# Patient Record
Sex: FEMALE | Race: WHITE | NOT HISPANIC OR LATINO | Employment: OTHER
[De-identification: names, ages, dates, MRNs, and addresses within clinical notes are randomized per-mention and may not be internally consistent; named-entity substitution may affect disease eponyms.]

---

## 2017-01-06 ENCOUNTER — RECORDS - HEALTHEAST (OUTPATIENT)
Dept: ADMINISTRATIVE | Facility: OTHER | Age: 72
End: 2017-01-06

## 2017-01-06 ENCOUNTER — OFFICE VISIT (OUTPATIENT)
Dept: ORTHOPEDICS | Facility: CLINIC | Age: 72
End: 2017-01-06

## 2017-01-06 DIAGNOSIS — M79.671 RIGHT FOOT PAIN: Primary | ICD-10-CM

## 2017-01-06 DIAGNOSIS — M84.374A METATARSAL STRESS FRACTURE OF RIGHT FOOT, INITIAL ENCOUNTER: ICD-10-CM

## 2017-01-06 NOTE — MR AVS SNAPSHOT
After Visit Summary   1/6/2017    Sadia Rider    MRN: [id number]           Patient Information     Date Of Birth          1945        Visit Information        Provider Department      1/6/2017 12:00 PM Shankar Edmondson DPM [facility]        Today's Diagnoses     Right foot pain    -  1        Follow-ups after your visit        Your next 10 appointments already scheduled     Jan 06, 2017  4:40 PM   CT LOWER EXTREMITY RIGHT W/O CONTRAST with UCCT2   [facility] [place] [facility] ([facility])    [address] [place] [address]  [address]st [facility] [id number] [phone]           Please bring any scans or X-rays taken at other hospitals, if similar tests were done. Also bring a list of your medicines, including vitamins, minerals and over-the-counter drugs. It is safest to leave personal items at home.  Be sure to tell your doctor:   If you have any allergies.   If there s any chance you are pregnant.   If you are breastfeeding.   If you have any special needs.  You do not need to do anything special to prepare.  Please wear loose clothing, such as a sweat suit or jogging clothes. Avoid snaps, zippers and other metal. We may ask you to undress and put on a hospital gown.            Jan 13, 2017 12:20 PM   (Arrive by 12:05 PM)   RETURN FOOT/ANKLE with Shankar Edmondson DPM   [facility] ([facility])    [address] [place] [address]  4th [facility] [id number] [phone]            Jan 23, 2017  8:00 AM   (Arrive by 7:45 AM)   MA SCREENING DIGITAL BILATERAL with UCBCMA1   [facility] ([facility])    [address] [place] [id number] [phone]           Do not use any powder, lotion or deodorant under your arms or on your breast. If you do, we will ask you to remove it before your exam.  Wear comfortable, two-piece clothing.  If you  have any allergies, tell your care team.  Bring any previous mammograms from other facilities or have them mailed to the breast center. This mammogram location, [facility], now offers 3D mammography. It doesn't replace a screening mammogram and can be done with a regular screening mammogram. It is optional and not all insurances will pay for it. 3D mammography is a special kind of mammogram that produces a three-dimensional image of the breast by using low dose-xrays. 3D allows the radiologist to see the breast tissue differently from 2D, which reduces the chance of repeat testing due to overlapping breast tissue. If you are interested in have a 3D mammogram, please check with your insurance before you arrive for your exam. 3D mammography is offered to all patients. On the day of your exam you will be asked to sign a form stating yes, you wish to have 3D imaging or, no, you decline.            Jan 23, 2017  8:45 AM   VISUAL FIELD with [facility] EYE VISUAL FIELD   Eye Clinic ([facility])    [address]  [address] [place] [id number] [phone]            Jan 23, 2017  9:15 AM   RETURN GLAUCOMA with Evelyn Mathis MD   Eye Clinic ([facility])    [address]  [address] [facility]  [address] [address]  [facility] [address]   [phone]              Future tests that were ordered for you today     Open Future Orders        Priority Expected Expires Ordered    CT Lower extremity RT w/o contrast Routine  1/6/2018 1/6/2017            Who to contact     Please call your clinic at [phone] to:    Ask questions about your health    Make or cancel appointments    Discuss your medicines    Learn about your test results    Speak to your doctor   If you have compliments or concerns about an experience at your clinic, or if you wish to file a complaint, please contact [facility] Patient Relations at  [phone] or email us at [email or web address]         Additional Information About Your Visit        Watch-Siteshart Information     [employer] gives you secure access to your electronic health record. If you see a primary care provider, you can also send messages to your care team and make appointments. If you have questions, please call your primary care clinic.  If you do not have a primary care provider, please call [phone] and they will assist you.      [employer] is an electronic gateway that provides easy, online access to your medical records. With [employer], you can request a clinic appointment, read your test results, renew a prescription or communicate with your care team.     To access your existing account, please contact your [facility] or call [phone] for assistance.        Care EveryWhere ID     This is your Care EveryWhere ID. This could be used by other organizations to access your [place] medical records  [id number]         Blood Pressure from Last 3 Encounters:   07/13/15 188/91   07/08/14 182/92   08/19/11 141/74    Weight from Last 3 Encounters:   07/13/15 79.289 kg (174 lb 12.8 oz)   07/08/14 78.744 kg (173 lb 9.6 oz)   08/19/11 80.151 kg (176 lb 11.2 oz)                 Today's Medication Changes          These changes are accurate as of: 1/6/17  1:08 PM.  If you have any questions, ask your nurse or doctor.               Stop taking these medicines if you haven't already. Please contact your care team if you have questions.     alendronate 70 MG tablet   Commonly known as:  FOSAMAX   Stopped by:  Shankar Edmondson DPM                    Primary Care Provider Office Phone # Fax #    Delfina Boyle [phone]       [facility] [place] [address] [place] [address]        Thank you!     Thank you for choosing [facility]  for your care. Our goal is always to provide you with excellent care.  Hearing back from our patients is one way we can continue to improve our services. Please take a few minutes to complete the written survey that you may receive in the mail after your visit with us. Thank you!             Your Updated Medication List - Protect others around you: Learn how to safely use, store and throw away your medicines at www.disposemymeds.org.          This list is accurate as of: 1/6/17  1:08 PM.  Always use your most recent med list.                   Brand Name Dispense Instructions for use    aspirin 81 MG tablet      Take 1 tablet by mouth daily.       calcium carb-cholecalciferol 600-500 MG-UNIT Caps      Take 1 capsule by mouth 3 times daily       CENTRUM ULTRA WOMENS PO      Take 1 tablet by mouth daily. ( calcium: 500 mg and vitamin d: 800 units)       Fiber Tabs      Take 2 tablets by mouth daily.       simvastatin 10 MG tablet    ZOCOR     Take 1 tablet by mouth At Bedtime.       VITAMIN D3 PO      Take 1,000 mg by mouth daily

## 2017-01-06 NOTE — PROGRESS NOTES
Date of Service: 1/6/2017    Chief Complaint:   Chief Complaint   Patient presents with     Musculoskeletal Problem     Bilateral foot pain, R>L for about 6 weeks.         HPI: Sadia is a 71 year old female who presents today for further evaluation of pain in primarily the right foot.  She walks 1 hour 4 days a week, but can now only walk for about 30 minutes now since 6 weeks ago. No inciting trauma noted. Nothing hurting as of right now, it hurts most when she is exercising and walking. No previous treatment. Anterior shin pain with some pain in the knee and hip. Pain is sharp in nature. No burning or electric. Right hurts more than left. She feels as though she is walking on the outside of her foot.     Review of Systems: A 14-point review was obtained and added to the medical record.   Answers for HPI/ROS submitted by the patient on 1/6/2017   General Symptoms: No  Skin Symptoms: No  HENT Symptoms: No  EYE SYMPTOMS: No  HEART SYMPTOMS: No  LUNG SYMPTOMS: No  INTESTINAL SYMPTOMS: No  URINARY SYMPTOMS: No  GYNECOLOGIC SYMPTOMS: No  BREAST SYMPTOMS: No  SKELETAL SYMPTOMS: No  BLOOD SYMPTOMS: No  NERVOUS SYSTEM SYMPTOMS: No  MENTAL HEALTH SYMPTOMS: No        PMH:   Past Medical History   Diagnosis Date     Glaucoma       POAG ADV     Tear film insufficiency, unspecified      Basal cell carcinoma        PSxH:   Past Surgical History   Procedure Laterality Date     Cataract iol, rt/lt  1998/2002     BE     Glaucoma surgery  1990s     Trab x 2 RE     Trabeculectomy, mitomycin filter, combined  3/9/2014     LE     Argon laser trabeculoplasty (alt) od (right eye)  5/1989     RE     Yag capsulotomy od (right eye)  8/4/2008     RE     Mohs micrographic procedure       Cholecystectomy         Allergies: Alphagan    SH:   Social History     Social History     Marital Status:      Spouse Name: N/A     Number of Children: N/A     Years of Education: N/A     Occupational History     Not on file.     Social History Main  Topics     Smoking status: Never Smoker      Smokeless tobacco: Never Used     Alcohol Use: No     Drug Use: No     Sexual Activity: Not on file     Other Topics Concern     Not on file     Social History Narrative       FH:   Family History   Problem Relation Age of Onset     Glaucoma Mother      Glaucoma Brother      Retinal detachment Brother      Glaucoma Other      uncle       Objective:      PT and DP pulses are 1/4 bilaterally. CRT is > 3 seconds. Diminished pedal hair.   Gross sensation is Intact bilaterally.   Equinus is noted bilaterally with right greater than left. No pain with active or passive ROM of the ankle, MTJ, 1st ray, or halluces bilaterally. The right foot appears more rectus with weightbearing and the left appears to be in planus. No discreet areas of pain are noted today with palpation. No pain along the courses of the Achilles, PT, or peroneal tendons.  She points to the entire forefoot as the area of pain.   Nails normal bilaterally. No open lesions are noted.     right foot xrays indicated in 3 weightbearing views.    Cortical irregularities noted on the 2nd and 3rd met bases. Lucency noted at the base of the 4th met. 2nd and 3rd met changes may represent previous stress fractures or LisFranc injury. 4th met lesion likely stress fracture.       Assessment: right metatarsal stress fractures in various stages of healing    Plan:  - Pt seen and evaluated  - Xrays were taken and discussed with her.   - I ordered a CT for better visualization of the metatarsals. She is getting that today.   - She is not having active pain today. I dispensed a brace today to help keep her alignment better. I will likely change to a CAM next week if she is agreeable.   - See after CT to discuss further tx options.         Answers for HPI/ROS submitted by the patient on 1/6/2017   General Symptoms: No  Skin Symptoms: No  HENT Symptoms: No  EYE SYMPTOMS: No  HEART SYMPTOMS: No  LUNG SYMPTOMS: No  INTESTINAL  SYMPTOMS: No  URINARY SYMPTOMS: No  GYNECOLOGIC SYMPTOMS: No  BREAST SYMPTOMS: No  SKELETAL SYMPTOMS: No  BLOOD SYMPTOMS: No  NERVOUS SYSTEM SYMPTOMS: No  MENTAL HEALTH SYMPTOMS: No

## 2017-01-06 NOTE — Clinical Note
1/6/2017       RE: Sadia Rider  [address] [facility] [address]     Dear Colleague,    Thank you for referring your patient, Sadia Rider, to the [facility] at [facility]. Please see a copy of my visit note below.    Date of Service: 1/6/2017    Chief Complaint:   Chief Complaint   Patient presents with     Musculoskeletal Problem     Bilateral foot pain, R>L for about 6 weeks.         HPI: Sadia is a 71 year old female who presents today for further evaluation of pain in primarily the right foot.  She walks 1 hour 4 days a week, but can now only walk for about 30 minutes now since 6 weeks ago. No inciting trauma noted. Nothing hurting as of right now, it hurts most when she is exercising and walking. No previous treatment. Anterior shin pain with some pain in the knee and hip. Pain is sharp in nature. No burning or electric. Right hurts more than left. She feels as though she is walking on the outside of her foot.     Review of Systems: A 14-point review was obtained and added to the medical record.   Answers for HPI/ROS submitted by the patient on 1/6/2017   General Symptoms: No  Skin Symptoms: No  HENT Symptoms: No  EYE SYMPTOMS: No  HEART SYMPTOMS: No  LUNG SYMPTOMS: No  INTESTINAL SYMPTOMS: No  URINARY SYMPTOMS: No  GYNECOLOGIC SYMPTOMS: No  BREAST SYMPTOMS: No  SKELETAL SYMPTOMS: No  BLOOD SYMPTOMS: No  NERVOUS SYSTEM SYMPTOMS: No  MENTAL HEALTH SYMPTOMS: No        PMH:   Past Medical History   Diagnosis Date     Glaucoma       POAG ADV     Tear film insufficiency, unspecified      Basal cell carcinoma        PSxH:   Past Surgical History   Procedure Laterality Date     Cataract iol, rt/lt  1998/2002     BE     Glaucoma surgery  1990s     Trab x 2 RE     Trabeculectomy, mitomycin filter, combined  3/9/2014     LE     Argon laser trabeculoplasty (alt) od (right eye)  5/1989     RE     Yag capsulotomy od (right eye)  8/4/2008     RE      Mohs micrographic procedure       Cholecystectomy         Allergies: Alphagan    SH:   Social History     Social History     Marital Status:      Spouse Name: N/A     Number of Children: N/A     Years of Education: N/A     Occupational History     Not on file.     Social History Main Topics     Smoking status: Never Smoker      Smokeless tobacco: Never Used     Alcohol Use: No     Drug Use: No     Sexual Activity: Not on file     Other Topics Concern     Not on file     Social History Narrative       FH:   Family History   Problem Relation Age of Onset     Glaucoma Mother      Glaucoma Brother      Retinal detachment Brother      Glaucoma Other      uncle       Objective:      PT and DP pulses are 1/4 bilaterally. CRT is > 3 seconds. Diminished pedal hair.   Gross sensation is Intact bilaterally.   Equinus is noted bilaterally with right greater than left. No pain with active or passive ROM of the ankle, MTJ, 1st ray, or halluces bilaterally. The right foot appears more rectus with weightbearing and the left appears to be in planus. No discreet areas of pain are noted today with palpation. No pain along the courses of the Achilles, PT, or peroneal tendons.  She points to the entire forefoot as the area of pain.   Nails normal bilaterally. No open lesions are noted.     right foot xrays indicated in 3 weightbearing views.    Cortical irregularities noted on the 2nd and 3rd met bases. Lucency noted at the base of the 4th met. 2nd and 3rd met changes may represent previous stress fractures or LisFranc injury. 4th met lesion likely stress fracture.       Assessment: right metatarsal stress fractures in various stages of healing    Plan:  - Pt seen and evaluated  - Xrays were taken and discussed with her.   - I ordered a CT for better visualization of the metatarsals. She is getting that today.   - She is not having active pain today. I dispensed a brace today to help keep her alignment better. I will likely  change to a CAM next week if she is agreeable.   - See after CT to discuss further tx options.             Again, thank you for allowing me to participate in the care of your patient.      Sincerely,    Shankar Edmondson DPM

## 2017-01-13 ENCOUNTER — RECORDS - HEALTHEAST (OUTPATIENT)
Dept: ADMINISTRATIVE | Facility: OTHER | Age: 72
End: 2017-01-13

## 2017-01-13 ENCOUNTER — OFFICE VISIT (OUTPATIENT)
Dept: ORTHOPEDICS | Facility: CLINIC | Age: 72
End: 2017-01-13

## 2017-01-13 DIAGNOSIS — M84.374A STRESS FRACTURE OF METATARSAL BONE OF RIGHT FOOT: Primary | ICD-10-CM

## 2017-01-13 NOTE — Clinical Note
1/13/2017       RE: Sadia Rider  [address] [facility] [facility] [address]     Dear Colleague,    Thank you for referring your patient, Sadia Rider, to the [facility] at [facility]. Please see a copy of my visit note below.    Chief Complaint:   Chief Complaint   Patient presents with     Results     Follow up. MRI, Right Foot. Pt stated that her right foot hurts due to wearing ankle brace. Pt stated that her foot became swollen and painful.           Allergies   Allergen Reactions     Alphagan [Brimonidine Tartrate] Blisters         Subjective: Sadia is a 71 year old female who presents to the clinic today for a follow up of foot swelling and pain. She says the swelling increased in the foot after starting to wear the brace, however her foot has decreased a lot since the last visit and stopping use of the brace. She has been wearing tennis shoes and stopped walking.     Objective  Physical exam unchanged since the last visit. No edema is noted to the right foot today.     Assessment:   - Stress fractures of the 2nd - 4th metatarsals. 4th metatarsal stress fracture is active.    Plan:   - Pt seen and evaluated. Discussed diagnosis and treatment options with patient in detail. Discussed x-rays and CT with patient today.  - Dispensed 1 large AirCast (short) for patient to wear at all times while ambulating. Educated patient on use.   - She is to avoid distance walking, activities that put stress on the foot (biking, leg strengthening). Okay to perform upper body exercises and swimming.   - Encouraged patient to make an appointment with her PCP to re-evaluate bone density throughout. She is no longer taking a bisphosphonate and is incidentally exhibiting 3 stress fractures in her foot. She will make an appointment with PCP.   - Pt to return to clinic in 4 weeks          Again, thank you for allowing me to participate in the care of your patient.       Sincerely,    Shankar Edmondson DPM

## 2017-01-13 NOTE — NURSING NOTE
Reason For Visit:   Chief Complaint   Patient presents with     Results     Follow up. MRI, Right Foot. Pt stated that her right foot hurts due to wearing ankle brace. Pt stated that her foot became swollen and painful.        Pain Assessment  Patient Currently in Pain: Denies (Pt stated she has pain in her right foot when she walks. )            Current Outpatient Prescriptions   Medication Sig Dispense Refill     aspirin 81 MG tablet Take 1 tablet by mouth daily.       calcium carb-cholecalciferol 600-500 MG-UNIT CAPS Take 1 capsule by mouth 3 times daily       Cholecalciferol (VITAMIN D3 PO) Take 1,000 mg by mouth daily       simvastatin (ZOCOR) 10 MG tablet Take 1 tablet by mouth At Bedtime.       Fiber TABS Take 2 tablets by mouth daily.       Multiple Vitamins-Minerals (CENTRUM ULTRA WOMENS PO) Take 1 tablet by mouth daily ( calcium: 300 mg and vitamin d: 1000 units)            Allergies   Allergen Reactions     Alphagan [Brimonidine Tartrate] Blisters

## 2017-01-13 NOTE — PROGRESS NOTES
Chief Complaint:   Chief Complaint   Patient presents with     Results     Follow up. MRI, Right Foot. Pt stated that her right foot hurts due to wearing ankle brace. Pt stated that her foot became swollen and painful.           Allergies   Allergen Reactions     Alphagan [Brimonidine Tartrate] Blisters         Subjective: Sadia is a 71 year old female who presents to the clinic today for a follow up of foot swelling and pain. She says the swelling increased in the foot after starting to wear the brace, however her foot has decreased a lot since the last visit and stopping use of the brace. She has been wearing tennis shoes and stopped walking.     Objective  Physical exam unchanged since the last visit. No edema is noted to the right foot today.     Assessment:   - Stress fractures of the 2nd - 4th metatarsals. 4th metatarsal stress fracture is active.    Plan:   - Pt seen and evaluated. Discussed diagnosis and treatment options with patient in detail. Discussed x-rays and CT with patient today.  - Dispensed 1 large AirCast (short) for patient to wear at all times while ambulating. Educated patient on use.   - She is to avoid distance walking, activities that put stress on the foot (biking, leg strengthening). Okay to perform upper body exercises and swimming.   - Encouraged patient to make an appointment with her PCP to re-evaluate bone density throughout. She is no longer taking a bisphosphonate and is incidentally exhibiting 3 stress fractures in her foot. She will make an appointment with PCP.   - Pt to return to clinic in 4 weeks

## 2017-01-23 ENCOUNTER — RADIANT APPOINTMENT (OUTPATIENT)
Dept: MAMMOGRAPHY | Facility: CLINIC | Age: 72
End: 2017-01-23

## 2017-01-23 ENCOUNTER — OFFICE VISIT - HEALTHEAST (OUTPATIENT)
Dept: FAMILY MEDICINE | Facility: CLINIC | Age: 72
End: 2017-01-23

## 2017-01-23 ENCOUNTER — RECORDS - HEALTHEAST (OUTPATIENT)
Dept: ADMINISTRATIVE | Facility: OTHER | Age: 72
End: 2017-01-23

## 2017-01-23 ENCOUNTER — APPOINTMENT (OUTPATIENT)
Dept: OPHTHALMOLOGY | Facility: CLINIC | Age: 72
End: 2017-01-23
Attending: OPHTHALMOLOGY
Payer: COMMERCIAL

## 2017-01-23 DIAGNOSIS — H52.13 MYOPIA OF BOTH EYES: Primary | ICD-10-CM

## 2017-01-23 DIAGNOSIS — M81.0 OSTEOPOROSIS: ICD-10-CM

## 2017-01-23 DIAGNOSIS — Q15.0 JUVENILE GLAUCOMA: ICD-10-CM

## 2017-01-23 DIAGNOSIS — E06.3 HYPOTHYROIDISM DUE TO HASHIMOTO'S THYROIDITIS: ICD-10-CM

## 2017-01-23 DIAGNOSIS — M84.376A: ICD-10-CM

## 2017-01-23 DIAGNOSIS — Z12.31 VISIT FOR SCREENING MAMMOGRAM: ICD-10-CM

## 2017-01-23 PROCEDURE — 92015 DETERMINE REFRACTIVE STATE: CPT | Mod: ZF

## 2017-01-23 PROCEDURE — 92081 LIMITED VISUAL FIELD XM: CPT | Mod: ZF | Performed by: OPHTHALMOLOGY

## 2017-01-23 PROCEDURE — 99214 OFFICE O/P EST MOD 30 MIN: CPT | Mod: ZF

## 2017-01-23 ASSESSMENT — REFRACTION_MANIFEST
OD_AXIS: 120
OD_ADD: +3.00
OS_SPHERE: -0.50
OS_ADD: +3.00
OS_CYLINDER: SPHERE
OD_SPHERE: -4.00
OD_CYLINDER: +1.50

## 2017-01-23 ASSESSMENT — CONF VISUAL FIELD
OD_INFERIOR_NASAL_RESTRICTION: 3
OD_SUPERIOR_NASAL_RESTRICTION: 3
OD_SUPERIOR_TEMPORAL_RESTRICTION: 3
OD_INFERIOR_TEMPORAL_RESTRICTION: 3

## 2017-01-23 ASSESSMENT — VISUAL ACUITY
OD_CC: 20/50-
OD_PH_CC: 20/30-
METHOD: SNELLEN - LINEAR
OS_CC: 20/150 ECC
CORRECTION_TYPE: GLASSES

## 2017-01-23 ASSESSMENT — MIFFLIN-ST. JEOR: SCORE: 1252.92

## 2017-01-23 ASSESSMENT — TONOMETRY
IOP_METHOD: APPLANATION
OS_IOP_MMHG: 03
OD_IOP_MMHG: 07

## 2017-01-23 ASSESSMENT — REFRACTION_WEARINGRX
OD_CYLINDER: +2.25
OS_SPHERE: -0.75
OS_AXIS: 124
OD_SPHERE: -5.00
OD_AXIS: 119
SPECS_TYPE: SVL
OS_CYLINDER: +0.50

## 2017-01-23 ASSESSMENT — CUP TO DISC RATIO: OS_RATIO: 0.6

## 2017-01-23 ASSESSMENT — SLIT LAMP EXAM - LIDS
COMMENTS: NORMAL
COMMENTS: NORMAL

## 2017-01-23 NOTE — PROGRESS NOTES
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    Assessment & Plan:  1. Primary open angle glaucoma (POAG)    Trabeculectomy right eye in 1990    Trabeculectomy left eye 2004    S/p Argon laser trabeculoplasty both eyes 2009  Currently not using eyedrops  Allergy to Alphagan     2. posterior chamber intraocular lens (PCIOL) both eyes   Cataract extraction right eye 1998 and Cataract extraction left eye  2002    Good intraocular pressure on no medications    return to clinic: 6 months with  LVC  --do yearly given low intraocular pressure, inconsistent with 24-2    Attending Physician Attestation:  Complete documentation of historical and exam elements from today's encounter can be found in the full encounter summary report (not reduplicated in this progress note). I personally obtained the chief complaint(s) and history of present illness. I confirmed and edited asnecessary the review of systems, past medical/surgical history, family history, social history, and examination findings as documented by others; and I examined the patient myself. I personally reviewed the relevant tests, images, and reports as documented above. I formulated and edited as necessary the assessment and plan and discussed the findings and management plan with the patient and family.  - Evelyn Mathis MD 9:13 AM 1/23/2017

## 2017-01-23 NOTE — NURSING NOTE
Chief Complaints and History of Present Illnesses   Patient presents with     Glaucoma Follow Up     HPI    Affected eye(s):  Both   Symptoms:     Blurred vision   Decreased vision   Difficulty watching television   Tearing   Dryness         Do you have eye pain now?:  No      Comments:  Mirna QUINONEZ January 23, 2017 8:14 AM

## 2017-01-23 NOTE — NURSING NOTE
Chief Complaints and History of Present Illnesses   Patient presents with     Glaucoma Follow Up     HPI    Affected eye(s):  Both   Symptoms:     Blurred vision   Decreased vision   Difficulty watching television   No Dryness         Do you have eye pain now?:  No      Comments:  Mirna QUINONEZ January 23, 2017 8:14 AM

## 2017-01-23 NOTE — MR AVS SNAPSHOT
After Visit Summary   1/23/2017    Sadia Rider    MRN: [id number]           Patient Information     Date Of Birth          1945        Visit Information        Provider Department      1/23/2017 9:15 AM Evelyn Mathis MD [facility]        Today's Diagnoses     Myopia of both eyes    -  1     Juvenile glaucoma - Both Eyes            Follow-ups after your visit        Follow-up notes from your care team     Return in about 6 months (around 7/23/2017) for visual field LVC .      Your next 10 appointments already scheduled     Feb 10, 2017 12:40 PM   (Arrive by 12:25 PM)   RETURN FOOT/ANKLE with Shankar Edmondson DPM   [facility] ([facility])    [address]  4th Floor  [place] [facility] [id number] [phone]            Jul 24, 2017  8:15 AM   VISUAL FIELD with [facility] EYE VISUAL FIELD   Eye Clinic ([facility])    [address]  [address]  [address]  [place] [facility] [address] [phone]            Jul 24, 2017  8:45 AM   RETURN GLAUCOMA with Evelyn Mathis MD   Eye Clinic ([facility])    [address]  [address]  [address]  [place] [facility] [address] [phone]              Who to contact     Please call your clinic at [phone] to:    Ask questions about your health    Make or cancel appointments    Discuss your medicines    Learn about your test results    Speak to your doctor   If you have compliments or concerns about an experience at your clinic, or if you wish to file a complaint, please contact [facility] Patient Relations at [phone] or email us at [email or web address]         Additional Information About Your Visit        MyChart Information     [email or web address] gives you secure access to your electronic health record. If you see a primary care provider, you can also send messages to your care team and make appointments. If you have  questions, please call your primary care clinic.  If you do not have a primary care provider, please call [phone] and they will assist you.      [employer] is an electronic gateway that provides easy, online access to your medical records. With [employer] you can request a clinic appointment, read your test results, renew a prescription or communicate with your care team.     To access your existing account, please contact your [facility] or call [phone] for assistance.        Care EveryWhere ID     This is your Care EveryWhere ID. This could be used by other organizations to access your [facility] medical records  [id number]         Blood Pressure from Last 3 Encounters:   07/13/15 188/91   07/08/14 182/92   08/19/11 141/74    Weight from Last 3 Encounters:   07/13/15 79.289 kg (174 lb 12.8 oz)   07/08/14 78.744 kg (173 lb 9.6 oz)   08/19/11 80.151 kg (176 lb 11.2 oz)              We Performed the Following     Kinetic DMV OU        Primary Care Provider Office Phone # Fax #    Delfina ESCOBEDO Boyle [phone]       [facility] [place] [address] [place] [address] [address] [place] [address]        Thank you!     Thank you for choosing EYE CLINIC  for your care. Our goal is always to provide you with excellent care. Hearing back from our patients is one way we can continue to improve our services. Please take a few minutes to complete the written survey that you may receive in the mail after your visit with us. Thank you!             Your Updated Medication List - Protect others around you: Learn how to safely use, store and throw away your medicines at www.disposemymeds.org.          This list is accurate as of: 1/23/17  9:39 AM.  Always use your most recent med list.                   Brand Name Dispense Instructions for use    aspirin 81 MG tablet      Take 1 tablet by mouth daily.       calcium carb-cholecalciferol 600-500 MG-UNIT Caps      Take 1 capsule by mouth 3 times  daily       CENTRUM ULTRA WOMENS PO      Take 1 tablet by mouth daily ( calcium: 300 mg and vitamin d: 1000 units)       Fiber Tabs      Take 2 tablets by mouth daily.       simvastatin 10 MG tablet    ZOCOR     Take 1 tablet by mouth At Bedtime.       VITAMIN D3 PO      Take 1,000 mg by mouth daily

## 2017-02-10 ENCOUNTER — RECORDS - HEALTHEAST (OUTPATIENT)
Dept: ADMINISTRATIVE | Facility: OTHER | Age: 72
End: 2017-02-10

## 2017-02-10 ENCOUNTER — OFFICE VISIT (OUTPATIENT)
Dept: ORTHOPEDICS | Facility: CLINIC | Age: 72
End: 2017-02-10

## 2017-02-10 DIAGNOSIS — L60.0 ONYCHOCRYPTOSIS: ICD-10-CM

## 2017-02-10 DIAGNOSIS — B35.1 DERMATOPHYTOSIS OF NAIL: ICD-10-CM

## 2017-02-10 DIAGNOSIS — M84.374D STRESS FRACTURE OF METATARSAL BONE, RIGHT, WITH ROUTINE HEALING, SUBSEQUENT ENCOUNTER: Primary | ICD-10-CM

## 2017-02-10 NOTE — NURSING NOTE
Reason For Visit:   Chief Complaint   Patient presents with     Foot Problems     f/u stress fractures in right foot and new pain under toenail of right great toe       There were no vitals taken for this visit.    Pain Assessment  Patient Currently in Pain: No

## 2017-02-10 NOTE — PROGRESS NOTES
Chief Complaint: No chief complaint on file.         Allergies   Allergen Reactions     Alphagan [Brimonidine Tartrate] Blisters         Subjective: Sadia is a 71 year old female who presents to the clinic today for a follow up of right foot 4th metatarsal fracture. She has been religiously wearing the CAM walker. She relates decreased pain in the foot today. She has modulated her activity since the last visit. She has a new complaint today: her right big toenail is curved in and is painful for her when she is walking.     Objective    DP/PT pulses 2/4 on the right.   No pain with palpation of the right 2nd, 3rd, or 4th metatarsals. No pain with ROM of the corresponding MTPJs. No ecchymosis or edema noted.   No open lesions. Right hallux nail is incurvated and painful at the lateral nail. No s/s of infection noted to the area.     right foot xrays indicated in 3 weightbearing views.    fracture is noted at the lateral 4th metatarsal shaft. Possible bony bridging at the most lateral aspect of the fracture line. There looks to be some remodeling of the bases of the 2nd and 3rd mets. No new acute processes noted.      Assessment: Stress fractures of the right 2-4th metatarsals. 4th is still active but possible bone bridge.  onychocryptosis    Plan:   - Pt seen and evaluated  - She should continue with the boot for another 2 weeks. After the 2 weeks, she can try to transition into a tennis shoe around her house. At the gym, she can add elliptical and bike, with the boot. Even after these next 2 weeks, she should continue to wear the boot at the gym.  - Right hallux nail was debrided of the offending nail margins. No anesthesia needed.    - Pt to return to clinic in 1 month with re-xray of the right foot 3 views.

## 2017-02-10 NOTE — LETTER
2/10/2017       RE: Sadia Rider  [address] [address]     Dear Colleague,    Thank you for referring your patient, Sadia Rider, to the [facility] at [facility]. Please see a copy of my visit note below.    Chief Complaint: No chief complaint on file.         Allergies   Allergen Reactions     Alphagan [Brimonidine Tartrate] Blisters         Subjective: Sadia is a 71 year old female who presents to the clinic today for a follow up of right foot 4th metatarsal fracture. She has been religiously wearing the CAM walker. She relates decreased pain in the foot today. She has modulated her activity since the last visit. She has a new complaint today: her right big toenail is curved in and is painful for her when she is walking.     Objective    DP/PT pulses 2/4 on the right.   No pain with palpation of the right 2nd, 3rd, or 4th metatarsals. No pain with ROM of the corresponding MTPJs. No ecchymosis or edema noted.   No open lesions. Right hallux nail is incurvated and painful at the lateral nail. No s/s of infection noted to the area.     right foot xrays indicated in 3 weightbearing views.    fracture is noted at the lateral 4th metatarsal shaft. Possible bony bridging at the most lateral aspect of the fracture line. There looks to be some remodeling of the bases of the 2nd and 3rd mets. No new acute processes noted.      Assessment: Stress fractures of the right 2-4th metatarsals. 4th is still active but possible bone bridge.  onychocryptosis    Plan:   - Pt seen and evaluated  - She should continue with the boot for another 2 weeks. After the 2 weeks, she can try to transition into a tennis shoe around her house. At the gym, she can add elliptical and bike, with the boot. Even after these next 2 weeks, she should continue to wear the boot at the gym.  - Right hallux nail was debrided of the offending nail margins. No anesthesia  needed.    - Pt to return to clinic in 1 month with re-xray of the right foot 3 views.       Again, thank you for allowing me to participate in the care of your patient.      Sincerely,    Shankar Edmondson DPM

## 2017-03-14 ENCOUNTER — OFFICE VISIT (OUTPATIENT)
Dept: ORTHOPEDICS | Facility: CLINIC | Age: 72
End: 2017-03-14

## 2017-03-14 ENCOUNTER — RECORDS - HEALTHEAST (OUTPATIENT)
Dept: ADMINISTRATIVE | Facility: OTHER | Age: 72
End: 2017-03-14

## 2017-03-14 DIAGNOSIS — M84.374D STRESS FRACTURE OF METATARSAL BONE, RIGHT, WITH ROUTINE HEALING, SUBSEQUENT ENCOUNTER: Primary | ICD-10-CM

## 2017-03-14 NOTE — PROGRESS NOTES
Chief Complaint:   Chief Complaint   Patient presents with     RECHECK     1 month follow up. Stress fracture, Right foot. Pt stated that she is concerned about her left foot. She occasionally gets a burning sensation under her toes.           Allergies   Allergen Reactions     Alphagan [Brimonidine Tartrate] Blisters         Subjective: Sadia is a 71 year old female who presents to the clinic today for a follow up of right foot stress fx. She relates that over the last 2 weeks, she has been out of the boot more. However, she is very hesitant to be in a shoe still. She relates no pain when she walks around the house in her egular shoe and when on the stationary bike. She did feel a twinge on the elliptical so has not been doing that. She also relates some burning in the balls of the left toes which has been present for about a week.     Objective    No pain is noted with palpation of the right 2nd, 3rd, or 4th metatarsal bases. No pain with active or passive ROM of the lesser digit MTPJs. No ecchymosis or edema on the right foot. No pain with palpation of the left lesser MTPJs or with full ROM of the digits. No s/s of infection to the area noted. Hammered digits BL    right foot xrays indicated in 3 weightbearing views.    Stress fracture is demonstrated on the 4th metatarsal. Appears similar to the last visit. No new fx lines noted today      Assessment: Right 4th met stress fx. Burning in the left toes - possibly secondary to compensation for prolonged boot wear.     Plan:   - Pt seen and evaluated  - She should transition out of the boot now. I want her to start WB to increase the force on the metatarsal to allow for callus formation and remodeling. She should continue with the bike but hold off on the elliptical and long distance walking for now. After 2 weeks, she can start to gradually build up her walking to tolerance. She should always wear shoes during this transition period.    - If there is any fx line  present next month, I will order a bone stim, as the area will then be classified as a non-union.   - Pt to return to clinic in 1 month or sooner if problems arise.

## 2017-03-14 NOTE — MR AVS SNAPSHOT
After Visit Summary   3/14/2017    Sadia Rider    MRN: [id number]           Patient Information     Date Of Birth          1945        Visit Information        Provider Department      3/14/2017 12:40 PM Shankar Edmondson DPM [facility]        Today's Diagnoses     Stress fracture of metatarsal bone, right, with routine healing, subsequent encounter    -  1       Follow-ups after your visit        Your next 10 appointments already scheduled     Apr 11, 2017 12:00 PM CDT   (Arrive by 11:45 AM)   RETURN FOOT/ANKLE with DANE Reza [facility] ([facility])    [address]  4th Floor  [place] [facility] [address]   [phone]            Jul 24, 2017  8:15 AM CDT   VISUAL FIELD with [facility] EYE VISUAL FIELD   Eye Clinic ([facility])    [address]  [address]  [place] [facility] [address]   [phone]            Jul 24, 2017  8:45 AM CDT   RETURN GLAUCOMA with Evelyn Mathis MD   Eye Clinic ([facility])    [address]  [address]  [place] [facility] [address]   [phone]              Who to contact     Please call your clinic at [phone] to:    Ask questions about your health    Make or cancel appointments    Discuss your medicines    Learn about your test results    Speak to your doctor   If you have compliments or concerns about an experience at your clinic, or if you wish to file a complaint, please contact [facility] Patient Relations at [phone] or email us at [email or web address]         Additional Information About Your Visit        MyChart Information     [email or web address] gives you secure access to your electronic health record. If you see a primary care provider, you can also send messages to your care team and make appointments. If you have questions, please call your primary care clinic.   If you do not have a primary care provider, please call [phone] and they will assist you.      [employer] is an electronic gateway that provides easy, online access to your medical records. With [employer], you can request a clinic appointment, read your test results, renew a prescription or communicate with your care team.     To access your existing account, please contact your [facility] or call [phone] for assistance.        Care EveryWhere ID     This is your Care EveryWhere ID. This could be used by other organizations to access your [place] medical records  [id number]         Blood Pressure from Last 3 Encounters:   07/13/15 (!) 188/91   07/08/14 (!) 182/92   08/19/11 141/74    Weight from Last 3 Encounters:   07/13/15 79.3 kg (174 lb 12.8 oz)   07/08/14 78.7 kg (173 lb 9.6 oz)   08/19/11 80.2 kg (176 lb 11.2 oz)               Primary Care Provider Office Phone # Fax #    Delfina FANNY Boyle [phone]       [facility] [place] [address] [place] [address]        Thank you!     Thank you for choosing [facility]THOP[facility] CL[facility]  for your care. Our goal is always to provide you with excellent care. Hearing back from our patients is one way we can continue to improve our services. Please take a few minutes to complete the written survey that you may receive in the mail after your visit with us. Thank you!             Your Updated Medication List - Protect others around you: Learn how to safely use, store and throw away your medicines at www.disposemymeds.org.          This list is accurate as of: 3/14/17  1:18 PM.  Always use your most recent med list.                   Brand Name Dispense Instructions for use    aspirin 81 MG tablet      Take 1 tablet by mouth daily.       calcium carb-cholecalciferol 600-500 MG-UNIT Caps      Take 1 capsule by mouth 3 times daily       CENTRUM ULTRA WOMENS PO      Take 1 tablet by mouth daily ( calcium: 300  mg and vitamin d: 1000 units)       Fiber Tabs      Take 2 tablets by mouth daily.       simvastatin 10 MG tablet    ZOCOR     Take 1 tablet by mouth At Bedtime.       VITAMIN D3 PO      Take 1,000 mg by mouth daily

## 2017-03-14 NOTE — NURSING NOTE
Reason For Visit:   Chief Complaint   Patient presents with     RECHECK     1 month follow up. Stress fracture, Right foot. Pt stated that she is concerned about her left foot. She occasionally gets a burning sensation under her toes.        Pain Assessment  Patient Currently in Pain: Denies (Pt stated once in a while she gets a shooting pain in her right foot. Pt stated she also gets a an occasional burning sensation under the toes.  )               Current Outpatient Prescriptions   Medication Sig Dispense Refill     calcium carb-cholecalciferol 600-500 MG-UNIT CAPS Take 1 capsule by mouth 3 times daily       Cholecalciferol (VITAMIN D3 PO) Take 1,000 mg by mouth daily       simvastatin (ZOCOR) 10 MG tablet Take 1 tablet by mouth At Bedtime.       Fiber TABS Take 2 tablets by mouth daily.       Multiple Vitamins-Minerals (CENTRUM ULTRA WOMENS PO) Take 1 tablet by mouth daily ( calcium: 300 mg and vitamin d: 1000 units)       aspirin 81 MG tablet Take 1 tablet by mouth daily.            Allergies   Allergen Reactions     Alphagan [Brimonidine Tartrate] Blisters

## 2017-03-14 NOTE — LETTER
3/14/2017       RE: Sadia Rider  [address] [address]     Dear Colleague,    Thank you for referring your patient, Sadia Rider, to the [facility] at [facility]. Please see a copy of my visit note below.    Chief Complaint:   Chief Complaint   Patient presents with     RECHECK     1 month follow up. Stress fracture, Right foot. Pt stated that she is concerned about her left foot. She occasionally gets a burning sensation under her toes.           Allergies   Allergen Reactions     Alphagan [Brimonidine Tartrate] Blisters         Subjective: Sadia is a 71 year old female who presents to the clinic today for a follow up of right foot stress fx. She relates that over the last 2 weeks, she has been out of the boot more. However, she is very hesitant to be in a shoe still. She relates no pain when she walks around the house in her egular shoe and when on the stationary bike. She did feel a twinge on the elliptical so has not been doing that. She also relates some burning in the balls of the left toes which has been present for about a week.     Objective    No pain is noted with palpation of the right 2nd, 3rd, or 4th metatarsal bases. No pain with active or passive ROM of the lesser digit MTPJs. No ecchymosis or edema on the right foot. No pain with palpation of the left lesser MTPJs or with full ROM of the digits. No s/s of infection to the area noted. Hammered digits BL    right foot xrays indicated in 3 weightbearing views.    Stress fracture is demonstrated on the 4th metatarsal. Appears similar to the last visit. No new fx lines noted today      Assessment: Right 4th met stress fx. Burning in the left toes - possibly secondary to compensation for prolonged boot wear.     Plan:   - Pt seen and evaluated  - She should transition out of the boot now. I want her to start WB to increase the force on the metatarsal to allow for  callus formation and remodeling. She should continue with the bike but hold off on the elliptical and long distance walking for now. After 2 weeks, she can start to gradually build up her walking to tolerance. She should always wear shoes during this transition period.    - If there is any fx line present next month, I will order a bone stim, as the area will then be classified as a non-union.   - Pt to return to clinic in 1 month or sooner if problems arise.       Again, thank you for allowing me to participate in the care of your patient.      Sincerely,    Shankar Edmondson DPM

## 2017-04-11 ENCOUNTER — RECORDS - HEALTHEAST (OUTPATIENT)
Dept: ADMINISTRATIVE | Facility: OTHER | Age: 72
End: 2017-04-11

## 2017-04-11 ENCOUNTER — OFFICE VISIT (OUTPATIENT)
Dept: ORTHOPEDICS | Facility: CLINIC | Age: 72
End: 2017-04-11

## 2017-04-11 DIAGNOSIS — M84.374D STRESS FRACTURE OF METATARSAL BONE, RIGHT, WITH ROUTINE HEALING, SUBSEQUENT ENCOUNTER: Primary | ICD-10-CM

## 2017-04-11 NOTE — PROGRESS NOTES
Chief Complaint:   Chief Complaint   Patient presents with     RECHECK     1 month follow up.  Right foot stress fracture.           Allergies   Allergen Reactions     Alphagan [Brimonidine Tartrate] Blisters         Subjective: Sadia is a 72 year old female who presents to the clinic today for a follow up of right foot stress fracture. She relates that the pain is much better today. She has been sequestered to the elliptical and the stationary bike and is doing these and is not having pain. She does admit to some pain while she is sleeping, but this is infrequent.  She would like to know if she can use the rower, and if she can start to walk around the neighborhood again.     Objective    Right foot was examined today. There is no pain along the 4th metatarsal at the level of the stress fracture. No pain in the adjacent interspaces either. No pain with any MTPJ ROM on the right foot. No edema or ecchymosis noted.     right foot xrays indicated in 3 weightbearing views.    metatarsal stress fracture is noted on the right 4th metatarsal. The area appears less radiolucent today and has some increased callus formation noted to the area. No other acute processes are noted.     Assessment: Healing right 4th metatarsal stress fx    Plan:   - Pt seen and evaluated  - Xrays were taken, independently interpreted, and discussed with her.   - She can add rowing in a stiff soled shoe and a gradual increase in her walking as tolerated.   - Pt to return to clinic in 3 months unless problems arise. If so, we can order a bone stim for her. As of right now, it is not needed.

## 2017-04-11 NOTE — MR AVS SNAPSHOT
After Visit Summary   4/11/2017    Sadia Rider    MRN: [id number]           Patient Information     Date Of Birth          1945        Visit Information        Provider Department      4/11/2017 12:00 PM Shankar Edmondson DPM M [facility]        Today's Diagnoses     Stress fracture of metatarsal bone, right, with routine healing, subsequent encounter    -  1       Follow-ups after your visit        Your next 10 appointments already scheduled     Jul 24, 2017  8:15 AM CDT   VISUAL FIELD with [facility] EYE VISUAL FIELD   Eye Clinic ([facility])    [address]  [address] [place] [address]   [phone]            Jul 24, 2017  8:45 AM CDT   RETURN GLAUCOMA with Evelyn Mathis MD   Eye Clinic ([facility])    [address]  [address] [address]   [phone]            Jul 24, 2017  1:00 PM CDT   (Arrive by 12:45 PM)   Return Visit with DANE Reza [facility] ([facility])    [address] [facility]  [address] [facility] [id number] [phone]              Who to contact     Please call your clinic at [phone] to:    Ask questions about your health    Make or cancel appointments    Discuss your medicines    Learn about your test results    Speak to your doctor   If you have compliments or concerns about an experience at your clinic, or if you wish to file a complaint, please contact [facility] Patient Relations at [phone] or email us at [email or web address]         Additional Information About Your Visit        MyChart Information     [employer] gives you secure access to your electronic health record. If you see a primary care provider, you can also send messages to your care team and make appointments. If you have questions, please call your primary care clinic.  If you  do not have a primary care provider, please call [phone] and they will assist you.      [employer] is an electronic gateway that provides easy, online access to your medical records. With [employer], you can request a clinic appointment, read your test results, renew a prescription or communicate with your care team.     To access your existing account, please contact your [facility] or call [phone] for assistance.        Care EveryWhere ID     This is your Care EveryWhere ID. This could be used by other organizations to access your [place] medical records  [id number]         Blood Pressure from Last 3 Encounters:   07/13/15 (!) 188/91   07/08/14 (!) 182/92   08/19/11 141/74    Weight from Last 3 Encounters:   07/13/15 79.3 kg (174 lb 12.8 oz)   07/08/14 78.7 kg (173 lb 9.6 oz)   08/19/11 80.2 kg (176 lb 11.2 oz)              Today, you had the following     No orders found for display       Primary Care Provider Office Phone # Fax #    Delfina ESCOBEDO Boyle [phone]       [facility] [place] [address] [place] [address]        Thank you!     Thank you for choosing [facility]  for your care. Our goal is always to provide you with excellent care. Hearing back from our patients is one way we can continue to improve our services. Please take a few minutes to complete the written survey that you may receive in the mail after your visit with us. Thank you!             Your Updated Medication List - Protect others around you: Learn how to safely use, store and throw away your medicines at www.disposemymeds.org.          This list is accurate as of: 4/11/17 12:25 PM.  Always use your most recent med list.                   Brand Name Dispense Instructions for use    aspirin 81 MG tablet      Take 1 tablet by mouth daily.       calcium carb-cholecalciferol 600-500 MG-UNIT Caps      Take 1 capsule by mouth 3 times daily       CENTRUM ULTRA  WOMENS PO      Take 1 tablet by mouth daily ( calcium: 300 mg and vitamin d: 1000 units)       Fiber Tabs      Take 2 tablets by mouth daily.       simvastatin 10 MG tablet    ZOCOR     Take 1 tablet by mouth At Bedtime.       VITAMIN D3 PO      Take 1,000 mg by mouth daily

## 2017-04-11 NOTE — NURSING NOTE
Reason For Visit:   Chief Complaint   Patient presents with     RECHECK     1 month follow up.  Right foot stress fracture.        Pain Assessment  Patient Currently in Pain: Denies (Pt stated that the pain comes and goes. )            Current Outpatient Prescriptions   Medication Sig Dispense Refill     calcium carb-cholecalciferol 600-500 MG-UNIT CAPS Take 1 capsule by mouth 3 times daily       Cholecalciferol (VITAMIN D3 PO) Take 1,000 mg by mouth daily       simvastatin (ZOCOR) 10 MG tablet Take 1 tablet by mouth At Bedtime.       Fiber TABS Take 2 tablets by mouth daily.       Multiple Vitamins-Minerals (CENTRUM ULTRA WOMENS PO) Take 1 tablet by mouth daily ( calcium: 300 mg and vitamin d: 1000 units)       aspirin 81 MG tablet Take 1 tablet by mouth daily.            Allergies   Allergen Reactions     Alphagan [Brimonidine Tartrate] Blisters

## 2017-04-11 NOTE — LETTER
4/11/2017       RE: Sadia Rider  [address] [address]     Dear Colleague,    Thank you for referring your patient, Sadia Rider, to the [facility] at [facility]. Please see a copy of my visit note below.    Chief Complaint:   Chief Complaint   Patient presents with     RECHECK     1 month follow up.  Right foot stress fracture.           Allergies   Allergen Reactions     Alphagan [Brimonidine Tartrate] Blisters         Subjective: Sadia is a 72 year old female who presents to the clinic today for a follow up of right foot stress fracture. She relates that the pain is much better today. She has been sequestered to the elliptical and the stationary bike and is doing these and is not having pain. She does admit to some pain while she is sleeping, but this is infrequent.  She would like to know if she can use the rower, and if she can start to walk around the neighborhood again.     Objective    Right foot was examined today. There is no pain along the 4th metatarsal at the level of the stress fracture. No pain in the adjacent interspaces either. No pain with any MTPJ ROM on the right foot. No edema or ecchymosis noted.     right foot xrays indicated in 3 weightbearing views.    metatarsal stress fracture is noted on the right 4th metatarsal. The area appears less radiolucent today and has some increased callus formation noted to the area. No other acute processes are noted.     Assessment: Healing right 4th metatarsal stress fx    Plan:   - Pt seen and evaluated  - Xrays were taken, independently interpreted, and discussed with her.   - She can add rowing in a stiff soled shoe and a gradual increase in her walking as tolerated.   - Pt to return to clinic in 3 months unless problems arise. If so, we can order a bone stim for her. As of right now, it is not needed.      Again, thank you for allowing me to participate in the care  of your patient.      Sincerely,    Shankar Edmondson DPM

## 2017-07-24 ENCOUNTER — OFFICE VISIT (OUTPATIENT)
Dept: OPHTHALMOLOGY | Facility: CLINIC | Age: 72
End: 2017-07-24
Attending: OPHTHALMOLOGY
Payer: COMMERCIAL

## 2017-07-24 ENCOUNTER — OFFICE VISIT (OUTPATIENT)
Dept: DERMATOLOGY | Facility: CLINIC | Age: 72
End: 2017-07-24

## 2017-07-24 ENCOUNTER — OFFICE VISIT (OUTPATIENT)
Dept: ORTHOPEDICS | Facility: CLINIC | Age: 72
End: 2017-07-24

## 2017-07-24 ENCOUNTER — RECORDS - HEALTHEAST (OUTPATIENT)
Dept: ADMINISTRATIVE | Facility: OTHER | Age: 72
End: 2017-07-24

## 2017-07-24 VITALS — HEIGHT: 65 IN

## 2017-07-24 DIAGNOSIS — D48.5 NEOPLASM OF UNCERTAIN BEHAVIOR OF SKIN: Primary | ICD-10-CM

## 2017-07-24 DIAGNOSIS — M84.374D STRESS FRACTURE OF METATARSAL BONE, RIGHT, WITH ROUTINE HEALING, SUBSEQUENT ENCOUNTER: Primary | ICD-10-CM

## 2017-07-24 DIAGNOSIS — L82.1 SEBORRHEIC KERATOSIS: ICD-10-CM

## 2017-07-24 DIAGNOSIS — D22.9 MULTIPLE BENIGN NEVI: ICD-10-CM

## 2017-07-24 DIAGNOSIS — Q15.0 JUVENILE GLAUCOMA: Primary | ICD-10-CM

## 2017-07-24 DIAGNOSIS — Z12.83 SKIN CANCER SCREENING: ICD-10-CM

## 2017-07-24 PROCEDURE — 92083 EXTENDED VISUAL FIELD XM: CPT | Mod: ZF | Performed by: OPHTHALMOLOGY

## 2017-07-24 PROCEDURE — 99213 OFFICE O/P EST LOW 20 MIN: CPT | Mod: ZF

## 2017-07-24 ASSESSMENT — CONF VISUAL FIELD
OS_SUPERIOR_TEMPORAL_RESTRICTION: 3
OD_SUPERIOR_NASAL_RESTRICTION: 3
OD_INFERIOR_NASAL_RESTRICTION: 1
OS_SUPERIOR_NASAL_RESTRICTION: 3

## 2017-07-24 ASSESSMENT — REFRACTION_WEARINGRX
OS_AXIS: 000
OS_CYLINDER: +0.00
OD_CYLINDER: +1.50
OD_SPHERE: -4.00
OS_ADD: +3.00
OD_AXIS: 120
OD_ADD: +3.00
OS_SPHERE: -0.50

## 2017-07-24 ASSESSMENT — TONOMETRY
OD_IOP_MMHG: 8
OS_IOP_MMHG: 4
IOP_METHOD: APPLANATION

## 2017-07-24 ASSESSMENT — CUP TO DISC RATIO: OS_RATIO: 0.6

## 2017-07-24 ASSESSMENT — VISUAL ACUITY
OS_CC: 20/200 ECC
OD_PH_CC: 20/40-3
METHOD: SNELLEN - LINEAR
OD_CC+: +1
OD_CC: 20/50
OS_PH_CC: 20/150

## 2017-07-24 ASSESSMENT — PAIN SCALES - GENERAL
PAINLEVEL: NO PAIN (0)
PAINLEVEL: NO PAIN (0)

## 2017-07-24 ASSESSMENT — SLIT LAMP EXAM - LIDS
COMMENTS: NORMAL
COMMENTS: NORMAL

## 2017-07-24 NOTE — PROGRESS NOTES
"[facility] Dermatology Note    Dermatology Problem List:  1. History of BCC - nose s/p excision 2012  2. History of intertrigo - OTC anti-fungal  3. NUB - right lower cheek s/p biopsy 7/24/17  4. Last full body skin check 7/24/17    CC:   Chief Complaint   Patient presents with     Skin Check     Skin check, Sadia states \" I have one spot under my left breast.\" Personal hx of BCC.     Date of Service: Jul 24, 2017    History of Present Illness:  Ms. Sadia Rider is a 72 year old female who presents for a skin check. Today the patient reports that she has a spot of concern under her left breast. This spot has been there for a year or so. She is overall doing well. She wears sunscreen with tint in them. She has a history of a mole that was removed by cryotherapy and excisions. The patient reports no other lesions of concern at this time.    Otherwise, the patient reports no painful, bleeding, nonhealing, or pruritic lesions, and denies new or changing moles.    Past Medical History:   Patient Active Problem List   Diagnosis     Juvenile glaucoma     Myopia of both eyes     Stress fracture of metatarsal bone, right, with routine healing, subsequent encounter     Past Medical History:   Diagnosis Date     Basal cell carcinoma      Glaucoma      POAG ADV     Tear film insufficiency, unspecified      Past Surgical History:   Procedure Laterality Date     ARGON LASER TRABECULOPLASTY (ALT) OD (RIGHT EYE)  5/1989    RE     C STOMACH SURGERY PROCEDURE UNLISTED       CATARACT IOL, RT/LT  1998/2002    BE     CHOLECYSTECTOMY       GLAUCOMA SURGERY  1990s    Trab x 2 RE     MOHS MICROGRAPHIC PROCEDURE       TRABECULECTOMY, MITOMYCIN FILTER, COMBINED  3/9/2014    LE     YAG CAPSULOTOMY OD (RIGHT EYE)  8/4/2008    RE     Social History:  She had a lot of sun exposure when she was younger, did not use sunscreen when she was younger.     Family History:  Not addressed at this visit.     Medications:  Current " Outpatient Prescriptions   Medication Sig Dispense Refill     calcium carb-cholecalciferol 600-500 MG-UNIT CAPS Take 1 capsule by mouth 3 times daily       Cholecalciferol (VITAMIN D3 PO) Take 1,000 mg by mouth daily       simvastatin (ZOCOR) 10 MG tablet Take 1 tablet by mouth At Bedtime.       Fiber TABS Take 2 tablets by mouth daily.       Multiple Vitamins-Minerals (CENTRUM ULTRA WOMENS PO) Take 1 tablet by mouth daily ( calcium: 300 mg and vitamin d: 1000 units)       aspirin 81 MG tablet Take 1 tablet by mouth daily.       Allergies:  Allergies   Allergen Reactions     Alphagan [Brimonidine Tartrate] Blisters     Review of Systems:  - Skin: As above in HPI. No additional skin concerns.  - No chronic cough, night sweats, fevers, or unexpected weight loss    Physical exam:  Vitals: There were no vitals taken for this visit.  GEN: This is a well developed, well-nourished female in no acute distress, in a pleasant mood.      SKIN: Total skin excluding the undergarment areas was performed. The exam included the head/face, neck, both arms, chest, back, abdomen, both legs, digits and/or nails.   - Stuck on tan to brown papules on the trunk, extremities  - Multiple regular brown pigmented macules and papules are identified on the back and under the left breast.   - Right lower cheek: 8 mm pink and brown pearly papule  - No other lesions of concern on areas examined.     Impression/Plan:  1. History of BCC, nose s/p excision  - No evidence of recurrence.   - Continue skin checks and good sun protection    2. Seborrheic keratoses - trunk, extremities  - No further intervention required. Patient to report changes.     3. Multiple clinically benign nevi - back, under the left breast  - No further intervention required. Patient to report changes.     4. NUB - right lower cheek. Neoplasm of uncertain behavior. Differential diagnosis includes r/o pigmented BCC  - Shave biopsy:  After discussion of benefits and risks  including but not limited to bleeding/bruising, pain/swelling, infection, scar, incomplete removal, nerve damage/numbness, recurrence, and non-diagnostic biopsy, written consent, verbal consent and photographs were obtained. Time-out was performed. The area was cleaned with isopropyl alcohol.  was injected to obtain adequate anesthesia of the lesion on the right lower cheek. 1.5 ml of 1% lidocaine was injected to obtain adequate anesthesia. A  shave biopsy was performed. Hemostasis was achieved with aluminium chloride. Vaseline and a sterile dressing were applied. The patient tolerated the procedure and no complications were noted. The patient was provided with verbal and written post care instructions.      Follow-up in 1 year, earlier for new or changing lesions.    Staff Involved:  Staff Only    Scribe Disclosure:   I, Georgia Beyer, am serving as a scribe to document services personally performed by Jessica Yoon, based on data collection and the provider's statements to me.    Provider Disclosure:   The documentation recorded by the scribe accurately reflects the services I personally performed and the decisions made by me.    All risks, benefits and alternatives were discussed with patient.  Patient is in agreement and understands the assessment and plan.  All questions were answered.  Sun Screen Education was given.   Return to Clinic annually or sooner as needed.   Jessica Yoon PA-C   [facility]

## 2017-07-24 NOTE — NURSING NOTE
"Dermatology Rooming Note    Sadia Rider's goals for this visit include:   Chief Complaint   Patient presents with     Skin Check     Skin check, Sadia states \" I have one spot under my left breast.\" Personal hx of BCC.     Rika Lauren LPN  "

## 2017-07-24 NOTE — MR AVS SNAPSHOT
After Visit Summary   7/24/2017    Sadia Rider    MRN: [id number]           Patient Information     Date Of Birth          1945        Visit Information        Provider Department      7/24/2017 11:00 AM Jessica Yoon PA-C [facility] D[facility]        Today's Diagnoses     Neoplasm of uncertain behavior of skin    -  1    Skin cancer screening        Seborrheic keratosis        Multiple benign nevi          Care Instructions    Wound Care After a Biopsy    What is a skin biopsy?  A skin biopsy allows the doctor to examine a very small piece of tissue under the microscope to determine the diagnosis and the best treatment for the skin condition. A local anesthetic (numbing medicine)  is injected with a very small needle into the skin area to be tested. A small piece of skin is taken from the area. Sometimes a suture (stitch) is used.     What are the risks of a skin biopsy?  I will experience scar, bleeding, swelling, pain, crusting and redness. I may experience incomplete removal or recurrence. Risks of this procedure are excessive bleeding, bruising, infection, nerve damage, numbness, thick (hypertrophic or keloidal) scar and non-diagnostic biopsy.    How should I care for my wound for the first 24 hours?    Keep the wound dry and covered for 24 hours    If it bleeds, hold direct pressure on the area for 15 minutes. If bleeding does not stop then go to the emergency room    Avoid strenuous exercise the first 1-2 days or as your doctor instructs you    How should I care for the wound after 24 hours?    After 24 hours, remove the bandage    You may bathe or shower as normal    If you had a scalp biopsy, you can shampoo as usual and can use shower water to clean the biopsy site daily    Clean the wound twice a day with gentle soap and water    Do not scrub, be gentle    Apply white petroleum/Vaseline after cleaning the wound with a cotton swab or a clean finger, and keep the site covered  with a Bandaid /bandage. Bandages are not necessary with a scalp biopsy    If you are unable to cover the site with a Bandaid /bandage, re-apply ointment 2-3 times a day to keep the site moist. Moisture will help with healing    Avoid strenuous activity for first 1-2 days    Avoid lakes, rivers, pools, and oceans until the stitches are removed or the site is healed    How do I clean my wound?    Wash hands thoroughly with soap or use hand  before all wound care    Clean the wound with gentle soap and water    Apply white petroleum/Vaseline  to wound after it is clean    Replace the Bandaid /bandage to keep the wound covered for the first few days or as instructed by your doctor    If you had a scalp biopsy, warm shower water to the area on a daily basis should suffice    What should I use to clean my wound?     Cotton-tipped applicators (Qtips )    White petroleum jelly (Vaseline ). Use a clean new container and use Q-tips to apply.    Bandaids   as needed    Gentle soap     How should I care for my wound long term?    Do not get your wound dirty    Keep up with wound care for one week or until the area is healed.    A small scab will form and fall off by itself when the area is completely healed. The area will be red and will become pink in color as it heals. Sun protection is very important for how your scar will turn out. Sunscreen with an SPF 30 or greater is recommended once the area is healed.    You should have some soreness but it should be mild and slowly go away over several days. Talk to your doctor about using tylenol for pain,    When should I call my doctor?  If you have increased:     Pain or swelling    Pus or drainage (clear or slightly yellow drainage is ok)    Temperature over 100F    Spreading redness or warmth around wound    When will I hear about my results?  The biopsy results can take 2-3 weeks to come back. The clinic will call you with the results, send you a "[employer]" message, or  have you schedule a follow-up clinic or phone time to discuss the results. Contact our clinics if you do not hear from us in 3 weeks.     Who should I call with questions?    [facility]: [phone]     [facility]: [phone]    For urgent needs outside of business hours call the [facility] at [phone] and ask for the dermatology resident on call          Follow-ups after your visit        Follow-up notes from your care team     Return in about 1 year (around 7/24/2018).      Your next 10 appointments already scheduled     Jul 24, 2017  1:00 PM CDT   (Arrive by 12:45 PM)   Return Visit with Shankar Edmondson DPM   [facility] Medicine ([facility])    [address] [facility]  [address] [facility] [address] [phone]            Jan 29, 2018  9:15 AM CST   VISUAL FIELD with [facility] EYE VISUAL FIELD   Eye Clinic ([facility])    [address]  [address] [place] [id number] [phone]            Jan 29, 2018  9:45 AM CST   RETURN GLAUCOMA with Evelyn Mathis MD   Eye Clinic ([facility])    [address]  [address]  [facility] [address] [phone]              Who to contact     Please call your clinic at [phone] to:    Ask questions about your health    Make or cancel appointments    Discuss your medicines    Learn about your test results    Speak to your doctor   If you have compliments or concerns about an experience at your clinic, or if you wish to file a complaint, please contact [facility] Patient Relations at [phone] or email us at [email or web address]         Additional Information About Your Visit        MyChart Information     [employer] gives you secure access to your electronic health record. If you see a primary care provider, you can also send  messages to your care team and make appointments. If you have questions, please call your primary care clinic.  If you do not have a primary care provider, please call [phone] and they will assist you.      [employer] is an electronic gateway that provides easy, online access to your medical records. With [employer], you can request a clinic appointment, read your test results, renew a prescription or communicate with your care team.     To access your existing account, please contact your [facility] or call [phone] for assistance.        Care EveryWhere ID     This is your Care EveryWhere ID. This could be used by other organizations to access your [place] medical records  [id number]         Blood Pressure from Last 3 Encounters:   07/13/15 (!) 188/91   07/08/14 (!) 182/92   08/19/11 141/74    Weight from Last 3 Encounters:   07/13/15 79.3 kg (174 lb 12.8 oz)   07/08/14 78.7 kg (173 lb 9.6 oz)   08/19/11 80.2 kg (176 lb 11.2 oz)              We Performed the Following     BIOPSY SKIN/SUBQ/MUC MEM, SINGLE LESION     Surgical pathology exam        Primary Care Provider Office Phone # Fax #    Delfina FANNY MontemayorBoyle [phone]       [address] [place] [facility] [address]        Equal Access to Services     HIPOLITO ESTRELLA : Hadii aad ku hadasho Soomaali, waaxda luqadaha, qaybta kaalmada adeegyada, ela figueroa. So [facility] [phone].    ATENCIÓN: Si habla español, tiene a monroy disposición servicios gratuitos de asistencia lingüística. Llame al [phone].    We comply with applicable federal civil rights laws and [place] laws. We do not discriminate on the basis of race, color, national origin, age, disability sex, sexual orientation or gender identity.            Thank you!     Thank you for choosing [facility] DERMATOLOGY  for your care. Our goal is always to provide you with excellent care. Hearing back from our  patients is one way we can continue to improve our services. Please take a few minutes to complete the written survey that you may receive in the mail after your visit with us. Thank you!             Your Updated Medication List - Protect others around you: Learn how to safely use, store and throw away your medicines at www.disposemymeds.org.          This list is accurate as of: 7/24/17 11:46 AM.  Always use your most recent med list.                   Brand Name Dispense Instructions for use Diagnosis    aspirin 81 MG tablet      Take 1 tablet by mouth daily.        calcium carb-cholecalciferol 600-500 MG-UNIT Caps      Take 1 capsule by mouth 3 times daily        CENTRUM ULTRA WOMENS PO      Take 1 tablet by mouth daily ( calcium: 300 mg and vitamin d: 1000 units)    Osteoporosis, Vertebral compression fracture (H)       Fiber Tabs      Take 2 tablets by mouth daily.    Osteoporosis, Vertebral compression fracture (H)       simvastatin 10 MG tablet    ZOCOR     Take 1 tablet by mouth At Bedtime.    Osteoporosis, Vertebral compression fracture (H)       VITAMIN D3 PO      Take 1,000 mg by mouth daily

## 2017-07-24 NOTE — LETTER
7/24/2017      RE: Sadia Rider  [address] [address]       Chief Complaint:   Chief Complaint   Patient presents with     RECHECK          Allergies   Allergen Reactions     Alphagan [Brimonidine Tartrate] Blisters         Subjective: Sadia is a 72 year old female who presents to the clinic today for a follow up of right foot stress fracture. She relates that the pain has resolved today. She has returned to her regular activity. She relates that she still feels that the needs to walk barefoot cautiously, however in shoes, she is having no pain at all.   Objective    Right foot was examined today. There is no pain along the 4th metatarsal at the level of the stress fracture. No pain in the adjacent interspaces either. No pain with any MTPJ ROM on the right foot. No edema or ecchymosis noted.     No xrays indicated today.     Assessment: Healing right 4th metatarsal stress fx    Plan:   - Pt seen and evaluated  - No need for activity modification now. She can perform all of her normal activities without restrictions.   - If the pain returns, she should reappoint. Otherwise, see PRN.

## 2017-07-24 NOTE — PROGRESS NOTES
Chief Complaint:   Chief Complaint   Patient presents with     RECHECK          Allergies   Allergen Reactions     Alphagan [Brimonidine Tartrate] Blisters         Subjective: Sadia is a 72 year old female who presents to the clinic today for a follow up of right foot stress fracture. She relates that the pain has resolved today. She has returned to her regular activity. She relates that she still feels that the needs to walk barefoot cautiously, however in shoes, she is having no pain at all.   Objective    Right foot was examined today. There is no pain along the 4th metatarsal at the level of the stress fracture. No pain in the adjacent interspaces either. No pain with any MTPJ ROM on the right foot. No edema or ecchymosis noted.     No xrays indicated today.     Assessment: Healing right 4th metatarsal stress fx    Plan:   - Pt seen and evaluated  - No need for activity modification now. She can perform all of her normal activities without restrictions.   - If the pain returns, she should reappoint. Otherwise, see PRN.

## 2017-07-24 NOTE — MR AVS SNAPSHOT
After Visit Summary   7/24/2017    Sadia Rider    MRN: [id number]           Patient Information     Date Of Birth          1945        Visit Information        Provider Department      7/24/2017 1:00 PM Shankar Edmondson DPM [facility]        Today's Diagnoses     Stress fracture of metatarsal bone, right, with routine healing, subsequent encounter    -  1       Follow-ups after your visit        Your next 10 appointments already scheduled     Jul 24, 2017  1:00 PM CDT   (Arrive by 12:45 PM)   Return Visit with DANE Reza [facility] ([facility])    [address]  5th Floor  [place] [address]   [phone]            Jan 29, 2018  9:15 AM CST   VISUAL FIELD with [facility] EYE VISUAL FIELD   Eye Clinic ([facility])    [address]  [place] [address] [phone]            Jan 29, 2018  9:45 AM CST   RETURN GLAUCOMA with Evelyn Mathis MD   Eye Clinic ([facility])    [address]  [address]  [address]  [place] [address]   [phone]              Who to contact     Please call your clinic at [phone] to:    Ask questions about your health    Make or cancel appointments    Discuss your medicines    Learn about your test results    Speak to your doctor   If you have compliments or concerns about an experience at your clinic, or if you wish to file a complaint, please contact [facility] Patient Relations at [phone] or email us at [email or web address]         Additional Information About Your Visit        MyChart Information     [employer] gives you secure access to your electronic health record. If you see a primary care provider, you can also send messages to your care team and make appointments. If you have questions, please call your primary care clinic.  If you do  "not have a primary care provider, please call [phone] and they will assist you.      [employer] is an electronic gateway that provides easy, online access to your medical records. With [employer], you can request a clinic appointment, read your test results, renew a prescription or communicate with your care team.     To access your existing account, please contact your [facility] or call [phone] for assistance.        Care EveryWhere ID     This is your Care EveryWhere ID. This could be used by other organizations to access your [place] medical records  [id number]        Your Vitals Were     Height                   1.638 m (5' 4.5\")            Blood Pressure from Last 3 Encounters:   07/13/15 (!) 188/91   07/08/14 (!) 182/92   08/19/11 141/74    Weight from Last 3 Encounters:   07/13/15 79.3 kg (174 lb 12.8 oz)   07/08/14 78.7 kg (173 lb 9.6 oz)   08/19/11 80.2 kg (176 lb 11.2 oz)              Today, you had the following     No orders found for display       Primary Care Provider Office Phone # Fax #    Delfina Boyle [phone]       [facility] [place] [address] [place] [address] [address] [facility] [address]        Equal Access to Services     HIPOLITO ESTRELLA : Hadii aad ku hadasho Soomaali, waaxda luqadaha, qaybta kaalmada adeegyada, waxay idiin haycamposn sri figueroa. So [facility] [phone].    ATENCIÓN: Si habla español, tiene a monroy disposición servicios gratuitos de asistencia lingüística. Llame al [phone].    We comply with applicable federal civil rights laws and [place] laws. We do not discriminate on the basis of race, color, national origin, age, disability sex, sexual orientation or gender identity.            Thank you!     Thank you for choosing [facility]  for your care. Our goal is always to provide you with excellent care. Hearing back from our patients is one way we can continue to improve our services. Please take a few " minutes to complete the written survey that you may receive in the mail after your visit with us. Thank you!             Your Updated Medication List - Protect others around you: Learn how to safely use, store and throw away your medicines at www.disposemymeds.org.          This list is accurate as of: 7/24/17 12:57 PM.  Always use your most recent med list.                   Brand Name Dispense Instructions for use Diagnosis    aspirin 81 MG tablet      Take 1 tablet by mouth daily.        calcium carb-cholecalciferol 600-500 MG-UNIT Caps      Take 1 capsule by mouth 3 times daily        CENTRUM ULTRA WOMENS PO      Take 1 tablet by mouth daily ( calcium: 300 mg and vitamin d: 1000 units)    Osteoporosis, Vertebral compression fracture (H)       Fiber Tabs      Take 2 tablets by mouth daily.    Osteoporosis, Vertebral compression fracture (H)       simvastatin 10 MG tablet    ZOCOR     Take 1 tablet by mouth At Bedtime.    Osteoporosis, Vertebral compression fracture (H)       VITAMIN D3 PO      Take 1,000 mg by mouth daily

## 2017-07-24 NOTE — LETTER
"7/24/2017       RE: Sadia Rider  [address]  [place] [address]     Dear Colleague,    Thank you for referring your patient, Sadia Rider, to the [facility] DERMATOLOGY at [facility]. Please see a copy of my visit note below.    [facility] Dermatology Note    Dermatology Problem List:  1. History of BCC - nose s/p excision 2012  2. History of intertrigo - OTC anti-fungal  3. NUB - right lower cheek s/p biopsy 7/24/17  4. Last full body skin check 7/24/17    CC:   Chief Complaint   Patient presents with     Skin Check     Skin check, Sadia states \" I have one spot under my left breast.\" Personal hx of BCC.     Date of Service: Jul 24, 2017    History of Present Illness:  Ms. Sadia Rider is a 72 year old female who presents for a skin check. Today the patient reports that she has a spot of concern under her left breast. This spot has been there for a year or so. She is overall doing well. She wears sunscreen with tint in them. She has a history of a mole that was removed by cryotherapy and excisions. The patient reports no other lesions of concern at this time.    Otherwise, the patient reports no painful, bleeding, nonhealing, or pruritic lesions, and denies new or changing moles.    Past Medical History:   Patient Active Problem List   Diagnosis     Juvenile glaucoma     Myopia of both eyes     Stress fracture of metatarsal bone, right, with routine healing, subsequent encounter     Past Medical History:   Diagnosis Date     Basal cell carcinoma      Glaucoma      POAG ADV     Tear film insufficiency, unspecified      Past Surgical History:   Procedure Laterality Date     ARGON LASER TRABECULOPLASTY (ALT) OD (RIGHT EYE)  5/1989    RE     C STOMACH SURGERY PROCEDURE UNLISTED       CATARACT IOL, RT/LT  1998/2002    BE     CHOLECYSTECTOMY       GLAUCOMA SURGERY  1990s    Trab x 2 RE     MOHS MICROGRAPHIC PROCEDURE       TRABECULECTOMY, " MITOMYCIN FILTER, COMBINED  3/9/2014    LE     YAG CAPSULOTOMY OD (RIGHT EYE)  8/4/2008    RE     Social History:  She had a lot of sun exposure when she was younger, did not use sunscreen when she was younger.     Family History:  Not addressed at this visit.     Medications:  Current Outpatient Prescriptions   Medication Sig Dispense Refill     calcium carb-cholecalciferol 600-500 MG-UNIT CAPS Take 1 capsule by mouth 3 times daily       Cholecalciferol (VITAMIN D3 PO) Take 1,000 mg by mouth daily       simvastatin (ZOCOR) 10 MG tablet Take 1 tablet by mouth At Bedtime.       Fiber TABS Take 2 tablets by mouth daily.       Multiple Vitamins-Minerals (CENTRUM ULTRA WOMENS PO) Take 1 tablet by mouth daily ( calcium: 300 mg and vitamin d: 1000 units)       aspirin 81 MG tablet Take 1 tablet by mouth daily.       Allergies:  Allergies   Allergen Reactions     Alphagan [Brimonidine Tartrate] Blisters     Review of Systems:  - Skin: As above in HPI. No additional skin concerns.  - No chronic cough, night sweats, fevers, or unexpected weight loss    Physical exam:  Vitals: There were no vitals taken for this visit.  GEN: This is a well developed, well-nourished female in no acute distress, in a pleasant mood.      SKIN: Total skin excluding the undergarment areas was performed. The exam included the head/face, neck, both arms, chest, back, abdomen, both legs, digits and/or nails.   - Stuck on tan to brown papules on the trunk, extremities  - Multiple regular brown pigmented macules and papules are identified on the back and under the left breast.   - Right lower cheek: 8 mm pink and brown pearly papule  - No other lesions of concern on areas examined.     Impression/Plan:  1. History of BCC, nose s/p excision  - No evidence of recurrence.   - Continue skin checks and good sun protection    2. Seborrheic keratoses - trunk, extremities  - No further intervention required. Patient to report changes.     3. Multiple  clinically benign nevi - back, under the left breast  - No further intervention required. Patient to report changes.     4. NUB - right lower cheek. Neoplasm of uncertain behavior. Differential diagnosis includes r/o pigmented BCC  - Shave biopsy:  After discussion of benefits and risks including but not limited to bleeding/bruising, pain/swelling, infection, scar, incomplete removal, nerve damage/numbness, recurrence, and non-diagnostic biopsy, written consent, verbal consent and photographs were obtained. Time-out was performed. The area was cleaned with isopropyl alcohol.  was injected to obtain adequate anesthesia of the lesion on the right lower cheek. 1.5 ml of 1% lidocaine was injected to obtain adequate anesthesia. A  shave biopsy was performed. Hemostasis was achieved with aluminium chloride. Vaseline and a sterile dressing were applied. The patient tolerated the procedure and no complications were noted. The patient was provided with verbal and written post care instructions.      Follow-up in 1 year, earlier for new or changing lesions.    Staff Involved:  Staff Only    Scribe Disclosure:   I, Goergia Beyer, am serving as a scribe to document services personally performed by Jessica Yoon, based on data collection and the provider's statements to me.        Again, thank you for allowing me to participate in the care of your patient.      Sincerely,    Jessica Yoon PA-C

## 2017-07-24 NOTE — PATIENT INSTRUCTIONS

## 2017-07-24 NOTE — NURSING NOTE
Chief Complaints and History of Present Illnesses   Patient presents with     Glaucoma Follow Up     HPI    Last Eye Exam:  1/23/17   Affected eye(s):  Both   Symptoms:     Floaters (Comment: Nothing new to reprt with floaters and flashing lights, all look the same )   Flashes      Duration:  6 months   Frequency:  Constant       Do you have eye pain now?:  No      Comments:  Pt returns for 6 month follow up. Pt feels that vision may have changed somewhat, feels that it is slowly getting worse. Still seeing some floaters and flashing lights. Notes that she is currently not using drops. KASSIDY FOSTER, COA 8:53 AM 07/24/2017

## 2017-07-24 NOTE — MR AVS SNAPSHOT
After Visit Summary   7/24/2017    Sadia Rider    MRN: [id number]           Patient Information     Date Of Birth          1945        Visit Information        Provider Department      7/24/2017 8:45 AM Evelyn Mathis MD [facility] Clinic        Today's Diagnoses     Juvenile glaucoma    -  1       Follow-ups after your visit        Follow-up notes from your care team     Return in about 6 months (around 1/24/2018) for OCT rnfl, dilation, DMV visual field.      Your next 10 appointments already scheduled     Jul 24, 2017 11:00 AM CDT   (Arrive by 10:45 AM)   Return Visit with Jessica Yoon PA-C   [facility] Dermatology ([facility] [place])    [address]  3rd Floor  [place] [id number] [phone]            Jul 24, 2017  1:00 PM CDT   (Arrive by 12:45 PM)   Return Visit with Shankar Edmondson DPM   [facility] Sports Medicine ([facility])    [address]  5th Floor  [place] [address] [phone]            Jan 29, 2018  9:15 AM CST   VISUAL FIELD with [facility] EYE VISUAL FIELD   Eye Clinic ([facility])    [address]  [address]  [address]  [facility] [address] [phone]            Jan 29, 2018  9:45 AM CST   RETURN GLAUCOMA with Evelyn Mathis MD   Eye Clinic ([facility])    [address]  [address]  [address] [facility] [address]  [facility] [place] [address] [phone]              Who to contact     Please call your clinic at [phone] to:    Ask questions about your health    Make or cancel appointments    Discuss your medicines    Learn about your test results    Speak to your doctor   If you have compliments or concerns about an experience at your clinic, or if you wish to file a complaint, please contact [facility] Patient Relations at [phone] or email us at [email or web address]         Additional Information  About Your Visit        [employer] Information     [employer] gives you secure access to your electronic health record. If you see a primary care provider, you can also send messages to your care team and make appointments. If you have questions, please call your primary care clinic.  If you do not have a primary care provider, please call [phone] and they will assist you.      [employer] is an electronic gateway that provides easy, online access to your medical records. With [employer], you can request a clinic appointment, read your test results, renew a prescription or communicate with your care team.     To access your existing account, please contact your [facility] or call [phone] for assistance.        Care EveryWhere ID     This is your Care EveryWhere ID. This could be used by other organizations to access your [place] medical records  [id number]         Blood Pressure from Last 3 Encounters:   07/13/15 (!) 188/91   07/08/14 (!) 182/92   08/19/11 141/74    Weight from Last 3 Encounters:   07/13/15 79.3 kg (174 lb 12.8 oz)   07/08/14 78.7 kg (173 lb 9.6 oz)   08/19/11 80.2 kg (176 lb 11.2 oz)              We Performed the Following     Low Vision Central OU        Primary Care Provider Office Phone # Fax #    Delfina Boyle [phone]       [facility] [place] [address] [place] [address] [address] [facility] [address]        Equal Access to Services     HIPOLITO ESTRELLA AH: Hadii aad ku hadasho Soomaali, waaxda luqadaha, qaybta kaalmada adeegyada, ela wells haygrey franklin . So [facility] [phone].    ATENCIÓN: Si habla español, tiene a monroy disposición servicios gratuitos de asistencia lingüística. Llame al [phone].    We comply with applicable federal civil rights laws and [place] laws. We do not discriminate on the basis of race, color, national origin, age, disability sex, sexual orientation or gender identity.            Thank you!     Thank you for  choosing EYE CLINIC  for your care. Our goal is always to provide you with excellent care. Hearing back from our patients is one way we can continue to improve our services. Please take a few minutes to complete the written survey that you may receive in the mail after your visit with us. Thank you!             Your Updated Medication List - Protect others around you: Learn how to safely use, store and throw away your medicines at www.disposemymeds.org.          This list is accurate as of: 7/24/17 10:15 AM.  Always use your most recent med list.                   Brand Name Dispense Instructions for use Diagnosis    aspirin 81 MG tablet      Take 1 tablet by mouth daily.        calcium carb-cholecalciferol 600-500 MG-UNIT Caps      Take 1 capsule by mouth 3 times daily        CENTRUM ULTRA WOMENS PO      Take 1 tablet by mouth daily ( calcium: 300 mg and vitamin d: 1000 units)    Osteoporosis, Vertebral compression fracture (H)       Fiber Tabs      Take 2 tablets by mouth daily.    Osteoporosis, Vertebral compression fracture (H)       simvastatin 10 MG tablet    ZOCOR     Take 1 tablet by mouth At Bedtime.    Osteoporosis, Vertebral compression fracture (H)       VITAMIN D3 PO      Take 1,000 mg by mouth daily

## 2017-07-27 LAB — COPATH REPORT: NORMAL

## 2017-07-28 ENCOUNTER — TELEPHONE (OUTPATIENT)
Dept: DERMATOLOGY | Facility: CLINIC | Age: 72
End: 2017-07-28

## 2017-07-28 DIAGNOSIS — C44.310 BCC (BASAL CELL CARCINOMA), FACE: Primary | ICD-10-CM

## 2017-07-28 NOTE — TELEPHONE ENCOUNTER
The patient called back and she was given the BX results. I let the patient know that the care coordinator would be contacting her in a week or two to set up MOHS.   Polina Nava, [facility]

## 2017-08-02 ENCOUNTER — TELEPHONE (OUTPATIENT)
Dept: DERMATOLOGY | Facility: CLINIC | Age: 72
End: 2017-08-02

## 2017-08-24 ENCOUNTER — TELEPHONE (OUTPATIENT)
Dept: DERMATOLOGY | Facility: CLINIC | Age: 72
End: 2017-08-24

## 2017-08-28 NOTE — TELEPHONE ENCOUNTER
History of Skin cancer : Yes    History of Mohs Surgery: yes    History of a solid organ transplant: No Organ(s):  Year(s):  Creatinine:  Bone Marrow Transplant :  (year, )    Immunosuppressive Medications: No (if yes, which ones: )    HIV or Hepatitis B or C : no    Chronic lymphocytic leukemia: No    Diabetes: no (Type 1 or 2: no)    Any Bleeding disorders: no    Do you have a Pacemaker or Defibrillator: no (year placed: )    Artificial heart valve: no (mechanical or porcine: )    Joint Replacement in the last 2 years: no Joint(s):  Year(s):     Do you typically take Prophylactic Antibiotics before seeing a dentist or having a procedure?: no    If yes, verify that patient has the antibiotic on hand and instruct to take one hour before their surgery appointment.    If they do not have the antibiotic, verify the patients pharmacy and notify Dr. Duncan by printing the pre-mohs call sheet.    Do you wear a C Pap Mask: no    If yes, and procedure is on the face, ask patient to bring in the mask with them (Mask only)    Do you have any mobility issues: no    If yes, is a van service is required to transport you to/from your appointment? no    Smoking History (tobacco of any sort): no    Do you have any other health issues that we should know about? no    Do you take any of the following Blood Thinners:    Aspirin: 81mg    Plavix/Aggrastat/Brilinta: no    Warfarin: no (Last INR:  Date: )    Pradaxa/Eliquis/Xarelto: no    Ibuprofen (Advil/Motrin): no    Naproxen (Aleve): no    Vitamin E: no    Fish Oil: no    Ginkgo biloba: no    Other:no    Done Paient was instructed of the following: Ibuprofen, naproxen, Vitamin E, Fish Oil, and Ginkgo biloba should be stopped 1 week prior to and 1 week after the procedure.    Medication Allergies: see chart     Are medications in Epic: see chart    Done Patient was reminded to take all medications as usual, other than those listed above, on the day of surgery     done Patient was  instructed to bring all medications to clinic on day of surgery     done  Patient will bring a     (A  is helpful for the following reasons: some patients need medications to relax, but once given, patients should not drive; sometimes bandages obstruct your vision making it difficult to see and unsafe to drive; the day can get long so it s nice to have a minh; sometimes the procedure wears people out/makes them quite tired)    Done Photograph of the surgical site(s) is/are available     done Patient was reminded that this can be an all-day procedure and that no other appointments should be scheduled on the day of Mohs

## 2017-08-30 ENCOUNTER — RECORDS - HEALTHEAST (OUTPATIENT)
Dept: ADMINISTRATIVE | Facility: OTHER | Age: 72
End: 2017-08-30

## 2017-08-30 ENCOUNTER — OFFICE VISIT (OUTPATIENT)
Dept: DERMATOLOGY | Facility: CLINIC | Age: 72
End: 2017-08-30

## 2017-08-30 VITALS — HEART RATE: 61 BPM | SYSTOLIC BLOOD PRESSURE: 159 MMHG | DIASTOLIC BLOOD PRESSURE: 84 MMHG

## 2017-08-30 DIAGNOSIS — C44.91 BASAL CELL CARCINOMA: Primary | ICD-10-CM

## 2017-08-30 ASSESSMENT — PAIN SCALES - GENERAL
PAINLEVEL: NO PAIN (0)
PAINLEVEL: NO PAIN (0)

## 2017-08-30 NOTE — NURSING NOTE
Chief Complaint   Patient presents with     Skin Cancer     Mohs on the right lower cheek BCC       Larisa Lloyd CMA

## 2017-08-30 NOTE — PATIENT INSTRUCTIONS
Sutured Wound Care  Caring for your skin after surgery:    After your surgery, a pressure bandage will be placed over the area that has stitches. This will prevent bleeding. Please follow these instructions over the next 1 to 2 weeks. Following this regimen will help to prevent complications as your wound heals.      No strenuous activity for 48 hours. Resume moderate activity in 48 hours. No heavy exercising until you are seen for follow up.    Take Tylenol one hour after surgery. Take Tylenol every 4 hours as needed and do not take any aspirin products for pain (continue taking aspirin if prescribed by your doctor to prevent heart attacks and strokes).    Do not drink alcoholic beverages for 48 hours.    No dietary restrictions.    Keep the pressure bandage in place for 24 hours. If the bandage becomes blood tinged or loose, reinforce it with gauze and tape.     Keep the bandage dry. Wash around it carefully    If the tape becomes soiled or starts to come off, reinforce it with additional paper tape.    Do not smoke for 3 weeks; smoking is detrimental to wound healing.    It is normal to have swelling and bruising around the surgical site. The bruising will fade in approximately 10-14 days. Elevate the area to reduce swelling.    Numbness, itchiness and sensitivity to temperature changes can occur after surgery and may take up to 18 months to normalize.  Remove the outer pressure bandage in 24 hours. Leave the flat bandage in place for 10 days or until your follow-up appointment if it is within those 10 days.     Bleeding:  You may see a small amount of drainage or blood on your pressure dressing. This is normal and the following instructions should be followed if the wound is bleeding saturates the dressing.    1. Leave the bandage in place.  2. Use tightly rolled up gauze or a cloth to apply direct pressure over the bandage for 20 minutes.  3. Reapply pressure for an additional 20 minutes if necessary.  4. Call  the office or go to the nearest emergency room if pressure fails to stop the bleeding.  5. Use additional gauze and tape to maintain pressure once the bleeding has stopped.    Pain:  1. Post operative pain should slowly get better, never worse.  2. A severe increase in pain may indicate a problem. Call the office if this occurs.  3. You may use ice directly over the dressing, but make sure the dressing does not get wet.    Phone numbers:  During business hours (M-F 8:00-4:30 p.m.)  [facility]-  [phone] Option 1 appt. desk  [phone]  Option 3 nurse triage line  ---------------------------------------------------------  Evenings/Weekends/Holidays  Hospital - [phone]   TTY for hearing popmjhpj-[phone]  *Ask  to page dermatologist on-call  Emergency Ylta-[phone]  TTY for hearing impaired- [phone]

## 2017-08-30 NOTE — LETTER
8/30/2017       RE: Sadia Rdier  [address] [facility] [address]     Dear Colleague,    Thank you for referring your patient, Sadia Rider, to the [facility] DERMATOLOGIC SURGERY at [facility]. Please see a copy of my visit note below.    [facility] Mohs Micrographic Surgery Summary:  Aug 30, 2017    Dear Jessica Yoon PA-C,    Thank you for referring your patient, Sadia Rider, a 72 year old female. Today a basal cell carcinoma located on the right lower cheek was excised using Mohs micrographic surgery in 2 stage(s); the defect was then repaired by complex linear closure.     Please refer to the attached operative report(s). The patient was asked to return in 1 week(s) for bandage change/wound check.     Thank you for the opportunity to see and treat your patient. Please do not hesitate to contact me with any questions or concerns regarding the findings and/or operation when the patient returns to you for regular skin examinations.      Sincerely,    Luda Duncan MD  , Department of Dermatology  Director, Dermatologic Surgery & Laser Center  Phone: [phone]; Fax: [phone]  [email or web address] Surgery [email or web address] Center   [address] [place] [address]   Mail Code [id number]   [place] [address]      MOHS MICROGRAPHIC SURGERY REPORT   Aug 30, 2017    Surgeon: Luda Duncan MD  Fellow:    Resident:     INDICATION:    Preoperative Diagnosis: primary basal cell carcinoma  Location: right lower cheek  Postoperative Diagnosis: Same  Preoperative Lesion size: 0.8 x 1.2 cm    After appropriate discussion and informed consent for Mohs surgery and possible repair of the Mohs surgery defect, the patient underwent Mohs surgery as follows:    STAGE I:  The patient was placed on the operating room table.  The area was cleansed with chlorhexidine and infiltrated with 1% Lidocaine and  epinephrine. Tumor was debulked. Using a #15-blade, complete excision was made around the tumor in 1 section.  Hemostasis was obtained by electrodesiccation.  A dressing was placed.  Tissue was divided into 1 tissue block(s) that were subsequently mapped, color coded and processed in the Mohs Laboratory.  Microscopic tumor (infiltrating BCC) was found in the tissue block.    STAGE II: The patient returned to the operating room.  By reference to the Mohs map, the area of positivity was delineated, infiltrated with the anesthetic mixture described above and excised in 2 sections. Tissue was divided into 2 tissue block(s) that were again mapped, color coded and processed in the Mohs Laboratory.  Hemostasis was obtained in the usual manner and a dressing placed.  Microscopic tumor was not found in either of the tissue blocks.     With the lesion clear of micrographic tumor, surgery was considered complete.  The defect extended to the subcutis and measured 1.5 x 1.9 cm.    RECONSTRUCTIVE DERMATOLOGIC SURGERY REPORT  We discussed the options for wound management in full with the patient including risks/benefits/possible outcomes.     Complex Layered Linear Closure:  Because of the size and full thickness nature of the defect and tightness of the surrounding skin, a complex closure was planned.  Patient was prepped with Betasept,  local anesthesia administered, and draped in a sterile fashion. The Mohs defect/wound was circumferentially debeveled. Burow s triangles were excised in the relaxed skin tension lines from opposite ends of the defect, oriented tangentially, and the wound edges were widely undermined by dissection in the subcutaneous plane until adequate tissue mobility had been obtained and tissue could be cosmetically re-draped.  Hemostasis was obtained.  The wound edges were then advanced and closed in a layered fashion.  Postoperative length was 4.9 cm.      Estimated blood loss, minimal; complications, none;  bandaging and wound care, routine. Patient was discharged in good condition and will return for bandage change in 1 week.      I, Paul Tijerina, am serving as a scribe to document services personally performed by Dr. Luda Duncan M.D., based on data collection and the provider's statements to me.     Provider Disclosure:   I have reviewed the content of the documentation above and have edited it as needed. I have personally performed the services documented herein and the documentation accurately represents those services provided and the decisions that I have made.       Luda Duncan MD  , Department of Dermatology  Director, Dermatologic Surgery & Laser Center  Phone: [phone]; Fax: [phone]  [email or web address] Dermatologic Surgery & Laser Center   [address] [place] [address]   Mail Code [id number]   [place] [address]

## 2017-08-30 NOTE — NURSING NOTE
1st layer performed on the right lower cheek. Injected 3.0ml of Xylocaine  (Lidocaine 1% and Epinephrine  (1:100,000))      Present for procedure:   Dr. Duncan, Dr. Polk Fellow and Larisa Lloyd CMA

## 2017-08-30 NOTE — NURSING NOTE
Steri strips, paper tape applied. Dental rolls, gauze and micropore tape applied for pressure. Instructions gone over verbally with patients. All questions answered, patient denied any pain.    Larisa Lloyd CMA

## 2017-08-30 NOTE — NURSING NOTE
Closure layer performed on the Right Lower Cheek. Injected 13.0ml of Xylocaine  (Lidocaine 1.5% and Epinephrine  (1:200,000))      Present for procedure: Larisa Perez CMA

## 2017-08-30 NOTE — PROGRESS NOTES
[facility] Mohs Micrographic Surgery Summary:  Aug 30, 2017    Dear Jessica Yoon PA-C,    Thank you for referring your patient, Sadia Rider, a 72 year old female. Today a basal cell carcinoma located on the right lower cheek was excised using Mohs micrographic surgery in 2 stage(s); the defect was then repaired by complex linear closure.     Please refer to the attached operative report(s). The patient was asked to return in 1 week(s) for bandage change/wound check.     Thank you for the opportunity to see and treat your patient. Please do not hesitate to contact me with any questions or concerns regarding the findings and/or operation when the patient returns to you for regular skin examinations.                    Sincerely,    Luda Duncan MD  , Department of Dermatology  Director, Dermatologic Surgery & Laser Center  Phone: [phone]; Fax: [phone]  [email or web address] [email or web address] Surgery [email or web address] Laser Center   [address] [place] [address]   Mail Code [id number]   [place] [address]      MOHS MICROGRAPHIC SURGERY REPORT   Aug 30, 2017    Surgeon: Luda Duncan MD  Fellow:    Resident:     INDICATION:    Preoperative Diagnosis: primary basal cell carcinoma  Location: right lower cheek  Postoperative Diagnosis: Same  Preoperative Lesion size: 0.8 x 1.2 cm    After appropriate discussion and informed consent for Mohs surgery and possible repair of the Mohs surgery defect, the patient underwent Mohs surgery as follows:    STAGE I:  The patient was placed on the operating room table.  The area was cleansed with chlorhexidine and infiltrated with 1% Lidocaine and epinephrine. Tumor was debulked. Using a #15-blade, complete excision was made around the tumor in 1 section.  Hemostasis was obtained by electrodesiccation.  A dressing was placed.  Tissue was divided into 1 tissue block(s) that were subsequently mapped, color coded and processed in the Mohs  Laboratory.  Microscopic tumor (infiltrating BCC) was found in the tissue block.    STAGE II: The patient returned to the operating room.  By reference to the Mohs map, the area of positivity was delineated, infiltrated with the anesthetic mixture described above and excised in 2 sections. Tissue was divided into 2 tissue block(s) that were again mapped, color coded and processed in the Mohs Laboratory.  Hemostasis was obtained in the usual manner and a dressing placed.  Microscopic tumor was not found in either of the tissue blocks.     With the lesion clear of micrographic tumor, surgery was considered complete.  The defect extended to the subcutis and measured 1.5 x 1.9 cm.    RECONSTRUCTIVE DERMATOLOGIC SURGERY REPORT  We discussed the options for wound management in full with the patient including risks/benefits/possible outcomes.     Complex Layered Linear Closure:  Because of the size and full thickness nature of the defect and tightness of the surrounding skin, a complex closure was planned.  Patient was prepped with Betasept,  local anesthesia administered, and draped in a sterile fashion. The Mohs defect/wound was circumferentially debeveled. Burow s triangles were excised in the relaxed skin tension lines from opposite ends of the defect, oriented tangentially, and the wound edges were widely undermined by dissection in the subcutaneous plane until adequate tissue mobility had been obtained and tissue could be cosmetically re-draped.  Hemostasis was obtained.  The wound edges were then advanced and closed in a layered fashion.  Postoperative length was 4.9 cm.      Estimated blood loss, minimal; complications, none; bandaging and wound care, routine. Patient was discharged in good condition and will return for bandage change in 1 week.      Paul LARA, am serving as a scribe to document services personally performed by Dr. Luda Duncan M.D., based on data collection and the provider's  statements to me.     Provider Disclosure:   I have reviewed the content of the documentation above and have edited it as needed. I have personally performed the services documented herein and the documentation accurately represents those services provided and the decisions that I have made.       Luda Duncan MD  , Department of Dermatology  Director, Dermatologic Surgery & Laser Center  Phone: [phone]; Fax: [phone]  [email or web address] [email or web address] Surgery [email or web address] Laser Center   [address] [place] [address]   Mail Code [place] [address]

## 2017-08-30 NOTE — MR AVS SNAPSHOT
After Visit Summary   8/30/2017    Sadia Rider    MRN: [id number]           Patient Information     Date Of Birth          1945        Visit Information        Provider Department      8/30/2017 9:00 AM Luda Duncan MD [facility] Dermatologic Surgery        Care Instructions    Sutured Wound Care  Caring for your skin after surgery:    After your surgery, a pressure bandage will be placed over the area that has stitches. This will prevent bleeding. Please follow these instructions over the next 1 to 2 weeks. Following this regimen will help to prevent complications as your wound heals.      No strenuous activity for 48 hours. Resume moderate activity in 48 hours. No heavy exercising until you are seen for follow up.    Take Tylenol one hour after surgery. Take Tylenol every 4 hours as needed and do not take any aspirin products for pain (continue taking aspirin if prescribed by your doctor to prevent heart attacks and strokes).    Do not drink alcoholic beverages for 48 hours.    No dietary restrictions.    Keep the pressure bandage in place for 24 hours. If the bandage becomes blood tinged or loose, reinforce it with gauze and tape.     Keep the bandage dry. Wash around it carefully    If the tape becomes soiled or starts to come off, reinforce it with additional paper tape.    Do not smoke for 3 weeks; smoking is detrimental to wound healing.    It is normal to have swelling and bruising around the surgical site. The bruising will fade in approximately 10-14 days. Elevate the area to reduce swelling.    Numbness, itchiness and sensitivity to temperature changes can occur after surgery and may take up to 18 months to normalize.  Remove the outer pressure bandage in 24 hours. Leave the flat bandage in place for 10 days or until your follow-up appointment if it is within those 10 days.     Bleeding:  You may see a small amount of drainage or blood on your pressure dressing. This is normal  and the following instructions should be followed if the wound is bleeding saturates the dressing.    1. Leave the bandage in place.  2. Use tightly rolled up gauze or a cloth to apply direct pressure over the bandage for 20 minutes.  3. Reapply pressure for an additional 20 minutes if necessary.  4. Call the office or go to the nearest emergency room if pressure fails to stop the bleeding.  5. Use additional gauze and tape to maintain pressure once the bleeding has stopped.    Pain:  1. Post operative pain should slowly get better, never worse.  2. A severe increase in pain may indicate a problem. Call the office if this occurs.  3. You may use ice directly over the dressing, but make sure the dressing does not get wet.    Phone numbers:  During business hours (M-F 8:00-4:30 p.m.)  Dermatologic Surgery and Laser Center-  [phone] Option 1 appt. desk  [phone]  Option 3 nurse triage line  ---------------------------------------------------------  Evenings/Weekends/Holidays  Hospital - [phone]   TTY for hearing jptvgujn-[phone]  *Ask  to page dermatologist on-call  Emergency Wuwb-[phone]  TTY for hearing impaired- [phone]            Follow-ups after your visit        Your next 10 appointments already scheduled     Sep 06, 2017  2:30 PM CDT   (Arrive by 2:15 PM)   Post-Op with Luda Duncan MD   [facility] Dermatologic Surgery ([facility])    [address]  3rd Floor  [facility] [id number] [phone]            Jan 29, 2018  9:15 AM CST   VISUAL FIELD with [facility] EYE VISUAL FIELD   Eye Clinic ([facility])    N[place]or Dudley  [address]  [facility] [address]   [phone]            Jan 29, 2018  9:45 AM CST   RETURN GLAUCOMA with Evelyn Mathis MD   Eye Clinic ([facility])    [place] Dudley  [address]  [address]  [facility] [id number] [phone]               Who to contact     Please call your clinic at [phone] to:    Ask questions about your health    Make or cancel appointments    Discuss your medicines    Learn about your test results    Speak to your doctor   If you have compliments or concerns about an experience at your clinic, or if you wish to file a complaint, please contact [facility] Patient Relations at [phone] or email us at [email or web address]         Additional Information About Your Visit        [employer] Information     [employer] gives you secure access to your electronic health record. If you see a primary care provider, you can also send messages to your care team and make appointments. If you have questions, please call your primary care clinic.  If you do not have a primary care provider, please call [phone] and they will assist you.      [facility] is an electronic gateway that provides easy, online access to your medical records. With [facility], you can request a clinic appointment, read your test results, renew a prescription or communicate with your care team.     To access your existing account, please contact your [facility] Clinic or call [phone] for assistance.        Care EveryWhere ID     This is your Care EveryWhere ID. This could be used by other organizations to access your [place] medical records  [id number]        Your Vitals Were     Pulse                   61            Blood Pressure from Last 3 Encounters:   08/30/17 159/84   07/13/15 (!) 188/91   07/08/14 (!) 182/92    Weight from Last 3 Encounters:   07/13/15 79.3 kg (174 lb 12.8 oz)   07/08/14 78.7 kg (173 lb 9.6 oz)   08/19/11 80.2 kg (176 lb 11.2 oz)              Today, you had the following     No orders found for display       Primary Care Provider Office Phone # Fax #    Delfina Boyle [phone]       [facility] [place] [address] [place] [address]         Equal Access to Services     [facility] HospitalFANNY : Hadii aad ku haddeshawnfide Celinaali, warogerda luqlucas, qamattta zunildacurryela bryant. So [facility] [phone].    ATENCIÓN: Si habla español, tiene a monroy disposición servicios gratuitos de asistencia lingüística. Llame al [phone].    We comply with applicable federal civil rights laws and [place] laws. We do not discriminate on the basis of race, color, national origin, age, disability sex, sexual orientation or gender identity.            Thank you!     Thank you for choosing [facility] DERMATOLOGIC SURGERY  for your care. Our goal is always to provide you with excellent care. Hearing back from our patients is one way we can continue to improve our services. Please take a few minutes to complete the written survey that you may receive in the mail after your visit with us. Thank you!             Your Updated Medication List - Protect others around you: Learn how to safely use, store and throw away your medicines at www.disposemymeds.org.          This list is accurate as of: 8/30/17 12:36 PM.  Always use your most recent med list.                   Brand Name Dispense Instructions for use Diagnosis    aspirin 81 MG tablet      Take 1 tablet by mouth daily.        calcium carb-cholecalciferol 600-500 MG-UNIT Caps      Take 1 capsule by mouth 3 times daily        CENTRUM ULTRA WOMENS PO      Take 1 tablet by mouth daily ( calcium: 300 mg and vitamin d: 1000 units)    Osteoporosis, Vertebral compression fracture (H)       Fiber Tabs      Take 2 tablets by mouth daily.    Osteoporosis, Vertebral compression fracture (H)       simvastatin 10 MG tablet    ZOCOR     Take 1 tablet by mouth At Bedtime.    Osteoporosis, Vertebral compression fracture (H)       VITAMIN D3 PO      Take 1,000 mg by mouth daily

## 2017-08-30 NOTE — NURSING NOTE
2nd layer performed on the Right Lower Cheek. Injected 3.0ml of Xylocaine  (Lidocaine 1.5% and Epinephrine  (1:200,000)      Present for procedure:  Dr. Dakota Polk Fellow  Polina Nava, [facility]

## 2017-09-06 ENCOUNTER — ALLIED HEALTH/NURSE VISIT (OUTPATIENT)
Dept: DERMATOLOGY | Facility: CLINIC | Age: 72
End: 2017-09-06

## 2017-09-06 DIAGNOSIS — Z51.89 VISIT FOR WOUND CHECK: Primary | ICD-10-CM

## 2017-09-06 NOTE — PATIENT INSTRUCTIONS
Wound care instructions for  ONE WEEK AFTER SURGERY    1. Leave the bandage on for 1 week after today's bandage change.  2. In 1 week you may resume your regular skin care when you remove the dressing. This includes washing with mild soap and water, applying moisturizer, make-up and sunscreen.  3. If the wound is open or bleeding in any part of the incision/graft site, you should cover the area with a bandage daily as directed below:    1. Clean and dry area with plain water using a Q-tip or sterile gauze pad.  2. Apply vaseline, aquaphor, polysporin, or bacitracin ointment to the wound  3. Cover the wound with a band-aid or a sterile non-stick gauze pad and micropore paper tape.      Repeat the instructions above until wound is completely healed    If you notice that the scar is red and firm (after you remove the dressing) this is normal and will fade over time. It may take up to 6-12 months for this to occur.    Massaging the area helps the scar fade and soften quicker. Begin to massage the area one month after the dressings have been removed. Apply pressure directly and firmly over the scar with the fingertips and move in circular motions. You may massage up to 10 times daily, each time for 30 seconds.    It is normal for there to be a tender, pimple-like bump along the scar about 6-8 weeks after surgery. This sometimes happens as the scar matures and the stitches beneath begin to dissolve. Do not pick or squeeze. It will resolve in time. If an area of the wound does open and there appears to be drainage, you may apply one of the ointments listed in the above directions a few times every day until the wound is completely healed.    Numbness around the surgical site is normal. It can take up to 12-18 months for the feeling to return to normal. Itchiness, tingling, and occasional sharp pains might be present during this portion of the healing. All of these feelings will subside once the nerves have completely  healed.      Phone numbers:  During business hours (M-F 8:00-4:30 p.m.)  Dermatologic Surgery and Laser Center-  [phone] Option 1 appt. desk  [phone]  Option 3 nurse triage line  ---------------------------------------------------------  Evenings/Weekends/Holidays  Hospital - [phone]   TTY for hearing ogmbcbwg-[phone]  *Ask  to page dermatologist on-call  Emergency Ifyf-[phone]  TTY for hearing impaired- [phone]

## 2017-09-06 NOTE — NURSING NOTE
Sadia is here for a 1 week post op for mohs. Site looks well healed, mild crusting. Cleansed with hibaclens and saline. Steristrips and paper tape were applied. Instructions were gone over with patient and all questions were answered.    Larisa Lloyd CMA

## 2017-11-01 ENCOUNTER — OFFICE VISIT (OUTPATIENT)
Dept: OPHTHALMOLOGY | Facility: CLINIC | Age: 72
End: 2017-11-01
Attending: OPHTHALMOLOGY
Payer: COMMERCIAL

## 2017-11-01 DIAGNOSIS — Q15.0 JUVENILE GLAUCOMA: Primary | ICD-10-CM

## 2017-11-01 DIAGNOSIS — H53.8 BLURRED VISION, RIGHT EYE: ICD-10-CM

## 2017-11-01 PROCEDURE — 92015 DETERMINE REFRACTIVE STATE: CPT | Mod: ZF

## 2017-11-01 PROCEDURE — 92081 LIMITED VISUAL FIELD XM: CPT | Mod: ZF | Performed by: OPHTHALMOLOGY

## 2017-11-01 PROCEDURE — 92134 CPTRZ OPH DX IMG PST SGM RTA: CPT | Mod: ZF | Performed by: OPHTHALMOLOGY

## 2017-11-01 PROCEDURE — 99213 OFFICE O/P EST LOW 20 MIN: CPT | Mod: ZF

## 2017-11-01 PROCEDURE — 92133 CPTRZD OPH DX IMG PST SGM ON: CPT | Mod: ZF | Performed by: OPHTHALMOLOGY

## 2017-11-01 PROCEDURE — 92025 CPTRIZED CORNEAL TOPOGRAPHY: CPT | Mod: ZF | Performed by: OPHTHALMOLOGY

## 2017-11-01 ASSESSMENT — REFRACTION_MANIFEST
OS_AXIS: 125
OS_SPHERE: -0.50
OD_AXIS: 120
OD_SPHERE: -4.75
OD_CYLINDER: +0.75
OD_ADD: +3.00
OS_ADD: +3.00
OS_CYLINDER: +0.25

## 2017-11-01 ASSESSMENT — CONF VISUAL FIELD
OD_INFERIOR_TEMPORAL_RESTRICTION: 3
OS_INFERIOR_TEMPORAL_RESTRICTION: 1
OD_SUPERIOR_TEMPORAL_RESTRICTION: 3
OD_INFERIOR_NASAL_RESTRICTION: 3
OD_SUPERIOR_NASAL_RESTRICTION: 3

## 2017-11-01 ASSESSMENT — VISUAL ACUITY
OD_CC: 20/100
OD_PH_CC: 20/60
OS_CC: 20/150 ECC
METHOD: SNELLEN - LINEAR
CORRECTION_TYPE: GLASSES

## 2017-11-01 ASSESSMENT — TONOMETRY
OD_IOP_MMHG: 08
IOP_METHOD: TONOPEN
OS_IOP_MMHG: 05

## 2017-11-01 ASSESSMENT — SLIT LAMP EXAM - LIDS
COMMENTS: NORMAL
COMMENTS: NORMAL

## 2017-11-01 ASSESSMENT — REFRACTION_WEARINGRX
OS_AXIS: 000
OD_ADD: +3.00
OD_CYLINDER: +1.50
OD_SPHERE: -4.00
OD_AXIS: 120
OS_CYLINDER: +0.00
OS_SPHERE: -0.50
OS_ADD: +3.00

## 2017-11-01 ASSESSMENT — CUP TO DISC RATIO: OS_RATIO: 0.6

## 2017-11-01 NOTE — MR AVS SNAPSHOT
"              After Visit Summary   11/1/2017    Sadia Rider    MRN: [id number]           Patient Information     Date Of Birth          1945        Visit Information        Provider Department      11/1/2017 9:30 AM Evelyn Mathis MD [facility] Clinic        Today's Diagnoses     Juvenile glaucoma    -  1    Blurred vision, right eye           Follow-ups after your visit        Your next 10 appointments already scheduled     Nov 17, 2017 10:15 AM CST   RETURN GLAUCOMA with Evelyn Mathis MD   Eye Clinic ([facility])    [address]  [address]  [address]a  [place] [address]   [phone]            Jan 29, 2018  8:15 AM CST   (Arrive by 8:00 AM)   MA SCREENING BILATERAL W/ JHONNY with UCBCMA1   [facility] ([facility])    [address]  2nd Floor  [place] [address] [phone]           Three-dimensional (3D) mammograms are available at [place] locations in [facility], [employer], [place], [facility], [place], [place], and [place]. [facility] locations include [place] and [facility] in [place]. Benefits of 3D mammograms include: - Improved rate of cancer detection - Decreases your chance of having to go back for more tests, which means fewer: - \"False-positive\" results (This means that there is an abnormal area but it isn't cancer.) - Invasive testing procedures, such as a biopsy or surgery - Can provide clearer images of the breast if you have dense breast tissue. 3D mammography is an optional exam that anyone can have with a 2D mammogram. It doesn't replace or take the place of a 2D mammogram. 2D mammograms remain an effective screening test for all women.  Not all insurance companies cover the cost of a 3D mammogram. Check with your insurance.            Jan 29, 2018  9:15 AM CST   VISUAL FIELD with [facility] EYE VISUAL FIELD   Eye Clinic ([facility])    [address]  [address]  [address]a  [place] [facility] [address]   [phone]            Jan 29, 2018  9:45 AM CST   RETURN GLAUCOMA with Evelyn Mathis MD   Eye Clinic ([facility])    [address]  [address]  [address]a  [place] [address]   [phone]              Who to contact     Please call your clinic at [phone] to:    Ask questions about your health    Make or cancel appointments    Discuss your medicines    Learn about your test results    Speak to your doctor   If you have compliments or concerns about an experience at your clinic, or if you wish to file a complaint, please contact [facility] Patient Relations at [phone] or email us at [email or web address]         Additional Information About Your Visit        [employer] Information     [employer] gives you secure access to your electronic health record. If you see a primary care provider, you can also send messages to your care team and make appointments. If you have questions, please call your primary care clinic.  If you do not have a primary care provider, please call [phone] and they will assist you.      [employer] is an electronic gateway that provides easy, online access to your medical records. With [employer], you can request a clinic appointment, read your test results, renew a prescription or communicate with your care team.     To access your existing account, please contact your [facility] or call [phone] for assistance.        Care EveryWhere ID     This is your Care EveryWhere ID. This could be used by other organizations to access your [place] medical records  [id number]         Blood Pressure from Last 3 Encounters:   08/30/17 159/84   07/13/15 (!) 188/91   07/08/14 (!) 182/92    Weight from Last 3 Encounters:   07/13/15 79.3 kg (174 lb 12.8 oz)   07/08/14 78.7 kg (173 lb 9.6 oz)   08/19/11 80.2 kg (176 lb 11.2 oz)               We Performed the Following     Corneal Topography OD (right eye)     OCT Optic Nerve RNFL Spectralis OU (both eyes)     OCT Retina Spectralis OD (right eye)     Octopus DMV        Primary Care Provider Office Phone # Fax #    Delfina Boyle [phone]       [facility] [place] [address] [place] [address]        Equal Access to Services     [facility] : Hadii aad ku hadasho Soomaali, waaxda luqadaha, qaybta kaalmada adeegyada, waxay jasonin hayaan adedanny silvakimberleeklever laruthn . So [facility] [phone].    ATENCIÓN: Si habla español, tiene a monroy disposición servicios gratuitos de asistencia lingüística. Llame al [phone].    We comply with applicable federal civil rights laws and [place] laws. We do not discriminate on the basis of race, color, national origin, age, disability, sex, sexual orientation, or gender identity.            Thank you!     Thank you for choosing EYE CLINIC  for your care. Our goal is always to provide you with excellent care. Hearing back from our patients is one way we can continue to improve our services. Please take a few minutes to complete the written survey that you may receive in the mail after your visit with us. Thank you!             Your Updated Medication List - Protect others around you: Learn how to safely use, store and throw away your medicines at www.disposemymeds.org.          This list is accurate as of: 11/1/17  1:42 PM.  Always use your most recent med list.                   Brand Name Dispense Instructions for use Diagnosis    aspirin 81 MG tablet      Take 1 tablet by mouth daily.        calcium carb-cholecalciferol 600-500 MG-UNIT Caps      Take 1 capsule by mouth 3 times daily        CENTRUM ULTRA WOMENS PO      Take 1 tablet by mouth daily ( calcium: 300 mg and vitamin d: 1000 units)    Osteoporosis, Vertebral compression fracture (H)       simvastatin 10 MG tablet    ZOCOR     Take 1 tablet by mouth At Bedtime.     Osteoporosis, Vertebral compression fracture (H)       VITAMIN D3 PO      Take 1,000 mg by mouth daily

## 2017-11-01 NOTE — NURSING NOTE
Chief Complaints and History of Present Illnesses   Patient presents with     Follow Up For     POAG     HPI    Affected eye(s):  Both   Symptoms:        Frequency:  Constant       Do you have eye pain now?:  No      Comments:  Noticing changes at dist  No red watery or dry  Olya Laureano COT 9:54 AM November 1, 2017

## 2017-11-01 NOTE — PROGRESS NOTES
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  Visual acuity right eye worse in the past 2 weeks, not a sudden change    OCT macula right eye with Epiretinal membrane     OCT retinal nerve fiber layer stable    Octopus visual field DMV visual field stable    Assessment :  Primary open angle glaucoma (POAG)    Trabeculectomy right eye in 1990    Trabeculectomy left eye 2004    S/p Argon laser trabeculoplasty both eyes 2009   Currently not using eyedrops   Allergy to Alphagan     Posterior chamber intraocular lens (PCIOL) both eyes    Cataract extraction right eye 1998 and Cataract extraction left eye  2002   Post Yag right eye, now with posterior capsular opacity (PCO) left eye but baseline poor visual acuity so will recheck next time    Plan  Good intraocular pressure on no medications  Visual acuity worse right eye than before, partially improved with manifest refraction    No cystoid macular edema    Does have some Epiretinal membrane right eye   No leak   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 I personally obtained the chief complaint(s) and history of present illness. I confirmed and edited asnecessary the review of systems, past medical/surgical history, family history, social history, and examination findings as documented by others; and I examined the patient myself. I personally reviewed the relevant tests, images, and reports as documented above. I formulated and edited as necessary the assessment and plan and  discussed the findings and management plan with the patient and family.  - Evelyn Mathis MD 11:19 AM 11/1/2017

## 2017-11-16 ENCOUNTER — OFFICE VISIT - HEALTHEAST (OUTPATIENT)
Dept: FAMILY MEDICINE | Facility: CLINIC | Age: 72
End: 2017-11-16

## 2017-11-16 DIAGNOSIS — E78.2 MIXED HYPERLIPIDEMIA: ICD-10-CM

## 2017-11-16 DIAGNOSIS — E55.9 VITAMIN D DEFICIENCY: ICD-10-CM

## 2017-11-16 DIAGNOSIS — E06.3 HYPOTHYROIDISM DUE TO HASHIMOTO'S THYROIDITIS: ICD-10-CM

## 2017-11-16 DIAGNOSIS — M81.0 OSTEOPOROSIS: ICD-10-CM

## 2017-11-16 LAB
CHOLEST SERPL-MCNC: 155 MG/DL
FASTING STATUS PATIENT QL REPORTED: YES
HDLC SERPL-MCNC: 42 MG/DL
LDLC SERPL CALC-MCNC: 78 MG/DL
TRIGL SERPL-MCNC: 175 MG/DL

## 2017-11-16 ASSESSMENT — MIFFLIN-ST. JEOR: SCORE: 1280.99

## 2017-11-17 ENCOUNTER — OFFICE VISIT (OUTPATIENT)
Dept: OPHTHALMOLOGY | Facility: CLINIC | Age: 72
End: 2017-11-17
Attending: OPHTHALMOLOGY
Payer: COMMERCIAL

## 2017-11-17 DIAGNOSIS — H53.8 BLURRED VISION, RIGHT EYE: Primary | ICD-10-CM

## 2017-11-17 PROCEDURE — 92015 DETERMINE REFRACTIVE STATE: CPT | Mod: 52,ZF

## 2017-11-17 PROCEDURE — 99213 OFFICE O/P EST LOW 20 MIN: CPT | Mod: ZF

## 2017-11-17 ASSESSMENT — REFRACTION_MANIFEST
OD_SPHERE: -4.75
OD_CYLINDER: +1.75
OD_ADD: +2.75
OD_AXIS: 090

## 2017-11-17 ASSESSMENT — SLIT LAMP EXAM - LIDS
COMMENTS: NORMAL
COMMENTS: NORMAL

## 2017-11-17 ASSESSMENT — CONF VISUAL FIELD
METHOD: COUNTING FINGERS
OS_NORMAL: 1
OD_NORMAL: 1

## 2017-11-17 ASSESSMENT — TONOMETRY
IOP_METHOD: APPLANATION
OS_IOP_MMHG: 05
OD_IOP_MMHG: 08

## 2017-11-17 ASSESSMENT — VISUAL ACUITY
OD_CC: 20/80
OD_CC+: -1
OS_CC: 20/150 ECC
OD_PH_CC: 20/60-2
METHOD: SNELLEN - LINEAR
CORRECTION_TYPE: GLASSES

## 2017-11-17 ASSESSMENT — CUP TO DISC RATIO: OS_RATIO: 0.6

## 2017-11-17 ASSESSMENT — REFRACTION_WEARINGRX
OD_CYLINDER: +1.50
OD_AXIS: 120
OD_SPHERE: -4.00
OS_CYLINDER: +0.00
OS_ADD: +3.00
OS_SPHERE: -0.50
OS_AXIS: 000
OD_ADD: +3.00

## 2017-11-17 NOTE — PROGRESS NOTES
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    Interval history: patient reports good compliance with shield. She has not had any discomfort. Vision in right eye may be slightly better.     OCT macula right eye with Epiretinal membrane   OCT retinal nerve fiber layer stable    Assessment :  Primary open angle glaucoma (POAG)    Trabeculectomy right eye in 1990    Trabeculectomy left eye 2004    S/p Argon laser trabeculoplasty both eyes 2009   Currently not using eyedrops   Allergy to Alphagan     Posterior chamber intraocular lens (PCIOL) both eyes    Cataract extraction right eye 1998 and Cataract extraction left eye  2002   Post Yag right eye, now with posterior capsular opacity (PCO) left eye but baseline poor visual acuity so will recheck next time    Plan  -Good intraocular pressure on no medications  -Visual acuity worse right eye than before, unimproved despite today's refraction right eye (patient was refracted to 20/30 right eye jan 2017)  -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 However last recorded at 20/30 vision with intraocular pressure of 7 right eye.   -Patient would like to continue with denise shield while sleeping   -Does have some Epiretinal membrane right eye may be contributing to visual disturbance- condiser referral to retina     Corneal topography right eye - some irregularities inferiorly    (next summer do LVC  --do yearly given low intraocular pressure, inconsistent with 24-2)    Consider eferral to Dr. Tucker for low vision refraction before follow up.   Return to clinic glaucoma 2-3 months for intraocular pressure check. ( of note patient leaving for vacation until Dec 9th).     Brianna Trejo MD  Ophthalmology PGY3    Attending Physician Attestation:  Complete documentation of historical and exam elements from today's encounter can be found in the full encounter summary report (not reduplicated in this progress note). I personally obtained the chief  complaint(s) and history of present illness. I confirmed and edited asnecessary the review of systems, past medical/surgical history, family history, social history, and examination findings as documented by others; and I examined the patient myself. I personally reviewed the relevant tests, images, and reports as documented above. I formulated and edited as necessary the assessment and plan and discussed the findings and management plan with the patient and family.  - Evelyn Mathis MD 1:21 PM 11/17/2017

## 2017-11-17 NOTE — MR AVS SNAPSHOT
"              After Visit Summary   11/17/2017    Sadia Rider    MRN: [id number]           Patient Information     Date Of Birth          1945        Visit Information        Provider Department      11/17/2017 10:15 AM Evelyn Mathis MD Eye Clinic        Today's Diagnoses     Blurred vision, right eye    -  1       Follow-ups after your visit        Follow-up notes from your care team     Return in about 8 weeks (around 1/12/2018).      Your next 10 appointments already scheduled     Jan 12, 2018 10:00 AM CST   RETURN GLAUCOMA with Evelyn Mathis MD   Eye Clinic ([facility])    [address]  [address]  [address] Clin [address]a  [place] [facility] [address]   [phone]            Jan 29, 2018  8:15 AM CST   (Arrive by 8:00 AM)   MA SCREENING BILATERAL W/ JHONNY with UCBCMA1   [facility] ([facility])    [address]  2nd Floor  [facility] [id number] [phone]           Three-dimensional (3D) mammograms are available at [place] locations in [facility], [place], [place], [facility], [place], [place], and [place]. [facility] locations include [place] and [facility] Center in [place]. Benefits of 3D mammograms include: - Improved rate of cancer detection - Decreases your chance of having to go back for more tests, which means fewer: - \"False-positive\" results (This means that there is an abnormal area but it isn't cancer.) - Invasive testing procedures, such as a biopsy or surgery - Can provide clearer images of the breast if you have dense breast tissue. 3D mammography is an optional exam that anyone can have with a 2D mammogram. It doesn't replace or take the place of a 2D mammogram. 2D mammograms remain an effective screening test for all women.  Not all insurance companies cover the cost of a 3D mammogram. Check with your insurance.            Jan 29, 2018  9:15 AM CST   VISUAL FIELD with [facility] EYE VISUAL " FIELD   Eye Clinic ([facility])    [address]   [address] Clin [address]  [facility] [address]   [phone]            Jan 29, 2018  9:45 AM CST   RETURN GLAUCOMA with Evelyn Mathis MD   Eye Clinic ([facility])    [address]  [address]  [address] Clin [address]a  [facility] [address]   [phone]              Who to contact     Please call your clinic at [phone] to:    Ask questions about your health    Make or cancel appointments    Discuss your medicines    Learn about your test results    Speak to your doctor   If you have compliments or concerns about an experience at your clinic, or if you wish to file a complaint, please contact [facility] Patient Relations at [phone] or email us at [email or web address]         Additional Information About Your Visit        [employer] Information     [employer] gives you secure access to your electronic health record. If you see a primary care provider, you can also send messages to your care team and make appointments. If you have questions, please call your primary care clinic.  If you do not have a primary care provider, please call [phone] and they will assist you.      [employer] is an electronic gateway that provides easy, online access to your medical records. With [employer], you can request a clinic appointment, read your test results, renew a prescription or communicate with your care team.     To access your existing account, please contact your [facility] or call [phone] for assistance.        Care EveryWhere ID     This is your Care EveryWhere ID. This could be used by other organizations to access your [place] medical records  [id number]         Blood Pressure from Last 3 Encounters:   08/30/17 159/84   07/13/15 (!) 188/91   07/08/14 (!) 182/92    Weight from Last 3 Encounters:   07/13/15 79.3 kg (174 lb 12.8 oz)   07/08/14  78.7 kg (173 lb 9.6 oz)   08/19/11 80.2 kg (176 lb 11.2 oz)              Today, you had the following     No orders found for display       Primary Care Provider Office Phone # Fax #    Delfina Boyle [phone]       [facility] [place] [address] [place] [address]        Equal Access to Services     [facility]: Hadii aad ku hadasho Soomaali, waaxda luqadaha, qaybta kaalmada adeegyada, waxay idiin hayaan adeeg kharash la'aan ah. So [facility] [phone].    ATENCIÓN: Si anyala krissy, tiene a monroy disposición servicios gratuitos de asistencia lingüística. Jaspal al [phone].    We comply with applicable federal civil rights laws and [place] laws. We do not discriminate on the basis of race, color, national origin, age, disability, sex, sexual orientation, or gender identity.            Thank you!     Thank you for choosing EYE CLINIC  for your care. Our goal is always to provide you with excellent care. Hearing back from our patients is one way we can continue to improve our services. Please take a few minutes to complete the written survey that you may receive in the mail after your visit with us. Thank you!             Your Updated Medication List - Protect others around you: Learn how to safely use, store and throw away your medicines at www.disposemymeds.org.          This list is accurate as of: 11/17/17  1:29 PM.  Always use your most recent med list.                   Brand Name Dispense Instructions for use Diagnosis    aspirin 81 MG tablet      Take 1 tablet by mouth daily.        calcium carb-cholecalciferol 600-500 MG-UNIT Caps      Take 1 capsule by mouth 3 times daily        CENTRUM ULTRA WOMENS PO      Take 1 tablet by mouth daily ( calcium: 300 mg and vitamin d: 1000 units)    Osteoporosis, Vertebral compression fracture (H)       simvastatin 10 MG tablet    ZOCOR     Take 1 tablet by mouth At Bedtime.    Osteoporosis, Vertebral compression fracture (H)        VITAMIN D3 PO      Take 1,000 mg by mouth daily

## 2017-11-17 NOTE — NURSING NOTE
Chief Complaints and History of Present Illnesses   Patient presents with     Follow Up For     Juvenile glaucoma. Needs MRx OD today.     HPI    Affected eye(s):  Right   Symptoms:     No blurred vision   No decreased vision         Do you have eye pain now?:  No      Comments:  Pt states she has been using the denise shield and has noticed much change.  Regine QUINONEZ 10:57 AM November 17, 2017

## 2018-01-12 ENCOUNTER — OFFICE VISIT (OUTPATIENT)
Dept: OPHTHALMOLOGY | Facility: CLINIC | Age: 73
End: 2018-01-12
Attending: OPHTHALMOLOGY
Payer: COMMERCIAL

## 2018-01-12 DIAGNOSIS — Q15.0 JUVENILE GLAUCOMA: Primary | ICD-10-CM

## 2018-01-12 PROCEDURE — G0463 HOSPITAL OUTPT CLINIC VISIT: HCPCS | Mod: ZF

## 2018-01-12 ASSESSMENT — TONOMETRY
OS_IOP_MMHG: 03
IOP_METHOD: APPLANATION
OD_IOP_MMHG: 05

## 2018-01-12 ASSESSMENT — VISUAL ACUITY
OD_CC+: -2
CORRECTION_TYPE: GLASSES
OD_PH_CC: 20/40
METHOD: SNELLEN - LINEAR
OD_CC: 20/60
OS_CC: 20/150 ECC

## 2018-01-12 ASSESSMENT — REFRACTION_WEARINGRX
OS_CYLINDER: +0.00
OD_AXIS: 120
OS_ADD: +3.00
OS_SPHERE: -0.50
OD_CYLINDER: +1.50
OD_ADD: +3.00
OS_AXIS: 000
OD_SPHERE: -4.00

## 2018-01-12 ASSESSMENT — CUP TO DISC RATIO
OD_RATIO: 0.6
OS_RATIO: 0.6

## 2018-01-12 ASSESSMENT — SLIT LAMP EXAM - LIDS
COMMENTS: NORMAL
COMMENTS: NORMAL

## 2018-01-12 ASSESSMENT — CONF VISUAL FIELD: OD_NORMAL: 1

## 2018-01-12 NOTE — MR AVS SNAPSHOT
"              After Visit Summary   1/12/2018    Sadia Rider    MRN: [id number]           Patient Information     Date Of Birth          1945        Visit Information        Provider Department      1/12/2018 10:00 AM Evelyn Mathis MD [facility]        Today's Diagnoses     Juvenile glaucoma    -  1       Follow-ups after your visit        Follow-up notes from your care team     Return in about 4 months (around 5/12/2018) for iop check.      Your next 10 appointments already scheduled     Jan 29, 2018  8:15 AM CST   (Arrive by 8:00 AM)   MA SCREENING BILATERAL W/ JHONNY with [id number]   [facility] ([facility])    [address]  2nd Floor  [facility] [id number] [phone]           Three-dimensional (3D) mammograms are available at [place] locations in [facility], [facility], [facility], [facility], and [place]. [facility] locations include [place] and Clinic & [facility] Center in [place]. Benefits of 3D mammograms include: - Improved rate of cancer detection - Decreases your chance of having to go back for more tests, which means fewer: - \"False-positive\" results (This means that there is an abnormal area but it isn't cancer.) - Invasive testing procedures, such as a biopsy or surgery - Can provide clearer images of the breast if you have dense breast tissue. 3D mammography is an optional exam that anyone can have with a 2D mammogram. It doesn't replace or take the place of a 2D mammogram. 2D mammograms remain an effective screening test for all women.  Not all insurance companies cover the cost of a 3D mammogram. Check with your insurance.            Jan 30, 2018  1:00 PM CST   New Low Vision with Derrick Tucker OD   Eye Clinic ([facility])    [address]  Clinic [address]a  [address] [facility] [place] [address]   [phone]            Jun 01, 2018  8:45 AM CDT   RETURN GLAUCOMA with " Evelyn Mathis MD   Eye Clinic ([facility])    [address]  [address]  9th Fl Clin 9a  [facility] [id number] [phone]              Who to contact     Please call your clinic at [phone] to:    Ask questions about your health    Make or cancel appointments    Discuss your medicines    Learn about your test results    Speak to your doctor   If you have compliments or concerns about an experience at your clinic, or if you wish to file a complaint, please contact [facility] Patient Relations at [phone] or email us at [email or web address]         Additional Information About Your Visit        [employer] Information     Server[employer]ott gives you secure access to your electronic health record. If you see a primary care provider, you can also send messages to your care team and make appointments. If you have questions, please call your primary care clinic.  If you do not have a primary care provider, please call [phone] and they will assist you.      Zalicus is an electronic gateway that provides easy, online access to your medical records. With Zalicus, you can request a clinic appointment, read your test results, renew a prescription or communicate with your care team.     To access your existing account, please contact your [facility] or call [phone] for assistance.        Care EveryWhere ID     This is your Care EveryWhere ID. This could be used by other organizations to access your [place] medical records  [id number]         Blood Pressure from Last 3 Encounters:   08/30/17 159/84   07/13/15 (!) 188/91   07/08/14 (!) 182/92    Weight from Last 3 Encounters:   07/13/15 79.3 kg (174 lb 12.8 oz)   07/08/14 78.7 kg (173 lb 9.6 oz)   08/19/11 80.2 kg (176 lb 11.2 oz)              Today, you had the following     No orders found for display       Primary Care Provider Office Phone # Fax #    Delfina Boyle  [phone]       [facility] [place] [address] [place] [address]        Equal Access to Services     HIPOLITO ESTRELLA : Hadii aad ku hadubaldo Browne, gunner stacylucas, kristal kadaniel gill, ela carcamochevy henry. So [facility] [phone].    ATENCIÓN: Si habla español, tiene a monroy disposición servicios gratuitos de asistencia lingüística. Llame al [phone].    We comply with applicable federal civil rights laws and [place] laws. We do not discriminate on the basis of race, color, national origin, age, disability, sex, sexual orientation, or gender identity.            Thank you!     Thank you for choosing EYE CLINIC  for your care. Our goal is always to provide you with excellent care. Hearing back from our patients is one way we can continue to improve our services. Please take a few minutes to complete the written survey that you may receive in the mail after your visit with us. Thank you!             Your Updated Medication List - Protect others around you: Learn how to safely use, store and throw away your medicines at www.disposemymeds.org.          This list is accurate as of: 1/12/18 11:43 AM.  Always use your most recent med list.                   Brand Name Dispense Instructions for use Diagnosis    aspirin 81 MG tablet      Take 1 tablet by mouth daily.        calcium carb-cholecalciferol 600-500 MG-UNIT Caps      Take 1 capsule by mouth 3 times daily        CENTRUM ULTRA WOMENS PO      Take 1 tablet by mouth daily ( calcium: 300 mg and vitamin d: 1000 units)    Osteoporosis, Vertebral compression fracture (H)       simvastatin 10 MG tablet    ZOCOR     Take 1 tablet by mouth At Bedtime.    Osteoporosis, Vertebral compression fracture (H)       VITAMIN D3 PO      Take 1,000 mg by mouth daily

## 2018-01-12 NOTE — PROGRESS NOTES
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    Interval history: patient reports vision in right eye may be slightly better. Sleeping with shield to reduce hypotony    OCT macula right eye with Epiretinal membrane   OCT retinal nerve fiber layer stable    Assessment :  Primary open angle glaucoma (POAG)    Trabeculectomy right eye in 1990    Trabeculectomy left eye 2004    S/p Argon laser trabeculoplasty both eyes 2009   Currently not using eyedrops   Allergy to Alphagan     Posterior chamber intraocular lens (PCIOL) both eyes    Cataract extraction right eye 1998 and Cataract extraction left eye  2002   Post Yag right eye, now with posterior capsular opacity (PCO) left eye but baseline poor visual acuity so will recheck next time    Plan  -Good intraocular pressure on no medications  -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 However last recorded at 20/30 vision with intraocular pressure of 7 right eye.   -Patient would like to continue with denise shield while sleeping   -Does have some Epiretinal membrane right eye may be contributing to visual disturbance- condiser referral to retina     Corneal topography right eye - some irregularities inferiorly    (this summer do LVC  --do yearly given low intraocular pressure, inconsistent with 24-2)    Refer to Dr. Tucker for low vision refraction   Return to clinic glaucoma 4 months for IOP check      Darren Pruitt MD  PGY3, Dept of Ophthalmology    Attending Physician Attestation:  Complete documentation of historical and exam elements from today's encounter can be found in the full encounter summary report (not reduplicated in this progress note). I personally obtained the chief complaint(s) and history of present illness. I confirmed and edited asnecessary the review of systems, past medical/surgical history, family history, social history, and examination findings as documented by others; and I examined the patient myself. I  personally reviewed the relevant tests, images, and reports as documented above. I formulated and edited as necessary the assessment and plan and discussed the findings and management plan with the patient and family.  - Evelyn Mathis MD 11:31 AM 1/12/2018

## 2018-01-12 NOTE — NURSING NOTE
Chief Complaints and History of Present Illnesses   Patient presents with     Follow Up For     POAG     HPI    Affected eye(s):  Both   Symptoms:        Frequency:  Constant       Do you have eye pain now?:  No      Comments:  Feels that the va is better today  No red watery or dry  Olya Laureano COT 10:14 AM January 12, 2018

## 2018-01-30 ENCOUNTER — RADIANT APPOINTMENT (OUTPATIENT)
Dept: MAMMOGRAPHY | Facility: CLINIC | Age: 73
End: 2018-01-30
Payer: COMMERCIAL

## 2018-01-30 ENCOUNTER — OFFICE VISIT (OUTPATIENT)
Dept: OPTOMETRY | Facility: CLINIC | Age: 73
End: 2018-01-30
Payer: COMMERCIAL

## 2018-01-30 ENCOUNTER — RECORDS - HEALTHEAST (OUTPATIENT)
Dept: ADMINISTRATIVE | Facility: OTHER | Age: 73
End: 2018-01-30

## 2018-01-30 DIAGNOSIS — Q15.0 JUVENILE GLAUCOMA: ICD-10-CM

## 2018-01-30 DIAGNOSIS — H54.52A2: Primary | ICD-10-CM

## 2018-01-30 DIAGNOSIS — H52.13 MYOPIA OF BOTH EYES: ICD-10-CM

## 2018-01-30 DIAGNOSIS — Z12.31 VISIT FOR SCREENING MAMMOGRAM: ICD-10-CM

## 2018-01-30 ASSESSMENT — REFRACTION_MANIFEST
OD_AXIS: 125
OD_SPHERE: -5.00
OS_SPHERE: -0.50
OD_ADD: +2.75
OS_ADD: +2.75
OS_CYLINDER: SPHERE
OD_CYLINDER: +1.75

## 2018-01-30 ASSESSMENT — VISUAL ACUITY
OS_CC: 20/150
OS_SC: J10
OD_CC: 20/60
OD_SC: J3
METHOD: SNELLEN - LINEAR
OD_CC+: +1

## 2018-01-30 ASSESSMENT — CONF VISUAL FIELD: OD_NORMAL: 1

## 2018-01-30 NOTE — MR AVS SNAPSHOT
After Visit Summary   1/30/2018    Sadia Rider    MRN: [id number]           Patient Information     Date Of Birth          1945        Visit Information        Provider Department      1/30/2018 1:00 PM Derrick Tucker, OD Eye Clinic        Today's Diagnoses     Low vision left eye category 2, normal vision right eye    -  1    Juvenile glaucoma        Myopia of both eyes           Follow-ups after your visit        Follow-up notes from your care team     Return in about 1 year (around 1/30/2019) for Refraction.      Your next 10 appointments already scheduled     Jun 01, 2018  8:45 AM CDT   RETURN GLAUCOMA with Evelyn Mathis MD   Eye Clinic ([facility])    [address]  [address]  9th [address] Clin 9a  [facility] [address] [phone]              Who to contact     Please call your clinic at [phone] to:    Ask questions about your health    Make or cancel appointments    Discuss your medicines    Learn about your test results    Speak to your doctor   If you have compliments or concerns about an experience at your clinic, or if you wish to file a complaint, please contact [facility] Patient Relations at [phone] or email us at [email or web address]         Additional Information About Your Visit        MyChart Information     SenseData gives you secure access to your electronic health record. If you see a primary care provider, you can also send messages to your care team and make appointments. If you have questions, please call your primary care clinic.  If you do not have a primary care provider, please call [phone] and they will assist you.      SenseData is an electronic gateway that provides easy, online access to your medical records. With SenseData, you can request a clinic appointment, read your test results, renew a prescription or communicate with your care team.     To access your existing account, please  contact your [facility] or call [phone] for assistance.        Care EveryWhere ID     This is your Care EveryWhere ID. This could be used by other organizations to access your [place] medical records  [id number]         Blood Pressure from Last 3 Encounters:   08/30/17 159/84   07/13/15 (!) 188/91   07/08/14 (!) 182/92    Weight from Last 3 Encounters:   07/13/15 79.3 kg (174 lb 12.8 oz)   07/08/14 78.7 kg (173 lb 9.6 oz)   08/19/11 80.2 kg (176 lb 11.2 oz)              We Performed the Following     REFRACTION [[id number]]        Primary Care Provider Office Phone # Fax #    Delfina Boyle [phone]       [facility] [place] [address] [place] [address]        Equal Access to Services     [facility]: Hadii aad ku hadasho Soomaali, waaxda luqadaha, qaybta kaalmada adeegyada, waxay jasonin haycamposn sri franklin . So [facility] [phone].    ATENCIÓN: Si habla español, tiene a monroy disposición servicios gratuitos de asistencia lingüística. Jaspal al [phone].    We comply with applicable federal civil rights laws and [place] laws. We do not discriminate on the basis of race, color, national origin, age, disability, sex, sexual orientation, or gender identity.            Thank you!     Thank you for choosing EYE CLINIC  for your care. Our goal is always to provide you with excellent care. Hearing back from our patients is one way we can continue to improve our services. Please take a few minutes to complete the written survey that you may receive in the mail after your visit with us. Thank you!             Your Updated Medication List - Protect others around you: Learn how to safely use, store and throw away your medicines at www.disposemymeds.org.          This list is accurate as of 1/30/18  1:36 PM.  Always use your most recent med list.                   Brand Name Dispense Instructions for use Diagnosis    aspirin 81 MG tablet       Take 1 tablet by mouth daily.        calcium carb-cholecalciferol 600-500 MG-UNIT Caps      Take 1 capsule by mouth 3 times daily        CENTRUM ULTRA WOMENS PO      Take 1 tablet by mouth daily ( calcium: 300 mg and vitamin d: 1000 units)    Osteoporosis, Vertebral compression fracture (H)       simvastatin 10 MG tablet    ZOCOR     Take 1 tablet by mouth At Bedtime.    Osteoporosis, Vertebral compression fracture (H)       VITAMIN D3 PO      Take 1,000 mg by mouth daily

## 2018-01-30 NOTE — PROGRESS NOTES
Assessment/Plan  (H54.52A2) Low vision left eye category 2, normal vision right eye  (primary encounter diagnosis)  Comment: BCVA: 20/40- OD, 20/150 OS  Plan: Discussed findings with patient. With today's exam findings, DMV paperwork was completed for patient to continue driving. Advised patient that she should RTC for re-evaluation with any vision changes. Will monitor patient annually at this time.     (Q15.0) Juvenile glaucoma  Comment: Patient under care of Dr. Mathis  Plan: Continue care with Dr. Mathis. RTC ASAP with vision changes, particularly new flashes/floaters/curtain/dimming of vision.     (H52.13) Myopia of both eyes  Plan: SRx updated and released. Monitor each year for changes. Some adaptation may be needed with current lenses.       Complete documentation of historical and exam elements from today's encounter can  be found in the full encounter summary report (not reduplicated in this progress  note). I personally obtained the chief complaint(s) and history of present illness. I  confirmed and edited as necessary the review of systems, past medical/surgical  history, family history, social history, and examination findings as documented by  others; and I examined the patient myself. I personally reviewed the relevant tests,  images, and reports as documented above. I formulated and edited as necessary the  assessment and plan and discussed the findings and management plan with the  patient and family.    Derrick Tucker, OD

## 2018-02-22 ENCOUNTER — OFFICE VISIT (OUTPATIENT)
Dept: OPTOMETRY | Facility: CLINIC | Age: 73
End: 2018-02-22
Payer: COMMERCIAL

## 2018-02-22 DIAGNOSIS — H52.4 PRESBYOPIA: Primary | ICD-10-CM

## 2018-02-22 ASSESSMENT — REFRACTION_MANIFEST
OD_CYLINDER: +1.75
OS_CYLINDER: SPHERE
OD_AXIS: 120
OS_SPHERE: -0.50
OD_SPHERE: -4.50

## 2018-02-22 ASSESSMENT — REFRACTION_WEARINGRX
OD_CYLINDER: +1.50
OS_CYLINDER: SPHERE
OS_SPHERE: -0.50
OD_SPHERE: -4.00
OD_AXIS: 120

## 2018-02-22 ASSESSMENT — VISUAL ACUITY
OD_CC+: +2
CORRECTION_TYPE: GLASSES
OD_CC: 20/60
METHOD: SNELLEN - LINEAR
OS_CC: 20/150

## 2018-02-22 NOTE — PROGRESS NOTES
Assessment/Plan  (H52.4) Presbyopia  (primary encounter diagnosis)  Comment: Rx change today  Plan: Discussed findings with patient. Two Rx's dispensed- one for Rx that patient should be able to adapt to. Prior change of -1.00D was not tolerated, so -0.50 should still help some with her distance visual acuity while providing an easier adaptation experience. Advised patient that with new Rx, she would have a driving restriction of less than 55mph, however patient stated that she does NOT plan on driving anymore. Will plan on monitoring patient regularly with refractions.       Complete documentation of historical and exam elements from today's encounter can  be found in the full encounter summary report (not reduplicated in this progress  note). I personally obtained the chief complaint(s) and history of present illness. I  confirmed and edited as necessary the review of systems, past medical/surgical  history, family history, social history, and examination findings as documented by  others; and I examined the patient myself. I personally reviewed the relevant tests,  images, and reports as documented above. I formulated and edited as necessary the  assessment and plan and discussed the findings and management plan with the  patient and family.    Derrick Tucker OD

## 2018-02-22 NOTE — MR AVS SNAPSHOT
After Visit Summary   2/22/2018    Sadia Rider    MRN: [id number]           Patient Information     Date Of Birth          1945        Visit Information        Provider Department      2/22/2018 2:00 PM Derrick Tucker, [facility]        Today's Diagnoses     Presbyopia    -  1       Follow-ups after your visit        Follow-up notes from your care team     Return for Refraction.      Your next 10 appointments already scheduled     Jun 01, 2018  8:45 AM CDT   RETURN GLAUCOMA with Evelyn Mathis MD   Eye Clinic ([facility])    [address]  [address] [address] [place] [id number]   [phone]              Who to contact     Please call your clinic at [phone] to:    Ask questions about your health    Make or cancel appointments    Discuss your medicines    Learn about your test results    Speak to your doctor            Additional Information About Your Visit        MyChart Information     SchemaLogict gives you secure access to your electronic health record. If you see a primary care provider, you can also send messages to your care team and make appointments. If you have questions, please call your primary care clinic.  If you do not have a primary care provider, please call [phone] and they will assist you.      Met[employer]ix is an electronic gateway that provides easy, online access to your medical records. With [employer], you can request a clinic appointment, read your test results, renew a prescription or communicate with your care team.     To access your existing account, please contact your [facility] or call [phone] for assistance.        Care EveryWhere ID     This is your Care EveryWhere ID. This could be used by other organizations to access your [place] medical records  [id number]         Blood Pressure from Last 3 Encounters:   08/30/17 159/84   07/13/15 (!) 188/91   07/08/14 (!) 182/92     Weight from Last 3 Encounters:   07/13/15 79.3 kg (174 lb 12.8 oz)   07/08/14 78.7 kg (173 lb 9.6 oz)   08/19/11 80.2 kg (176 lb 11.2 oz)              Today, you had the following     No orders found for display       Primary Care Provider Office Phone # Fax #    Delfina Boyle [phone]       [facility] [place] [address] [place] [address]        Equal Access to Services     JAVIER ESTRELLA : Hadii aad ku hadasho Soomaali, waaxda luqadaha, qaybta kaalmada adeegyada, waxay idiin hayaan sri figueroa. So [facility] [phone].    ATENCIÓN: Si habla español, tiene a monroy disposición servicios gratuitos de asistencia lingüística. Ll[facility] [phone].    We comply with applicable federal civil rights laws and [place] laws. We do not discriminate on the basis of race, color, national origin, age, disability, sex, sexual orientation, or gender identity.            Thank you!     Thank you for choosing EYE CLINIC  for your care. Our goal is always to provide you with excellent care. Hearing back from our patients is one way we can continue to improve our services. Please take a few minutes to complete the written survey that you may receive in the mail after your visit with us. Thank you!             Your Updated Medication List - Protect others around you: Learn how to safely use, store and throw away your medicines at www.disposemymeds.org.          This list is accurate as of 2/22/18  2:25 PM.  Always use your most recent med list.                   Brand Name Dispense Instructions for use Diagnosis    aspirin 81 MG tablet      Take 1 tablet by mouth daily.        calcium carb-cholecalciferol 600-500 MG-UNIT Caps      Take 1 capsule by mouth 3 times daily        CENTRUM ULTRA WOMENS PO      Take 1 tablet by mouth daily ( calcium: 300 mg and vitamin d: 1000 units)    Osteoporosis, Vertebral compression fracture (H)       simvastatin 10 MG tablet    ZOCOR     Take 1 tablet by  mouth At Bedtime.    Osteoporosis, Vertebral compression fracture (H)       VITAMIN D3 PO      Take 1,000 mg by mouth daily

## 2018-03-08 ENCOUNTER — RECORDS - HEALTHEAST (OUTPATIENT)
Dept: BONE DENSITY | Facility: CLINIC | Age: 73
End: 2018-03-08

## 2018-03-08 ENCOUNTER — RECORDS - HEALTHEAST (OUTPATIENT)
Dept: ADMINISTRATIVE | Facility: OTHER | Age: 73
End: 2018-03-08

## 2018-03-08 DIAGNOSIS — M81.0 AGE-RELATED OSTEOPOROSIS WITHOUT CURRENT PATHOLOGICAL FRACTURE: ICD-10-CM

## 2018-03-08 DIAGNOSIS — E03.8 OTHER SPECIFIED HYPOTHYROIDISM: ICD-10-CM

## 2018-03-08 DIAGNOSIS — E06.3 AUTOIMMUNE THYROIDITIS: ICD-10-CM

## 2018-03-15 ENCOUNTER — COMMUNICATION - HEALTHEAST (OUTPATIENT)
Dept: FAMILY MEDICINE | Facility: CLINIC | Age: 73
End: 2018-03-15

## 2018-03-17 ENCOUNTER — COMMUNICATION - HEALTHEAST (OUTPATIENT)
Dept: SCHEDULING | Facility: CLINIC | Age: 73
End: 2018-03-17

## 2018-06-01 ENCOUNTER — OFFICE VISIT (OUTPATIENT)
Dept: OPHTHALMOLOGY | Facility: CLINIC | Age: 73
End: 2018-06-01
Attending: OPHTHALMOLOGY
Payer: COMMERCIAL

## 2018-06-01 DIAGNOSIS — Q15.0 JUVENILE GLAUCOMA: Primary | ICD-10-CM

## 2018-06-01 PROCEDURE — G0463 HOSPITAL OUTPT CLINIC VISIT: HCPCS | Mod: ZF

## 2018-06-01 ASSESSMENT — CONF VISUAL FIELD
OD_INFERIOR_TEMPORAL_RESTRICTION: 3
OD_SUPERIOR_NASAL_RESTRICTION: 3
OD_INFERIOR_NASAL_RESTRICTION: 3
OD_SUPERIOR_TEMPORAL_RESTRICTION: 3

## 2018-06-01 ASSESSMENT — TONOMETRY
OD_IOP_MMHG: 7
OS_IOP_MMHG: 2
IOP_METHOD: APPLANATION

## 2018-06-01 ASSESSMENT — VISUAL ACUITY
OD_CC: 20/50
CORRECTION_TYPE: GLASSES
OD_CC+: +2
OD_PH_CC: 20/40
OS_CC: 20/150 ECC
METHOD: SNELLEN - LINEAR

## 2018-06-01 ASSESSMENT — CUP TO DISC RATIO
OD_RATIO: 0.6
OS_RATIO: 0.6

## 2018-06-01 ASSESSMENT — REFRACTION_WEARINGRX
OD_CYLINDER: +1.75
OD_SPHERE: -4.50
OS_SPHERE: -0.50
OS_CYLINDER: SPHERE
OD_AXIS: 120

## 2018-06-01 ASSESSMENT — SLIT LAMP EXAM - LIDS
COMMENTS: NORMAL
COMMENTS: NORMAL

## 2018-06-01 NOTE — MR AVS SNAPSHOT
After Visit Summary   6/1/2018    Sadia Rider    MRN: [id number]           Patient Information     Date Of Birth          1945        Visit Information        Provider Department      6/1/2018 8:45 AM Evelyn Mathis MD [facility]        Today's Diagnoses     Juvenile glaucoma    -  1       Follow-ups after your visit        Follow-up notes from your care team     Return in about 4 months (around 10/1/2018) for visual field LVC.      Your next 10 appointments already scheduled     Aug 01, 2018  2:15 PM CDT   (Arrive by 2:00 PM)   Return Visit with ALMA DELIA Davalos [facility] D[facility]at[facility] ([facility] [place])    [address]  3rd Floor  [place] [address] [phone]            Oct 22, 2018  9:30 AM CDT   RETURN GLAUCOMA with Evelyn Mathis MD   Eye Clinic ([facility])    [address]  [address] [address] [phone]              Who to contact     Please call your clinic at [phone] to:    Ask questions about your health    Make or cancel appointments    Discuss your medicines    Learn about your test results    Speak to your doctor            Additional Information About Your Visit        [employer] Information     [employer] gives you secure access to your electronic health record. If you see a primary care provider, you can also send messages to your care team and make appointments. If you have questions, please call your primary care clinic.  If you do not have a primary care provider, please call [phone] and they will assist you.      [employer] is an electronic gateway that provides easy, online access to your medical records. With [employer], you can request a clinic appointment, read your test results, renew a prescription or communicate with your care team.     To access your existing account, please contact your [facility] or call [phone]  for assistance.        Care EveryWhere ID     This is your Care EveryWhere ID. This could be used by other organizations to access your [place] medical records  [id number]         Blood Pressure from Last 3 Encounters:   08/30/17 159/84   07/13/15 (!) 188/91   07/08/14 (!) 182/92    Weight from Last 3 Encounters:   07/13/15 79.3 kg (174 lb 12.8 oz)   07/08/14 78.7 kg (173 lb 9.6 oz)   08/19/11 80.2 kg (176 lb 11.2 oz)               Primary Care Provider Office Phone # Fax #    Delfina Boyle [phone]       [facility] [place] [address] [place] [address]        Equal Access to Services     HIPOLITO ESTRELLA : Hadii radha serrano hadasho Soomaali, waaxda luqadaha, qaybta kaalmada adeegyada, ela franklin . So [facility] [phone].    ATENCIÓN: Si habla español, tiene a monroy disposición servicios gratuitos de asistencia lingüística. [facility] [phone].    We comply with applicable federal civil rights laws and [place] laws. We do not discriminate on the basis of race, color, national origin, age, disability, sex, sexual orientation, or gender identity.            Thank you!     Thank you for choosing EYE CLINIC  for your care. Our goal is always to provide you with excellent care. Hearing back from our patients is one way we can continue to improve our services. Please take a few minutes to complete the written survey that you may receive in the mail after your visit with us. Thank you!             Your Updated Medication List - Protect others around you: Learn how to safely use, store and throw away your medicines at www.disposemymeds.org.          This list is accurate as of 6/1/18 10:43 AM.  Always use your most recent med list.                   Brand Name Dispense Instructions for use Diagnosis    Alendronate Sodium 70 MG Tbef      Take by mouth every 7 days        aspirin 81 MG tablet      Take 1 tablet by mouth daily.        calcium carb-cholecalciferol  600-500 MG-UNIT Caps      Take 1 capsule by mouth 3 times daily        CENTRUM ULTRA WOMENS PO      Take 1 tablet by mouth daily ( calcium: 300 mg and vitamin d: 1000 units)    Osteoporosis, Vertebral compression fracture (H)       simvastatin 10 MG tablet    ZOCOR     Take 1 tablet by mouth At Bedtime.    Osteoporosis, Vertebral compression fracture (H)       VITAMIN D3 PO      Take 1,000 mg by mouth daily

## 2018-06-01 NOTE — NURSING NOTE
Chief Complaints and History of Present Illnesses   Patient presents with     Follow Up For     Juvenile glaucoma (Primary Dx)     HPI    Affected eye(s):  Both   Symptoms:     No blurred vision   No decreased vision   No floaters   No flashes      Duration:  6 months   Frequency:  Constant       Do you have eye pain now?:  No      Comments:  States va is the same since last visit.  Using no drops.  Arnold Dillard  9:24 AM June 1, 2018

## 2018-08-01 ENCOUNTER — OFFICE VISIT (OUTPATIENT)
Dept: DERMATOLOGY | Facility: CLINIC | Age: 73
End: 2018-08-01
Payer: COMMERCIAL

## 2018-08-01 ENCOUNTER — RECORDS - HEALTHEAST (OUTPATIENT)
Dept: ADMINISTRATIVE | Facility: OTHER | Age: 73
End: 2018-08-01

## 2018-08-01 DIAGNOSIS — Z12.83 SKIN CANCER SCREENING: Primary | ICD-10-CM

## 2018-08-01 DIAGNOSIS — L82.1 SEBORRHEIC KERATOSIS: ICD-10-CM

## 2018-08-01 DIAGNOSIS — Z85.828 HISTORY OF BASAL CELL CARCINOMA: ICD-10-CM

## 2018-08-01 NOTE — MR AVS SNAPSHOT
After Visit Summary   8/1/2018    Sadia Rider    MRN: [id number]           Patient Information     Date Of Birth          1945        Visit Information        Provider Department      8/1/2018 2:15 PM Jessica Yoon PA-C [facility] Dermat[facility]        Today's Diagnoses     Skin cancer screening    -  1    History of basal cell carcinoma        Seborrheic keratosis           Follow-ups after your visit        Follow-up notes from your care team     Return in about 1 year (around 8/1/2019).      Your next 10 appointments already scheduled     Oct 22, 2018  9:30 AM CDT   RETURN GLAUCOMA with Evelyn Mathis MD   Eye Clinic ([facility])    [place] [address]  9th [address] Clin [address] [place] [address] [phone]              Who to contact     Please call your clinic at [phone] to:    Ask questions about your health    Make or cancel appointments    Discuss your medicines    Learn about your test results    Speak to your doctor            Additional Information About Your Visit        [employer] Information     [employer] gives you secure access to your electronic health record. If you see a primary care provider, you can also send messages to your care team and make appointments. If you have questions, please call your primary care clinic.  If you do not have a primary care provider, please call [phone] and they will assist you.      [employer] is an electronic gateway that provides easy, online access to your medical records. With [employer], you can request a clinic appointment, read your test results, renew a prescription or communicate with your care team.     To access your existing account, please contact your [facility] or call [phone] for assistance.        Care EveryWhere ID     This is your Care EveryWhere ID. This could be used by other organizations to access your [place] medical records  [id number]          Blood Pressure from Last 3 Encounters:   08/30/17 159/84   07/13/15 (!) 188/91   07/08/14 (!) 182/92    Weight from Last 3 Encounters:   07/13/15 79.3 kg (174 lb 12.8 oz)   07/08/14 78.7 kg (173 lb 9.6 oz)   08/19/11 80.2 kg (176 lb 11.2 oz)              Today, you had the following     No orders found for display       Primary Care Provider Office Phone # Fax #    Delfina Boyle [phone]       [facility] [place] [address] [place] [address]        Equal Access to Services     [facility]: Hadii radha Browne, waaxda luqadaha, qaybta kaalmada jairo, ela figueroa. So [facility] [place] [phone].    ATENCIÓN: Si habla español, tiene a monroy disposición servicios gratuitos de asistencia lingüística. [facility] [phone].    We comply with applicable federal civil rights laws and [place] laws. We do not discriminate on the basis of race, color, national origin, age, disability, sex, sexual orientation, or gender identity.            Thank you!     Thank you for choosing [facility] DERMATOLOG[facility]  for your care. Our goal is always to provide you with excellent care. Hearing back from our patients is one way we can continue to improve our services. Please take a few minutes to complete the written survey that you may receive in the mail after your visit with us. Thank you!             Your Updated Medication List - Protect others around you: Learn how to safely use, store and throw away your medicines at www.disposemymeds.org.          This list is accurate as of 8/1/18 11:59 PM.  Always use your most recent med list.                   Brand Name Dispense Instructions for use Diagnosis    Alendronate Sodium 70 MG Tbef      Take by mouth every 7 days        aspirin 81 MG tablet      Take 1 tablet by mouth daily.        calcium carb-cholecalciferol 600-500 MG-UNIT Caps      Take 1 capsule by mouth 3 times daily        CENTRUM ULTRA WOMENS PO      Take  1 tablet by mouth daily ( calcium: 300 mg and vitamin d: 1000 units)    Osteoporosis, Vertebral compression fracture (H)       simvastatin 10 MG tablet    ZOCOR     Take 1 tablet by mouth At Bedtime.    Osteoporosis, Vertebral compression fracture (H)       VITAMIN D3 PO      Take 1,000 mg by mouth daily

## 2018-08-01 NOTE — PROGRESS NOTES
"[facility] Dermatology Note    Dermatology Problem List:  1. History of  NMSC  - BCC - right lower cheek, s/p Mohs 8/30/17  - BCC - nose s/p excision 2012  2. History of intertrigo - OTC anti-fungal  3. Last full body skin check 8/1/18    CC:   Chief Complaint   Patient presents with     Skin Check     annual skin check, no areas of concern for patient     Date of Service: Aug 1, 2018    History of Present Illness:  Ms. Sadia Rider is a 73 year old female with a history of BCC who presents for a skin check. She was last seen in dermatology on 8/30/17 with Dr. Duncan, who removed a BCC on the right lower cheek via Mohs. Today, the patient reports that she has healed incredibly well following this procedure. She \"cannot speak more highly\" about the job done to minimize the scar. As for concerns to be addressed today, the patient has not noticed any bleeding, crusting, or changing lesions, but admits there still might be something she has not noticed. She is otherwise feeling well.     Past Medical History:   Patient Active Problem List   Diagnosis     Juvenile glaucoma     Myopia of both eyes     Stress fracture of metatarsal bone, right, with routine healing, subsequent encounter     Past Medical History:   Diagnosis Date     Basal cell carcinoma      Glaucoma      POAG ADV     Tear film insufficiency, unspecified      Past Surgical History:   Procedure Laterality Date     ARGON LASER TRABECULOPLASTY (ALT) OD (RIGHT EYE)  5/1989    RE     C STOMACH SURGERY PROCEDURE UNLISTED       CATARACT IOL, RT/LT  1998/2002    BE     CHOLECYSTECTOMY       GLAUCOMA SURGERY  1990s    Trab x 2 RE     MOHS MICROGRAPHIC PROCEDURE       TRABECULECTOMY, MITOMYCIN FILTER, COMBINED  3/9/2014    LE     YAG CAPSULOTOMY OD (RIGHT EYE)  8/4/2008    RE     Social History:  She had a lot of sun exposure when she was younger, did not use sunscreen when she was younger.     Family History:  Not addressed at this visit. "     Medications:  Current Outpatient Prescriptions   Medication Sig Dispense Refill     Alendronate Sodium 70 MG TBEF Take by mouth every 7 days       aspirin 81 MG tablet Take 1 tablet by mouth daily.       calcium carb-cholecalciferol 600-500 MG-UNIT CAPS Take 1 capsule by mouth 3 times daily       Cholecalciferol (VITAMIN D3 PO) Take 1,000 mg by mouth daily       Multiple Vitamins-Minerals (CENTRUM ULTRA WOMENS PO) Take 1 tablet by mouth daily ( calcium: 300 mg and vitamin d: 1000 units)       simvastatin (ZOCOR) 10 MG tablet Take 1 tablet by mouth At Bedtime.       Allergies:  Allergies   Allergen Reactions     Alphagan [Brimonidine Tartrate] Blisters     Review of Systems:  - Skin: As above in HPI. No additional skin concerns.  - No chronic cough, night sweats, fevers, or unexpected weight loss    Physical exam:  Vitals: There were no vitals taken for this visit.  GEN: This is a well developed, well-nourished female in no acute distress, in a pleasant mood.      SKIN: Total skin excluding the undergarment areas was performed. The exam included the head/face, neck, both arms, chest, back, abdomen, both legs, digits and/or nails.   - Stuck on tan to brown papules on the trunk, extremities  - There is no erythema, telangectasias, nodularity, or pigmentation on the sites of prior skin cancer..  - No other lesions of concern on areas examined.     Impression/Plan:  1. History of NMSC - no evidence of recurrence    Recommend sunscreens SPF #30 or greater, protective clothing and avoidance of tanning beds.    2. Seborrheic keratoses - trunk, extremities    No further intervention required. Patient to report changes.          Follow-up in 1 year, earlier for new or changing lesions.    Staff Involved:  Staff/Scribe    Scribe Disclosure:  Edu LARA, am serving as a scribe to document services personally performed by Jessica Yoon PA-C, based on data collection and the provider's statements to me.     Provider  Disclosure:   The documentation recorded by the scribe accurately reflects the services I personally performed and the decisions made by me.    All risks, benefits and alternatives were discussed with patient.  Patient is in agreement and understands the assessment and plan.  All questions were answered.  Sun Screen Education was given.   Return to Clinic annually or sooner as needed.   Jessica Yoon PA-C   [facility]

## 2018-08-01 NOTE — LETTER
"8/1/2018       RE: Sadia Rider  [address]  [place] [address]     Dear Colleague,    Thank you for referring your patient, Sadia Rider, to the [facility] DERMATOLOGY at [facility]. Please see a copy of my visit note below.    [facility] Dermatology Note    Dermatology Problem List:  1. History of  NMSC  - BCC - right lower cheek, s/p Mohs 8/30/17  - BCC - nose s/p excision 2012  2. History of intertrigo - OTC anti-fungal  3. Last full body skin check 8/1/18    CC:   Chief Complaint   Patient presents with     Skin Check     annual skin check, no areas of concern for patient     Date of Service: Aug 1, 2018    History of Present Illness:  Ms. Sadia Rider is a 73 year old female with a history of BCC who presents for a skin check. She was last seen in dermatology on 8/30/17 with Dr. Duncan, who removed a BCC on the right lower cheek via Mohs. Today, the patient reports that she has healed incredibly well following this procedure. She \"cannot speak more highly\" about the job done to minimize the scar. As for concerns to be addressed today, the patient has not noticed any bleeding, crusting, or changing lesions, but admits there still might be something she has not noticed. She is otherwise feeling well.     Past Medical History:   Patient Active Problem List   Diagnosis     Juvenile glaucoma     Myopia of both eyes     Stress fracture of metatarsal bone, right, with routine healing, subsequent encounter     Past Medical History:   Diagnosis Date     Basal cell carcinoma      Glaucoma      POAG ADV     Tear film insufficiency, unspecified      Past Surgical History:   Procedure Laterality Date     ARGON LASER TRABECULOPLASTY (ALT) OD (RIGHT EYE)  5/1989    RE     C STOMACH SURGERY PROCEDURE UNLISTED       CATARACT IOL, RT/LT  1998/2002    BE     CHOLECYSTECTOMY       GLAUCOMA SURGERY  1990s    Trab x 2 RE     MOHS MICROGRAPHIC " PROCEDURE       TRABECULECTOMY, MITOMYCIN FILTER, COMBINED  3/9/2014    LE     YAG CAPSULOTOMY OD (RIGHT EYE)  8/4/2008    RE     Social History:  She had a lot of sun exposure when she was younger, did not use sunscreen when she was younger.     Family History:  Not addressed at this visit.     Medications:  Current Outpatient Prescriptions   Medication Sig Dispense Refill     Alendronate Sodium 70 MG TBEF Take by mouth every 7 days       aspirin 81 MG tablet Take 1 tablet by mouth daily.       calcium carb-cholecalciferol 600-500 MG-UNIT CAPS Take 1 capsule by mouth 3 times daily       Cholecalciferol (VITAMIN D3 PO) Take 1,000 mg by mouth daily       Multiple Vitamins-Minerals (CENTRUM ULTRA WOMENS PO) Take 1 tablet by mouth daily ( calcium: 300 mg and vitamin d: 1000 units)       simvastatin (ZOCOR) 10 MG tablet Take 1 tablet by mouth At Bedtime.       Allergies:  Allergies   Allergen Reactions     Alphagan [Brimonidine Tartrate] Blisters     Review of Systems:  - Skin: As above in HPI. No additional skin concerns.  - No chronic cough, night sweats, fevers, or unexpected weight loss    Physical exam:  Vitals: There were no vitals taken for this visit.  GEN: This is a well developed, well-nourished female in no acute distress, in a pleasant mood.      SKIN: Total skin excluding the undergarment areas was performed. The exam included the head/face, neck, both arms, chest, back, abdomen, both legs, digits and/or nails.   - Stuck on tan to brown papules on the trunk, extremities  - There is no erythema, telangectasias, nodularity, or pigmentation on the sites of prior skin cancer..  - No other lesions of concern on areas examined.     Impression/Plan:  1. History of NMSC - no evidence of recurrence    Recommend sunscreens SPF #30 or greater, protective clothing and avoidance of tanning beds.    2. Seborrheic keratoses - trunk, extremities    No further intervention required. Patient to report changes.      Follow-up  in 1 year, earlier for new or changing lesions.    Staff Involved:  Staff/Scribe    Scribe Disclosure:  I, Edu Toussaint, am serving as a scribe to document services personally performed by Jessica Yoon PA-C, based on data collection and the provider's statements to me.     Provider Disclosure:   The documentation recorded by the scribe accurately reflects the services I personally performed and the decisions made by me.    All risks, benefits and alternatives were discussed with patient.  Patient is in agreement and understands the assessment and plan.  All questions were answered.  Sun Screen Education was given.   Return to Clinic annually or sooner as needed.     Again, thank you for allowing me to participate in the care of your patient.      Sincerely,    Jessica Yoon PA-C

## 2018-10-19 DIAGNOSIS — Q15.0 JUVENILE GLAUCOMA: Primary | ICD-10-CM

## 2018-10-22 ENCOUNTER — OFFICE VISIT (OUTPATIENT)
Dept: OPHTHALMOLOGY | Facility: CLINIC | Age: 73
End: 2018-10-22
Attending: OPHTHALMOLOGY
Payer: COMMERCIAL

## 2018-10-22 DIAGNOSIS — H26.492 SECONDARY MEMBRANE, OBSCURING VISION, LEFT: Primary | ICD-10-CM

## 2018-10-22 DIAGNOSIS — Q15.0 JUVENILE GLAUCOMA: ICD-10-CM

## 2018-10-22 PROCEDURE — 66821 AFTER CATARACT LASER SURGERY: CPT | Mod: ZF | Performed by: OPHTHALMOLOGY

## 2018-10-22 PROCEDURE — 92083 EXTENDED VISUAL FIELD XM: CPT | Mod: ZF | Performed by: OPHTHALMOLOGY

## 2018-10-22 PROCEDURE — G0463 HOSPITAL OUTPT CLINIC VISIT: HCPCS | Mod: ZF

## 2018-10-22 ASSESSMENT — VISUAL ACUITY
METHOD: SNELLEN - LINEAR
OS_CC: 20/200ECC
OD_CC: 20/40 SLOW

## 2018-10-22 ASSESSMENT — TONOMETRY
IOP_METHOD: APPLANATION
OS_IOP_MMHG: 3
OD_IOP_MMHG: 6

## 2018-10-22 ASSESSMENT — REFRACTION_WEARINGRX
OS_CYLINDER: SPHERE
OD_CYLINDER: +1.75
OD_SPHERE: -4.50
OD_AXIS: 120
OS_SPHERE: -0.50

## 2018-10-22 ASSESSMENT — CONF VISUAL FIELD
OS_INFERIOR_TEMPORAL_RESTRICTION: 3
OS_SUPERIOR_NASAL_RESTRICTION: 3
OS_INFERIOR_NASAL_RESTRICTION: 3
OS_SUPERIOR_TEMPORAL_RESTRICTION: 3

## 2018-10-22 ASSESSMENT — CUP TO DISC RATIO
OS_RATIO: 0.6
OD_RATIO: 0.6

## 2018-10-22 ASSESSMENT — SLIT LAMP EXAM - LIDS
COMMENTS: NORMAL
COMMENTS: NORMAL

## 2018-10-22 NOTE — PROGRESS NOTES
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    Interval history: patient reports vision in right eye may be slightly better. Sleeping with shield to reduce hypotony    OCT macula right eye with epiretinal membrane   OCT retinal nerve fiber layer stable    Assessment :  Primary open angle glaucoma (POAG)    Trabeculectomy right eye in 1990    Trabeculectomy left eye 2004    S/p Argon laser trabeculoplasty both eyes 2009   Currently not using eyedrops   Allergy to Alphagan     Posterior chamber intraocular lens (PCIOL) both eyes    Cataract extraction right eye 1998 and Cataract extraction left eye  2002   Post Yag right eye, now with posterior capsular opacity (PCO) left eye but baseline poor visual acuity so will recheck next time    Plan  -Good intraocular pressure on no medications  -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 However last recorded at 20/30 vision with intraocular pressure of 7 right eye.   -Patient would like to continue with denise shield while sleeping   -Does have some Epiretinal membrane right eye may be contributing to visual disturbance- consider referral to retina     Corneal topography right eye - some irregularities inferiorly    (this fall do LVC--do yearly given low intraocular pressure, inconsistent with 24-2, although does not need frequent visual fields with low intraocular pressure )    Return to clinic glaucoma 4 months for IOP check and DMV visual field   Yag caps left eye done today 10/22/18    Attending Physician Attestation:  Complete documentation of historical and exam elements from today's encounter can be found in the full encounter summary report (not reduplicated in this progress note). I personally obtained the chief complaint(s) and history of present illness. I confirmed and edited asnecessary the review of systems, past medical/surgical history, family history, social history, and examination findings as documented by others; and I  examined the patient myself. I personally reviewed the relevant tests, images, and reports as documented above. I formulated and edited as necessary the assessment and plan and discussed the findings and management plan with the patient and family.  - Evelyn Mathis MD 10:59 AM 10/22/2018

## 2018-10-22 NOTE — MR AVS SNAPSHOT
After Visit Summary   10/22/2018    Sadia Rider    MRN: [id number]           Patient Information     Date Of Birth          1945        Visit Information        Provider Department      10/22/2018 9:30 AM Evelyn Mathis MD [facility]        Today's Diagnoses     Secondary membrane, obscuring vision, left    -  1    Juvenile glaucoma           Follow-ups after your visit        Your next 10 appointments already scheduled     Feb 18, 2019 10:00 AM CST   RETURN GLAUCOMA with Evelyn Mathis MD   Eye Clinic ([facility])    [address] [facility] [address] [place] [id number]   [phone]              Future tests that were ordered for you today     Open Future Orders        Priority Expected Expires Ordered    [facility] DMV OU Routine 2/1/2019 4/22/2020 10/22/2018            Who to contact     Please call your clinic at [phone] to:    Ask questions about your health    Make or cancel appointments    Discuss your medicines    Learn about your test results    Speak to your doctor            Additional Information About Your Visit        [employer] Information     Ant[employer] gives you secure access to your electronic health record. If you see a primary care provider, you can also send messages to your care team and make appointments. If you have questions, please call your primary care clinic.  If you do not have a primary care provider, please call [phone] and they will assist you.      Antavo is an electronic gateway that provides easy, online access to your medical records. With [employer]avo, you can request a clinic appointment, read your test results, renew a prescription or communicate with your care team.     To access your existing account, please contact your [facility] or call [phone] for assistance.        Care EveryWhere ID     This is your Care EveryWhere ID. This could be used by other organizations to  access your Agency medical records  [id number]         Blood Pressure from Last 3 Encounters:   08/30/17 159/84   07/13/15 (!) 188/91   07/08/14 (!) 182/92    Weight from Last 3 Encounters:   07/13/15 79.3 kg (174 lb 12.8 oz)   07/08/14 78.7 kg (173 lb 9.6 oz)   08/19/11 80.2 kg (176 lb 11.2 oz)              We Performed the Following     Low Vision Central OU     YAG Capsulotomy OS (left eye)        Primary Care Provider Office Phone # Fax #    Delfina Boyle [phone]       [facility] [place] [address] [place] [address]        Equal Access to Services     JAVIER ESTRELLA : Hadii radha conteo Sofeliberto, waaxda luqadaha, qaybta kaalmada adeegyada, waxania figueroa. So [facility] [phone].    ATENCIÓN: Si habla español, tiene a monroy disposición servicios gratuitos de asistencia lingüística. [facility] [phone].    We comply with applicable federal civil rights laws and [place] laws. We do not discriminate on the basis of race, color, national origin, age, disability, sex, sexual orientation, or gender identity.            Thank you!     Thank you for choosing EYE CLINIC  for your care. Our goal is always to provide you with excellent care. Hearing back from our patients is one way we can continue to improve our services. Please take a few minutes to complete the written survey that you may receive in the mail after your visit with us. Thank you!             Your Updated Medication List - Protect others around you: Learn how to safely use, store and throw away your medicines at www.disposemymeds.org.          This list is accurate as of 10/22/18 11:58 AM.  Always use your most recent med list.                   Brand Name Dispense Instructions for use Diagnosis    Alendronate Sodium 70 MG Tbef      Take by mouth every 7 days        aspirin 81 MG tablet      Take 1 tablet by mouth daily.        calcium carb-cholecalciferol 600-500 MG-UNIT Caps      Take  1 capsule by mouth 3 times daily        CENTRUM ULTRA WOMENS PO      Take 1 tablet by mouth daily ( calcium: 300 mg and vitamin d: 1000 units)    Osteoporosis, Vertebral compression fracture (H)       simvastatin 10 MG tablet    ZOCOR     Take 1 tablet by mouth At Bedtime.    Osteoporosis, Vertebral compression fracture (H)       VITAMIN D3 PO      Take 1,000 mg by mouth daily

## 2018-10-22 NOTE — NURSING NOTE
Chief Complaints and History of Present Illnesses   Patient presents with     Follow Up For      Juvenile glaucoma      HPI    Last Eye Exam:  6/1/18   Affected eye(s):  Both   Symptoms:        Unknown duration    Frequency:  Constant       Do you have eye pain now?:  No      Comments:  Sadia is here for a follow up of Juvenile glaucoma. She says she needs more light overall to see better. She also says when she closes her LE while reading, she sees better with her RE.     Derick Calles COT 9:15 AM October 22, 2018

## 2018-11-20 ENCOUNTER — OFFICE VISIT - HEALTHEAST (OUTPATIENT)
Dept: FAMILY MEDICINE | Facility: CLINIC | Age: 73
End: 2018-11-20

## 2018-11-20 DIAGNOSIS — Z86.39 HISTORY OF HYPOTHYROIDISM: ICD-10-CM

## 2018-11-20 DIAGNOSIS — M81.0 AGE-RELATED OSTEOPOROSIS WITHOUT CURRENT PATHOLOGICAL FRACTURE: ICD-10-CM

## 2018-11-20 DIAGNOSIS — E78.2 MIXED HYPERLIPIDEMIA: ICD-10-CM

## 2018-11-20 DIAGNOSIS — E55.9 VITAMIN D DEFICIENCY: ICD-10-CM

## 2018-11-20 LAB
ALBUMIN SERPL-MCNC: 4.1 G/DL (ref 3.5–5)
ALP SERPL-CCNC: 53 U/L (ref 45–120)
ALT SERPL W P-5'-P-CCNC: 27 U/L (ref 0–45)
ANION GAP SERPL CALCULATED.3IONS-SCNC: 9 MMOL/L (ref 5–18)
AST SERPL W P-5'-P-CCNC: 20 U/L (ref 0–40)
BILIRUB SERPL-MCNC: 1.2 MG/DL (ref 0–1)
BUN SERPL-MCNC: 23 MG/DL (ref 8–28)
CALCIUM SERPL-MCNC: 10.3 MG/DL (ref 8.5–10.5)
CHLORIDE BLD-SCNC: 108 MMOL/L (ref 98–107)
CHOLEST SERPL-MCNC: 155 MG/DL
CO2 SERPL-SCNC: 25 MMOL/L (ref 22–31)
CREAT SERPL-MCNC: 0.8 MG/DL (ref 0.6–1.1)
FASTING STATUS PATIENT QL REPORTED: YES
GFR SERPL CREATININE-BSD FRML MDRD: >60 ML/MIN/1.73M2
GLUCOSE BLD-MCNC: 89 MG/DL (ref 70–125)
HDLC SERPL-MCNC: 48 MG/DL
LDLC SERPL CALC-MCNC: 71 MG/DL
POTASSIUM BLD-SCNC: 4.9 MMOL/L (ref 3.5–5)
PROT SERPL-MCNC: 7.2 G/DL (ref 6–8)
SODIUM SERPL-SCNC: 142 MMOL/L (ref 136–145)
T4 FREE SERPL-MCNC: 0.8 NG/DL (ref 0.7–1.8)
TRIGL SERPL-MCNC: 179 MG/DL
TSH SERPL DL<=0.005 MIU/L-ACNC: 3.05 UIU/ML (ref 0.3–5)

## 2018-11-20 ASSESSMENT — MIFFLIN-ST. JEOR: SCORE: 1281.84

## 2018-11-21 LAB
25(OH)D3 SERPL-MCNC: 40.1 NG/ML (ref 30–80)
25(OH)D3 SERPL-MCNC: 40.1 NG/ML (ref 30–80)

## 2019-01-16 ENCOUNTER — COMMUNICATION - HEALTHEAST (OUTPATIENT)
Dept: FAMILY MEDICINE | Facility: CLINIC | Age: 74
End: 2019-01-16

## 2019-01-16 DIAGNOSIS — M81.0 AGE-RELATED OSTEOPOROSIS WITHOUT CURRENT PATHOLOGICAL FRACTURE: ICD-10-CM

## 2019-02-18 ENCOUNTER — ANCILLARY PROCEDURE (OUTPATIENT)
Dept: MAMMOGRAPHY | Facility: CLINIC | Age: 74
End: 2019-02-18
Attending: FAMILY MEDICINE
Payer: COMMERCIAL

## 2019-02-18 ENCOUNTER — RECORDS - HEALTHEAST (OUTPATIENT)
Dept: ADMINISTRATIVE | Facility: OTHER | Age: 74
End: 2019-02-18

## 2019-02-18 ENCOUNTER — OFFICE VISIT (OUTPATIENT)
Dept: OPHTHALMOLOGY | Facility: CLINIC | Age: 74
End: 2019-02-18
Attending: OPHTHALMOLOGY
Payer: COMMERCIAL

## 2019-02-18 DIAGNOSIS — H52.13 MYOPIA OF BOTH EYES: ICD-10-CM

## 2019-02-18 DIAGNOSIS — Z12.31 VISIT FOR SCREENING MAMMOGRAM: ICD-10-CM

## 2019-02-18 DIAGNOSIS — Q15.0 JUVENILE GLAUCOMA: Primary | ICD-10-CM

## 2019-02-18 PROCEDURE — G0463 HOSPITAL OUTPT CLINIC VISIT: HCPCS | Mod: ZF

## 2019-02-18 ASSESSMENT — CONF VISUAL FIELD
OD_INFERIOR_TEMPORAL_RESTRICTION: 2
OD_SUPERIOR_NASAL_RESTRICTION: 2
OS_INFERIOR_NASAL_RESTRICTION: 3
OS_INFERIOR_TEMPORAL_RESTRICTION: 3
OS_SUPERIOR_NASAL_RESTRICTION: 3
OD_INFERIOR_NASAL_RESTRICTION: 2
OS_SUPERIOR_TEMPORAL_RESTRICTION: 3
OD_SUPERIOR_TEMPORAL_RESTRICTION: 2

## 2019-02-18 ASSESSMENT — TONOMETRY
OS_IOP_MMHG: 02
OD_IOP_MMHG: 05
OD_IOP_MMHG: 10
IOP_METHOD: APPLANATION
OS_IOP_MMHG: 5
IOP_METHOD: APPLANATION

## 2019-02-18 ASSESSMENT — SLIT LAMP EXAM - LIDS
COMMENTS: NORMAL
COMMENTS: NORMAL

## 2019-02-18 ASSESSMENT — REFRACTION_MANIFEST
OD_CYLINDER: +1.75
OD_ADD: +2.75
OS_ADD: +2.75
OS_CYLINDER: SPHERE
OD_SPHERE: -4.50
OD_AXIS: 123
OS_SPHERE: -0.50

## 2019-02-18 ASSESSMENT — VISUAL ACUITY
OD_CC+: -3
OD_PH_CC: 20/40
CORRECTION_TYPE: GLASSES
METHOD: SNELLEN - LINEAR
METHOD_MR: DIAGNOSTIC MR

## 2019-02-18 ASSESSMENT — REFRACTION_WEARINGRX
OS_CYLINDER: SPHERE
OD_CYLINDER: +1.75
OD_AXIS: 120
OS_SPHERE: -0.50
OD_SPHERE: -4.50

## 2019-02-18 ASSESSMENT — CUP TO DISC RATIO
OD_RATIO: 0.6
OS_RATIO: 0.6

## 2019-02-18 NOTE — NURSING NOTE
Chief Complaints and History of Present Illnesses   Patient presents with     Glaucoma Follow Up      Chief Complaint(s) and History of Present Illness(es)     Glaucoma Follow Up     Laterality: both eyes    Associated symptoms: Negative for headache and nausea              Comments     4 month Juvenile glaucoma follow-up with IOP/visual field testing.  Feels she probably won't be able to see as good today as she was seeing at the last exam 4 months ago.    Eye meds: none  EMILIE Salgado 2/18/2019 10:30 AM

## 2019-02-18 NOTE — PROGRESS NOTES
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    Interval history: patient reports vision in right eye may be stable or slightly worse. Sleeping with shield to reduce hypotony. Not using any eyedrops currently. Denies eye pain.     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 However VA today OD recorded at 20/40 with intraocular pressure of 5 right eye.   -Patient would like to continue with denise shield while sleeping   -Does have some Epiretinal membrane right eye may be contributing to visual disturbance- consider repeat OCT mac vs referral to retina     Corneal topography right eye - some irregularities inferiorly    (this fall do LVC--do yearly given low intraocular pressure, inconsistent with 24-2, although does not need frequent visual fields with low intraocular pressure )    Return to clinic glaucoma 4 months for IOP check and OCT retinal nerve fiber layer    Attending Physician Attestation:  Complete documentation of historical and exam elements from today's encounter can be found in the full encounter summary report (not reduplicated in this progress note). I personally obtained the chief complaint(s) and history of present illness. I confirmed and edited asnecessary the review of systems, past medical/surgical history, family history, social history, and examination findings as documented by others; and I examined the patient myself. I  personally reviewed the relevant tests, images, and reports as documented above. I formulated and edited as necessary the assessment and plan and discussed the findings and management plan with the patient and family.  - Evelyn Mathis MD 11:50 AM 2/18/2019

## 2019-06-24 ENCOUNTER — OFFICE VISIT (OUTPATIENT)
Dept: OPHTHALMOLOGY | Facility: CLINIC | Age: 74
End: 2019-06-24
Attending: OPHTHALMOLOGY
Payer: COMMERCIAL

## 2019-06-24 DIAGNOSIS — Q15.0 JUVENILE GLAUCOMA: Primary | ICD-10-CM

## 2019-06-24 PROCEDURE — 92133 CPTRZD OPH DX IMG PST SGM ON: CPT | Mod: ZF | Performed by: OPHTHALMOLOGY

## 2019-06-24 PROCEDURE — G0463 HOSPITAL OUTPT CLINIC VISIT: HCPCS | Mod: ZF

## 2019-06-24 ASSESSMENT — TONOMETRY
IOP_METHOD: APPLANATION
OS_IOP_MMHG: 2
OD_IOP_MMHG: 6

## 2019-06-24 ASSESSMENT — REFRACTION_WEARINGRX
OS_SPHERE: -0.50
OD_AXIS: 120
OS_CYLINDER: SPHERE
OD_SPHERE: -4.50
OD_CYLINDER: +1.75

## 2019-06-24 ASSESSMENT — CUP TO DISC RATIO
OD_RATIO: 0.6
OS_RATIO: 0.6

## 2019-06-24 ASSESSMENT — CONF VISUAL FIELD
OS_INFERIOR_TEMPORAL_RESTRICTION: 3
OS_SUPERIOR_NASAL_RESTRICTION: 3
METHOD: COUNTING FINGERS
OD_NORMAL: 1
OS_INFERIOR_NASAL_RESTRICTION: 3
OS_SUPERIOR_TEMPORAL_RESTRICTION: 3

## 2019-06-24 ASSESSMENT — SLIT LAMP EXAM - LIDS
COMMENTS: NORMAL
COMMENTS: NORMAL

## 2019-06-24 ASSESSMENT — VISUAL ACUITY
OD_CC+: +2
OD_CC: 20/40
METHOD: SNELLEN - LINEAR
OS_CC: 20/300 ECC

## 2019-06-24 NOTE — PROGRESS NOTES
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    Interval history: patient reports vision in right eye may be stable or slightly worse. Sleeping with shield to reduce hypotony. Not using any eyedrops currently. Denies eye pain.     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 However VA today OD recorded at 20/40 with intraocular pressure of 5 right eye.   -Patient would like to continue with denise shield while sleeping   -Does have some Epiretinal membrane right eye may be contributing to visual disturbance- consider repeat OCT mac vs referral to retina     Corneal topography right eye - some irregularities inferiorly    (this fall do LVC--do yearly given low intraocular pressure, inconsistent with 24-2, although does not need frequent visual fields with low intraocular pressure )    Return to clinic glaucoma 4 months for IOP check LVC both eyes     Attending Physician Attestation:  Complete documentation of historical and exam elements from today's encounter can be found in the full encounter summary report (not reduplicated in this progress note). I personally obtained the chief complaint(s) and history of present illness. I confirmed and edited asnecessary the review of systems, past medical/surgical history, family history, social history, and examination findings as documented by others; and I examined the patient myself. I personally reviewed the  relevant tests, images, and reports as documented above. I formulated and edited as necessary the assessment and plan and discussed the findings and management plan with the patient and family.  - Evelyn Mathis MD 10:13 AM 6/24/2019

## 2019-07-09 ENCOUNTER — OFFICE VISIT - HEALTHEAST (OUTPATIENT)
Dept: FAMILY MEDICINE | Facility: CLINIC | Age: 74
End: 2019-07-09

## 2019-07-09 DIAGNOSIS — J31.0 CHRONIC RHINITIS: ICD-10-CM

## 2019-07-09 DIAGNOSIS — E78.2 MIXED HYPERLIPIDEMIA: ICD-10-CM

## 2019-07-09 DIAGNOSIS — Z00.00 ROUTINE GENERAL MEDICAL EXAMINATION AT A HEALTH CARE FACILITY: ICD-10-CM

## 2019-07-09 DIAGNOSIS — M81.0 AGE-RELATED OSTEOPOROSIS WITHOUT CURRENT PATHOLOGICAL FRACTURE: ICD-10-CM

## 2019-07-09 LAB
ALBUMIN SERPL-MCNC: 4.4 G/DL (ref 3.5–5)
ALP SERPL-CCNC: 51 U/L (ref 45–120)
ALT SERPL W P-5'-P-CCNC: 22 U/L (ref 0–45)
ANION GAP SERPL CALCULATED.3IONS-SCNC: 9 MMOL/L (ref 5–18)
AST SERPL W P-5'-P-CCNC: 21 U/L (ref 0–40)
BILIRUB SERPL-MCNC: 1.2 MG/DL (ref 0–1)
BUN SERPL-MCNC: 18 MG/DL (ref 8–28)
CALCIUM SERPL-MCNC: 10.2 MG/DL (ref 8.5–10.5)
CHLORIDE BLD-SCNC: 107 MMOL/L (ref 98–107)
CHOLEST SERPL-MCNC: 152 MG/DL
CO2 SERPL-SCNC: 24 MMOL/L (ref 22–31)
CREAT SERPL-MCNC: 0.86 MG/DL (ref 0.6–1.1)
FASTING STATUS PATIENT QL REPORTED: YES
GFR SERPL CREATININE-BSD FRML MDRD: >60 ML/MIN/1.73M2
GLUCOSE BLD-MCNC: 84 MG/DL (ref 70–125)
HDLC SERPL-MCNC: 45 MG/DL
LDLC SERPL CALC-MCNC: 73 MG/DL
POTASSIUM BLD-SCNC: 4 MMOL/L (ref 3.5–5)
PROT SERPL-MCNC: 7.3 G/DL (ref 6–8)
SODIUM SERPL-SCNC: 140 MMOL/L (ref 136–145)
TRIGL SERPL-MCNC: 172 MG/DL

## 2019-07-09 ASSESSMENT — MIFFLIN-ST. JEOR: SCORE: 1269.65

## 2019-09-29 ENCOUNTER — COMMUNICATION - HEALTHEAST (OUTPATIENT)
Dept: FAMILY MEDICINE | Facility: CLINIC | Age: 74
End: 2019-09-29

## 2019-09-29 DIAGNOSIS — E78.2 MIXED HYPERLIPIDEMIA: ICD-10-CM

## 2019-09-29 DIAGNOSIS — M81.0 AGE-RELATED OSTEOPOROSIS WITHOUT CURRENT PATHOLOGICAL FRACTURE: ICD-10-CM

## 2019-10-04 ENCOUNTER — HEALTH MAINTENANCE LETTER (OUTPATIENT)
Age: 74
End: 2019-10-04

## 2019-10-09 ENCOUNTER — TELEPHONE (OUTPATIENT)
Dept: DERMATOLOGY | Facility: CLINIC | Age: 74
End: 2019-10-09

## 2019-10-18 ENCOUNTER — OFFICE VISIT (OUTPATIENT)
Dept: OPHTHALMOLOGY | Facility: CLINIC | Age: 74
End: 2019-10-18
Attending: OPHTHALMOLOGY
Payer: COMMERCIAL

## 2019-10-18 DIAGNOSIS — Z96.1 PSEUDOPHAKIA OF BOTH EYES: ICD-10-CM

## 2019-10-18 DIAGNOSIS — Q15.0 JUVENILE GLAUCOMA: Primary | ICD-10-CM

## 2019-10-18 DIAGNOSIS — H26.492 SECONDARY MEMBRANE, OBSCURING VISION, LEFT: ICD-10-CM

## 2019-10-18 PROCEDURE — 92081 LIMITED VISUAL FIELD XM: CPT | Mod: ZF | Performed by: OPHTHALMOLOGY

## 2019-10-18 PROCEDURE — G0463 HOSPITAL OUTPT CLINIC VISIT: HCPCS | Mod: ZF

## 2019-10-18 ASSESSMENT — REFRACTION_WEARINGRX
OS_CYLINDER: SPHERE
OD_SPHERE: -4.50
OD_CYLINDER: +1.75
OS_SPHERE: -0.50
OD_AXIS: 120

## 2019-10-18 ASSESSMENT — CONF VISUAL FIELD
OS_INFERIOR_TEMPORAL_RESTRICTION: 2
METHOD: COUNTING FINGERS
OS_SUPERIOR_NASAL_RESTRICTION: 2
OS_SUPERIOR_TEMPORAL_RESTRICTION: 2
OS_INFERIOR_NASAL_RESTRICTION: 2
OD_NORMAL: 1

## 2019-10-18 ASSESSMENT — SLIT LAMP EXAM - LIDS
COMMENTS: NORMAL
COMMENTS: NORMAL

## 2019-10-18 ASSESSMENT — VISUAL ACUITY
OD_CC: 20/70
METHOD: SNELLEN - LINEAR
OS_CC: 20/200
OD_CC+: -1+1
OD_PH_CC: 20/40
CORRECTION_TYPE: GLASSES

## 2019-10-18 ASSESSMENT — TONOMETRY
OS_IOP_MMHG: 04
IOP_METHOD: APPLANATION
OD_IOP_MMHG: 08

## 2019-10-18 ASSESSMENT — CUP TO DISC RATIO
OD_RATIO: 0.6
OS_RATIO: 0.6

## 2019-10-18 NOTE — NURSING NOTE
Chief Complaints and History of Present Illnesses   Patient presents with     Glaucoma Follow-Up     Chief Complaint(s) and History of Present Illness(es)     Glaucoma Follow-Up     Laterality: both eyes    Quality: Unsure if the va has changed      Associated symptoms: tearing and dryness.  Negative for redness    Pain scale: 0/10              Comments     RE has been irritated  Olya Laureano COT 8:57 AM October 18, 2019

## 2019-10-18 NOTE — PROGRESS NOTES
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    Interval history: States vision stable. Some irritation right eye with tearing and crusting of eyelid. Some improvement with artificial tear use. Sleeping with shield to reduce hypotony. Denies eye pain.     LVC stable in both eyes compared to previous    OCT macula right eye with epiretinal membrane   OCT retinal nerve fiber layer stable    Assessment :  Primary open angle glaucoma (POAG)    Trabeculectomy right eye in 1990    Trabeculectomy left eye 2004    S/p Argon laser trabeculoplasty both eyes 2009   Currently not using eyedrops   Allergy to Alphagan     Posterior chamber intraocular lens (PCIOL) both eyes    Cataract extraction right eye 1998 and Cataract extraction left eye  2002   Post Yag OU    Plan  - Good intraocular pressure on no medications  - LVC stable compared to previous  - 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   - Does have some epiretinal membrane right eye may be contributing to visual disturbance - consider repeat OCT mac vs referral to retina   - Start artificial tears PRN    Previous corneal topography right eye - some irregularities inferiorly    (LVC--do yearly given low intraocular pressure, inconsistent with 24-2, although does not need frequent visual fields with low intraocular pressure )    Return to clinic glaucoma 4 months for IOP check and DMV visual field     Mejia ANNA Perdomo MD  Ophthalmology Resident, PGY-3    Attending Physician Attestation:  Complete documentation of historical and exam elements from today's encounter can be found in the full encounter summary report (not reduplicated in this progress note). I personally obtained the chief complaint(s) and history of present illness. I confirmed and edited asnecessary the review of systems, past medical/surgical history, family history, social history, and examination findings as documented by others; and I examined the patient myself. I  personally reviewed the relevant tests, images, and reports as documented above. I formulated and edited as necessary the assessment and plan and discussed the findings and management plan with the patient and family.  - Evelyn Mathis MD 10:25 AM 10/18/2019

## 2019-11-21 ENCOUNTER — COMMUNICATION - HEALTHEAST (OUTPATIENT)
Dept: FAMILY MEDICINE | Facility: CLINIC | Age: 74
End: 2019-11-21

## 2019-11-21 DIAGNOSIS — M81.0 AGE-RELATED OSTEOPOROSIS WITHOUT CURRENT PATHOLOGICAL FRACTURE: ICD-10-CM

## 2020-01-02 ENCOUNTER — RECORDS - HEALTHEAST (OUTPATIENT)
Dept: ADMINISTRATIVE | Facility: OTHER | Age: 75
End: 2020-01-02

## 2020-01-02 ENCOUNTER — RECORDS - HEALTHEAST (OUTPATIENT)
Dept: BONE DENSITY | Facility: CLINIC | Age: 75
End: 2020-01-02

## 2020-01-02 DIAGNOSIS — M81.0 AGE-RELATED OSTEOPOROSIS WITHOUT CURRENT PATHOLOGICAL FRACTURE: ICD-10-CM

## 2020-02-08 ENCOUNTER — HEALTH MAINTENANCE LETTER (OUTPATIENT)
Age: 75
End: 2020-02-08

## 2020-02-19 ENCOUNTER — OFFICE VISIT (OUTPATIENT)
Dept: OPHTHALMOLOGY | Facility: CLINIC | Age: 75
End: 2020-02-19
Attending: OPHTHALMOLOGY
Payer: COMMERCIAL

## 2020-02-19 ENCOUNTER — OFFICE VISIT (OUTPATIENT)
Dept: DERMATOLOGY | Facility: CLINIC | Age: 75
End: 2020-02-19
Payer: COMMERCIAL

## 2020-02-19 ENCOUNTER — ANCILLARY PROCEDURE (OUTPATIENT)
Dept: MAMMOGRAPHY | Facility: CLINIC | Age: 75
End: 2020-02-19
Attending: FAMILY MEDICINE
Payer: COMMERCIAL

## 2020-02-19 ENCOUNTER — RECORDS - HEALTHEAST (OUTPATIENT)
Dept: ADMINISTRATIVE | Facility: OTHER | Age: 75
End: 2020-02-19

## 2020-02-19 DIAGNOSIS — D48.5 NEOPLASM OF UNCERTAIN BEHAVIOR OF SKIN: ICD-10-CM

## 2020-02-19 DIAGNOSIS — Q15.0 JUVENILE GLAUCOMA: Primary | ICD-10-CM

## 2020-02-19 DIAGNOSIS — Z12.31 SCREENING MAMMOGRAM, ENCOUNTER FOR: ICD-10-CM

## 2020-02-19 DIAGNOSIS — L82.1 SEBORRHEIC KERATOSIS: ICD-10-CM

## 2020-02-19 DIAGNOSIS — L85.3 XEROSIS OF SKIN: ICD-10-CM

## 2020-02-19 DIAGNOSIS — Z12.83 SKIN CANCER SCREENING: Primary | ICD-10-CM

## 2020-02-19 DIAGNOSIS — Z85.828 HISTORY OF BASAL CELL CANCER: ICD-10-CM

## 2020-02-19 PROCEDURE — G0463 HOSPITAL OUTPT CLINIC VISIT: HCPCS | Mod: ZF

## 2020-02-19 PROCEDURE — 92081 LIMITED VISUAL FIELD XM: CPT | Mod: ZF | Performed by: OPHTHALMOLOGY

## 2020-02-19 ASSESSMENT — TONOMETRY
OD_IOP_MMHG: 7
OS_IOP_MMHG: 3
OD_IOP_MMHG: 6
IOP_METHOD: TONOPEN
IOP_METHOD: APPLANATION
OS_IOP_MMHG: 4

## 2020-02-19 ASSESSMENT — CUP TO DISC RATIO
OD_RATIO: 0.6
OS_RATIO: 0.6

## 2020-02-19 ASSESSMENT — CONF VISUAL FIELD
OS_SUPERIOR_TEMPORAL_RESTRICTION: 1
OS_INFERIOR_NASAL_RESTRICTION: 1
OS_SUPERIOR_NASAL_RESTRICTION: 1
OD_NORMAL: 1
OS_INFERIOR_TEMPORAL_RESTRICTION: 1

## 2020-02-19 ASSESSMENT — REFRACTION_WEARINGRX
OS_CYLINDER: SPHERE
OD_SPHERE: -4.50
OD_AXIS: 120
OS_SPHERE: -0.50
OD_CYLINDER: +1.75

## 2020-02-19 ASSESSMENT — VISUAL ACUITY
OD_CC+: -1
CORRECTION_TYPE: GLASSES
OD_CC: 20/50 SLOW
OS_CC: 20/150
METHOD: SNELLEN - LINEAR

## 2020-02-19 ASSESSMENT — PAIN SCALES - GENERAL: PAINLEVEL: NO PAIN (0)

## 2020-02-19 NOTE — NURSING NOTE
Chief Complaints and History of Present Illnesses   Patient presents with     Follow Up     Juvenile glaucoma     Chief Complaint(s) and History of Present Illness(es)     Follow Up     Laterality: both eyes    Course: stable    Associated symptoms: tearing (stable).  Negative for dryness, eye pain and redness    Treatments tried: no treatments    Pain scale: 0/10    Comments: Juvenile glaucoma              Comments     She states that her vision has seemed stable in both eyes since her last eye exam.   Patient denies having any eye discomfort.     Luann Barraza, COT 9:01 AM  February 19, 2020

## 2020-02-19 NOTE — NURSING NOTE
Dermatology Rooming Note    Sadia Rider's goals for this visit include:   Chief Complaint   Patient presents with     Skin Check     Skin check, no concerns noted. Personal hx of BCC.     Rika Lauren LPN

## 2020-02-19 NOTE — PROGRESS NOTES
[facility] Dermatology Note    Dermatology Problem List:  0. NUB, right lower posterior neck- s/p bx 2/19/2020  1. History of  NMSC  - BCC - right lower cheek, s/p Mohs 8/30/17  - BCC - nose s/p excision 2012  2. History of intertrigo - OTC anti-fungal  3. Skin cancer screening, 2/19/2020    CC:   Chief Complaint   Patient presents with     Skin Check     Skin check, no concerns noted. Personal hx of BCC.     Date of Service: Feb 19, 2020    History of Present Illness:  Ms. Sadia Rider is a 74 year old female with a history of BCC who presents for a skin check. She was last seen in dermatology on 08/01/18 during which her skin check was unremarkable.     Today she reports no specific skin concerns. She presents questions regarding products for aging, itchy skin, as she wants to prevent this if possible. Denies any painful, non healing, bleeding or bothersome lesions.     Otherwise she is feeling well, without additional concerns.    Past Medical History:   Patient Active Problem List   Diagnosis     Juvenile glaucoma     Myopia of both eyes     Stress fracture of metatarsal bone, right, with routine healing, subsequent encounter     Past Medical History:   Diagnosis Date     Basal cell carcinoma      Glaucoma      POAG ADV     Tear film insufficiency, unspecified      Past Surgical History:   Procedure Laterality Date     ARGON LASER TRABECULOPLASTY (ALT) OD (RIGHT EYE)  5/1989    RE     C STOMACH SURGERY PROCEDURE UNLISTED       CATARACT IOL, RT/LT  1998/2002    BE     CHOLECYSTECTOMY       GLAUCOMA SURGERY  1990s    Trab x 2 RE     MOHS MICROGRAPHIC PROCEDURE       TRABECULECTOMY, MITOMYCIN FILTER, COMBINED  3/9/2014    LE     YAG CAPSULOTOMY OD (RIGHT EYE)  8/4/2008    RE     Social History:  She had a lot of sun exposure when she was younger, did not use sunscreen when she was younger.     Family History:  Not addressed at this visit.     Medications:  Current Outpatient Medications    Medication Sig Dispense Refill     Alendronate Sodium 70 MG TBEF Take by mouth every 7 days       aspirin 81 MG tablet Take 1 tablet by mouth daily.       calcium carb-cholecalciferol 600-500 MG-UNIT CAPS Take 1 capsule by mouth 3 times daily       Cholecalciferol (VITAMIN D3 PO) Take 1,000 mg by mouth daily       Multiple Vitamins-Minerals (CENTRUM ULTRA WOMENS PO) Take 1 tablet by mouth daily ( calcium: 300 mg and vitamin d: 1000 units)       simvastatin (ZOCOR) 10 MG tablet Take 1 tablet by mouth At Bedtime.       Allergies:  Allergies   Allergen Reactions     Alphagan [Brimonidine Tartrate] Blisters     Brimonidine Itching     Other reaction(s): Blisters     Review of Systems:  - Skin: As above in HPI. No additional skin concerns.  - No chronic cough, night sweats, fevers, or unexpected weight loss    Physical exam:  Vitals: There were no vitals taken for this visit.  GEN: This is a well developed, well-nourished female in no acute distress, in a pleasant mood.    SKIN: Total skin excluding the undergarment areas was performed. The exam included the head/face, neck, both arms, chest, back, abdomen, both legs, digits and/or nails.     - Stuck on tan to brown papules on the trunk, extremities  - There is no erythema, telangectasias, nodularity, or pigmentation on the sites of prior skin cancer  - There is a 4 mm pink pearly papule on the right lower posterior neck  - Mild xerosis to extremities   - No other lesions of concern on areas examined.     Impression/Plan:  1. History of NMSC - no evidence of recurrence  - Recommend sunscreens SPF #30 or greater, protective clothing and avoidance of tanning beds.    2. Neoplasm of uncertain behavior on the right lower posterior neck. Dfferential diagnosis to r/o bcc   - Shave biopsy: After discussion of benefits and risks including but not limited to bleeding, infection, scar, incomplete removal, recurrence, and non-diagnostic biopsy, written consent and photographs  were obtained. The area was cleaned with isopropyl alcohol. 0.5 mL of 1% lidocaine with epinephrine was injected to obtain adequate anesthesia of the lesion on the right lower posterior neck. A shave biopsy was performed. Hemostasis was achieved with aluminium chloride. Vaseline and a sterile dressing were applied. The patient tolerated the procedure and no complications were noted. The patient was provided with verbal and written post care instructions.      3. Seborrheic keratoses - trunk, extremities  - No further intervention required. Patient to report changes.     4. Xerosis, generalized  - Educated on gentle skin care techniques.   - Recommend diligent use of emollients        Follow-up in 1 year, earlier pending biopsy results or for new/chaning lesions    Staff Involved:  Staff/Scribe    Scribe Disclosure:   I, Torrie Mckeon, am serving as a scribe to document services personally performed by Jessica Yoon PA-C, based on data collection and the provider's statements to me.    Provider Disclosure:   The documentation recorded by the scribe accurately reflects the services I personally performed and the decisions made by me.    All risks, benefits and alternatives were discussed with patient.  Patient is in agreement and understands the assessment and plan.  All questions were answered.  Sun Screen Education was given.   Return to Clinic annually or sooner as needed.   Jessica Yoon PA-C   [facility]

## 2020-02-19 NOTE — PATIENT INSTRUCTIONS
Wound Care After a Biopsy    What is a skin biopsy?  A skin biopsy allows the doctor to examine a very small piece of tissue under the microscope to determine the diagnosis and the best treatment for the skin condition. A local anesthetic (numbing medicine)  is injected with a very small needle into the skin area to be tested. A small piece of skin is taken from the area. Sometimes a suture (stitch) is used.     What are the risks of a skin biopsy?  I will experience scar, bleeding, swelling, pain, crusting and redness. I may experience incomplete removal or recurrence. Risks of this procedure are excessive bleeding, bruising, infection, nerve damage, numbness, thick (hypertrophic or keloidal) scar and non-diagnostic biopsy.    How should I care for my wound for the first 24 hours?    Keep the wound dry and covered for 24 hours    If it bleeds, hold direct pressure on the area for 15 minutes. If bleeding does not stop then go to the emergency room    Avoid strenuous exercise the first 1-2 days or as your doctor instructs you    How should I care for the wound after 24 hours?    After 24 hours, remove the bandage    You may bathe or shower as normal    If you had a scalp biopsy, you can shampoo as usual and can use shower water to clean the biopsy site daily    Clean the wound twice a day with gentle soap and water    Do not scrub, be gentle    Apply white petroleum/Vaseline after cleaning the wound with a cotton swab or a clean finger, and keep the site covered with a Bandaid /bandage. Bandages are not necessary with a scalp biopsy    If you are unable to cover the site with a Bandaid /bandage, re-apply ointment 2-3 times a day to keep the site moist. Moisture will help with healing    Avoid strenuous activity for first 1-2 days    Avoid lakes, rivers, pools, and oceans until the stitches are removed or the site is healed    How do I clean my wound?    Wash hands thoroughly with soap or use hand  before all  wound care    Clean the wound with gentle soap and water    Apply white petroleum/Vaseline  to wound after it is clean    Replace the Bandaid /bandage to keep the wound covered for the first few days or as instructed by your doctor    If you had a scalp biopsy, warm shower water to the area on a daily basis should suffice    What should I use to clean my wound?     Cotton-tipped applicators (Qtips )    White petroleum jelly (Vaseline ). Use a clean new container and use Q-tips to apply.    Bandaids   as needed    Gentle soap     How should I care for my wound long term?    Do not get your wound dirty    Keep up with wound care for one week or until the area is healed.    A small scab will form and fall off by itself when the area is completely healed. The area will be red and will become pink in color as it heals. Sun protection is very important for how your scar will turn out. Sunscreen with an SPF 30 or greater is recommended once the area is healed.    You should have some soreness but it should be mild and slowly go away over several days. Talk to your doctor about using tylenol for pain,    When should I call my doctor?  If you have increased:     Pain or swelling    Pus or drainage (clear or slightly yellow drainage is ok)    Temperature over 100F    Spreading redness or warmth around wound    When will I hear about my results?  The biopsy results can take 2-3 weeks to come back. The clinic will call you with the results, send you a [employer] message, or have you schedule a follow-up clinic or phone time to discuss the results. Contact our clinics if you do not hear from us in 3 weeks.     Who should I call with questions?    [facility]: [phone]     [facility]: [phone]    For urgent needs outside of business hours call the [facility] at [phone] and ask for the dermatology resident on call      Gentle Skin Care    Below is a  list of products our providers recommend for gentle skin care.  Moisturizers:    Lighter; Exederm Intensive Moisture Cream, Cetaphil Cream, CeraVe, Aveeno Positively radiant and Vanicream Light     Thicker; Aquaphor Ointment, Vaseline, Petroleum Jelly, Eucerin Original Healing Cream and Vanicream, CeraVe Healing Ointment, Aquaphor Body Spray    Avoid Lotions (too thin)  Mild Cleansers:    Dove- Fragrance Free bar or wash    CeraVe     Vanicream Cleansing bar    Cetaphil Cleanser     Aquaphor 2 in1 Gentle Wash and Shampoo    Dove Baby wash    Exederm Body wash       Laundry Products:      All Free and Clear    Cheer Free    Generic Brands are okay as long as they are  Fragrance Free      Avoid fabric softeners  and dryer sheets   Sunscreens: SPF 30 or greater       Sunscreens that contain Zinc Oxide and/or Titanium Dioxide should be applied, these are physical blockers. One or both of these should be listed in the  Active Ingredients     Any other listed ingredients under the active ingredients would be a chemically based sunscreen which might be irritating.    Spray sunscreens should be avoided because these are typically chemical sunscreens.      Shampoo and Conditioners:    Free and Clear by Vanicream    Aquaphor 2 in 1 Gentle Wash and Shampoo   Oils:    Mineral Oil     Emu Oil     For some patients: Coconut (raw, unrefined, organic) and Sunflower seed oil              Generic Products are an okay substitute, but make sure they are fragrance free.  *Reading the product ingredients list is very important  *Avoid product that have fragrance added to them.   *Organic does not mean  fragrance free.  In fact patients with sensitive skin can become quite irritated by some organic products.     1. Daily bathing is recommended. Make sure you are applying a good moisturizer after bathing every time.  2. Use Moisturizing creams at least twice daily to the whole body. Your provider may recommend a lighter or heavier  moisturizer based on your child s severity and that time of year it is.  3. Creams are more moisturizing than lotions.       Care Plan:  1. Keep bathing and showering short, less than 15 minutes   2. Always use lukewarm warm when possible. AVOID HOT or COLD water  3. DO NOT use bubble bath  4. Limit the use of soaps. Focus on the skin folds, face, armpits, groin and feet towards the end of the bath  5. Do NOT vigorously scrub when you cleanse the skin  6. After bathing, PAT your skin lightly with a towel. DO NOT rub or scrub when drying  7. ALWAYS apply a moisturizer immediately after bathing. This helps to  lock in  the moisture. * IF YOU WERE PRESCRIBED A TOPICAL MEDICATION, APPLY YOUR MEDICATION FIRST THEN COVER WITH YOUR DAILY MOISTURIZER  8. Reapply moisturizing agents at least twice daily to your whole body    Other helpful tips:    Do not use products such as powders, perfumes, or colognes on your skin    Diffusers can be harsh on sensitive skin, use with caution if you or your child has sensitive skin     Avoid saunas and steam baths. This temperature is too HOT    Avoid tight or  scratchy  clothing such as wool    Always wash new clothing before wearing them for the first time    Sometimes a humidifier or vaporizer can be used at night can help the dry skin. Remember to keep these items clean to avoid mold growth.

## 2020-02-19 NOTE — LETTER
2/19/2020       RE: Sadia Rider  [address]  [place] [address]     Dear Colleague,    Thank you for referring your patient, Sadia Rider, to the [facility] DERMATOLOGY at [facility]. Please see a copy of my visit note below.    [facility] Dermatology Note    Dermatology Problem List:  0. NUB, right lower posterior neck- s/p bx 2/19/2020  1. History of  NMSC  - BCC - right lower cheek, s/p Mohs 8/30/17  - BCC - nose s/p excision 2012  2. History of intertrigo - OTC anti-fungal  3. Skin cancer screening, 2/19/2020    CC:   Chief Complaint   Patient presents with     Skin Check     Skin check, no concerns noted. Personal hx of BCC.     Date of Service: Feb 19, 2020    History of Present Illness:  Ms. Sadia Rider is a 74 year old female with a history of BCC who presents for a skin check. She was last seen in dermatology on 08/01/18 during which her skin check was unremarkable.     Today she reports no specific skin concerns. She presents questions regarding products for aging, itchy skin, as she wants to prevent this if possible. Denies any painful, non healing, bleeding or bothersome lesions.     Otherwise she is feeling well, without additional concerns.    Past Medical History:   Patient Active Problem List   Diagnosis     Juvenile glaucoma     Myopia of both eyes     Stress fracture of metatarsal bone, right, with routine healing, subsequent encounter     Past Medical History:   Diagnosis Date     Basal cell carcinoma      Glaucoma      POAG ADV     Tear film insufficiency, unspecified      Past Surgical History:   Procedure Laterality Date     ARGON LASER TRABECULOPLASTY (ALT) OD (RIGHT EYE)  5/1989    RE     C STOMACH SURGERY PROCEDURE UNLISTED       CATARACT IOL, RT/LT  1998/2002    BE     CHOLECYSTECTOMY       GLAUCOMA SURGERY  1990s    Trab x 2 RE     MOHS MICROGRAPHIC PROCEDURE       TRABECULECTOMY, MITOMYCIN FILTER,  COMBINED  3/9/2014    LE     YAG CAPSULOTOMY OD (RIGHT EYE)  8/4/2008    RE     Social History:  She had a lot of sun exposure when she was younger, did not use sunscreen when she was younger.     Family History:  Not addressed at this visit.     Medications:  Current Outpatient Medications   Medication Sig Dispense Refill     Alendronate Sodium 70 MG TBEF Take by mouth every 7 days       aspirin 81 MG tablet Take 1 tablet by mouth daily.       calcium carb-cholecalciferol 600-500 MG-UNIT CAPS Take 1 capsule by mouth 3 times daily       Cholecalciferol (VITAMIN D3 PO) Take 1,000 mg by mouth daily       Multiple Vitamins-Minerals (CENTRUM ULTRA WOMENS PO) Take 1 tablet by mouth daily ( calcium: 300 mg and vitamin d: 1000 units)       simvastatin (ZOCOR) 10 MG tablet Take 1 tablet by mouth At Bedtime.       Allergies:  Allergies   Allergen Reactions     Alphagan [Brimonidine Tartrate] Blisters     Brimonidine Itching     Other reaction(s): Blisters     Review of Systems:  - Skin: As above in HPI. No additional skin concerns.  - No chronic cough, night sweats, fevers, or unexpected weight loss    Physical exam:  Vitals: There were no vitals taken for this visit.  GEN: This is a well developed, well-nourished female in no acute distress, in a pleasant mood.    SKIN: Total skin excluding the undergarment areas was performed. The exam included the head/face, neck, both arms, chest, back, abdomen, both legs, digits and/or nails.     - Stuck on tan to brown papules on the trunk, extremities  - There is no erythema, telangectasias, nodularity, or pigmentation on the sites of prior skin cancer  - There is a 4 mm pink pearly papule on the right lower posterior neck  - Mild xerosis to extremities   - No other lesions of concern on areas examined.     Impression/Plan:  1. History of NMSC - no evidence of recurrence  - Recommend sunscreens SPF #30 or greater, protective clothing and avoidance of tanning beds.    2. Neoplasm of  uncertain behavior on the right lower posterior neck. Dfferential diagnosis to r/o bcc   - Shave biopsy: After discussion of benefits and risks including but not limited to bleeding, infection, scar, incomplete removal, recurrence, and non-diagnostic biopsy, written consent and photographs were obtained. The area was cleaned with isopropyl alcohol. 0.5 mL of 1% lidocaine with epinephrine was injected to obtain adequate anesthesia of the lesion on the right lower posterior neck. A shave biopsy was performed. Hemostasis was achieved with aluminium chloride. Vaseline and a sterile dressing were applied. The patient tolerated the procedure and no complications were noted. The patient was provided with verbal and written post care instructions.      3. Seborrheic keratoses - trunk, extremities  - No further intervention required. Patient to report changes.     4. Xerosis, generalized  - Educated on gentle skin care techniques.   - Recommend diligent use of emollients        Follow-up in 1 year, earlier pending biopsy results or for new/chaning lesions    Staff Involved:  Staff/Scribe    Scribe Disclosure:   JANE, Torrie Mckeon, am serving as a scribe to document services personally performed by Jessica Yoon PA-C, based on data collection and the provider's statements to me.    Provider Disclosure:   The documentation recorded by the scribe accurately reflects the services I personally performed and the decisions made by me.    All risks, benefits and alternatives were discussed with patient.  Patient is in agreement and understands the assessment and plan.  All questions were answered.  Sun Screen Education was given.   Return to Clinic annually or sooner as needed.   Jessica Yoon PA-C   [facility]

## 2020-02-19 NOTE — PROGRESS NOTES
CC: Juvenile glaucoma (diagnosed age 9) follow-up.    Interval history: States vision stable. Eyes are comfortable. Not using AT. Sleeping with shield to reduce hypotony. Denies eye pain. Has had right eye tearing several     LVC stable in both eyes compared to previous at last visit (10/2019)    OCT macula right eye with epiretinal membrane   OCT retinal nerve fiber layer stable    Assessment :  Primary open angle glaucoma (POAG)    Trabeculectomy right eye in 1990    Trabeculectomy left eye 2004    S/p Argon laser trabeculoplasty both eyes 2009   Currently not using eyedrops   Allergy to Alphagan     Posterior chamber intraocular lens (PCIOL) both eyes    Cataract extraction right eye 1998 and Cataract extraction left eye  2002   Post Yag OU    Plan  - Good intraocular pressure on no medications  - 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   - Does have some epiretinal membrane each eye may be contributing to visual disturbance; acuity stable today - consider repeat OCT mac vs referral to retina if VA decreasing. Also due for MRx, though declines today     (LVC--do yearly given low intraocular pressure, inconsistent with 24-2, although does not need frequent visual fields with low intraocular pressure ) due for LVC 10/2020.    Return to clinic glaucoma 4 months for IOP check  Oculoplastics eval next available for RLL ectropion, tearing right eye.    Scott Rios MD  Department of Ophthalmology - PGY3    Attending Physician Attestation:  Complete documentation of historical and exam elements from today's encounter can be found in the full encounter summary report (not reduplicated in this progress note). I personally obtained the chief complaint(s) and history of present illness. I confirmed and edited asnecessary the review of systems, past medical/surgical history, family history, social history, and examination findings as documented by others; and I examined the patient myself.  I personally reviewed the relevant tests, images, and reports as documented above. I formulated and edited as necessary the assessment and plan and discussed the findings and management plan with the patient and family.  - Evelyn Mathis MD 10:59 AM 2/19/2020

## 2020-02-21 ENCOUNTER — COMMUNICATION - HEALTHEAST (OUTPATIENT)
Dept: FAMILY MEDICINE | Facility: CLINIC | Age: 75
End: 2020-02-21

## 2020-02-21 LAB — COPATH REPORT: NORMAL

## 2020-02-25 ENCOUNTER — RECORDS - HEALTHEAST (OUTPATIENT)
Dept: ADMINISTRATIVE | Facility: OTHER | Age: 75
End: 2020-02-25

## 2020-02-25 ENCOUNTER — APPOINTMENT (OUTPATIENT)
Dept: LAB | Facility: CLINIC | Age: 75
End: 2020-02-25
Payer: COMMERCIAL

## 2020-02-25 ENCOUNTER — ANCILLARY PROCEDURE (OUTPATIENT)
Dept: MAMMOGRAPHY | Facility: CLINIC | Age: 75
End: 2020-02-25
Attending: FAMILY MEDICINE
Payer: COMMERCIAL

## 2020-02-25 DIAGNOSIS — R92.8 ABNORMAL MAMMOGRAM OF RIGHT BREAST: ICD-10-CM

## 2020-02-25 DIAGNOSIS — N64.59 ABNORMAL BREAST FINDING: Primary | ICD-10-CM

## 2020-02-25 LAB
CREAT SERPL-MCNC: 0.85 MG/DL (ref 0.52–1.04)
GFR SERPL CREATININE-BSD FRML MDRD: 67 ML/MIN/{1.73_M2}

## 2020-02-26 ENCOUNTER — MEDICAL CORRESPONDENCE (OUTPATIENT)
Dept: HEALTH INFORMATION MANAGEMENT | Facility: CLINIC | Age: 75
End: 2020-02-26

## 2020-02-26 ENCOUNTER — RECORDS - HEALTHEAST (OUTPATIENT)
Dept: ADMINISTRATIVE | Facility: OTHER | Age: 75
End: 2020-02-26

## 2020-02-27 ENCOUNTER — ANCILLARY PROCEDURE (OUTPATIENT)
Dept: MAMMOGRAPHY | Facility: CLINIC | Age: 75
End: 2020-02-27
Attending: FAMILY MEDICINE
Payer: COMMERCIAL

## 2020-02-27 ENCOUNTER — RECORDS - HEALTHEAST (OUTPATIENT)
Dept: ADMINISTRATIVE | Facility: OTHER | Age: 75
End: 2020-02-27

## 2020-02-27 VITALS — WEIGHT: 178.9 LBS | BODY MASS INDEX: 30.23 KG/M2

## 2020-02-27 DIAGNOSIS — R92.8 ABNORMAL MAMMOGRAM OF RIGHT BREAST: ICD-10-CM

## 2020-02-27 DIAGNOSIS — N63.0 BREAST MASS: Primary | ICD-10-CM

## 2020-02-27 RX ORDER — IOPAMIDOL 612 MG/ML
100 INJECTION, SOLUTION INTRAVASCULAR ONCE
Status: COMPLETED | OUTPATIENT
Start: 2020-02-27 | End: 2020-02-27

## 2020-02-27 RX ORDER — LIDOCAINE HYDROCHLORIDE 10 MG/ML
10 INJECTION, SOLUTION EPIDURAL; INFILTRATION; INTRACAUDAL; PERINEURAL ONCE
Status: COMPLETED | OUTPATIENT
Start: 2020-02-27 | End: 2020-02-27

## 2020-02-27 RX ADMIN — LIDOCAINE HYDROCHLORIDE 10 ML: 10 INJECTION, SOLUTION EPIDURAL; INFILTRATION; INTRACAUDAL; PERINEURAL at 09:29

## 2020-02-27 RX ADMIN — IOPAMIDOL 100 ML: 612 INJECTION, SOLUTION INTRAVASCULAR at 09:13

## 2020-02-28 LAB — COPATH REPORT: NORMAL

## 2020-03-02 ENCOUNTER — TELEPHONE (OUTPATIENT)
Dept: SURGERY | Facility: CLINIC | Age: 75
End: 2020-03-02

## 2020-03-02 ENCOUNTER — TELEPHONE (OUTPATIENT)
Dept: GENERAL RADIOLOGY | Facility: CLINIC | Age: 75
End: 2020-03-02

## 2020-03-02 LAB — COPATH REPORT: NORMAL

## 2020-03-02 NOTE — TELEPHONE ENCOUNTER
PREVISIT INFORMATION                                                    Sadia Rider scheduled for future visit at [facility] specialty clinics.    Patient is scheduled to see Dr. Madison on 3/3/20  Reason for visit: breast cancer  Referring provider Radiology referral  Has patient seen previous specialist? No  Medical Records:  Available in chart.  Patient was previously seen at a [facility] or [facility] facility.    REVIEW                                                      New patient packet mailed to patient: Yes  Medication reconciliation complete: No      Current Outpatient Medications   Medication Sig Dispense Refill     Alendronate Sodium 70 MG TBEF Take by mouth every 7 days       aspirin 81 MG tablet Take 1 tablet by mouth daily.       calcium carb-cholecalciferol 600-500 MG-UNIT CAPS Take 1 capsule by mouth 3 times daily       Cholecalciferol (VITAMIN D3 PO) Take 1,000 mg by mouth daily       Multiple Vitamins-Minerals (CENTRUM ULTRA WOMENS PO) Take 1 tablet by mouth daily ( calcium: 300 mg and vitamin d: 1000 units)       simvastatin (ZOCOR) 10 MG tablet Take 1 tablet by mouth At Bedtime.         Allergies: Alphagan [brimonidine tartrate] and Brimonidine        PLAN/FOLLOW-UP NEEDED                                                      Previsit review complete.  Patient will see provider at future scheduled appointment.     Patient Reminders Given:  Please, make sure you bring an updated list of your medications.   If you are having a procedure, please, present 15 minutes early.  If you need to cancel or reschedule,please call [phone].    Kat Hernadez LPN

## 2020-03-02 NOTE — TELEPHONE ENCOUNTER
Spoke with patient regarding right breast biopsy results, which indicate invasive ductal carcinoma, grade 1. Notified pt that the radiologist recommendation is surgical consultation. Pt verbalized understanding of these results and all questions answered to her satisfaction. She is scheduled to meet with Dr. Grace Madison on 3/3/20 at the [facility].

## 2020-03-03 ENCOUNTER — OFFICE VISIT (OUTPATIENT)
Dept: SURGERY | Facility: CLINIC | Age: 75
End: 2020-03-03
Payer: COMMERCIAL

## 2020-03-03 ENCOUNTER — RECORDS - HEALTHEAST (OUTPATIENT)
Dept: ADMINISTRATIVE | Facility: OTHER | Age: 75
End: 2020-03-03

## 2020-03-03 VITALS — WEIGHT: 178.8 LBS | HEIGHT: 65 IN | BODY MASS INDEX: 29.79 KG/M2

## 2020-03-03 DIAGNOSIS — Z17.0 MALIGNANT NEOPLASM OF UPPER-OUTER QUADRANT OF RIGHT BREAST IN FEMALE, ESTROGEN RECEPTOR POSITIVE (H): ICD-10-CM

## 2020-03-03 DIAGNOSIS — C50.411 MALIGNANT NEOPLASM OF UPPER-OUTER QUADRANT OF RIGHT BREAST IN FEMALE, ESTROGEN RECEPTOR POSITIVE (H): ICD-10-CM

## 2020-03-03 PROCEDURE — 99205 OFFICE O/P NEW HI 60 MIN: CPT | Performed by: SURGERY

## 2020-03-03 RX ORDER — ACETAMINOPHEN 325 MG/1
975 TABLET ORAL ONCE
Status: CANCELLED | OUTPATIENT
Start: 2020-03-03 | End: 2020-03-03

## 2020-03-03 ASSESSMENT — MIFFLIN-ST. JEOR: SCORE: 1303.97

## 2020-03-03 NOTE — Clinical Note
3/3/2020         RE: Sadia Rider  [address] [facility] [id number]        Dear Colleague,    Thank you for referring your patient, Sadia Rider, to the [facility]. Please see a copy of my visit note below.    NEW SURGICAL CONSULTATION  Mar 3, 2020    Sadia Rider is a 74 year old woman who presents with a right  breast complaint.  She was referred by Dr Marquis.    HPI:    She noted no masses in either breast, axilla, or neck. She denies any nipple discharge or nipple inversion.     Imaging showed a spiculated 5 mm mass at 12:00 6 cm FN in the RIGHT breast; there are possible adjacent satellite lesions, together measuring 1.7 cm.    A biopsy was performed and a clip was placed.  It showed invasive ductal carcinoma, grade 1, ER+ MI+ HER2/brendon non-amplified.      BREAST-SPECIFIC HISTORY:  Prior breast biopsies: No  Prior breast surgeries: No  Prior radiation history: No  Hormone replacement therapy: Yes 20 years ago; unsure duration (1-2 years?), doesn't recall what she took  Bra size: 38 B  Dominant hand: Right    GYN HISTORY:    Age at 1st pregnancy: 23  Age at menarche: 11  Breastfeeding history: No  Menopausal? Post at age 52    FAMILY HISTORY:  Breast ca: No  Ovarian ca: No  Pancreatic ca: No  Melanoma: No  Gastric ca: No  Colon ca: No  Other cancer: Yes - PGF and PGM both  of cancer of unknown type    Past Medical History:   Diagnosis Date     Basal cell carcinoma      Glaucoma      POAG ADV     Tear film insufficiency, unspecified    No HTN, MI, CVA, Dm  Able to climb a flight of stairs    Past Surgical History:   Procedure Laterality Date     ARGON LASER TRABECULOPLASTY (ALT) OD (RIGHT EYE)  1989    RE     C STOMACH SURGERY PROCEDURE UNLISTED       CATARACT IOL, RT/LT      BE     CHOLECYSTECTOMY       GLAUCOMA SURGERY      Trab x 2 RE     MOHS MICROGRAPHIC PROCEDURE       TRABECULECTOMY, MITOMYCIN FILTER, COMBINED  3/9/2014    LE     YAG  "CAPSULOTOMY OD (RIGHT EYE)  8/4/2008    RE   No GA issues    Current Outpatient Medications   Medication Sig Dispense Refill     Alendronate Sodium 70 MG TBEF Take by mouth every 7 days       aspirin 81 MG tablet Take 1 tablet by mouth daily.       calcium carb-cholecalciferol 600-500 MG-UNIT CAPS Take 1 capsule by mouth 3 times daily       Cholecalciferol (VITAMIN D3 PO) Take 1,000 mg by mouth daily       Multiple Vitamins-Minerals (CENTRUM ULTRA WOMENS PO) Take 1 tablet by mouth daily ( calcium: 300 mg and vitamin d: 1000 units)       simvastatin (ZOCOR) 10 MG tablet Take 1 tablet by mouth At Bedtime.          Allergies   Allergen Reactions     Alphagan [Brimonidine Tartrate] Blisters     Brimonidine Itching     Other reaction(s): Blisters        SOCIAL HISTORY:  Smokes: No  EtOH: Yes rarely  Illicit drugs: No    She works around the home.      ROS:  Back pain: No  Headache: No  Abdominal pain: No  Unexpected weight loss: No  Easy bruising/bleeding: No    Ht 1.638 m (5' 4.5\")   Wt 81.1 kg (178 lb 12.8 oz)   BMI 30.22 kg/m      Physical Exam  Constitutional:       Appearance: She is well-developed.   Chest:      Breasts: Breasts are symmetrical.         Right: No inverted nipple, mass, nipple discharge, skin change or tenderness.         Left: No inverted nipple, mass, nipple discharge, skin change or tenderness.      Comments: Patient was examined in both supine and upright positions.  Bilateral lateral axillary redundant tissue.  Lymphadenopathy:      Cervical: No cervical adenopathy.      Right cervical: No superficial, deep or posterior cervical adenopathy.     Left cervical: No superficial, deep or posterior cervical adenopathy.      Upper Body:      Right upper body: No supraclavicular, axillary or pectoral adenopathy.      Left upper body: No supraclavicular, axillary or pectoral adenopathy.      Comments: No lymphedema in bilateral upper extremities.   Skin:     General: Skin is warm and dry.      "     INVESTIGATIONS:    Contrast-Enhanced Mammogram (2/27/2020) showed:  FINDINGS: There is minimal background parenchymal enhancement. In the right breast at 12:00 middle depth, there is an enhancing irregular mass measuring to 1 cm. In the left lateral breast there are subtle focus of enhancement that correspond to lymph nodes and asymmetry that are stable since 2014.   IMPRESSION: BI-RADS CATEGORY: 4 - Suspicious.    Diagnostic Mammogram & Ultrasound (2/25/2020) showed:  Findings:     Mammogram:  The right breast at 12:00 middle depth, there is a 5 mm mass with architectural distortion. The MLO view suggests architectural distortion to spend about 2 cm.  Ultrasound:  Targeted, real-time ultrasound evaluation was performed by the technologist and radiologist.  The right breast at 12:00, 6 cm from the nipple, there is an irregular mass with spiculated margins measuring 5 x 5 x 5 m.. Radially, there are possible adjacent satellite lesions that together measure 1.7 cm.  Normal-appearing lymph nodes in the right axilla.  IMPRESSION: BI-RADS CATEGORY: 4 - Suspicious.    Screening Mammogram (2/19/2020) showed:  FINDINGS: Possible right breast focal asymmetry with architectural distortion near central core mid-posterior depth. No significant change left breast.  IMPRESSION: BI-RADS CATEGORY: 0 - Need Additional Imaging Evaluation and/or Prior Mammograms for Comparison.    Biopsy (2/27/2020) showed:  FINAL DIAGNOSIS:   Breast, right, 12:00, 5 cm from nipple, ultrasound guided core biopsy:   -INVASIVE MAMMARY CARCINOMA OF NO SPECIAL TYPE (INVASIVE DUCTAL CARCINOMA), ANNA GRADE 1   -Ductal carcinoma in situ (DCIS), nuclear grade 2, cribriform type(s)   -Calcifications associated with invasive carcinoma   ER positive (99%)  MT positive (95%)  HER2/brendon negative    ASSESSMENT:  Sadia Rider is a 74 year old woman with RIGHT breast cancer.    Her stage is:  Cancer Staging  Malignant neoplasm of upper-outer quadrant of  "right breast in female, estrogen receptor positive (H)  Staging form: Breast, AJCC 8th Edition  - Clinical: Stage IA (cT1c, cN0, cM0, G1, ER+, NC+, HER2-) - Signed by Grace Madison MD on 3/3/2020    I reviewed the imaging, diagnosis, staging, and management (including surgery, chemotherapy, radiation therapy, and endocrine therapy) of breast cancer with Sadia Rider, her , and her daughter. A copy of the biopsy pathology report was also provided.    The mainstay of treatment for resectable breast cancer is surgical resection, in the form of either breast conservation (segmental mastectomy plus radiation) or mastectomy.  We reviewed that the two strategies are equivalent in terms of overall survival.  The advantages and disadvantages of each were discussed.   Sadia Rider IS a candidate for breast conservation therapy.  We discussed that this involves two necessary components: the lumpectomy (or \"segmental mastectomy\"), and several weeks of whole breast radiation therapy.  We discussed that the overall survival after breast conservation therapy is identical to mastectomy and that local recurrence rates are significantly higher if segmental mastectomy was performed without subsequent radiation. However, newer data suggest that in women >70, radiation may be omitted in breast conservation therapy in the setting of small, favourable primaries in the absence of lymphovascular invasion, jenna metastases, or concerning surgical margins.  We reviewed that this determination will be made based on post surgical pathology.   We also discussed the significance of clear margins and that a subsequent procedure may be necessary to achieve this.    Because the lesion is not palpable, a wire-localized approach would be taken for breast conservation.  She would present on the day of surgery for an image-guided wire placement, followed by a surgical excision in the operating room.  The risks of a wire-localized " segmental mastectomy were discussed with the patient and family, including the risks of bleeding, wound infection, wound dehiscence, and post-operative contour change to the breast.      Alternatively, we also discussed the various types of mastectomy, including total, skin-sparing, and nipple-sparing mastectomy.   The risks of a mastectomy were discussed with the patient and family, including the risks of bleeding, wound infection, wound dehiscence, skin flap/nipple necrosis, poor cosmetic outcomes with skin folds, decreased shoulder range of motion, chest wall numbness, and seroma formation.  Sadia Rider was not interested in a mastectomy. Depending on the margin and jenna status post-mastectomy, radiation may be necessary.      In addition to the surgical management of the breast, a sentinel lymph node biopsy is recommended for jenna staging of the axilla.  This is performed with the combination of the radioactive colloid and lymphazurin. The risks of a sentinel lymph node biopsy were discussed with the patient and family, including the risks of lymphedema (5-10%), bleeding, wound infection, wound dehiscence, seroma formation, and paresthesias. There is also a small risk of anaphylaxis with lymphazurin injection as part of the procedure. There is an approximately 10% false negative rate associated with sentinel lymph node biopsy as published in the literature.  The findings of the sentinel lymph node biopsy may result in the need for further surgery (i.e. Axillary lymph node dissection) or radiation. There is a 5-10% chance of patients whose sentinel lymph nodes do not map despite dual tracer (radiocolloid and lymphazurin). Should this be the case, we discussed that I would proceed with an axillary lymph node dissection at the index procedure.  The higher risks of an axillary lymph node dissection were also reviewed, including lymphedema (20-30%), bleeding, wound infection, wound dehiscence, seroma formation,  nerve injury, limited arm range of motion and paresthesias. We discussed that a drain would be placed intra-operatively should an axillary lymph node dissection be performed.     We discussed that surgical pathology results will be reviewed in person in clinic, at the postoperative visit. Because pathology reports are often complicated, results are not reviewed by phone.  Reviewing in person would also allow for careful discussion of next steps and for answering questions.    Finally, we reviewed that as part of team-based approach to breast cancer, medical oncology and radiation oncology referrals will also be made after surgery to discuss adjuvant systemic/endocrine therapy and radiation therapy.  The decision regarding adjuvant chemotherapy will be made after surgery.     We reviewed the genetic testing guidelines by the American Society of Breast Surgeons from February 2019.  I have recommended genetic testing +/- counseling.  The natural history of BRCA mutations and breast cancer were discussed with the patient. Should a deleterious mutation be identified, she may choose a mastectomy instead.  Because genetic testing results will influence surgical management of this breast cancer, I have recommended genetic testing on an urgent basis.  We reviewed that genetic counseling will be arranged.  Please see below for additional details.     All of the above was discussed with Sadia Rider and all questions were answered.  She elected to proceed with RIGHT wire-localized segmental mastectomy, RIGHT axillary sentinel lymph node biopsy, possible axillary lymph node dissection.    Total time spent with the patient was 60 minutes, of which 75% was counseling.     PLAN:  1. Genetic testing +/- counseling  2. RIGHT wire-localized segmental mastectomy  3. RIGHT axillary sentinel lymph node biopsy, possible axillary lymph node dissection   4. Patient to report to her PCP for preoperative H&P and testing.    Grace Madison MD  [facility] FACS    Division of Surgical Oncology  [facility]     ---  GENETIC TESTING FOR BREAST ACTIONABLE    Based on her personal history, Sadia meets the current National Comprehensive Cancer Network (NCCN) criteria for genetic testing of BRCA1/BRCA2 and/or requires genetic testing based on the recommendation of the American Society of Breast Surgeons Consensus Guideline on Genetic Testing for Hereditary Breast Cancer.     The Breast Actionable test analyzes 11 genes associated with hereditary breast cancer: ELIZABET, BRCA1, BRCA2, CDH1, CHEK2, NBN, NF1, PALB2, PTEN, STK11, TP53. BRCA1 and BRCA2 only account for about half of hereditary breast cancer; therefore, this multi-gene test is the most efficient and cost-effective way to analyze these genes. As hereditary breast cancer is suspected, there is a reasonable probability of detecting a mutation in my patient. All of the genes on this test have published clinical practice guidelines.    Medical Management:   For Sadia, we reviewed that the information from genetic testing may determine:    surgery to treat Sadia's active cancer diagnosis    targeted chemotherapies for Sadia's active cancer    additional cancer screening and/or medical intervention for which Sadia may qualify    Additionally, family history was reviewed for any significant cancer history in Sadia's first and second degree relatives. Family history is significant for the following: see above    I, a qualified medical provider, counseled the patient on possible test results and its implications in relation to Sadia's treatment/management. Sadia expressed understanding and consented to proceed with genetic testing. Referral to genetic counseling will be made upon return of results to discuss additional genetic testing options if indicated based on patient s personal and/or family history.      Again, thank you for allowing me to participate in the care of your patient.         Sincerely,        Grace Madison MD

## 2020-03-03 NOTE — LETTER
3/3/2020      RE: Sadia Rider  [address]  [place] [place] [address]     Mar 3, 2020    ANGY CABRAL   [facility] [address]   [place] [address]    RE: Sadia Rider  (: 1945)    Dear Dr. ANGY CABRAL    Your patient was seen for evaluation in my office.  Please find a copy of my notes for your record and review.  If you have any further questions, please feel free to contact my office.   Thank you for your kind referral.    Sincerely,   Grace Madison MD MSc [facility] FACS    ---   NEW SURGICAL CONSULTATION  Mar 3, 2020    Sadia Rider is a 74 year old woman who presents with a right  breast complaint.  She was referred by Dr Marquis.    HPI:    She noted no masses in either breast, axilla, or neck. She denies any nipple discharge or nipple inversion.     Imaging showed a spiculated 5 mm mass at 12:00 6 cm FN in the RIGHT breast; there are possible adjacent satellite lesions, together measuring 1.7 cm.    A biopsy was performed and a clip was placed.  It showed invasive ductal carcinoma, grade 1, ER+ DE+ HER2/brendon non-amplified.      BREAST-SPECIFIC HISTORY:  Prior breast biopsies: No  Prior breast surgeries: No  Prior radiation history: No  Hormone replacement therapy: Yes 20 years ago; unsure duration (1-2 years?), doesn't recall what she took  Bra size: 38 B  Dominant hand: Right    GYN HISTORY:    Age at 1st pregnancy: 23  Age at menarche: 11  Breastfeeding history: No  Menopausal? Post at age 52    FAMILY HISTORY:  Breast ca: No  Ovarian ca: No  Pancreatic ca: No  Melanoma: No  Gastric ca: No  Colon ca: No  Other cancer: Yes - PGF and PGM both  of cancer of unknown type    Past Medical History:   Diagnosis Date     Basal cell carcinoma      Glaucoma      POAG ADV     Tear film insufficiency, unspecified    No HTN, MI, CVA, Dm  Able to climb a flight of stairs    Past Surgical History:   Procedure Laterality Date     ARGON LASER TRABECULOPLASTY (ALT)  "OD (RIGHT EYE)  5/1989    RE     C STOMACH SURGERY PROCEDURE UNLISTED       CATARACT IOL, RT/LT  1998/2002    BE     CHOLECYSTECTOMY       GLAUCOMA SURGERY  1990s    Trab x 2 RE     MOHS MICROGRAPHIC PROCEDURE       TRABECULECTOMY, MITOMYCIN FILTER, COMBINED  3/9/2014    LE     YAG CAPSULOTOMY OD (RIGHT EYE)  8/4/2008    RE   No GA issues    Current Outpatient Medications   Medication Sig Dispense Refill     Alendronate Sodium 70 MG TBEF Take by mouth every 7 days       aspirin 81 MG tablet Take 1 tablet by mouth daily.       calcium carb-cholecalciferol 600-500 MG-UNIT CAPS Take 1 capsule by mouth 3 times daily       Cholecalciferol (VITAMIN D3 PO) Take 1,000 mg by mouth daily       Multiple Vitamins-Minerals (CENTRUM ULTRA WOMENS PO) Take 1 tablet by mouth daily ( calcium: 300 mg and vitamin d: 1000 units)       simvastatin (ZOCOR) 10 MG tablet Take 1 tablet by mouth At Bedtime.          Allergies   Allergen Reactions     Alphagan [Brimonidine Tartrate] Blisters     Brimonidine Itching     Other reaction(s): Blisters        SOCIAL HISTORY:  Smokes: No  EtOH: Yes rarely  Illicit drugs: No    She works around the home.      ROS:  Back pain: No  Headache: No  Abdominal pain: No  Unexpected weight loss: No  Easy bruising/bleeding: No    Ht 1.638 m (5' 4.5\")   Wt 81.1 kg (178 lb 12.8 oz)   BMI 30.22 kg/m      Physical Exam  Constitutional:       Appearance: She is well-developed.   Chest:      Breasts: Breasts are symmetrical.         Right: No inverted nipple, mass, nipple discharge, skin change or tenderness.         Left: No inverted nipple, mass, nipple discharge, skin change or tenderness.      Comments: Patient was examined in both supine and upright positions.  Bilateral lateral axillary redundant tissue.  Lymphadenopathy:      Cervical: No cervical adenopathy.      Right cervical: No superficial, deep or posterior cervical adenopathy.     Left cervical: No superficial, deep or posterior cervical adenopathy. "      Upper Body:      Right upper body: No supraclavicular, axillary or pectoral adenopathy.      Left upper body: No supraclavicular, axillary or pectoral adenopathy.      Comments: No lymphedema in bilateral upper extremities.   Skin:     General: Skin is warm and dry.          INVESTIGATIONS:    Contrast-Enhanced Mammogram (2/27/2020) showed:  FINDINGS: There is minimal background parenchymal enhancement. In the right breast at 12:00 middle depth, there is an enhancing irregular mass measuring to 1 cm. In the left lateral breast there are subtle focus of enhancement that correspond to lymph nodes and asymmetry that are stable since 2014.   IMPRESSION: BI-RADS CATEGORY: 4 - Suspicious.    Diagnostic Mammogram & Ultrasound (2/25/2020) showed:  Findings:     Mammogram:  The right breast at 12:00 middle depth, there is a 5 mm mass with architectural distortion. The MLO view suggests architectural distortion to spend about 2 cm.  Ultrasound:  Targeted, real-time ultrasound evaluation was performed by the technologist and radiologist.  The right breast at 12:00, 6 cm from the nipple, there is an irregular mass with spiculated margins measuring 5 x 5 x 5 m.. Radially, there are possible adjacent satellite lesions that together measure 1.7 cm.  Normal-appearing lymph nodes in the right axilla.  IMPRESSION: BI-RADS CATEGORY: 4 - Suspicious.    Screening Mammogram (2/19/2020) showed:  FINDINGS: Possible right breast focal asymmetry with architectural distortion near central core mid-posterior depth. No significant change left breast.  IMPRESSION: BI-RADS CATEGORY: 0 - Need Additional Imaging Evaluation and/or Prior Mammograms for Comparison.    Biopsy (2/27/2020) showed:  FINAL DIAGNOSIS:   Breast, right, 12:00, 5 cm from nipple, ultrasound guided core biopsy:   -INVASIVE MAMMARY CARCINOMA OF NO SPECIAL TYPE (INVASIVE DUCTAL CARCINOMA), ANNA GRADE 1   -Ductal carcinoma in situ (DCIS), nuclear grade 2, cribriform  "type(s)   -Calcifications associated with invasive carcinoma   ER positive (99%)  NJ positive (95%)  HER2/brendon negative    ASSESSMENT:  Sadia Rider is a 74 year old woman with RIGHT breast cancer.    Her stage is:  Cancer Staging  Malignant neoplasm of upper-outer quadrant of right breast in female, estrogen receptor positive (H)  Staging form: Breast, AJCC 8th Edition  - Clinical: Stage IA (cT1c, cN0, cM0, G1, ER+, NJ+, HER2-) - Signed by Grace Madison MD on 3/3/2020    I reviewed the imaging, diagnosis, staging, and management (including surgery, chemotherapy, radiation therapy, and endocrine therapy) of breast cancer with Sadia Rider, her , and her daughter. A copy of the biopsy pathology report was also provided.    The mainstay of treatment for resectable breast cancer is surgical resection, in the form of either breast conservation (segmental mastectomy plus radiation) or mastectomy.  We reviewed that the two strategies are equivalent in terms of overall survival.  The advantages and disadvantages of each were discussed.   Sadia Rider IS a candidate for breast conservation therapy.  We discussed that this involves two necessary components: the lumpectomy (or \"segmental mastectomy\"), and several weeks of whole breast radiation therapy.  We discussed that the overall survival after breast conservation therapy is identical to mastectomy and that local recurrence rates are significantly higher if segmental mastectomy was performed without subsequent radiation. However, newer data suggest that in women >70, radiation may be omitted in breast conservation therapy in the setting of small, favourable primaries in the absence of lymphovascular invasion, jenna metastases, or concerning surgical margins.  We reviewed that this determination will be made based on post surgical pathology.   We also discussed the significance of clear margins and that a subsequent procedure may be necessary to achieve " this.    Because the lesion is not palpable, a wire-localized approach would be taken for breast conservation.  She would present on the day of surgery for an image-guided wire placement, followed by a surgical excision in the operating room.  The risks of a wire-localized segmental mastectomy were discussed with the patient and family, including the risks of bleeding, wound infection, wound dehiscence, and post-operative contour change to the breast.      Alternatively, we also discussed the various types of mastectomy, including total, skin-sparing, and nipple-sparing mastectomy.   The risks of a mastectomy were discussed with the patient and family, including the risks of bleeding, wound infection, wound dehiscence, skin flap/nipple necrosis, poor cosmetic outcomes with skin folds, decreased shoulder range of motion, chest wall numbness, and seroma formation.  Sadia YUNIER Rider was not interested in a mastectomy. Depending on the margin and jenna status post-mastectomy, radiation may be necessary.      In addition to the surgical management of the breast, a sentinel lymph node biopsy is recommended for jenna staging of the axilla.  This is performed with the combination of the radioactive colloid and lymphazurin. The risks of a sentinel lymph node biopsy were discussed with the patient and family, including the risks of lymphedema (5-10%), bleeding, wound infection, wound dehiscence, seroma formation, and paresthesias. There is also a small risk of anaphylaxis with lymphazurin injection as part of the procedure. There is an approximately 10% false negative rate associated with sentinel lymph node biopsy as published in the literature.  The findings of the sentinel lymph node biopsy may result in the need for further surgery (i.e. Axillary lymph node dissection) or radiation. There is a 5-10% chance of patients whose sentinel lymph nodes do not map despite dual tracer (radiocolloid and lymphazurin). Should this be  the case, we discussed that I would proceed with an axillary lymph node dissection at the index procedure.  The higher risks of an axillary lymph node dissection were also reviewed, including lymphedema (20-30%), bleeding, wound infection, wound dehiscence, seroma formation, nerve injury, limited arm range of motion and paresthesias. We discussed that a drain would be placed intra-operatively should an axillary lymph node dissection be performed.     We discussed that surgical pathology results will be reviewed in person in clinic, at the postoperative visit. Because pathology reports are often complicated, results are not reviewed by phone.  Reviewing in person would also allow for careful discussion of next steps and for answering questions.    Finally, we reviewed that as part of team-based approach to breast cancer, medical oncology and radiation oncology referrals will also be made after surgery to discuss adjuvant systemic/endocrine therapy and radiation therapy.  The decision regarding adjuvant chemotherapy will be made after surgery.     We reviewed the genetic testing guidelines by the American Society of Breast Surgeons from February 2019.  I have recommended genetic testing +/- counseling.  The natural history of BRCA mutations and breast cancer were discussed with the patient. Should a deleterious mutation be identified, she may choose a mastectomy instead.  Because genetic testing results will influence surgical management of this breast cancer, I have recommended genetic testing on an urgent basis.  We reviewed that genetic counseling will be arranged.  Please see below for additional details.     All of the above was discussed with Sadia Rider and all questions were answered.  She elected to proceed with RIGHT wire-localized segmental mastectomy, RIGHT axillary sentinel lymph node biopsy, possible axillary lymph node dissection.    Total time spent with the patient was 60 minutes, of which 75% was  counseling.     PLAN:  1. Genetic testing +/- counseling  2. RIGHT wire-localized segmental mastectomy  3. RIGHT axillary sentinel lymph node biopsy, possible axillary lymph node dissection   4. Patient to report to her PCP for preoperative H&P and testing.    Grace Madison MD MSc [facility] FACS    Division of Surgical Oncology  [facility]     ---  GENETIC TESTING FOR BREAST ACTIONABLE    Based on her personal history, Sadia meets the current National Comprehensive Cancer Network (NCCN) criteria for genetic testing of BRCA1/BRCA2 and/or requires genetic testing based on the recommendation of the American Society of Breast Surgeons Consensus Guideline on Genetic Testing for Hereditary Breast Cancer.     The Breast Actionable test analyzes 11 genes associated with hereditary breast cancer: ELIZABET, BRCA1, BRCA2, CDH1, CHEK2, NBN, NF1, PALB2, PTEN, STK11, TP53. BRCA1 and BRCA2 only account for about half of hereditary breast cancer; therefore, this multi-gene test is the most efficient and cost-effective way to analyze these genes. As hereditary breast cancer is suspected, there is a reasonable probability of detecting a mutation in my patient. All of the genes on this test have published clinical practice guidelines.    Medical Management:   For Sadia, we reviewed that the information from genetic testing may determine:    surgery to treat Sadia's active cancer diagnosis    targeted chemotherapies for Sadia's active cancer    additional cancer screening and/or medical intervention for which Sadia may qualify    Additionally, family history was reviewed for any significant cancer history in Sadia's first and second degree relatives. Family history is significant for the following: see above    I, a qualified medical provider, counseled the patient on possible test results and its implications in relation to Sadia's treatment/management. Sadia expressed understanding and consented to proceed with  genetic testing. Referral to genetic counseling will be made upon return of results to discuss additional genetic testing options if indicated based on patient s personal and/or family history.

## 2020-03-03 NOTE — PROGRESS NOTES
NEW SURGICAL CONSULTATION  Mar 3, 2020    Sadia Rider is a 74 year old woman who presents with a right  breast complaint.  She was referred by Dr Marquis.    HPI:    She noted no masses in either breast, axilla, or neck. She denies any nipple discharge or nipple inversion.     Imaging showed a spiculated 5 mm mass at 12:00 6 cm FN in the RIGHT breast; there are possible adjacent satellite lesions, together measuring 1.7 cm.    A biopsy was performed and a clip was placed.  It showed invasive ductal carcinoma, grade 1, ER+ PA+ HER2/brendon non-amplified.      BREAST-SPECIFIC HISTORY:  Prior breast biopsies: No  Prior breast surgeries: No  Prior radiation history: No  Hormone replacement therapy: Yes 20 years ago; unsure duration (1-2 years?), doesn't recall what she took  Bra size: 38 B  Dominant hand: Right    GYN HISTORY:    Age at 1st pregnancy: 23  Age at menarche: 11  Breastfeeding history: No  Menopausal? Post at age 52    FAMILY HISTORY:  Breast ca: No  Ovarian ca: No  Pancreatic ca: No  Melanoma: No  Gastric ca: No  Colon ca: No  Other cancer: Yes - PGF and PGM both  of cancer of unknown type    Past Medical History:   Diagnosis Date     Basal cell carcinoma      Glaucoma      POAG ADV     Tear film insufficiency, unspecified    No HTN, MI, CVA, Dm  Able to climb a flight of stairs    Past Surgical History:   Procedure Laterality Date     ARGON LASER TRABECULOPLASTY (ALT) OD (RIGHT EYE)  1989    RE     C STOMACH SURGERY PROCEDURE UNLISTED       CATARACT IOL, RT/LT      BE     CHOLECYSTECTOMY       GLAUCOMA SURGERY      Trab x 2 RE     MOHS MICROGRAPHIC PROCEDURE       TRABECULECTOMY, MITOMYCIN FILTER, COMBINED  3/9/2014    LE     YAG CAPSULOTOMY OD (RIGHT EYE)  2008    RE   No GA issues    Current Outpatient Medications   Medication Sig Dispense Refill     Alendronate Sodium 70 MG TBEF Take by mouth every 7 days       aspirin 81 MG tablet Take 1 tablet by mouth daily.       calcium  "carb-cholecalciferol 600-500 MG-UNIT CAPS Take 1 capsule by mouth 3 times daily       Cholecalciferol (VITAMIN D3 PO) Take 1,000 mg by mouth daily       Multiple Vitamins-Minerals (CENTRUM ULTRA WOMENS PO) Take 1 tablet by mouth daily ( calcium: 300 mg and vitamin d: 1000 units)       simvastatin (ZOCOR) 10 MG tablet Take 1 tablet by mouth At Bedtime.          Allergies   Allergen Reactions     Alphagan [Brimonidine Tartrate] Blisters     Brimonidine Itching     Other reaction(s): Blisters        SOCIAL HISTORY:  Smokes: No  EtOH: Yes rarely  Illicit drugs: No    She works around the home.      ROS:  Back pain: No  Headache: No  Abdominal pain: No  Unexpected weight loss: No  Easy bruising/bleeding: No    Ht 1.638 m (5' 4.5\")   Wt 81.1 kg (178 lb 12.8 oz)   BMI 30.22 kg/m     Physical Exam  Constitutional:       Appearance: She is well-developed.   Chest:      Breasts: Breasts are symmetrical.         Right: No inverted nipple, mass, nipple discharge, skin change or tenderness.         Left: No inverted nipple, mass, nipple discharge, skin change or tenderness.      Comments: Patient was examined in both supine and upright positions.  Bilateral lateral axillary redundant tissue.  Lymphadenopathy:      Cervical: No cervical adenopathy.      Right cervical: No superficial, deep or posterior cervical adenopathy.     Left cervical: No superficial, deep or posterior cervical adenopathy.      Upper Body:      Right upper body: No supraclavicular, axillary or pectoral adenopathy.      Left upper body: No supraclavicular, axillary or pectoral adenopathy.      Comments: No lymphedema in bilateral upper extremities.   Skin:     General: Skin is warm and dry.          INVESTIGATIONS:    Contrast-Enhanced Mammogram (2/27/2020) showed:  FINDINGS: There is minimal background parenchymal enhancement. In the right breast at 12:00 middle depth, there is an enhancing irregular mass measuring to 1 cm. In the left lateral breast " there are subtle focus of enhancement that correspond to lymph nodes and asymmetry that are stable since 2014.   IMPRESSION: BI-RADS CATEGORY: 4 - Suspicious.    Diagnostic Mammogram & Ultrasound (2/25/2020) showed:  Findings:     Mammogram:  The right breast at 12:00 middle depth, there is a 5 mm mass with architectural distortion. The MLO view suggests architectural distortion to spend about 2 cm.  Ultrasound:  Targeted, real-time ultrasound evaluation was performed by the technologist and radiologist.  The right breast at 12:00, 6 cm from the nipple, there is an irregular mass with spiculated margins measuring 5 x 5 x 5 m.. Radially, there are possible adjacent satellite lesions that together measure 1.7 cm.  Normal-appearing lymph nodes in the right axilla.  IMPRESSION: BI-RADS CATEGORY: 4 - Suspicious.    Screening Mammogram (2/19/2020) showed:  FINDINGS: Possible right breast focal asymmetry with architectural distortion near central core mid-posterior depth. No significant change left breast.  IMPRESSION: BI-RADS CATEGORY: 0 - Need Additional Imaging Evaluation and/or Prior Mammograms for Comparison.    Biopsy (2/27/2020) showed:  FINAL DIAGNOSIS:   Breast, right, 12:00, 5 cm from nipple, ultrasound guided core biopsy:   -INVASIVE MAMMARY CARCINOMA OF NO SPECIAL TYPE (INVASIVE DUCTAL CARCINOMA), ANNA GRADE 1   -Ductal carcinoma in situ (DCIS), nuclear grade 2, cribriform type(s)   -Calcifications associated with invasive carcinoma   ER positive (99%)  GA positive (95%)  HER2/brendon negative    ASSESSMENT:  Sadia Rider is a 74 year old woman with RIGHT breast cancer.    Her stage is:  Cancer Staging  Malignant neoplasm of upper-outer quadrant of right breast in female, estrogen receptor positive (H)  Staging form: Breast, AJCC 8th Edition  - Clinical: Stage IA (cT1c, cN0, cM0, G1, ER+, GA+, HER2-) - Signed by Grace Madison MD on 3/3/2020    I reviewed the imaging, diagnosis, staging, and  "management (including surgery, chemotherapy, radiation therapy, and endocrine therapy) of breast cancer with Sadia Rider, her , and her daughter. A copy of the biopsy pathology report was also provided.    The mainstay of treatment for resectable breast cancer is surgical resection, in the form of either breast conservation (segmental mastectomy plus radiation) or mastectomy.  We reviewed that the two strategies are equivalent in terms of overall survival.  The advantages and disadvantages of each were discussed.   Sadia Rider IS a candidate for breast conservation therapy.  We discussed that this involves two necessary components: the lumpectomy (or \"segmental mastectomy\"), and several weeks of whole breast radiation therapy.  We discussed that the overall survival after breast conservation therapy is identical to mastectomy and that local recurrence rates are significantly higher if segmental mastectomy was performed without subsequent radiation. However, newer data suggest that in women >70, radiation may be omitted in breast conservation therapy in the setting of small, favourable primaries in the absence of lymphovascular invasion, jenna metastases, or concerning surgical margins.  We reviewed that this determination will be made based on post surgical pathology.   We also discussed the significance of clear margins and that a subsequent procedure may be necessary to achieve this.    Because the lesion is not palpable, a wire-localized approach would be taken for breast conservation.  She would present on the day of surgery for an image-guided wire placement, followed by a surgical excision in the operating room.  The risks of a wire-localized segmental mastectomy were discussed with the patient and family, including the risks of bleeding, wound infection, wound dehiscence, and post-operative contour change to the breast.      Alternatively, we also discussed the various types of mastectomy, " including total, skin-sparing, and nipple-sparing mastectomy.   The risks of a mastectomy were discussed with the patient and family, including the risks of bleeding, wound infection, wound dehiscence, skin flap/nipple necrosis, poor cosmetic outcomes with skin folds, decreased shoulder range of motion, chest wall numbness, and seroma formation.  Sadia Rider was not interested in a mastectomy. Depending on the margin and jenna status post-mastectomy, radiation may be necessary.      In addition to the surgical management of the breast, a sentinel lymph node biopsy is recommended for jenna staging of the axilla.  This is performed with the combination of the radioactive colloid and lymphazurin. The risks of a sentinel lymph node biopsy were discussed with the patient and family, including the risks of lymphedema (5-10%), bleeding, wound infection, wound dehiscence, seroma formation, and paresthesias. There is also a small risk of anaphylaxis with lymphazurin injection as part of the procedure. There is an approximately 10% false negative rate associated with sentinel lymph node biopsy as published in the literature.  The findings of the sentinel lymph node biopsy may result in the need for further surgery (i.e. Axillary lymph node dissection) or radiation. There is a 5-10% chance of patients whose sentinel lymph nodes do not map despite dual tracer (radiocolloid and lymphazurin). Should this be the case, we discussed that I would proceed with an axillary lymph node dissection at the index procedure.  The higher risks of an axillary lymph node dissection were also reviewed, including lymphedema (20-30%), bleeding, wound infection, wound dehiscence, seroma formation, nerve injury, limited arm range of motion and paresthesias. We discussed that a drain would be placed intra-operatively should an axillary lymph node dissection be performed.     We discussed that surgical pathology results will be reviewed in person in  clinic, at the postoperative visit. Because pathology reports are often complicated, results are not reviewed by phone.  Reviewing in person would also allow for careful discussion of next steps and for answering questions.    Finally, we reviewed that as part of team-based approach to breast cancer, medical oncology and radiation oncology referrals will also be made after surgery to discuss adjuvant systemic/endocrine therapy and radiation therapy.  The decision regarding adjuvant chemotherapy will be made after surgery.     We reviewed the genetic testing guidelines by the American Society of Breast Surgeons from February 2019.  I have recommended genetic testing +/- counseling.  The natural history of BRCA mutations and breast cancer were discussed with the patient. Should a deleterious mutation be identified, she may choose a mastectomy instead.  Because genetic testing results will influence surgical management of this breast cancer, I have recommended genetic testing on an urgent basis.  We reviewed that genetic counseling will be arranged.  Please see below for additional details.     All of the above was discussed with Sadia Rider and all questions were answered.  She elected to proceed with RIGHT wire-localized segmental mastectomy, RIGHT axillary sentinel lymph node biopsy, possible axillary lymph node dissection.    Total time spent with the patient was 60 minutes, of which 75% was counseling.     PLAN:  1. Genetic testing +/- counseling  2. RIGHT wire-localized segmental mastectomy  3. RIGHT axillary sentinel lymph node biopsy, possible axillary lymph node dissection   4. Patient to report to her PCP for preoperative H&P and testing.    Grace Madison MD MSc [facility] FACS    Division of Surgical Oncology  [facility]     ---  GENETIC TESTING FOR BREAST ACTIONABLE    Based on her personal history, Sadia meets the current National Comprehensive Cancer Network (NCCN) criteria for  genetic testing of BRCA1/BRCA2 and/or requires genetic testing based on the recommendation of the American Society of Breast Surgeons Consensus Guideline on Genetic Testing for Hereditary Breast Cancer.     The Breast Actionable test analyzes 11 genes associated with hereditary breast cancer: ELIZABET, BRCA1, BRCA2, CDH1, CHEK2, NBN, NF1, PALB2, PTEN, STK11, TP53. BRCA1 and BRCA2 only account for about half of hereditary breast cancer; therefore, this multi-gene test is the most efficient and cost-effective way to analyze these genes. As hereditary breast cancer is suspected, there is a reasonable probability of detecting a mutation in my patient. All of the genes on this test have published clinical practice guidelines.    Medical Management:   For Sadia, we reviewed that the information from genetic testing may determine:    surgery to treat Sadia's active cancer diagnosis    targeted chemotherapies for Sadia's active cancer    additional cancer screening and/or medical intervention for which Sadia may qualify    Additionally, family history was reviewed for any significant cancer history in Sadia's first and second degree relatives. Family history is significant for the following: see above    I, a qualified medical provider, counseled the patient on possible test results and its implications in relation to Sadia's treatment/management. Sadia expressed understanding and consented to proceed with genetic testing. Referral to genetic counseling will be made upon return of results to discuss additional genetic testing options if indicated based on patient s personal and/or family history.

## 2020-03-03 NOTE — NURSING NOTE
"Sadia Rider's goals for this visit include:   Chief Complaint   Patient presents with     Consult     breast cancer       She requests these members of her care team be copied on today's visit information: no    PCP: Delfina Boyle    Referring Provider:  No referring provider defined for this encounter.    Ht 1.638 m (5' 4.5\")   Wt 81.1 kg (178 lb 12.8 oz)   BMI 30.22 kg/m      Do you need any medication refills at today's visit? No    Kat Hernadez LPN      "

## 2020-03-03 NOTE — PATIENT INSTRUCTIONS
Surgery Instructions    Always follow your surgeon s instructions. If you don t, your surgery could be cancelled. Please use the following checklist.  Your surgery is on: The surgery scheduler will contact you within 1 week of your consult with the surgeon. If you do not hear from them, please call the clinic or RN Care Coordinator for your provider.    Time: Prearrival times can vary depending on location/type of surgery.  [place] - 2 hour pre-arrival  [facility] - 2 hour pre-arrival  [place] - 1 hour pre-arrival    Note:  These times may change. A nurse will call you before surgery to confirm. If you have not received a call or if you have more questions, please call us on the working day before your surgery:  ? [place]: [phone] or [phone] (9am to 5:30pm)  ? [facility]: [phone] (8am to 6pm)  ? [place]: [phone] (9am to 5pm)  ? [facility] [phone] (7am to 4pm)  Prior to surgery  ? Have a pre-op physical exam with your Primary Doctor within 30 days of surgery  - Ask your doctor to send all of your results to the surgery center/hospital before surgery. Your doctor also may ask you to bring the results with you on the day of surgery.  - Tell your doctor if:  - You are allergic to latex or rubber (latex and rubber gloves are often used in medical care).  - You are taking any medicines (including aspirin), vitamins, or herbal products. You may need to stop taking some medicines before surgery.  - You have any medical problems (allergies, diabetes, or heart disease, for example).  - You have a pacemaker or an AICD (automatic implanted cardiac defibrillator). If you do, please bring the ID card with you on the day of surgery.  - People who smoke have a higher risk of infection after surgery. Ask your doctor how you can quit smoking.  - If you Primary Doctor is not within the [facility] [employer] system, you will need to have your pre-op physical faxed to us to be scanned into your  chart.  - [place]: [phone] or [phone]  - [facility] ([place]): [phone]  - [facility] ([facility]): [phone]  ? Call your insurance company. Ask if you need pre-approval for your surgery. If you do not have insurance, please let us know. If you wish to speak to the , please alert the clinic staff so this can be arranged.  ? Arrange for someone to drive you home after surgery.  will need to be a responsible adult (18 years or older) that will provide transportation to and from surgery and stay in the waiting room during your surgery. You may not drive yourself or take public transportation to and from surgery.  ? Arrange for someone to stay with you for 24 hours after you go home. This person must be a responsible adult (18 years or older).  ? Call your surgeon or their nurse if there is any change in your health (cold, flu, infections, hospitalizations).  ? Do not smoke, drink alcohol, or take over-the-counter medicine for 24 hours before and after surgery.  ? If you take prescribed drugs, you may need to stop them until after the surgery.  Discuss what medications to take or not take prior to surgery with your Primary Doctor at your pre-op physical. Avoid over-the-counter blood-thinning medications such as Aspirin, Ibuprofen, vitamin E, or fish oil 7 days prior to surgery (unless otherwise directed by your Primary Doctor). Tylenol is a good alternative for mild pain relief prior to surgery.  ? Eating and drinking guidelines prior to surgery:  - Stop all solid food consumption 8 hours prior to surgery  - You may drink clear liquids up to 2 hours prior to surgery (water, fruit juices without pulp, jello, tea/coffee without creamer, sports drinks, clear-fat free broth (bouillon or consomme), popsicles (without milk, bits of fruit, or seeds/nuts)  ? Follow instructions given for showering or bathing before surgery.    - Use 8 ounces of antiseptic surgical soap,  like:  - Hibiclens, Scrub Care, or Exidine  - You can find it at your local pharmacy, clinic, or retail store. If you have trouble, ask your pharmacist to help you find the right substitute.  - Please wash with one of the above soaps twice before coming to the hospital for your surgery. This will decrease bacteria (germs) on your skin. It will also help reduce your chance of infection after surgery.  - Items you will need for showering:  - 4 newly washed washcloths  - 2 newly washed towels  - 8 ounces of one of the above soaps  - Following these instructions:  - The evening before surgery: Shower or bathe as you normally would, using your regular soap and a clean washcloth. Give special attention to places where your incision (surgical cut) or catheters will be. This includes your groin area. Rinse well. You may wash your hair with your regular shampoo. Next, wash your body with 4 ounces of the antiseptic soap. Use a clean, damp washcloth and gently clean your body (from the chin down). If your surgery involves your head, use the special soap on your head and scalp. Rinse well and dry off using a newly washed towel.  - The morning of surgery: Repeat the same process as the evening shower.  - Other suggestions:    Do not shave within 12 inches of your incision (surgical cut) area for at least 3 days before surgery. Shaving can make small cuts in the skin. This puts you at higher risk of infection.    Wear freshly washed pajamas or clothing after your evening shower.    Wear freshly washed clothes the day of surgery.    Wash and change your bed sheets the day before surgery to have clean bed sheets after your shower and when you get home from surgery.    If you have trouble washing all areas, make sure someone helps you.    Don't use any deodorant, lotion or powder after your shower.    Women who are menstruating should wear a fresh sanitary pad to the hospital.  ? Do not wear or add deodorant, cologne, lotion,  makeup, nail polish or jewelry to surgery. If you wear fake nails, please remove at least one nail before coming to surgery (an oxygen monitor needs to be placed on your finger during surgery).  ? Bring these items to the surgery center/hospital:  - Insurance card  - Money for parking and co-pays, if needed  - A list of all the medicines you take. Include vitamins, minerals, herbs, and over-the-counter drugs.  Note any drug allergies.  - A copy of your advance health care directive, if you have one. This tells us what treatment you would want--and who would make health care decisions--if you could no longer speak for yourself. You may request this form in advance or download it from [email or web address].  - A case for glasses, contact lenses, hearing aids, or dentures.  - Your inhaler or CPAP machine, if you use these at home.  ? Leave extra cash, jewelry, and other valuables at home.  When you arrive  When you get to the surgery center/hospital, you will:  ? Check in. If you are under age 18, you must be with a parent or legal guardian.  ? Sign consent forms, if you haven t already. These forms state that you know the risks and benefits of surgery. When you sign the forms, you give us permission to do the surgery. Do not sign them unless you understand what will happen during and after your surgery. If you have any questions about your surgery, ask to speak with your doctor before you sign the forms. If you don t understand the answers, ask again.  ? Receive a copy of the Patient s Bill of Rights. If you do not receive a copy, please ask for one.  ? Change into hospital clothes. Your belongings will be placed in a bag. We will return them to you after surgery.  ? Meet with the anesthesia provider. He or she will tell you what kind of anesthesia (medicine) will be used to keep you comfortable during surgery.  Remember: it s okay to remind doctors and nurses to wash their hands before touching you.  In most  cases, your surgeon will use a marker to write his or her initials on the surgery site. This ensures that the exact site is operated on.  For safety reasons, we will ask you the same questions many times. For example, we may ask your name and birth date over and over again.  Friends and family can stay with you until it s time for surgery. While you re in surgery, they will be in the waiting area. Please note that cell phones are not allowed in some patient care areas.  If you have questions about what will happen in the operating room, talk to your care team.  After surgery  We will move you to a recovery room, where we will watch you closely. If you have any pain or discomfort, tell your nurse. He or she will try to make you comfortable.  If you are staying overnight, we will move you to your hospital room after you are awake.  If you are going home, we will move you to another room. Friends and family may be able to join you. The length of time you spend in recovery depend on the type of medicine you received, your medical condition, the type of surgery you had, or your response to the anesthesia given during your procedure.  When you are discharged from the recovery room, the nurses will review instructions with you and your caregiver.  ? Please wash your hands every time you touch the wound or change bandages or dressings.  ? Do not submerge the wound in water.  You may not use a bathtub or hot tub until the wound is closed. The wait time frame is generally 2-3 weeks, but any open area can be a source of incoming bacteria, so it is better to be on the safe side and avoid water submersion until your wound is fully healed.  ? You may take a shower 24 hours after surgery. Double check with your surgeon if it is OK for water to run over the wound, whether it has been sutured, stapled, glued, or is open. You may gently wash the wound using the antiseptic soap provided for your pre-surgery showering (do not use a  washcloth). Any mild soap will work as well.  ? Many surgical wounds will have small white strips of tape on them called steri-strips.  Do not remove these. The edges will curl and fall off within 7-10 days with normal showering.  ? If you are going home with sutures (stitches) or staples, you must return to the clinic to have them taken out, usually within 1-2 weeks. Some stitches are dissolvable and do not require removal. Make sure to clarify with your surgeon or surgery nurse reviewing discharge paperwork what kind of sutures you have.  ? Signs and symptoms of infection include:  - Fever, temperature over 101.5   F  - Redness  - Swelling  - Increased pain  - Green or yellow drainage which may or may not have a foul odor  Dealing with pain  A nurse will check your comfort level often during your stay. He or she will work with you to manage your pain.  Remember:  ? All pain is real. There are many ways to control pain. We can help you decide what works best for you.  ? Ask for pain medicine when you need it. Don t try to  tough it out --this can make you feel worse. Always take your medicine as ordered.  ? Medicine doesn t work the same for everyone. If your medicine isn t working, tell your nurse. There may be other medicines or treatments we can try.  Going home  We will let you know when you re ready to leave the surgery center or hospital. Before you leave, we will tell you how to care for yourself at home and prevent infections. If you do not understand something, please say so. We will answer any questions you have. We will then help you get ready to leave.  Remember, you must have a responsible adult (18 years or older) to stay with you 24 hours after you leave the hospital.  Take it easy when you get home. You will need some time to recover--you may be more tired than you realize at first. Rest and relax for at least the first 24 hours at home. You ll feel better and heal faster if you take good care of  yourself.  Follow the discharge instructions that are given to you when you leave the surgery center or hospital  Please call the clinic if you experience any problems during regular clinic hours (Monday-Friday 8:00am-5:00pm).  If you experience problems during non-clinic hours, please call the [facility] on-call line at [phone] and ask the  to page the on-call Provider for your specialty. The on-call Provider will call you back and can triage your symptoms and further advise. If you are having an emergency, always call 911 or seek immediate evaluation at the Emergency Room.  Locations  [facility]  Same-day surgery center - 2nd floor, check-in #5  [address] [place] [address]  [phone]  www.[email or web address].[facility] ([facility])  [address], [place] [address] [phone]   [email or web address]    [address] [address] [phone] (patient registration)  [phone] (main line)  [email or web address]  [facility]  [address] [place] [address]  [facility], 3rd floor for check-in  [phone] (patient registration)  [phone] (main line)  www.[email or web address]  [facility]  [address] [address]  [phone]  www.[facility]  [address] [facility] [address] [place] [address]  [phone]  www.[facility].[place].[facility]  [address] [place] [address]  [phone]  www.[facility]  [address]  [place] [address]  [phone]  www.[facility].[place].[facility]  [address]  [place] [address]  [phone]  [email or web address].org  [address] [address]  [phone]  [email or web address]

## 2020-03-04 ENCOUNTER — HOSPITAL ENCOUNTER (OUTPATIENT)
Facility: AMBULATORY SURGERY CENTER | Age: 75
End: 2020-03-04
Attending: SURGERY
Payer: COMMERCIAL

## 2020-03-04 ENCOUNTER — TELEPHONE (OUTPATIENT)
Dept: ONCOLOGY | Facility: CLINIC | Age: 75
End: 2020-03-04

## 2020-03-04 NOTE — TELEPHONE ENCOUNTER
Surgery is scheduled with Dr. Madison on 3/12 at the [facility] [place].  Scheduled per pt.    H&P: to be completed by PCP.  POST-OP: 3/26 at 9:15 AM    The RN completed the education regarding the surgery.     The surgery packet was provided that contains surgical instructions and a map.     They are aware that they will receive a call  ~2 days prior to the scheduled procedure and will be given an exact arrival/start time.    I contacted the patient and was able to confirm the scheduled dates.     13

## 2020-03-06 ENCOUNTER — RECORDS - HEALTHEAST (OUTPATIENT)
Dept: GENERAL RADIOLOGY | Facility: CLINIC | Age: 75
End: 2020-03-06

## 2020-03-06 ENCOUNTER — OFFICE VISIT - HEALTHEAST (OUTPATIENT)
Dept: FAMILY MEDICINE | Facility: CLINIC | Age: 75
End: 2020-03-06

## 2020-03-06 DIAGNOSIS — E55.9 VITAMIN D DEFICIENCY: ICD-10-CM

## 2020-03-06 DIAGNOSIS — Z17.0 ESTROGEN RECEPTOR POSITIVE STATUS (ER+): ICD-10-CM

## 2020-03-06 DIAGNOSIS — Z86.39 HISTORY OF HYPOTHYROIDISM: ICD-10-CM

## 2020-03-06 DIAGNOSIS — C50.411 MALIGNANT NEOPLASM OF UPPER-OUTER QUADRANT OF RIGHT FEMALE BREAST (H): ICD-10-CM

## 2020-03-06 DIAGNOSIS — F43.22 ADJUSTMENT DISORDER WITH ANXIOUS MOOD: ICD-10-CM

## 2020-03-06 DIAGNOSIS — Z17.0 MALIGNANT NEOPLASM OF UPPER-OUTER QUADRANT OF RIGHT BREAST IN FEMALE, ESTROGEN RECEPTOR POSITIVE (H): ICD-10-CM

## 2020-03-06 DIAGNOSIS — E78.2 MIXED HYPERLIPIDEMIA: ICD-10-CM

## 2020-03-06 DIAGNOSIS — C50.411 MALIGNANT NEOPLASM OF UPPER-OUTER QUADRANT OF RIGHT BREAST IN FEMALE, ESTROGEN RECEPTOR POSITIVE (H): ICD-10-CM

## 2020-03-06 DIAGNOSIS — Z01.818 PREOP EXAMINATION: ICD-10-CM

## 2020-03-06 LAB
ALBUMIN SERPL-MCNC: 4.4 G/DL (ref 3.5–5)
ALP SERPL-CCNC: 50 U/L (ref 45–120)
ALT SERPL W P-5'-P-CCNC: 28 U/L (ref 0–45)
ANION GAP SERPL CALCULATED.3IONS-SCNC: 10 MMOL/L (ref 5–18)
AST SERPL W P-5'-P-CCNC: 22 U/L (ref 0–40)
ATRIAL RATE - MUSE: 67 BPM
BILIRUB SERPL-MCNC: 1.3 MG/DL (ref 0–1)
BUN SERPL-MCNC: 19 MG/DL (ref 8–28)
CALCIUM SERPL-MCNC: 10.2 MG/DL (ref 8.5–10.5)
CHLORIDE BLD-SCNC: 104 MMOL/L (ref 98–107)
CO2 SERPL-SCNC: 27 MMOL/L (ref 22–31)
CREAT SERPL-MCNC: 0.89 MG/DL (ref 0.6–1.1)
DIASTOLIC BLOOD PRESSURE - MUSE: NORMAL
ERYTHROCYTE [DISTWIDTH] IN BLOOD BY AUTOMATED COUNT: 12.1 % (ref 11–14.5)
GFR SERPL CREATININE-BSD FRML MDRD: >60 ML/MIN/1.73M2
GLUCOSE BLD-MCNC: 88 MG/DL (ref 70–125)
HCT VFR BLD AUTO: 47.5 % (ref 35–47)
HGB BLD-MCNC: 16.1 G/DL (ref 12–16)
INTERPRETATION ECG - MUSE: NORMAL
MCH RBC QN AUTO: 31.3 PG (ref 27–34)
MCHC RBC AUTO-ENTMCNC: 34 G/DL (ref 32–36)
MCV RBC AUTO: 92 FL (ref 80–100)
P AXIS - MUSE: 30 DEGREES
PLATELET # BLD AUTO: 254 THOU/UL (ref 140–440)
PMV BLD AUTO: 8.7 FL (ref 7–10)
POTASSIUM BLD-SCNC: 4.2 MMOL/L (ref 3.5–5)
PR INTERVAL - MUSE: 154 MS
PROT SERPL-MCNC: 7.3 G/DL (ref 6–8)
QRS DURATION - MUSE: 82 MS
QT - MUSE: 428 MS
QTC - MUSE: 452 MS
R AXIS - MUSE: 47 DEGREES
RBC # BLD AUTO: 5.16 MILL/UL (ref 3.8–5.4)
SODIUM SERPL-SCNC: 141 MMOL/L (ref 136–145)
SYSTOLIC BLOOD PRESSURE - MUSE: NORMAL
T AXIS - MUSE: 72 DEGREES
T4 FREE SERPL-MCNC: 0.8 NG/DL (ref 0.7–1.8)
TSH SERPL DL<=0.005 MIU/L-ACNC: 3.62 UIU/ML (ref 0.3–5)
VENTRICULAR RATE- MUSE: 67 BPM
WBC: 6.1 THOU/UL (ref 4–11)

## 2020-03-06 ASSESSMENT — MIFFLIN-ST. JEOR: SCORE: 1282.97

## 2020-03-09 ENCOUNTER — TELEPHONE (OUTPATIENT)
Dept: SURGERY | Facility: CLINIC | Age: 75
End: 2020-03-09

## 2020-03-09 LAB
25(OH)D3 SERPL-MCNC: 34.8 NG/ML (ref 30–80)
25(OH)D3 SERPL-MCNC: 34.8 NG/ML (ref 30–80)

## 2020-03-09 NOTE — TELEPHONE ENCOUNTER
responded back that she  has been reaching out to Luann ADAIR and Nathaly surgery scheduler about this pt and Luann will follow up. Will close this encounter...Cathy Jones RN

## 2020-03-09 NOTE — TELEPHONE ENCOUNTER
Pt calling with questions about her upcoming surgery and would like to talk to . RN notified  that RN would send a message to  notifying her that Pt has further questions about surgery and this whole process. Staff message sent to  today to  advise...Cathy Jones RN

## 2020-03-10 ENCOUNTER — TELEPHONE (OUTPATIENT)
Dept: ONCOLOGY | Facility: CLINIC | Age: 75
End: 2020-03-10

## 2020-03-10 NOTE — TELEPHONE ENCOUNTER
Rescheduled surgery with Dr. Madison at the [facility] to 3/26. Confirmed with Sadia.    She is aware of arrival time and post-op changes.    H&P was completed on 3/6.

## 2020-03-16 DIAGNOSIS — C50.411 MALIGNANT NEOPLASM OF UPPER-OUTER QUADRANT OF RIGHT BREAST IN FEMALE, ESTROGEN RECEPTOR POSITIVE (H): Primary | ICD-10-CM

## 2020-03-16 DIAGNOSIS — Z17.0 MALIGNANT NEOPLASM OF UPPER-OUTER QUADRANT OF RIGHT BREAST IN FEMALE, ESTROGEN RECEPTOR POSITIVE (H): Primary | ICD-10-CM

## 2020-03-16 PROCEDURE — 81405 MOPATH PROCEDURE LEVEL 6: CPT | Mod: 91 | Performed by: SURGERY

## 2020-03-16 PROCEDURE — 81408 MOPATH PROCEDURE LEVEL 9: CPT | Performed by: SURGERY

## 2020-03-16 PROCEDURE — 81479 UNLISTED MOLECULAR PATHOLOGY: CPT | Performed by: SURGERY

## 2020-03-16 PROCEDURE — 81406 MOPATH PROCEDURE LEVEL 7: CPT | Performed by: SURGERY

## 2020-03-16 PROCEDURE — 81405 MOPATH PROCEDURE LEVEL 6: CPT | Performed by: SURGERY

## 2020-03-16 PROCEDURE — 81321 PTEN GENE FULL SEQUENCE: CPT | Performed by: SURGERY

## 2020-03-16 PROCEDURE — 81162 BRCA1&2 GEN FULL SEQ DUP/DEL: CPT | Performed by: SURGERY

## 2020-03-16 PROCEDURE — 81323 PTEN GENE DUP/DELET VARIANT: CPT | Performed by: SURGERY

## 2020-03-16 PROCEDURE — 81307 PALB2 GENE FULL GENE SEQ: CPT | Performed by: SURGERY

## 2020-03-18 ENCOUNTER — COMMUNICATION - HEALTHEAST (OUTPATIENT)
Dept: FAMILY MEDICINE | Facility: CLINIC | Age: 75
End: 2020-03-18

## 2020-03-18 ENCOUNTER — MEDICAL CORRESPONDENCE (OUTPATIENT)
Dept: HEALTH INFORMATION MANAGEMENT | Facility: CLINIC | Age: 75
End: 2020-03-18

## 2020-03-18 DIAGNOSIS — Z17.0 MALIGNANT NEOPLASM OF UPPER-OUTER QUADRANT OF RIGHT BREAST IN FEMALE, ESTROGEN RECEPTOR POSITIVE (H): ICD-10-CM

## 2020-03-18 DIAGNOSIS — C50.411 MALIGNANT NEOPLASM OF UPPER-OUTER QUADRANT OF RIGHT BREAST IN FEMALE, ESTROGEN RECEPTOR POSITIVE (H): ICD-10-CM

## 2020-03-18 DIAGNOSIS — R17 ELEVATED BILIRUBIN: ICD-10-CM

## 2020-03-18 LAB — COPATH REPORT: NORMAL

## 2020-03-20 ENCOUNTER — TELEPHONE (OUTPATIENT)
Dept: ONCOLOGY | Facility: CLINIC | Age: 75
End: 2020-03-20

## 2020-03-20 NOTE — TELEPHONE ENCOUNTER
Spoke with Sadia about her surgery with Dr. Madison on 3/26 at [place].    She knows that I will call by Monday with start arrival times.

## 2020-03-20 NOTE — TELEPHONE ENCOUNTER
Confirmed arrival time of 8 AM at the [facility].     She will be transported to [place] after the wire-loc.

## 2020-03-23 ENCOUNTER — ANESTHESIA EVENT (OUTPATIENT)
Dept: SURGERY | Facility: CLINIC | Age: 75
End: 2020-03-23
Payer: COMMERCIAL

## 2020-03-25 NOTE — ANESTHESIA PREPROCEDURE EVALUATION
"Anesthesia Pre-Procedure Evaluation    Patient: Sadia Rider   MRN:     [id number] Gender:   female   Age:    74 year old :      1945        Preoperative Diagnosis: Malignant neoplasm of upper-outer quadrant of right breast in female, estrogen receptor positive (H) [C50.411, Z17.0]   Procedure(s):  RIGHT Wire-Localized Segmental Mastectomy, RIGHT Axillary [place] Lymph Node Biopsy  Possible RIGHT Axillary Lymph Node Dissection     Current Outpatient Medications   Medication Sig Dispense Refill     Alendronate Sodium 70 MG TBEF Take by mouth every 7 days Thursday       aspirin 81 MG tablet Take 1 tablet by mouth daily.       calcium carb-cholecalciferol 600-500 MG-UNIT CAPS Take 1 capsule by mouth 3 times daily       Cholecalciferol (VITAMIN D3 PO) Take 1,000 mg by mouth daily       Multiple Vitamins-Minerals (CENTRUM ULTRA WOMENS PO) Take 1 tablet by mouth daily ( calcium: 300 mg and vitamin d: 1000 units)       simvastatin (ZOCOR) 10 MG tablet Take 1 tablet by mouth At Bedtime.          LABS:  CBC: No results found for: WBC, HGB, HCT, PLT  BMP:   Lab Results   Component Value Date    CR 0.85 2020    CR 0.80 2011     COAGS: No results found for: PTT, INR, FIBR  POC: No results found for: BGM, HCG, HCGS  OTHER:   Lab Results   Component Value Date    NEIL 9.7 2011    ALBUMIN 4.5 2011        Preop Vitals    BP Readings from Last 3 Encounters:   17 159/84   07/13/15 (!) 188/91   14 (!) 182/92    Pulse Readings from Last 3 Encounters:   17 61   07/13/15 69   14 65      Resp Readings from Last 3 Encounters:   No data found for Resp    SpO2 Readings from Last 3 Encounters:   No data found for SpO2      Temp Readings from Last 1 Encounters:   No data found for Temp    Ht Readings from Last 1 Encounters:   20 1.638 m (5' 4.5\")      Wt Readings from Last 1 Encounters:   20 81.1 kg (178 lb 12.8 oz)    Estimated body mass index is 30.22 kg/m  as calculated " "from the following:    Height as of 3/3/20: 1.638 m (5' 4.5\").    Weight as of 3/3/20: 81.1 kg (178 lb 12.8 oz).     LDA:        Past Medical History:   Diagnosis Date     Basal cell carcinoma      Glaucoma      POAG ADV     Osteoporosis      Other chronic pain      PONV (postoperative nausea and vomiting)     many years ago.     Tear film insufficiency, unspecified       Past Surgical History:   Procedure Laterality Date     ARGON LASER TRABECULOPLASTY (ALT) OD (RIGHT EYE)  5/1989    RE     C STOMACH SURGERY PROCEDURE UNLISTED       CATARACT IOL, RT/LT  1998/2002    BE     CHOLECYSTECTOMY      metal staple     GLAUCOMA SURGERY  1990s    Trab x 2 RE     MOHS MICROGRAPHIC PROCEDURE       TRABECULECTOMY, MITOMYCIN FILTER, COMBINED  3/9/2014    LE     YAG CAPSULOTOMY OD (RIGHT EYE)  8/4/2008    RE      Allergies   Allergen Reactions     Alphagan [Brimonidine Tartrate] Blisters     Brimonidine Itching     Other reaction(s): Blisters        Anesthesia Evaluation     . Pt has had prior anesthetic. Type: General    History of anesthetic complications   - PONV        ROS/MED HX    ENT/Pulmonary: Comment: Prior environmental smoke exposure, CXR at pre-op normal       Neurologic:  - neg neurologic ROS     Cardiovascular:     (+) Dyslipidemia, ----. Taking blood thinners (ASA 81 mg) : . . . :. .       METS/Exercise Tolerance:     Hematologic:  - neg hematologic  ROS       Musculoskeletal:  - neg musculoskeletal ROS       GI/Hepatic:  - neg GI/hepatic ROS       Renal/Genitourinary:  - ROS Renal section negative       Endo:     (+) Other Endocrine Disorder (osteoporosis) .      Psychiatric:     (+) psychiatric history anxiety      Infectious Disease:  - neg infectious disease ROS       Malignancy:   (+) Malignancy History of Breast          Other: Comment: glaucoma                         PHYSICAL EXAM:   Mental Status/Neuro:    Airway:   Mallampati: II  Mouth/Opening: Full  TM distance: > 6 cm  Neck ROM: Full   Respiratory: " Auscultation: CTAB     Resp. Rate: Normal     Resp. Effort: Normal      CV: Rhythm: Regular  Rate: Age appropriate  Heart: Normal Sounds  Edema: None   Comments:                      Assessment:   ASA SCORE: 2    H&P: History and physical reviewed and following examination; no interval change.   Smoking Status:  Non-Smoker/Unknown        Plan:   Anes. Type:  General; Peripheral Nerve Block     Block Details: Single Shot (pec block)   Pre-Medication: Midazolam; Acetaminophen   Induction:  IV (Standard)   Airway: ETT; Oral   Access/Monitoring: PIV; 2nd PIV   Maintenance: TIVA     Postop Plan:   Postop Pain: Opioids  Postop Sedation/Airway: Not planned  Disposition: Outpatient     PONV Management:   Adult Risk Factors: Female, H/o PONV or Motion Sickness, Non-Smoker, Postop Opioids   Prevention: Ondansetron, Dexamethasone, No Volatiles     CONSENT: Direct conversation   Plan and risks discussed with: Patient          Comments for Plan/Consent:  74 year old female, ASA 2, PMH anxiety, breast mass coming in for right segmental mastectomy with sentinel node biopsy.   - GETA  - 2nd IV  - TIVA for PONV hx (no scopolamine patch due to age)  - pec block for post-op pain   - APAP, midazolam for pre-medication                  Chi Leon III, MD

## 2020-03-26 ENCOUNTER — ANCILLARY PROCEDURE (OUTPATIENT)
Dept: MAMMOGRAPHY | Facility: CLINIC | Age: 75
End: 2020-03-26
Attending: SURGERY
Payer: COMMERCIAL

## 2020-03-26 ENCOUNTER — HOSPITAL ENCOUNTER (OUTPATIENT)
Facility: CLINIC | Age: 75
Discharge: HOME | End: 2020-03-26
Attending: SURGERY | Admitting: SURGERY
Payer: COMMERCIAL

## 2020-03-26 ENCOUNTER — HOSPITAL ENCOUNTER (OUTPATIENT)
Dept: NUCLEAR MEDICINE | Facility: CLINIC | Age: 75
Setting detail: NUCLEAR MEDICINE
End: 2020-03-26
Attending: SURGERY
Payer: COMMERCIAL

## 2020-03-26 ENCOUNTER — TRANSFERRED RECORDS (OUTPATIENT)
Dept: HEALTH INFORMATION MANAGEMENT | Facility: CLINIC | Age: 75
End: 2020-03-26

## 2020-03-26 ENCOUNTER — ANESTHESIA (OUTPATIENT)
Dept: SURGERY | Facility: CLINIC | Age: 75
End: 2020-03-26
Payer: COMMERCIAL

## 2020-03-26 VITALS — HEART RATE: 72 BPM | SYSTOLIC BLOOD PRESSURE: 185 MMHG | DIASTOLIC BLOOD PRESSURE: 80 MMHG | OXYGEN SATURATION: 96 %

## 2020-03-26 VITALS
TEMPERATURE: 97.8 F | OXYGEN SATURATION: 93 % | SYSTOLIC BLOOD PRESSURE: 134 MMHG | HEART RATE: 84 BPM | DIASTOLIC BLOOD PRESSURE: 69 MMHG | RESPIRATION RATE: 16 BRPM

## 2020-03-26 DIAGNOSIS — C50.411 MALIGNANT NEOPLASM OF UPPER-OUTER QUADRANT OF RIGHT BREAST IN FEMALE, ESTROGEN RECEPTOR POSITIVE (H): ICD-10-CM

## 2020-03-26 DIAGNOSIS — Z17.0 MALIGNANT NEOPLASM OF UPPER-OUTER QUADRANT OF RIGHT BREAST IN FEMALE, ESTROGEN RECEPTOR POSITIVE (H): ICD-10-CM

## 2020-03-26 LAB
ABO + RH BLD: NORMAL
ABO + RH BLD: NORMAL
BLD GP AB SCN SERPL QL: NORMAL
BLOOD BANK CMNT PATIENT-IMP: NORMAL
BLOOD BANK CMNT PATIENT-IMP: NORMAL
GLUCOSE BLDC GLUCOMTR-MCNC: 93 MG/DL (ref 70–99)
HGB BLD-MCNC: 16.7 G/DL (ref 11.7–15.7)
SPECIMEN EXP DATE BLD: NORMAL

## 2020-03-26 PROCEDURE — 86850 RBC ANTIBODY SCREEN: CPT

## 2020-03-26 PROCEDURE — 85018 HEMOGLOBIN: CPT

## 2020-03-26 PROCEDURE — 25800030 ZZH RX IP 258 OP 636: Performed by: PHYSICAL THERAPIST

## 2020-03-26 PROCEDURE — 37000008 ZZH ANESTHESIA TECHNICAL FEE, 1ST 30 MIN: Performed by: SURGERY

## 2020-03-26 PROCEDURE — 40000171 ZZH STATISTIC PRE-PROCEDURE ASSESSMENT III: Performed by: SURGERY

## 2020-03-26 PROCEDURE — 71000027 ZZH RECOVERY PHASE 2 EACH 15 MINS: Performed by: SURGERY

## 2020-03-26 PROCEDURE — 82962 GLUCOSE BLOOD TEST: CPT | Mod: GZ

## 2020-03-26 PROCEDURE — 25000128 H RX IP 250 OP 636

## 2020-03-26 PROCEDURE — 88307 TISSUE EXAM BY PATHOLOGIST: CPT | Performed by: SURGERY

## 2020-03-26 PROCEDURE — 25800030 ZZH RX IP 258 OP 636: Performed by: STUDENT IN AN ORGANIZED HEALTH CARE EDUCATION/TRAINING PROGRAM

## 2020-03-26 PROCEDURE — 36000059 ZZH SURGERY LEVEL 3 EA 15 ADDTL MIN UMMC: Performed by: SURGERY

## 2020-03-26 PROCEDURE — 25000128 H RX IP 250 OP 636: Performed by: ANESTHESIOLOGY

## 2020-03-26 PROCEDURE — 25000128 H RX IP 250 OP 636: Performed by: STUDENT IN AN ORGANIZED HEALTH CARE EDUCATION/TRAINING PROGRAM

## 2020-03-26 PROCEDURE — 36000061 ZZH SURGERY LEVEL 3 W FLUORO 1ST 30 MIN - UMMC: Performed by: SURGERY

## 2020-03-26 PROCEDURE — 86901 BLOOD TYPING SEROLOGIC RH(D): CPT

## 2020-03-26 PROCEDURE — 25000125 ZZHC RX 250: Performed by: SURGERY

## 2020-03-26 PROCEDURE — 38792 RA TRACER ID OF SENTINL NODE: CPT

## 2020-03-26 PROCEDURE — 25000128 H RX IP 250 OP 636: Performed by: SURGERY

## 2020-03-26 PROCEDURE — 86900 BLOOD TYPING SEROLOGIC ABO: CPT

## 2020-03-26 PROCEDURE — 37000009 ZZH ANESTHESIA TECHNICAL FEE, EACH ADDTL 15 MIN: Performed by: SURGERY

## 2020-03-26 PROCEDURE — 25000132 ZZH RX MED GY IP 250 OP 250 PS 637: Performed by: STUDENT IN AN ORGANIZED HEALTH CARE EDUCATION/TRAINING PROGRAM

## 2020-03-26 PROCEDURE — 25000125 ZZHC RX 250: Performed by: STUDENT IN AN ORGANIZED HEALTH CARE EDUCATION/TRAINING PROGRAM

## 2020-03-26 PROCEDURE — 25000566 ZZH SEVOFLURANE, EA 15 MIN: Performed by: SURGERY

## 2020-03-26 PROCEDURE — 27210794 ZZH OR GENERAL SUPPLY STERILE: Performed by: SURGERY

## 2020-03-26 PROCEDURE — 25000125 ZZHC RX 250: Performed by: NURSE ANESTHETIST, CERTIFIED REGISTERED

## 2020-03-26 PROCEDURE — 71000014 ZZH RECOVERY PHASE 1 LEVEL 2 FIRST HR: Performed by: SURGERY

## 2020-03-26 RX ORDER — ACETAMINOPHEN 325 MG/1
650 TABLET ORAL EVERY 4 HOURS PRN
Qty: 50 TABLET | Refills: 0 | Status: SHIPPED | OUTPATIENT
Start: 2020-03-26 | End: 2020-04-16

## 2020-03-26 RX ORDER — CEFAZOLIN SODIUM 2 G/100ML
2 INJECTION, SOLUTION INTRAVENOUS
Status: COMPLETED | OUTPATIENT
Start: 2020-03-26 | End: 2020-03-26

## 2020-03-26 RX ORDER — FENTANYL CITRATE 50 UG/ML
25-50 INJECTION, SOLUTION INTRAMUSCULAR; INTRAVENOUS
Status: DISCONTINUED | OUTPATIENT
Start: 2020-03-26 | End: 2020-03-26 | Stop reason: HOSPADM

## 2020-03-26 RX ORDER — NALOXONE HYDROCHLORIDE 0.4 MG/ML
.1-.4 INJECTION, SOLUTION INTRAMUSCULAR; INTRAVENOUS; SUBCUTANEOUS
Status: DISCONTINUED | OUTPATIENT
Start: 2020-03-26 | End: 2020-03-26 | Stop reason: HOSPADM

## 2020-03-26 RX ORDER — CEFAZOLIN SODIUM 1 G/3ML
1 INJECTION, POWDER, FOR SOLUTION INTRAMUSCULAR; INTRAVENOUS SEE ADMIN INSTRUCTIONS
Status: DISCONTINUED | OUTPATIENT
Start: 2020-03-26 | End: 2020-03-26 | Stop reason: HOSPADM

## 2020-03-26 RX ORDER — ACETAMINOPHEN 325 MG/1
975 TABLET ORAL ONCE
Status: COMPLETED | OUTPATIENT
Start: 2020-03-26 | End: 2020-03-26

## 2020-03-26 RX ORDER — ISOSULFAN BLUE 50 MG/5ML
INJECTION, SOLUTION SUBCUTANEOUS PRN
Status: DISCONTINUED | OUTPATIENT
Start: 2020-03-26 | End: 2020-03-26 | Stop reason: HOSPADM

## 2020-03-26 RX ORDER — FLUMAZENIL 0.1 MG/ML
0.2 INJECTION, SOLUTION INTRAVENOUS
Status: DISCONTINUED | OUTPATIENT
Start: 2020-03-26 | End: 2020-03-26 | Stop reason: HOSPADM

## 2020-03-26 RX ORDER — PROPOFOL 10 MG/ML
INJECTION, EMULSION INTRAVENOUS PRN
Status: DISCONTINUED | OUTPATIENT
Start: 2020-03-26 | End: 2020-03-26

## 2020-03-26 RX ORDER — DEXAMETHASONE SODIUM PHOSPHATE 10 MG/ML
INJECTION, SOLUTION INTRAMUSCULAR; INTRAVENOUS PRN
Status: DISCONTINUED | OUTPATIENT
Start: 2020-03-26 | End: 2020-03-26

## 2020-03-26 RX ORDER — ACETAMINOPHEN 325 MG/1
975 TABLET ORAL ONCE
Status: DISCONTINUED | OUTPATIENT
Start: 2020-03-26 | End: 2020-03-26 | Stop reason: HOSPADM

## 2020-03-26 RX ORDER — SODIUM CHLORIDE, SODIUM LACTATE, POTASSIUM CHLORIDE, CALCIUM CHLORIDE 600; 310; 30; 20 MG/100ML; MG/100ML; MG/100ML; MG/100ML
INJECTION, SOLUTION INTRAVENOUS CONTINUOUS
Status: DISCONTINUED | OUTPATIENT
Start: 2020-03-26 | End: 2020-03-26 | Stop reason: HOSPADM

## 2020-03-26 RX ORDER — HYDROMORPHONE HYDROCHLORIDE 1 MG/ML
.3-.5 INJECTION, SOLUTION INTRAMUSCULAR; INTRAVENOUS; SUBCUTANEOUS EVERY 10 MIN PRN
Status: DISCONTINUED | OUTPATIENT
Start: 2020-03-26 | End: 2020-03-26 | Stop reason: HOSPADM

## 2020-03-26 RX ORDER — LIDOCAINE 40 MG/G
CREAM TOPICAL
Status: DISCONTINUED | OUTPATIENT
Start: 2020-03-26 | End: 2020-03-26 | Stop reason: HOSPADM

## 2020-03-26 RX ORDER — OXYCODONE HCL 5 MG/5 ML
5 SOLUTION, ORAL ORAL EVERY 4 HOURS PRN
Status: CANCELLED | OUTPATIENT
Start: 2020-03-26

## 2020-03-26 RX ORDER — BUPIVACAINE HYDROCHLORIDE 2.5 MG/ML
INJECTION, SOLUTION EPIDURAL; INFILTRATION; INTRACAUDAL PRN
Status: DISCONTINUED | OUTPATIENT
Start: 2020-03-26 | End: 2020-03-26

## 2020-03-26 RX ORDER — EPHEDRINE SULFATE 50 MG/ML
INJECTION, SOLUTION INTRAMUSCULAR; INTRAVENOUS; SUBCUTANEOUS PRN
Status: DISCONTINUED | OUTPATIENT
Start: 2020-03-26 | End: 2020-03-26

## 2020-03-26 RX ORDER — FENTANYL CITRATE 50 UG/ML
25-50 INJECTION, SOLUTION INTRAMUSCULAR; INTRAVENOUS
Status: CANCELLED | OUTPATIENT
Start: 2020-03-26

## 2020-03-26 RX ORDER — LIDOCAINE HYDROCHLORIDE 10 MG/ML
10 INJECTION, SOLUTION EPIDURAL; INFILTRATION; INTRACAUDAL; PERINEURAL ONCE
Status: COMPLETED | OUTPATIENT
Start: 2020-03-26 | End: 2020-03-26

## 2020-03-26 RX ORDER — ONDANSETRON 2 MG/ML
4 INJECTION INTRAMUSCULAR; INTRAVENOUS EVERY 30 MIN PRN
Status: DISCONTINUED | OUTPATIENT
Start: 2020-03-26 | End: 2020-03-26 | Stop reason: HOSPADM

## 2020-03-26 RX ORDER — ACETAMINOPHEN 325 MG/1
650 TABLET ORAL
Status: CANCELLED | OUTPATIENT
Start: 2020-03-26

## 2020-03-26 RX ORDER — PROPOFOL 10 MG/ML
INJECTION, EMULSION INTRAVENOUS CONTINUOUS PRN
Status: DISCONTINUED | OUTPATIENT
Start: 2020-03-26 | End: 2020-03-26

## 2020-03-26 RX ORDER — OXYCODONE HYDROCHLORIDE 5 MG/1
5 TABLET ORAL
Status: CANCELLED | OUTPATIENT
Start: 2020-03-26

## 2020-03-26 RX ORDER — ONDANSETRON 4 MG/1
4 TABLET, ORALLY DISINTEGRATING ORAL EVERY 30 MIN PRN
Status: DISCONTINUED | OUTPATIENT
Start: 2020-03-26 | End: 2020-03-26 | Stop reason: HOSPADM

## 2020-03-26 RX ADMIN — PROPOFOL 100 MG: 10 INJECTION, EMULSION INTRAVENOUS at 12:20

## 2020-03-26 RX ADMIN — HYDROMORPHONE HYDROCHLORIDE 0.5 MG: 1 INJECTION, SOLUTION INTRAMUSCULAR; INTRAVENOUS; SUBCUTANEOUS at 14:50

## 2020-03-26 RX ADMIN — DEXAMETHASONE SODIUM PHOSPHATE 1 MG: 10 INJECTION, SOLUTION INTRAMUSCULAR; INTRAVENOUS at 11:05

## 2020-03-26 RX ADMIN — Medication 5 MG: at 12:41

## 2020-03-26 RX ADMIN — PHENYLEPHRINE HYDROCHLORIDE 100 MCG: 10 INJECTION INTRAVENOUS at 12:26

## 2020-03-26 RX ADMIN — CEFAZOLIN SODIUM 2 G: 2 INJECTION, SOLUTION INTRAVENOUS at 12:28

## 2020-03-26 RX ADMIN — SODIUM CHLORIDE, POTASSIUM CHLORIDE, SODIUM LACTATE AND CALCIUM CHLORIDE: 600; 310; 30; 20 INJECTION, SOLUTION INTRAVENOUS at 12:10

## 2020-03-26 RX ADMIN — PROPOFOL 200 MCG/KG/MIN: 10 INJECTION, EMULSION INTRAVENOUS at 12:32

## 2020-03-26 RX ADMIN — LIDOCAINE HYDROCHLORIDE 10 ML: 10 INJECTION, SOLUTION EPIDURAL; INFILTRATION; INTRACAUDAL; PERINEURAL at 08:25

## 2020-03-26 RX ADMIN — ACETAMINOPHEN 975 MG: 325 TABLET, FILM COATED ORAL at 10:42

## 2020-03-26 RX ADMIN — PHENYLEPHRINE HYDROCHLORIDE 50 MCG: 10 INJECTION INTRAVENOUS at 13:14

## 2020-03-26 RX ADMIN — ONDANSETRON 4 MG: 2 INJECTION INTRAMUSCULAR; INTRAVENOUS at 14:42

## 2020-03-26 RX ADMIN — BUPIVACAINE HYDROCHLORIDE 30 ML: 2.5 INJECTION, SOLUTION EPIDURAL; INFILTRATION; INTRACAUDAL; PERINEURAL at 11:05

## 2020-03-26 RX ADMIN — ROCURONIUM BROMIDE 80 MG: 10 INJECTION INTRAVENOUS at 12:20

## 2020-03-26 RX ADMIN — FENTANYL CITRATE 25 MCG: 50 INJECTION INTRAMUSCULAR; INTRAVENOUS at 10:57

## 2020-03-26 RX ADMIN — Medication 10 MG: at 12:25

## 2020-03-26 RX ADMIN — Medication 5 MG: at 13:14

## 2020-03-26 RX ADMIN — SUGAMMADEX 200 MG: 100 INJECTION, SOLUTION INTRAVENOUS at 14:09

## 2020-03-26 RX ADMIN — Medication 5 MG: at 13:06

## 2020-03-26 RX ADMIN — FENTANYL CITRATE 100 MCG: 50 INJECTION INTRAMUSCULAR; INTRAVENOUS at 12:20

## 2020-03-26 RX ADMIN — FENTANYL CITRATE 25 MCG: 50 INJECTION INTRAMUSCULAR; INTRAVENOUS at 14:47

## 2020-03-26 RX ADMIN — FENTANYL CITRATE 25 MCG: 50 INJECTION INTRAMUSCULAR; INTRAVENOUS at 14:43

## 2020-03-26 NOTE — ANESTHESIA CARE TRANSFER NOTE
Patient: Sadia Rider    Procedure(s):  RIGHT Wire-Localized Segmental Mastectomy, RIGHT Axillary [place] Lymph Node Biopsy    Diagnosis: Malignant neoplasm of upper-outer quadrant of right breast in female, estrogen receptor positive (H) [C50.411, Z17.0]  Diagnosis Additional Information: No value filed.    Anesthesia Type:   General, Peripheral Nerve Block     Note:  Airway :Face Mask  Patient transferred to:PACU  Comments: To PACU with CRNA, RN. Pt alert, appropriate, VSS on O2 per FM. Report and care to PACU RNHandoff Report: Identifed the Patient, Identified the Reponsible Provider, Reviewed the pertinent medical history, Discussed the surgical course, Reviewed Intra-OP anesthesia mangement and issues during anesthesia, Set expectations for post-procedure period and Allowed opportunity for questions and acknowledgement of understanding      Vitals: (Last set prior to Anesthesia Care Transfer)    CRNA VITALS  3/26/2020 1346 - 3/26/2020 1424      3/26/2020             Resp Rate (set):  10                Electronically Signed By: ESPERANZA Cardoso CRNA  March 26, 2020  2:24 PM

## 2020-03-26 NOTE — PROGRESS NOTES
SBAR Wire Localization     SITUATION:  Patient to breast imaging center for imaging guided wire localizations before breast lumpectomy or excision biopsy with sentinel node injection.    BACKGROUND:  Breast imaging cancer, breast abnormality  Ordered procedure completed: Yes  Special needs identified: Yes     ASSESSMENT:  SBAR report called to patient care unit because of unexpected event in radiology: No  Allergies and medication list reviewed prior to procedure. Yes  Skin cleansed with ChloraPrep One-Step.  Anesthesia: approximately 10ml of 1% Lidocaine injection subcutaneous before wire insertion administered by the radiologist.   Gauze dressing over insertion site(s).  Post procedure mammogram completed: Yes    Patient tolerance: Tolerated procedure without issue    RECOMMENDATIONS:  Patient transferred to Same Day Surgery in stable condition via wheelchair with Breast Imaging Staff.    Please call Breast Center at Clinic and Surgery Center [phone] if there are any questions.

## 2020-03-26 NOTE — ANESTHESIA PROCEDURE NOTES
Peripheral Nerve Block Procedure Note    Staff:     Anesthesiologist:  Issa Cruz MD    Resident/CRNA:  Franco Bashir MD    Block performed by resident/CRNA in the presence of a teaching physician    Location: Pre-op  Procedure Start/Stop TImes:      3/26/2020 11:00 AM     3/26/2020 11:06 AM    patient identified, IV checked, site marked, risks and benefits discussed, informed consent, monitors and equipment checked, pre-op evaluation, at physician/surgeon's request and post-op pain management      Correct Patient: Yes      Correct Position: Yes      Correct Site: Yes      Correct Procedure: Yes      Correct Laterality:  Yes    Site Marked:  Yes  Procedure details:     Procedure:  Pectoralis    ASA:  2    Diagnosis:  Post operative pain control    Laterality:  Right    Position:  Supine    Sterile Prep: chloraprep      Local skin infiltration:  None    Needle:  Short bevel    Needle length (mm):  110    Ultrasound: Yes      Ultrasound used to identify targeted nerve, plexus, or vascular structure and placed a needle adjacent to it      Permanent Image entered into patiient's record      Abnormal pain on injection: No      Blood Aspirated: No      Paresthesias:  No    Bleeding at site: No      Bolus via:  Needle    Infusion Method:  Single Shot    Blood aspirated via catheter: No      Complications:  None

## 2020-03-26 NOTE — OP NOTE
DATE OF SURGERY: March 26, 2020     SURGEON: Grace Madison MD  ASSISTANT: Jaquelin Stone MD PGY-3    PREOPERATIVE DIAGNOSIS: Breast cancer, 12:00 position right breast  POSTOPERATIVE DIAGNOSIS: same    PROCEDURE:   1. Right axillary sentinel lymph node mapping and biopsy  2. Right wire-localized segmental mastectomy     ANESTHESIA: General + Block    CLINICAL NOTE:  Sadia Rider is a 74 year old woman with breast cancer.  The risks and benefits of a wire-localized segmental mastectomy and axillary jenna staging were discussed with the patient and she elected to proceed with informed consent.    OPERATIVE FINDINGS:  1. Two sentinel lymph nodes removed  2. Specimen radiograph confirmed presence of the wire and biopsy clip within the specimen.     OPERATIVE PROCEDURE:  [facility] wire-localization and faby-areolar technetium-labelled tilmanocept injection by the radiologist.  The wire-localization images were personally reviewed by me prior to the start of the procedure.  The patient was brought to the operating room and placed in the supine position with appropriate padding for all of the pressure points. A general anesthetic was administered.   A surgical safety checklist was performed to confirm the patient's identity, the site and laterality of the procedure. Perioperative antibiotics (Ancef) was provided.  VTE prophylaxis was provided with serial compression devices.  2 mL of lymphazurin was injected into the right  subareolar space and the right  breast was massaged for 5 minutes.   The right breast and axilla were then prepped and draped in the usual sterile fashion using Chloraprep.     We began with the axilla.  An incision was made just below the hair-bearing area of the right axilla.  The clavipectoral fascia was incised to enter the axilla. The Neoprobe was used to identify the sentinel lymph nodes.  [place] lymph node #1 was not blue and had an ex vivo count of  382.  [place] lymph node #2 was blue and had an ex vivo count of 18073. The background count was 363. No other blue or palpable lymph nodes were found.  Thus no additional sentinel lymph nodes were sought.  All of the lymph nodes were sent to pathology individually.  The wound was irrigated and hemostasis was achieved.  The incision was closed in two layers with interrupted 3-0 vicryl and a running subcuticular 4-0 monocryl.     Next, an incision was made in the 12:00 position of the right breast.  Skin flaps were raised.  The breast tissue denoted by the localizing wire was dissected out, with careful attention paid to resect this area with a grossly normal margin of surrounding breast tissue.  The dissection was carried out down to the pectoralis major muscle, taking its fascia en bloc with the specimen.  The specimen was inked and radiographed.  It was found to contain the biopsy clip and wire.  The specimen was then sent to pathology.  I felt the inferior margin was a bit close, thus an additional inferior margin was removed, inked, and sent to pathology. The wound was irrigated and hemostasis was achieved.  Hemoclips were placed to sandy the edges of the cavity: medial, inferior, lateral, superior, and posterior (deep).   Deep 3-0 vicryl sutures were placed to reapproximate the breast tissue to avoid a divot.   The incision was closed in two layers with interrupted 3-0 vicryl and a running subcuticular 4-0 monocryl.  Steristrips and dressings were applied.  The patient tolerated the procedure well. The sponge, needle, instrument counts were correct.  The patient was then awakened and taken to recovery in stable fashion.     I was present and scrubbed for the entire above procedure.    EBL: Nil    SPECIMEN(S):  A. Right axillary sentinel lymph node # 1  B. Right axillary sentinel lymph node # 2  C. Right breast mass  D. Right breast, new inferior margin    POSTOPERATIVE PLANS:  Sadia Rider will be discharged  home today with wound care instructions and no prescription for analgesics.  She will follow-up with me in 2 weeks.     Grace Madison MD MSc FRCSC FACS    Division of Surgical Oncology  [facility]

## 2020-03-26 NOTE — DISCHARGE INSTRUCTIONS
[facility]  Same-Day Surgery   Adult Discharge Orders & Instructions     For 24 hours after surgery    1. Get plenty of rest.  A responsible adult must stay with you for at least 24 hours after you leave the hospital.   2. Do not drive or use heavy equipment.  If you have weakness or tingling, don't drive or use heavy equipment until this feeling goes away.  3. Do not drink alcohol.  4. Avoid strenuous or risky activities.  Ask for help when climbing stairs.   5. You may feel lightheaded.  IF so, sit for a few minutes before standing.  Have someone help you get up.   6. If you have nausea (feel sick to your stomach): Drink only clear liquids such as apple juice, ginger ale, broth or 7-Up.  Rest may also help.  Be sure to drink enough fluids.  Move to a regular diet as you feel able.  7. You may have a slight fever. Call the doctor if your fever is over 100 F (37.7 C) (taken under the tongue) or lasts longer than 24 hours.  8. You may have a dry mouth, a sore throat, muscle aches or trouble sleeping.  These should go away after 24 hours.  9. Do not make important or legal decisions.   Call your doctor for any of the followin.  Signs of infection (fever, growing tenderness at the surgery site, a large amount of drainage or bleeding, severe pain, foul-smelling drainage, redness, swelling).    2. It has been over 8 to 10 hours since surgery and you are still not able to urinate (pass water).    3.  Headache for over 24 hours.      To contact a doctor, call Dr. Grace Madison's office @ [phone] or:      [phone] and ask for the resident on call for General Surgery for Dr. Madison (answered 24 hours a day)      Emergency Department:    [facility]: [phone]       (TTY for hearing impaired: [phone])    [facility]: [phone]       (TTY for hearing impaired: [phone])

## 2020-03-26 NOTE — PROGRESS NOTES
PACU RN NOTE  Assigned as pt nurse during PACU recovery   Breast binder in place. Breast binder Straps on back of pt bed on top of chart on back of bed  LIMB ALERT TO RIGHT SIDE  Nicholas  informed that he will be called with instructions.   Pt tolerating ice chips without difficulty   Pharmacy called @ 1505, tech confirms that Tylenol script is filled and ready for .   @ 1505 Dr. Rodriguez called for sign out. @ pt bedside states she will place sign out.

## 2020-03-26 NOTE — PROGRESS NOTES
Block procedure complete.  Patient alert and responding appropriately.  Monitors will remain attached to patient until tranfer to OR.

## 2020-03-26 NOTE — ANESTHESIA POSTPROCEDURE EVALUATION
Anesthesia POST Procedure Evaluation    Patient: Sadia Rider   MRN:     [id number] Gender:   female   Age:    74 year old :      1945        Preoperative Diagnosis: Malignant neoplasm of upper-outer quadrant of right breast in female, estrogen receptor positive (H) [C50.411, Z17.0]   Procedure(s):  RIGHT Wire-Localized Segmental Mastectomy, RIGHT Axillary Central Lymph Node Biopsy   Postop Comments: No value filed.     Anesthesia Type: General, Peripheral Nerve Block       Disposition: Outpatient   Postop Pain Control: Uneventful            Sign Out: Well controlled pain   PONV: No   Neuro/Psych: Uneventful            Sign Out: Acceptable/Baseline neuro status   Airway/Respiratory: Uneventful            Sign Out: Acceptable/Baseline resp. status   CV/Hemodynamics: Uneventful            Sign Out: Acceptable CV status   Other NRE: NONE   DID A NON-ROUTINE EVENT OCCUR? No         Last Anesthesia Record Vitals:  CRNA VITALS  3/26/2020 1346 - 3/26/2020 1446      3/26/2020             Resp Rate (set):  10          Last PACU Vitals:  Vitals Value Taken Time   /75 3/26/2020  3:10 PM   Temp 36.4  C (97.6  F) 3/26/2020  3:10 PM   Pulse 71 3/26/2020  3:10 PM   Resp 16 3/26/2020  3:10 PM   SpO2 98 % 3/26/2020  3:18 PM   Temp src     NIBP     Pulse     SpO2     Resp     Temp     Ht Rate     Temp 2     Vitals shown include unvalidated device data.      Electronically Signed By: Yeimi Woodson MD, 2020, 3:20 PM

## 2020-03-27 ENCOUNTER — TELEPHONE (OUTPATIENT)
Dept: ONCOLOGY | Facility: CLINIC | Age: 75
End: 2020-03-27

## 2020-03-27 NOTE — TELEPHONE ENCOUNTER
POST-OP CALL  Mar 27, 2020    Sadia Rider is a 74 year old female s/p   1. Right axillary sentinel lymph node mapping and biopsy  2. Right wire-localized segmental mastectomy     Reports doing wonderful. No concerns    Fevers/chills: Patient denies fever/chills.  Incisions: Patient denies any signs and symptoms of infection. No erythema, swelling or drainage  Pain: Denies pain  Follow up appointment scheduled on 4/16 with Dr. Madison.  Patient will call with any questions or concerns.    Stacy Fragoso PA-C

## 2020-03-27 NOTE — ADDENDUM NOTE
Addendum  created 03/26/20 2111 by Issa Cruz MD    Attestation recorded in Intraprocedure, Intraprocedure Attestations filed

## 2020-04-02 LAB — COPATH REPORT: NORMAL

## 2020-04-16 ENCOUNTER — VIRTUAL VISIT (OUTPATIENT)
Dept: ONCOLOGY | Facility: CLINIC | Age: 75
End: 2020-04-16
Attending: SURGERY
Payer: COMMERCIAL

## 2020-04-16 DIAGNOSIS — Z17.0 MALIGNANT NEOPLASM OF UPPER-OUTER QUADRANT OF RIGHT BREAST IN FEMALE, ESTROGEN RECEPTOR POSITIVE (H): Primary | ICD-10-CM

## 2020-04-16 DIAGNOSIS — C50.411 MALIGNANT NEOPLASM OF UPPER-OUTER QUADRANT OF RIGHT BREAST IN FEMALE, ESTROGEN RECEPTOR POSITIVE (H): Primary | ICD-10-CM

## 2020-04-16 NOTE — PROGRESS NOTES
"Sadia Rider is a 75 year old female who is being evaluated via a billable telephone visit.      Please call patient on Home phone.     The patient has been notified of following:     \"This telephone visit will be conducted via a call between you and your physician/provider. We have found that certain health care needs can be provided without the need for a physical exam.  This service lets us provide the care you need with a short phone conversation.  If a prescription is necessary we can send it directly to your pharmacy.  If lab work is needed we can place an order for that and you can then stop by our lab to have the test done at a later time.    Telephone visits are billed at different rates depending on your insurance coverage. During this emergency period, for some insurers they may be billed the same as an in-person visit.  Please reach out to your insurance provider with any questions.    If during the course of the call the physician/provider feels a telephone visit is not appropriate, you will not be charged for this service.\"     Physician has received verbal consent for a Telephone Visit from the patient? Yes    How would you like to obtain your AVS? Senthil    Sadia Rider complains of    Chief Complaint   Patient presents with     Telephone     TELEPHONE VISIT; BREAST CANCER        Allergies reviewed: Yes  Medications reviewed: Yes    Medications: Medication refills not needed today.  Pharmacy name entered into [employer]: [facility] [id number] [place] [facility] [address] EMILIA Osborn CMA April 16, 2020  10:18 AM     ALLERGIES  Alphagan [brimonidine tartrate] and Brimonidine    Additional provider notes:    The above were completed by the medical assistant for the visit.    FOLLOW-UP/POST-OP   Apr 16, 2020    Sadia Rider is a 75 year old female who is phoning in for her 1st post-operative follow-up visit.    Cancer Staging  Malignant neoplasm of upper-outer quadrant of right " breast in female, estrogen receptor positive (H)  Staging form: Breast, AJCC 8th Edition  - Clinical: Stage IA (cT1c, cN0, cM0, G1, ER+, DC+, HER2-) - Signed by Grace Madison MD on 3/3/2020    Treatment to date:  1. Genetic testing (3/16/2020) - negative for deleterious mutations in: ELIZABET, BRCA1, BRCA2, CDH1, CHEK2, NBN, NF1, PALB2,   PTEN, STK11, TP53  2. Right wire-localized segmental mastectomy and axillary sentinel lymph node biopsy (3/26/2020)    HPI:    She underwent a right wire-localized segmental mastectomy and axillary SLNB on 3/26/2020.  She is currently 3 week(s) post-op.  Final surgical pathology showed a pT1bN0 invasive ductal carcinoma, grade 1, with 0/2 lymph nodes and uninvolved margins.    Since the procedure, she has been doing well. She denies any redness or swelling, or drainage from the incision, or fever/chills.  She has good range of motion and denies any upper extremity swelling.  She has been practicing social distancing.    INVESTIGATIONS:    Surgical Pathology (3/26/2020):  FINAL DIAGNOSIS:   A. SENTINEL LYMPH NODE, RIGHT AXILLARY #1, EXCISION:   - One lymph node, negative for malignancy (0/1).   B. SENTINEL LYMPH NODE, RIGHT AXILLARY #2, EXCISION:   - One lymph node, negative for malignancy (0/1).   C. RIGHT BREAST, WIRE LOCALIZED SEGMENTAL MASTECTOMY:   - INVASIVE MAMMARY CARCINOMA NO SPECIAL TYPE (NST/DUCTAL), [place] grade 1, measuring 0.9 cm in greatest dimension.   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 0.4 cm in linear extent.   - Surgical margins are negative for in-situ and invasive tumor; closest margin to both DCIS and invasive carcinoma is inferior at 5 mm; all other margins are more than 10 mm from tumor.   - Microcalcifications are associated with invasive carcinoma and occasionally with benign epithelium.   - Biopsy site and fibrocystic changes.   - The AJCC pathologic staging is pT1b N0(sn).   - See  synoptic report.   D. RIGHT BREAST, NEW INFERIOR MARGIN, EXCISION:   - Benign breast tissue with fibrocystic changes including microcysts and apocrine metaplasia.   - Negative for atypia or malignancy.     Assessment/Plan:  1. Malignant neoplasm of upper-outer quadrant of right breast in female, estrogen receptor positive (H      ASSESSMENT:    Sadia Rider is a 75 year old female with right breast cancer, s/p surgery.    She is healing well.  We reviewed the pathology today and a copy of the report was released on [employer]. No further surgery is indicated.  We reviewed the role of Oncotype DX in decision-making for adjuvant chemotherapy; this is pending.  A referral will be made to Medical Oncology to further discuss.  We discussed newer data suggest that in women >70, radiation may be omitted in breast conservation therapy in the setting of small, favourable primaries in the absence of lymphovascular invasion, jenna metastases, or concerning surgical margins. I have not recommended radiation therapy at this time as a result.  I will see her in 3 months' time.     All of the above was discussed and all questions were answered.    PLAN:  1. Oncotype pending  2. Medical oncology referral  3. Follow up with me in 3 months    Phone call duration: 14 minutes    Grace Madison MD MSc FRC FACS    Division of Surgical Oncology  [facility]

## 2020-04-22 ENCOUNTER — DOCUMENTATION ONLY (OUTPATIENT)
Dept: ONCOLOGY | Facility: CLINIC | Age: 75
End: 2020-04-22

## 2020-04-22 NOTE — TELEPHONE ENCOUNTER
Oncology/Surgical Oncology Referral Request:     Specialty Requested: Medical Oncology     Referring Provider: Dr Grace Madison MD    Referring Clinic/Organization: [facility]    Records location: [facility]     Requested Provider (if specified): Not Specified     RECORDS STATUS - BREAST    RECORDS REQUESTED FROM: [facility]   DATE REQUESTED: 5.6.2020   NOTES DETAILS STATUS   OFFICE NOTE from referring provider Internal 4.16.2020 Dr. Madison, [facility]  3.3.2020   OFFICE NOTE from medical oncologist Care Everywhere 3.6.2020 Dr. Sweet, [facility]   OFFICE NOTE from surgeon     OFFICE NOTE from radiation oncologist     DISCHARGE SUMMARY from hospital Internal 3.26.2020 [facility] Malignant neoplasm of upper-outer quadrant of right breast in female, estrogen receptor positive   DISCHARGE REPORT from the ER     OPERATIVE REPORT Internal 3.26.2020 [facility] Malignant neoplasm of upper-outer quadrant of right breast in female, estrogen receptor positive   MEDICATION LIST Internal    CLINICAL TRIAL TREATMENTS TO DATE     LABS     PATHOLOGY REPORTS  (Tissue diagnosis, Stage, ER/NE percentage positive and intensity of staining, HER2 IHC, FISH, and all biopsies from breast and any distant metastasis)                  3.26.2020 [id number] Excision (less than total mastectomy)     Specimen Laterality:         - Right   Tumor Size: 9 x 5 mm  2.27.20 Her 2 new fish   2.27.2020 right Breast needle biopsy, 5 cm, [id number]   GENONOMIC TESTING     TYPE:   (Next Generation Sequencing, including Foundation One testing, and Oncotype score)     IMAGING (NEED IMAGES & REPORT)     CT SCANS     MRI     MAMMO Internal 3.26.2020  2.27.2020  2.25.2020   2.19.2020  2.18.2020  More in Epic if needed   ULTRASOUND Internal 3.26.2020 Breast  2.27.20 breast biopsy core  2.25.2020   PET     BONE SCAN In process 1.4.2020 [facility]  3.12.18 [facility]  10.1.15 [facility]   BRAIN MRI       Action MJ 4.24.2020   Action Taken Requested bone scans from [facility].      Action MJ 4.28.2020 1:27 PM   Action Taken Requested images from [facility]

## 2020-04-22 NOTE — PROGRESS NOTES
Completed [employer] forms and requested records faxed to [phone].    Confirmation of transmission received.    Sadia Smallwood CMA ([facility])

## 2020-04-23 LAB — LAB SCANNED RESULT: NORMAL

## 2020-05-04 ENCOUNTER — TELEPHONE (OUTPATIENT)
Dept: ONCOLOGY | Facility: CLINIC | Age: 75
End: 2020-05-04

## 2020-05-04 NOTE — TELEPHONE ENCOUNTER
"Patient is currently scheduled for an appointment at [facility] in [place].  Called patient to review current visitor restrictions and complete COVID-19 Patient Infection/Travel Screening Tool.     Due to the recent public health concerns around COVID-19 and in an effort to keep our patients and staff safe and healthy, we are implementing a screening process for the patients that come to our clinic.      I am going to ask you a few questions, please answer yes or no.  Your honesty about any symptoms is critical, as it keeps patients and staff healthy.      Do you have a:  Fever (or reported chills)?  No  New cough (started within the past 14 days)?  No  New shortness of breath (started within the past 14 days)?  No  Rash?  No    In the last month, have you been in contact with someone who was confirmed or suspected to have Coronavirus/COVID-19?  No    Have you traveled internationally in the last month?  No  If so, where?  N/A     I also wanted to let you know that to protect our patients from the flu and other common illnesses, [facility] enforce visitor restrictions year round, but due to the community spread of COVID-19 in [place], we are taking additional precautionary steps to ensure the health of our patients.  At this time, NO visitors are allowed on our hospital and clinic campuses.     Patient PASSED the screening assessment.    Patient instructed to come to the clinic as planned for their scheduled appointment and to call the clinic if any symptoms develop prior to their appointment.    \"Per CDC Guidelines, we are asking all patients that are coming into the building to wear a cloth covering that covers your mouth and nose.  You will be screened again at the entrance to the clinic for any Covid 19 symptoms. If you screen positive to any Covid 19 symptoms during our screening process you will be provided a surgical mask to wear during your time in the " "building.\"    \"COVID-19 is contagious and can be dangerous for our patients and staff.  Please send us a [employer] message or call our clinic before coming in if you feel any of the following symptoms: fever, cough, congestion, runny nose, sore throat, muscle aches and pains, or shortness of breath.  If you are already at our clinic, it is very important that you be honest about any symptoms you are experiencing to ensure your safety and that of other patients and staff who treat you.  If you do have symptoms, we will have a nurse and/or provider asses you to determine next steps.\"    Amisha Mcclure, CMA on 5/4/2020 at 11:52 AM    "

## 2020-05-05 ENCOUNTER — TELEPHONE (OUTPATIENT)
Dept: ONCOLOGY | Facility: CLINIC | Age: 75
End: 2020-05-05

## 2020-05-05 ENCOUNTER — RECORDS - HEALTHEAST (OUTPATIENT)
Dept: ADMINISTRATIVE | Facility: OTHER | Age: 75
End: 2020-05-05

## 2020-05-05 ENCOUNTER — ONCOLOGY VISIT (OUTPATIENT)
Dept: ONCOLOGY | Facility: CLINIC | Age: 75
End: 2020-05-05
Attending: SURGERY
Payer: COMMERCIAL

## 2020-05-05 VITALS
HEART RATE: 59 BPM | WEIGHT: 176.2 LBS | RESPIRATION RATE: 16 BRPM | DIASTOLIC BLOOD PRESSURE: 76 MMHG | HEIGHT: 65 IN | BODY MASS INDEX: 29.36 KG/M2 | OXYGEN SATURATION: 100 % | SYSTOLIC BLOOD PRESSURE: 149 MMHG | TEMPERATURE: 97.8 F

## 2020-05-05 DIAGNOSIS — E80.6 CONJUGATED HYPERBILIRUBINEMIA: ICD-10-CM

## 2020-05-05 DIAGNOSIS — Z17.0 MALIGNANT NEOPLASM OF UPPER-OUTER QUADRANT OF RIGHT BREAST IN FEMALE, ESTROGEN RECEPTOR POSITIVE (H): ICD-10-CM

## 2020-05-05 DIAGNOSIS — C50.411 MALIGNANT NEOPLASM OF UPPER-OUTER QUADRANT OF RIGHT BREAST IN FEMALE, ESTROGEN RECEPTOR POSITIVE (H): ICD-10-CM

## 2020-05-05 PROCEDURE — G0463 HOSPITAL OUTPT CLINIC VISIT: HCPCS

## 2020-05-05 PROCEDURE — 99215 OFFICE O/P EST HI 40 MIN: CPT | Performed by: INTERNAL MEDICINE

## 2020-05-05 RX ORDER — ANASTROZOLE 1 MG/1
1 TABLET ORAL DAILY
Qty: 90 TABLET | Refills: 3 | Status: SHIPPED | OUTPATIENT
Start: 2020-05-05 | End: 2021-03-22

## 2020-05-05 ASSESSMENT — PAIN SCALES - GENERAL
PAINLEVEL: NO PAIN (0)
PAINLEVEL: NO PAIN (0)

## 2020-05-05 ASSESSMENT — MIFFLIN-ST. JEOR: SCORE: 1287.18

## 2020-05-05 NOTE — PROGRESS NOTES
"Oncology Rooming Note    May 5, 2020 8:57 AM   Sadia Rider is a 75 year old female who presents for:    Chief Complaint   Patient presents with     Oncology Clinic Visit     Malignant neoplasm of upper-outer quadrant of right breast in female, estrogen receptor positive (H)     Initial Vitals: BP (!) 156/71 (BP Location: Left arm, Patient Position: Sitting, Cuff Size: Adult Regular)   Pulse 59   Temp 97.8  F (36.6  C) (Oral)   Resp 16   Ht 1.638 m (5' 4.5\")   Wt 79.9 kg (176 lb 3.2 oz)   SpO2 100%   BMI 29.78 kg/m   Estimated body mass index is 29.78 kg/m  as calculated from the following:    Height as of this encounter: 1.638 m (5' 4.5\").    Weight as of this encounter: 79.9 kg (176 lb 3.2 oz). Body surface area is 1.91 meters squared.  No Pain (0) Comment: Data Unavailable   No LMP recorded. Patient is postmenopausal.  Allergies reviewed: Yes  Medications reviewed: Yes    Medications: Medication refills not needed today.  Pharm[facility] entered into [facility] [id number] [place] [facility] [address]    Clinical concerns: no           Renea Holley CMA            "

## 2020-05-05 NOTE — TELEPHONE ENCOUNTER
Left voicemail message for patient requesting a return call regarding scheduling lab and ultrasound due in 3 months at [facility].

## 2020-05-05 NOTE — LETTER
"    5/5/2020         RE: Sadia Rider  [address] [facility] [address]        Dear Colleague,    Thank you for referring your patient, Sadia Rider, to the [facility]. Please see a copy of my visit note below.    Oncology Rooming Note    May 5, 2020 8:57 AM   Sadia Rider is a 75 year old female who presents for:    Chief Complaint   Patient presents with     Oncology Clinic Visit     Malignant neoplasm of upper-outer quadrant of right breast in female, estrogen receptor positive (H)     Initial Vitals: BP (!) 156/71 (BP Location: Left arm, Patient Position: Sitting, Cuff Size: Adult Regular)   Pulse 59   Temp 97.8  F (36.6  C) (Oral)   Resp 16   Ht 1.638 m (5' 4.5\")   Wt 79.9 kg (176 lb 3.2 oz)   SpO2 100%   BMI 29.78 kg/m   Estimated body mass index is 29.78 kg/m  as calculated from the following:    Height as of this encounter: 1.638 m (5' 4.5\").    Weight as of this encounter: 79.9 kg (176 lb 3.2 oz). Body surface area is 1.91 meters squared.  No Pain (0) Comment: Data Unavailable   No LMP recorded. Patient is postmenopausal.  Allergies reviewed: Yes  Medications reviewed: Yes    Medications: Medication refills not needed today.  Pharmacy name entered into [employer]: [id number] [place] [facility], [place] - [address]    Clinical concerns: no           Renea Holley, [facility]              Visit Date:   05/05/2020      Sadia Rider is a 75-year-old patient who has been referred today by Dr. Madison for a new diagnosis of a right-sided breast cancer.  She is here for Medical Oncology.      HISTORY OF PRESENTING COMPLAINT:  Sadia was in her normal state of health and went for routine screening mammogram on 02/19/2020.  The density showed scattered fibroglandular densities and there was a possible right breast focal asymmetry with architectural distortion and she was called back for diagnostic mammogram and ultrasound.  I have reviewed all of her imaging today.  The " diagnostic mammogram showed a 5 mm mass at the 12 o'clock position of the right breast.  Ultrasound showed this to be an irregular mass with spiculated margins and also there was thought to be some adjacent satellite lesions.  The right axilla was negative.  A biopsy was recommended.  The patient was noted to have a biopsy on 02/27/2020 at the 12 o'clock position of the right breast.  This showed an invasive ductal carcinoma, grade 1, with DCIS.  Tumor was estrogen and progesterone receptor strongly positive, HER-2 receptor was negative by FISH.  The patient did have genetic a panel done which showed no pathological variant.  She then elected to have a right-sided lumpectomy done.  This was done on 03/26/2020.  I have reviewed that pathology also.  Pathology revealed invasive ductal carcinoma, grade 1, measuring 0.9 cm in greatest dimension.  There was associated DCIS measuring 0.4 cm.  All the surgical margins are negative for both the invasive and the situ tumor.  She had 2 sentinel lymph nodes taken, which were both negative.      Final stage of disease was a T1b N0 M0 infiltrating ductal carcinoma of the right breast, central portion, ER/WV strongly positive, HER-2 receptor negative.  The patient then had an Oncotype sent out which came back with a recurrence score of 13.  She is here today for advice regarding further therapy.        Her DEXA scan has been reviewed by me today.      PAST MEDICAL HISTORY:   1.  New diagnosis of right-sided breast cancer.   2.  History of glaucoma.   3.  History of mild osteopenia.        PAST SURGICAL HISTORY:  History of right-sided lumpectomy, history of multiple eye surgeries, history of gallbladder surgery, history of basal cell carcinomas removed from the face.      SOCIAL HISTORY:  The patient is .  She has got one 51-year-old daughter.  Her daughter just recently had a mammogram done, which was negative.  She is a nonsmoker, drinks alcohol very rarely.       MENOPAUSAL HISTORY:  She went into menopause in her 50s and was on estrogen replacement for less than 10 years.      FAMILY HISTORY:  Negative for breast cancer.  She has 1 brother who is 77.  There is not a lot of women in the family.        GENETIC TESTING:  The patient had genetic testing done which was negative.      PAIN ASSESSMENT:  The patient denies any pain today.      MEDICATIONS AND ALLERGIES:  Outlined in the nursing records.      REVIEW OF SYSTEMS:  A 14-point comprehensive review of systems has been done with her today.  She denies any respiratory, cardiovascular, musculoskeletal, neurological, gastrointestinal or genitourinary symptoms that are worrisome.  Oncotype score equals 13 in the low risk of recurrence range.      PHYSICAL EXAMINATION:   GENERAL:  She is well-appearing lady in no acute distress.   VITAL SIGNS:  Blood pressure 149/76, pulse 59, respiratory rate 16, weight is 176 pounds.   GENERAL:  She is alert and oriented x 3.  She is slightly anxious.   NECK:  Has no masses or goiter.   NECK:  There is no cervical, supraclavicular or infraclavicular adenopathy.   CHEST:  Clear to auscultation and percussion bilaterally.   HEART:  Sounds 1, 2 normal.  No added sounds or murmurs.   BREASTS:  Right breast, no palpable masses.  She is status post sided lumpectomy, the scar looks good.  She also has a right axillary scar.  Both scars look well-healed.  No further palpable masses in the right breast.  Left breast normal, no palpable masses.  Left axilla:  Negative.   GASTROINTESTINAL:  Abdomen is soft and nontender, no hepatosplenomegaly.   EXTREMITIES:  Legs are without tenderness or edema.   NEUROLOGIC:  Peripheral neurological exam grossly intact.  Normal power, sensation, reflexes throughout.   SKIN:  Has no rashes or lesions.   LYMPHATIC:  No evidence of lymphedema.      IMPRESSION:  This is a 75-year-old patient.  She has a diagnosis of a right-sided breast cancer.  Final pathological  stage is a T1b N0 invasive ductal carcinoma, grade 1, with 0 of 2 lymph nodes, all of her margins are negative, ER/MA strongly positive, HER-2 receptor negative.  She is healing up well from her surgery.  We have discussed the pathology in detail today.  We have also discussed the Oncotype.  The Oncotype came back with a recurrence score of 13 in the low risk of recurrence range.  She has already discussed with Dr. Madison that women over 70 radiation may be omitted in the setting of a small favorable primaries so, she has decided against any adjuvant radiation to complete out her local control.  With regard to systemic treatment with that Oncotype score and the favorable pathology, I have not recommended any chemotherapy at this time.  I do think she would be a good candidate for adjuvant hormonal treatment.  My main discussion has been directed towards that today.  I have discussed both tamoxifen and Arimidex as options.  I have reviewed her bone density and she has some mild osteopenia, but she is on calcium and vitamin D as well as Zoledronate on a weekly basis.  For that reason, I think she would be a fairly good candidate to start on an aromatase inhibitor and keep an eye on her bone density.  I have discussed aromatase inhibitors at length.  We discussed the risks, benefits and side effects.  She understands and is willing to proceed.  I will start her on Arimidex in the next couple of weeks.  The patient has several questions about the Arimidex, which I have answered to the best of my ability.      On reviewing her records, the patient has brought up to me today that she has had an elevated bilirubin for a number of years.  I have looked back to 2011 and she has had hyperbilirubinemia.  She did have her gallbladder removed in 2016 and her liver function tests did elevate around that time, but then they have normalized except for a very slight hyperbilirubinemia.  I have discussed with her that this is probably  not of any clinical significance.  She was due to get a liver ultrasound and repeat liver function tests but because of public health emergency this has not happened.  I have discussed with her today that this is not an emergency at this time, but in 3 months, we will write for a repeat liver function tests and a liver ultrasound to make sure there is nothing sinister which I doubt.  The patient is in agreement with that plan.  Her last blood work was done at [facility] on 2020.  Her full blood count was normal and her chemistry panel and hepatic panel was normal except for slightly elevated bilirubin of 1.3.  I have not repeated labs on her today.  At the end of our discussion today, I have started her on an aromatase inhibitor to decrease her systemic risk of recurrence.  I have discussed the risks, benefits and side effects.  I have answered all of her questions today.  She has had a long-term issue with hyperbilirubinemia.  In 3 months, we can recheck her liver function tests and get a liver ultrasound.  I suspect this is of no clinical consequence since this has been present since .  If we are still in the global pandemic I have asked her to hold on that.  I will see her back in my clinic in 4 months for assessment of toxicity with the Arimidex.  All of the above has been discussed with the patient at length today.      Consultation time has been 1 hour 20 minutes.  The majority of time was spent on counseling and direction of patient care.      Thank you for allowing me to participate in her care.         CUONG DOSHI MD             D: 2020   T: 2020   MT: JOSÉ      Name:     CHANEL GASTON   MRN:      [id number]        Account:      [id number]   :      1945           Visit Date:   2020      Document: [id number]       cc: Grace Madison MD      Again, thank you for allowing me to participate in the care of your patient.        Sincerely,        Cuong Doshi  MD

## 2020-05-06 ENCOUNTER — PRE VISIT (OUTPATIENT)
Dept: ONCOLOGY | Facility: CLINIC | Age: 75
End: 2020-05-06

## 2020-05-06 NOTE — PROGRESS NOTES
Visit Date:   05/05/2020      Sadia Rider is a 75-year-old patient who has been referred today by Dr. Madison for a new diagnosis of a right-sided breast cancer.  She is here for Medical Oncology.      HISTORY OF PRESENTING COMPLAINT:  Sadia was in her normal state of health and went for routine screening mammogram on 02/19/2020.  The density showed scattered fibroglandular densities and there was a possible right breast focal asymmetry with architectural distortion and she was called back for diagnostic mammogram and ultrasound.  I have reviewed all of her imaging today.  The diagnostic mammogram showed a 5 mm mass at the 12 o'clock position of the right breast.  Ultrasound showed this to be an irregular mass with spiculated margins and also there was thought to be some adjacent satellite lesions.  The right axilla was negative.  A biopsy was recommended.  The patient was noted to have a biopsy on 02/27/2020 at the 12 o'clock position of the right breast.  This showed an invasive ductal carcinoma, grade 1, with DCIS.  Tumor was estrogen and progesterone receptor strongly positive, HER-2 receptor was negative by FISH.  The patient did have genetic a panel done which showed no pathological variant.  She then elected to have a right-sided lumpectomy done.  This was done on 03/26/2020.  I have reviewed that pathology also.  Pathology revealed invasive ductal carcinoma, grade 1, measuring 0.9 cm in greatest dimension.  There was associated DCIS measuring 0.4 cm.  All the surgical margins are negative for both the invasive and the situ tumor.  She had 2 sentinel lymph nodes taken, which were both negative.      Final stage of disease was a T1b N0 M0 infiltrating ductal carcinoma of the right breast, central portion, ER/SD strongly positive, HER-2 receptor negative.  The patient then had an Oncotype sent out which came back with a recurrence score of 13.  She is here today for advice regarding further therapy.        Her  DEXA scan has been reviewed by me today.      PAST MEDICAL HISTORY:   1.  New diagnosis of right-sided breast cancer.   2.  History of glaucoma.   3.  History of mild osteopenia.        PAST SURGICAL HISTORY:  History of right-sided lumpectomy, history of multiple eye surgeries, history of gallbladder surgery, history of basal cell carcinomas removed from the face.      SOCIAL HISTORY:  The patient is .  She has got one 51-year-old daughter.  Her daughter just recently had a mammogram done, which was negative.  She is a nonsmoker, drinks alcohol very rarely.      MENOPAUSAL HISTORY:  She went into menopause in her 50s and was on estrogen replacement for less than 10 years.      FAMILY HISTORY:  Negative for breast cancer.  She has 1 brother who is 77.  There is not a lot of women in the family.        GENETIC TESTING:  The patient had genetic testing done which was negative.      PAIN ASSESSMENT:  The patient denies any pain today.      MEDICATIONS AND ALLERGIES:  Outlined in the nursing records.      REVIEW OF SYSTEMS:  A 14-point comprehensive review of systems has been done with her today.  She denies any respiratory, cardiovascular, musculoskeletal, neurological, gastrointestinal or genitourinary symptoms that are worrisome.  Oncotype score equals 13 in the low risk of recurrence range.      PHYSICAL EXAMINATION:   GENERAL:  She is well-appearing lady in no acute distress.   VITAL SIGNS:  Blood pressure 149/76, pulse 59, respiratory rate 16, weight is 176 pounds.   GENERAL:  She is alert and oriented x 3.  She is slightly anxious.   NECK:  Has no masses or goiter.   NECK:  There is no cervical, supraclavicular or infraclavicular adenopathy.   CHEST:  Clear to auscultation and percussion bilaterally.   HEART:  Sounds 1, 2 normal.  No added sounds or murmurs.   BREASTS:  Right breast, no palpable masses.  She is status post sided lumpectomy, the scar looks good.  She also has a right axillary scar.  Both  scars look well-healed.  No further palpable masses in the right breast.  Left breast normal, no palpable masses.  Left axilla:  Negative.   GASTROINTESTINAL:  Abdomen is soft and nontender, no hepatosplenomegaly.   EXTREMITIES:  Legs are without tenderness or edema.   NEUROLOGIC:  Peripheral neurological exam grossly intact.  Normal power, sensation, reflexes throughout.   SKIN:  Has no rashes or lesions.   LYMPHATIC:  No evidence of lymphedema.      IMPRESSION:  This is a 75-year-old patient.  She has a diagnosis of a right-sided breast cancer.  Final pathological stage is a T1b N0 invasive ductal carcinoma, grade 1, with 0 of 2 lymph nodes, all of her margins are negative, ER/IN strongly positive, HER-2 receptor negative.  She is healing up well from her surgery.  We have discussed the pathology in detail today.  We have also discussed the Oncotype.  The Oncotype came back with a recurrence score of 13 in the low risk of recurrence range.  She has already discussed with Dr. Madison that women over 70 radiation may be omitted in the setting of a small favorable primaries so, she has decided against any adjuvant radiation to complete out her local control.  With regard to systemic treatment with that Oncotype score and the favorable pathology, I have not recommended any chemotherapy at this time.  I do think she would be a good candidate for adjuvant hormonal treatment.  My main discussion has been directed towards that today.  I have discussed both tamoxifen and Arimidex as options.  I have reviewed her bone density and she has some mild osteopenia, but she is on calcium and vitamin D as well as Zoledronate on a weekly basis.  For that reason, I think she would be a fairly good candidate to start on an aromatase inhibitor and keep an eye on her bone density.  I have discussed aromatase inhibitors at length.  We discussed the risks, benefits and side effects.  She understands and is willing to proceed.  I will start  her on Arimidex in the next couple of weeks.  The patient has several questions about the Arimidex, which I have answered to the best of my ability.      On reviewing her records, the patient has brought up to me today that she has had an elevated bilirubin for a number of years.  I have looked back to 2011 and she has had hyperbilirubinemia.  She did have her gallbladder removed in 2016 and her liver function tests did elevate around that time, but then they have normalized except for a very slight hyperbilirubinemia.  I have discussed with her that this is probably not of any clinical significance.  She was due to get a liver ultrasound and repeat liver function tests but because of public health emergency this has not happened.  I have discussed with her today that this is not an emergency at this time, but in 3 months, we will write for a repeat liver function tests and a liver ultrasound to make sure there is nothing sinister which I doubt.  The patient is in agreement with that plan.  Her last blood work was done at [facility] on 03/06/2020.  Her full blood count was normal and her chemistry panel and hepatic panel was normal except for slightly elevated bilirubin of 1.3.  I have not repeated labs on her today.  At the end of our discussion today, I have started her on an aromatase inhibitor to decrease her systemic risk of recurrence.  I have discussed the risks, benefits and side effects.  I have answered all of her questions today.  She has had a long-term issue with hyperbilirubinemia.  In 3 months, we can recheck her liver function tests and get a liver ultrasound.  I suspect this is of no clinical consequence since this has been present since 2011.  If we are still in the global pandemic I have asked her to hold on that.  I will see her back in my clinic in 4 months for assessment of toxicity with the Arimidex.  All of the above has been discussed with the patient at length today.      Consultation time  has been 1 hour 20 minutes.  The majority of time was spent on counseling and direction of patient care.      Thank you for allowing me to participate in her care.         CUONG DOSHI MD             D: 2020   T: 2020   MT: JOSÉ      Name:     CHANEL GASTON   MRN:      [id number]        Account:      [id number]   :      1945           Visit Date:   2020      Document: [id number]       cc: Grace Madison MD

## 2020-05-21 LAB — COPATH REPORT: NORMAL

## 2020-06-25 ENCOUNTER — TELEPHONE (OUTPATIENT)
Dept: ONCOLOGY | Facility: CLINIC | Age: 75
End: 2020-06-25

## 2020-06-25 NOTE — TELEPHONE ENCOUNTER
Pt called this afternoon, she has questions for Dr. Harper, she can be reached at [phone].  Donya Harris, CMA

## 2020-06-25 NOTE — TELEPHONE ENCOUNTER
Talked with pt. Pt states that she has a dental appt for cleaning on 7/8/2020. Her last cleaning was in November 2019. Pt states that she is not having any new symptoms or concerns with her teeth.  Pt is wondering if it is OK for her to go to the dentist based on her history of cancer?  Pt did not receive any chemo or radiation. She is currently taking arimidex.  Advised pt that based on her treatment for cancer, she would not be considered to be high risk. However, her age does put her at higher risk.  Pt is wondering if Dr Harper would recommend delaying her dental cleaning?  Will check with Dr Harper for advice.  Ok to leave a message if unable to reach pt.  Zakiya Chong, RN, BSN, OCN, CBCN

## 2020-06-26 NOTE — TELEPHONE ENCOUNTER
"Per Dr Harper:    \"Would hold off a few more months\"    Pt has been notified with recommendations, and pt agrees with plan.  Pt will plan to discuss this further with Dr Harper at her appt in August.  Patient verbalized understanding and agreement with plan.  Pt was instructed to call the clinic with any questions, concerns, or worsening symptoms.   Zakiya Chong RN, BSN, OCN, CBCN        "

## 2020-07-22 NOTE — PROGRESS NOTES
"Sadia Rider is a 75 year old female who is being evaluated via a billable telephone visit.      The patient has been notified of following:     \"This telephone visit will be conducted via a call between you and your physician/provider. We have found that certain health care needs can be provided without the need for a physical exam.  This service lets us provide the care you need with a short phone conversation.  If a prescription is necessary we can send it directly to your pharmacy.  If lab work is needed we can place an order for that and you can then stop by our lab to have the test done at a later time.    Telephone visits are billed at different rates depending on your insurance coverage. During this emergency period, for some insurers they may be billed the same as an in-person visit.  Please reach out to your insurance provider with any questions.    If during the course of the call the physician/provider feels a telephone visit is not appropriate, you will not be charged for this service.\"    Patient has given verbal consent for Telephone visit?  Yes    What phone number would you like to be contacted at? [phone].    How would you like to obtain your AVS? MyChart    I have reviewed and updated the patient's allergies and medication list.    Concerns: No new concerns.   Refills: None needed.     Vitals - Patient Reported  Weight (Patient Reported): 78 kg (172 lb)  Pain Score: No Pain (0)      Lanette Reyes CMA    ---  The above were completed by the medical assistant for the visit.    FOLLOW-UP  Jul 23, 2020    Sadia Rider is a 75 year old female who is phoning in for follow-up for .    Cancer Staging  Malignant neoplasm of upper-outer quadrant of right breast in female, estrogen receptor positive (H)  Staging form: Breast, AJCC 8th Edition  - Clinical: Stage IA (cT1c, cN0, cM0, G1, ER+, WV+, HER2-) - Signed by Grace Madison MD on 3/3/2020    Treatment to date:  1. Genetic testing " (3/16/2020) - negative for pathogenic variants in: ELIZABET, BRCA1, BRCA2, CDH1, CHEK2, NBN, NF1, PALB2,   PTEN, STK11, TP53  2. Right wire-localized segmental mastectomy and axillary sentinel lymph node biopsy (3/26/2020)  3. Oncotype DX 13  4. Adjuvant arimidex (5/5/2020 to ongoing)    HPI:    Since her last visit, she started arimidex per Dr Blanca.  She is tolerating it reasonably well.  She noted no masses in either breast, axilla, or neck. She denies any nipple discharge or nipple inversion.  She thinks there may be some swelling under her arm.  It does not bother her. No arm swelling. Good range of motion of the arm.    INVESTIGATIONS:  None    Assessment/Plan:  Diagnoses       Codes Comments    Malignant neoplasm of upper-outer quadrant of right breast in female, estrogen receptor positive (H)    -  Primary C50.411, Z17.0          ASSESSMENT:  Sadia Rider is a 75 year old female with right breast cancer, 4 months s/p surgery.    We discussed that typically at a breast cancer follow up appointment, a physical exam is performed. Given that she has no concerns and we are minimizing face-to-face visits during the COVID-19 pandemic, the physical exam is deferred today.  I will see her in follow up in 3 months.  Her next mammogram will be due February 2021. We reviewed concerning signs and symptoms for recurrence and she knows to call to be seen if she develops any of them.    All of the above was discussed and all questions were answered.    PLAN:  1. Next mammogram due February 2021  2. Follow up with me in 3 months    Grace Madison MD MSc [facility] FACS    Division of Surgical Oncology  [facility]     Phone call duration: 6 minutes

## 2020-07-23 ENCOUNTER — RECORDS - HEALTHEAST (OUTPATIENT)
Dept: ADMINISTRATIVE | Facility: OTHER | Age: 75
End: 2020-07-23

## 2020-07-23 ENCOUNTER — OFFICE VISIT (OUTPATIENT)
Dept: OPHTHALMOLOGY | Facility: CLINIC | Age: 75
End: 2020-07-23
Attending: OPHTHALMOLOGY
Payer: COMMERCIAL

## 2020-07-23 ENCOUNTER — VIRTUAL VISIT (OUTPATIENT)
Dept: ONCOLOGY | Facility: CLINIC | Age: 75
End: 2020-07-23
Attending: SURGERY
Payer: COMMERCIAL

## 2020-07-23 DIAGNOSIS — C50.411 MALIGNANT NEOPLASM OF UPPER-OUTER QUADRANT OF RIGHT BREAST IN FEMALE, ESTROGEN RECEPTOR POSITIVE (H): Primary | ICD-10-CM

## 2020-07-23 DIAGNOSIS — Z17.0 MALIGNANT NEOPLASM OF UPPER-OUTER QUADRANT OF RIGHT BREAST IN FEMALE, ESTROGEN RECEPTOR POSITIVE (H): Primary | ICD-10-CM

## 2020-07-23 DIAGNOSIS — Z12.31 ENCOUNTER FOR SCREENING MAMMOGRAM FOR MALIGNANT NEOPLASM OF BREAST: ICD-10-CM

## 2020-07-23 DIAGNOSIS — Q15.0 JUVENILE GLAUCOMA: Primary | ICD-10-CM

## 2020-07-23 PROCEDURE — 40001009 ZZH VIDEO/TELEPHONE VISIT; NO CHARGE

## 2020-07-23 PROCEDURE — 92015 DETERMINE REFRACTIVE STATE: CPT | Mod: ZF

## 2020-07-23 PROCEDURE — G0463 HOSPITAL OUTPT CLINIC VISIT: HCPCS | Mod: 25

## 2020-07-23 ASSESSMENT — CONF VISUAL FIELD
OS_SUPERIOR_NASAL_RESTRICTION: 2
OS_INFERIOR_NASAL_RESTRICTION: 2
OS_SUPERIOR_TEMPORAL_RESTRICTION: 2
OD_NORMAL: 1
OS_INFERIOR_TEMPORAL_RESTRICTION: 2

## 2020-07-23 ASSESSMENT — CUP TO DISC RATIO
OS_RATIO: 0.6
OD_RATIO: 0.6

## 2020-07-23 ASSESSMENT — VISUAL ACUITY
OD_CC+: +2
OS_CC: 20/200 ECC FIX
METHOD: SNELLEN - LINEAR
CORRECTION_TYPE: GLASSES
OD_CC: 20/50 SLOW

## 2020-07-23 ASSESSMENT — REFRACTION_MANIFEST
OD_AXIS: 105
OS_ADD: +2.75
OS_CYLINDER: SPHERE
OD_CYLINDER: +1.75
OD_SPHERE: -4.50
OS_SPHERE: -0.50
OD_ADD: +2.75

## 2020-07-23 ASSESSMENT — REFRACTION_WEARINGRX
OD_SPHERE: -4.50
OS_SPHERE: -0.50
OD_AXIS: 120
OD_CYLINDER: +1.75
OS_CYLINDER: SPHERE

## 2020-07-23 ASSESSMENT — TONOMETRY
IOP_METHOD: APPLANATION
OS_IOP_MMHG: 03
OD_IOP_MMHG: 06

## 2020-07-23 NOTE — PROGRESS NOTES
1)POAG/JOAG -- s/p ALT OU in 2009, s/p Trab OS in 2004 by Dr. Mathis, s/p Trab OD in 1990 by Dr. Muñoz, s/p Trab OU at 27 yo-- K pachy:    Tmax:     HVF: OD:Inf arcuate with paracentral dec sens and OS:Sup arcuate with paracentral dec sens affecting central fixation     CDR:     HRT/OCT:  OD:Severe RNFL thinning and OS:Mod RNFL thinning     FHX of Glc: daughter, mother, brother, great-grandma -- all on gtts, surgery and mother lost vision     Gonio:       Intolerant to: AGN - contact dermatitis      Asthma/COPD: No  Steroid Use: No    Kidney Stones: No    Sulfa Allergy:      IOP targets:  2)PCIOL OU -- baseline VA OD:20/40 and OS:20/300 in 2014 -- pt reports vision OS since 27 yo  3)H/O Hypotony -- IOPs in single digits since 2014 -- no maculopathy on OCT in 2019    Patient will return to clinic in 4-6 months with corneal pachymetry, repeat visual field test (OU:LVC), dilated eye exam and disc photos.      Attending Physician Attestation:  Complete documentation of historical and exam elements from today's encounter can be found in the full encounter summary report (not reduplicated in this progress note). I personally obtained the chief complaint(s) and history of present illness.  I confirmed and edited as necessary the review of systems, past medical/surgical history, family history, social history, and examination findings as documented by others; and I examined the patient myself. I personally reviewed the relevant tests, images, and reports as documented above. I formulated and edited as necessary the assessment and plan and discussed the findings and management plan with the patient and family.  - Ceci Tom MD

## 2020-07-23 NOTE — LETTER
7/23/2020     RE: Sadia Rider  [address] [facility] [address]    Dear Colleague,    Thank you for referring your patient, Sadia Rider, to the [facility]. Please see a copy of my visit note below.    Sadia Rider is a 75 year old female who is being evaluated via a billable telephone visit.      I have reviewed and updated the patient's allergies and medication list.    Concerns: No new concerns.   Refills: None needed.     Vitals - Patient Reported  Weight (Patient Reported): 78 kg (172 lb)  Pain Score: No Pain (0)      Lanette Reyes CMA    ---  The above were completed by the medical assistant for the visit.    FOLLOW-UP  Jul 23, 2020    Sadia Rider is a 75 year old female who is phoning in for follow-up for .    Cancer Staging  Malignant neoplasm of upper-outer quadrant of right breast in female, estrogen receptor positive (H)  Staging form: Breast, AJCC 8th Edition  - Clinical: Stage IA (cT1c, cN0, cM0, G1, ER+, ND+, HER2-) - Signed by Grace Madison MD on 3/3/2020    Treatment to date:  1. Genetic testing (3/16/2020) - negative for pathogenic variants in: ELIZABET, BRCA1, BRCA2, CDH1, CHEK2, NBN, NF1, PALB2,   PTEN, STK11, TP53  2. Right wire-localized segmental mastectomy and axillary sentinel lymph node biopsy (3/26/2020)  3. Oncotype DX 13  4. Adjuvant arimidex (5/5/2020 to ongoing)    HPI:    Since her last visit, she started arimidex per Dr Blanca.  She is tolerating it reasonably well.  She noted no masses in either breast, axilla, or neck. She denies any nipple discharge or nipple inversion.  She thinks there may be some swelling under her arm.  It does not bother her. No arm swelling. Good range of motion of the arm.    INVESTIGATIONS:  None    Assessment/Plan:  Diagnoses       Codes Comments    Malignant neoplasm of upper-outer quadrant of right breast in female, estrogen receptor positive (H)    -  Primary C50.411, Z17.0          ASSESSMENT:  Sadia FAYE  Tereso is a 75 year old female with right breast cancer, 4 months s/p surgery.    We discussed that typically at a breast cancer follow up appointment, a physical exam is performed. Given that she has no concerns and we are minimizing face-to-face visits during the COVID-19 pandemic, the physical exam is deferred today.  I will see her in follow up in 3 months.  Her next mammogram will be due February 2021. We reviewed concerning signs and symptoms for recurrence and she knows to call to be seen if she develops any of them.    All of the above was discussed and all questions were answered.    PLAN:  1. Next mammogram due February 2021  2. Follow up with me in 3 months    Grace Madison MD MSc FRCSC FACS    Division of Surgical Oncology  [facility]     Phone call duration: 6 minutes

## 2020-07-23 NOTE — PATIENT INSTRUCTIONS
Patient will return to clinic in 4-6 months with corneal pachymetry, repeat visual field test (OU:LVC), dilated eye exam and disc photos.

## 2020-07-24 ENCOUNTER — COMMUNICATION - HEALTHEAST (OUTPATIENT)
Dept: FAMILY MEDICINE | Facility: CLINIC | Age: 75
End: 2020-07-24

## 2020-07-24 DIAGNOSIS — E55.9 VITAMIN D DEFICIENCY: ICD-10-CM

## 2020-07-24 DIAGNOSIS — E78.2 MIXED HYPERLIPIDEMIA: ICD-10-CM

## 2020-07-30 ENCOUNTER — ANCILLARY PROCEDURE (OUTPATIENT)
Dept: ULTRASOUND IMAGING | Facility: CLINIC | Age: 75
End: 2020-07-30
Attending: INTERNAL MEDICINE
Payer: COMMERCIAL

## 2020-07-30 DIAGNOSIS — C50.411 MALIGNANT NEOPLASM OF UPPER-OUTER QUADRANT OF RIGHT BREAST IN FEMALE, ESTROGEN RECEPTOR POSITIVE (H): ICD-10-CM

## 2020-07-30 DIAGNOSIS — Z17.0 MALIGNANT NEOPLASM OF UPPER-OUTER QUADRANT OF RIGHT BREAST IN FEMALE, ESTROGEN RECEPTOR POSITIVE (H): ICD-10-CM

## 2020-07-30 LAB
ALBUMIN SERPL-MCNC: 3.9 G/DL (ref 3.4–5)
ALP SERPL-CCNC: 52 U/L (ref 40–150)
ALT SERPL W P-5'-P-CCNC: 28 U/L (ref 0–50)
AST SERPL W P-5'-P-CCNC: 19 U/L (ref 0–45)
BILIRUB DIRECT SERPL-MCNC: 0.2 MG/DL (ref 0–0.2)
BILIRUB SERPL-MCNC: 1.1 MG/DL (ref 0.2–1.3)
PROT SERPL-MCNC: 7.5 G/DL (ref 6.8–8.8)

## 2020-08-05 ENCOUNTER — COMMUNICATION - HEALTHEAST (OUTPATIENT)
Dept: FAMILY MEDICINE | Facility: CLINIC | Age: 75
End: 2020-08-05

## 2020-08-05 DIAGNOSIS — E78.2 MIXED HYPERLIPIDEMIA: ICD-10-CM

## 2020-08-05 DIAGNOSIS — E55.9 VITAMIN D DEFICIENCY: ICD-10-CM

## 2020-08-05 LAB
CHOLEST SERPL-MCNC: 141 MG/DL
HDLC SERPL-MCNC: 42 MG/DL
LDLC SERPL CALC-MCNC: 67 MG/DL
NONHDLC SERPL-MCNC: 99 MG/DL
TRIGL SERPL-MCNC: 164 MG/DL

## 2020-08-06 LAB — DEPRECATED CALCIDIOL+CALCIFEROL SERPL-MC: 48 UG/L (ref 20–75)

## 2020-08-07 ENCOUNTER — NURSE TRIAGE (OUTPATIENT)
Dept: NURSING | Facility: CLINIC | Age: 75
End: 2020-08-07

## 2020-08-07 NOTE — TELEPHONE ENCOUNTER
Sadia calls through neurology line today.  Over the last several months, since March, has had 5-6 times of a couple of seconds of feeling lightheaded/dizzy.    Also during this same time frame has had 5-6 times of a couple of seconds of feeling of squeezing at the top of her head, coming down the right side of head.  This does not occur at same time as dizzy/lightheaded feeling.  Denies vision, hearing changes, headaches, loss of balance, falls, slurred speech, facial drooping, changes in ability to walk, move extremities as usual.  Advised Sadia to contact primary provider for appointment.  Does have an upcoming appointment with medical oncology on 8/13 who she may talk with about symptoms if not able to see primary provider before then.  Advised on symptoms to observe for and need for ED evaluation if develops.    Additional Information    Negative: Shock suspected (e.g., cold/pale/clammy skin, too weak to stand, low BP, rapid pulse)    Negative: Difficult to awaken or acting confused (e.g., disoriented, slurred speech)    Negative: Fainted, and still feels dizzy afterwards    Negative: Severe difficulty breathing (e.g., struggling for each breath, speaks in single words)    Negative: Overdose (accidental or intentional) of medications    Negative: New neurologic deficit that is present now: * Weakness of the face, arm, or leg on one side of the body * Numbness of the face, arm, or leg on one side of the body * Loss of speech or garbled speech    Negative: Heart beating < 50 beats per minute OR > 140 beats per minute    Negative: Sounds like a life-threatening emergency to the triager    Negative: Chest pain    Negative: Rectal bleeding, bloody stool, or tarry-black stool    Negative: Vomiting is the main symptom    Negative: Diarrhea is the main symptom    Negative: Headache is the main symptom    Negative: Heat exhaustion suspected (i.e., dehydration from heat exposure)    Negative: Patient states that he/she is  "having an anxiety/panic attack    Negative: SEVERE dizziness (e.g., unable to stand, requires support to walk, feels like passing out now)    Negative: SEVERE headache or neck pain    Negative: Spinning or tilting sensation (vertigo) present now and one or more stroke risk factors (i.e., hypertension, diabetes, prior stroke/TIA, heart attack, age over 60) (Exception: prior physician evaluation for this AND no different/worse than usual)    Negative: Loss of vision or double vision    Negative: Extra heart beats OR irregular heart beating (i.e., 'palpitations')    Negative: Difficulty breathing    Negative: Drinking very little and has signs of dehydration (e.g., no urine > 12 hours, very dry mouth, very lightheaded)    Negative: Follows bleeding (e.g., stomach, rectum, vagina) (Exception: became dizzy from sight of small amount blood)    Negative: Patient sounds very sick or weak to the triager    Negative: Lightheadedness (dizziness) present now, after 2 hours of rest and fluids    Negative: Spinning or tilting sensation (vertigo) present now    Negative: Fever > 103 F (39.4 C)    Negative: Fever > 100.0 F (37.8 C) and has diabetes mellitus or a weak immune system (e.g., HIV positive, cancer chemotherapy, organ transplant, splenectomy, chronic steroids)    Patient wants to be seen    Answer Assessment - Initial Assessment Questions  1. DESCRIPTION: \"Describe your dizziness.\"      Dizzy feeling, last a couple of seconds  2. LIGHTHEADED: \"Do you feel lightheaded?\" (e.g., somewhat faint, woozy, weak upon standing)      Lightheaded with the dizzy feeling  3. VERTIGO: \"Do you feel like either you or the room is spinning or tilting?\" (i.e. vertigo)      No  4. SEVERITY: \"How bad is it?\"  \"Do you feel like you are going to faint?\" \"Can you stand and walk?\"    - MILD - walking normally    - MODERATE - interferes with normal activities (e.g., work, school)     - SEVERE - unable to stand, requires support to walk, feels like " "passing out now.       mild  5. ONSET:  \"When did the dizziness begin?\"      Over the last couple of months  6. AGGRAVATING FACTORS: \"Does anything make it worse?\" (e.g., standing, change in head position)      Was standing/walking when occured  7. HEART RATE: \"Can you tell me your heart rate?\" \"How many beats in 15 seconds?\"  (Note: not all patients can do this)        N/A  8. CAUSE: \"What do you think is causing the dizziness?\"      unsure  9. RECURRENT SYMPTOM: \"Have you had dizziness before?\" If so, ask: \"When was the last time?\" \"What happened that time?\"      No  10. OTHER SYMPTOMS: \"Do you have any other symptoms?\" (e.g., fever, chest pain, vomiting, diarrhea, bleeding)        Had pain on the top of head going to right side of head, lasting a couple of seconds, but not at the same time as when experienced dizzy/lightheaded feeling  11. PREGNANCY: \"Is there any chance you are pregnant?\" \"When was your last menstrual period?\"        N/A    Protocols used: DIZZINESS-A-OH      "

## 2020-08-11 ENCOUNTER — OFFICE VISIT - HEALTHEAST (OUTPATIENT)
Dept: FAMILY MEDICINE | Facility: CLINIC | Age: 75
End: 2020-08-11

## 2020-08-11 DIAGNOSIS — R51.9 NONINTRACTABLE EPISODIC HEADACHE, UNSPECIFIED HEADACHE TYPE: ICD-10-CM

## 2020-08-11 DIAGNOSIS — C50.411 MALIGNANT NEOPLASM OF UPPER-OUTER QUADRANT OF RIGHT BREAST IN FEMALE, ESTROGEN RECEPTOR POSITIVE (H): ICD-10-CM

## 2020-08-11 DIAGNOSIS — F40.240 CLAUSTROPHOBIA: ICD-10-CM

## 2020-08-11 DIAGNOSIS — R42 DIZZINESS: ICD-10-CM

## 2020-08-11 DIAGNOSIS — Z17.0 MALIGNANT NEOPLASM OF UPPER-OUTER QUADRANT OF RIGHT BREAST IN FEMALE, ESTROGEN RECEPTOR POSITIVE (H): ICD-10-CM

## 2020-08-13 ENCOUNTER — VIRTUAL VISIT (OUTPATIENT)
Dept: ONCOLOGY | Facility: CLINIC | Age: 75
End: 2020-08-13
Attending: INTERNAL MEDICINE
Payer: COMMERCIAL

## 2020-08-13 ENCOUNTER — RECORDS - HEALTHEAST (OUTPATIENT)
Dept: ADMINISTRATIVE | Facility: OTHER | Age: 75
End: 2020-08-13

## 2020-08-13 DIAGNOSIS — C50.411 MALIGNANT NEOPLASM OF UPPER-OUTER QUADRANT OF RIGHT BREAST IN FEMALE, ESTROGEN RECEPTOR POSITIVE (H): Primary | ICD-10-CM

## 2020-08-13 DIAGNOSIS — Z17.0 MALIGNANT NEOPLASM OF UPPER-OUTER QUADRANT OF RIGHT BREAST IN FEMALE, ESTROGEN RECEPTOR POSITIVE (H): Primary | ICD-10-CM

## 2020-08-13 PROCEDURE — 40001009 ZZH VIDEO/TELEPHONE VISIT; NO CHARGE

## 2020-08-13 PROCEDURE — 99214 OFFICE O/P EST MOD 30 MIN: CPT | Mod: 95 | Performed by: INTERNAL MEDICINE

## 2020-08-13 NOTE — LETTER
"    8/13/2020         RE: Sadia Rider  [address] [facility] [address]        Dear Colleague,    Thank you for referring your patient, Sadia Rider, to the [facility]. Please see a copy of my visit note below.    Sadia Rider is a 75 year old female who is being evaluated via a billable telephone visit.      The patient has been notified of following:     \"This telephone visit will be conducted via a call between you and your physician/provider. We have found that certain health care needs can be provided without the need for a physical exam.  This service lets us provide the care you need with a short phone conversation.  If a prescription is necessary we can send it directly to your pharmacy.  If lab work is needed we can place an order for that and you can then stop by our lab to have the test done at a later time.    Telephone visits are billed at different rates depending on your insurance coverage. During this emergency period, for some insurers they may be billed the same as an in-person visit.  Please reach out to your insurance provider with any questions.    If during the course of the call the physician/provider feels a telephone visit is not appropriate, you will not be charged for this service.\"    Patient has given verbal consent for Telephone visit?  Yes    What phone number would you like to be contacted at? [phone]    How would you like to obtain your AVS? Senthil Lee CMA on 8/13/2020 at 9:38 AM    Phone call duration: 25 minutes            Visit Date:   08/13/2020      HISTORY OF PRESENT ILLNESS:  Sadia Rider is a 75-year-old patient who is being evaluated today by a billable telephone visit due to a public health emergency with coronavirus.  The patient has given consent.  Sadia was recently diagnosed with a T1b N0 M0 infiltrating ductal carcinoma of the right breast in the central portion, ER/MS strongly positive, HER-2 receptor " negative.  She had an Oncotype sent out, which came back with a recurrence score of 13.  She had a right-sided lumpectomy done and then decided against any adjuvant chemotherapy.  I started her on adjuvant Arimidex, which she is currently on.  She seems to be having good tolerance to it.  She does get some hot flashes as well as some ongoing fatigue, but overall she feels like her tolerance to the drug has been quite good.  We discussed the effect on bone density, and she is aware.      Sadia also has had hyperbilirubinemia on and off since 2011.  I repeated her liver function tests as well as a liver ultrasound.  Her last bilirubin was in the normal range, and a liver ultrasound  I reviewed with her today, and that was normal.  I do not think there is anything of any clinical significance going on, and I have reassured her today.      REVIEW OF SYSTEMS:  A 12-point comprehensive review of systems is unremarkable today.  She denies any respiratory, cardiovascular, genitourinary, musculoskeletal, neurological or gastrointestinal symptoms that are worrisome.      PAIN ASSESSMENT:  She is in no pain today.      MEDICATIONS AND ALLERGIES:  Outlined in the nursing records.      SOCIAL HISTORY:  The patient is a nonsmoker.  She is able to do all of her activities of daily living.      PHYSICAL EXAMINATION:  Has been deferred today because this is a telephone visit.      IMPRESSION AND PLAN:  This is a 75-year-old patient with a history of right-sided breast cancer.  It was a T1b N0 M0 infiltrating ductal carcinoma, ER/SD positive, HER-2 receptor negative.  She is status post lumpectomy, decided against any adjuvant radiation therapy, and is on Arimidex.  She also takes calcium and vitamin D for bone health.  She seems to be tolerating the Arimidex well without any major problems.  She has also had some issues with hyperbilirubinemia.  I have repeated her liver function tests as well as a liver ultrasound, and everything  actually looks normal on the repeat testing.  I have discussed the repeat testing with her today.  She is happy with the outcome.  All of her questions have been answered to the best of my ability.        Telephone conversation with her today has been  25 minutes.  The majority of time was spent on counseling and direction of patient care.         CUONG DOSHI MD             D: 2020   T: 2020   MT: PARKER      Name:     CHANEL GASTON   MRN:      [id number]        Account:      [id number]   :      1945           Visit Date:   2020      Document: [id number]       Again, thank you for allowing me to participate in the care of your patient.        Sincerely,        Cuong Doshi MD

## 2020-08-13 NOTE — LETTER
"    8/13/2020         RE: Sadia Rider  [address] [facility] [address]        Dear Colleague,    Thank you for referring your patient, Sadia Rider, to the [facility]. Please see a copy of my visit note below.    Sadia Rider is a 75 year old female who is being evaluated via a billable telephone visit.      The patient has been notified of following:     \"This telephone visit will be conducted via a call between you and your physician/provider. We have found that certain health care needs can be provided without the need for a physical exam.  This service lets us provide the care you need with a short phone conversation.  If a prescription is necessary we can send it directly to your pharmacy.  If lab work is needed we can place an order for that and you can then stop by our lab to have the test done at a later time.    Telephone visits are billed at different rates depending on your insurance coverage. During this emergency period, for some insurers they may be billed the same as an in-person visit.  Please reach out to your insurance provider with any questions.    If during the course of the call the physician/provider feels a telephone visit is not appropriate, you will not be charged for this service.\"    Patient has given verbal consent for Telephone visit?  Yes    What phone number would you like to be contacted at? [phone]    How would you like to obtain your AVS? Senthil Lee CMA on 8/13/2020 at 9:38 AM    Phone call duration: 25 minutes            Visit Date:   08/13/2020      HISTORY OF PRESENT ILLNESS:  Sadia Rider is a 75-year-old patient who is being evaluated today by a billable telephone visit due to a public health emergency with coronavirus.  The patient has given consent.  Sadia was recently diagnosed with a T1b N0 M0 infiltrating ductal carcinoma of the right breast in the central portion, ER/[place] strongly positive, HER-2 receptor " negative.  She had an Oncotype sent out, which came back with a recurrence score of 13.  She had a right-sided lumpectomy done and then decided against any adjuvant chemotherapy.  I started her on adjuvant Arimidex, which she is currently on.  She seems to be having good tolerance to it.  She does get some hot flashes as well as some ongoing fatigue, but overall she feels like her tolerance to the drug has been quite good.  We discussed the effect on bone density, and she is aware.      Sadia also has had hyperbilirubinemia on and off since 2011.  I repeated her liver function tests as well as a liver ultrasound.  Her last bilirubin was in the normal range, and a liver ultrasound  I reviewed with her today, and that was normal.  I do not think there is anything of any clinical significance going on, and I have reassured her today.      REVIEW OF SYSTEMS:  A 12-point comprehensive review of systems is unremarkable today.  She denies any respiratory, cardiovascular, genitourinary, musculoskeletal, neurological or gastrointestinal symptoms that are worrisome.      PAIN ASSESSMENT:  She is in no pain today.      MEDICATIONS AND ALLERGIES:  Outlined in the nursing records.      SOCIAL HISTORY:  The patient is a nonsmoker.  She is able to do all of her activities of daily living.      PHYSICAL EXAMINATION:  Has been deferred today because this is a telephone visit.      IMPRESSION AND PLAN:  This is a 75-year-old patient with a history of right-sided breast cancer.  It was a T1b N0 M0 infiltrating ductal carcinoma, ER/CA positive, HER-2 receptor negative.  She is status post lumpectomy, decided against any adjuvant radiation therapy, and is on Arimidex.  She also takes calcium and vitamin D for bone health.  She seems to be tolerating the Arimidex well without any major problems.  She has also had some issues with hyperbilirubinemia.  I have repeated her liver function tests as well as a liver ultrasound, and everything  actually looks normal on the repeat testing.  I have discussed the repeat testing with her today.  She is happy with the outcome.  All of her questions have been answered to the best of my ability.        Telephone conversation with her today has been  25 minutes.  The majority of time was spent on counseling and direction of patient care.         CUONG DOSHI MD             D: 2020   T: 2020   MT: PARKER      Name:     CHANEL GASTON   MRN:      [id number]        Account:      [id number]   :      1945           Visit Date:   2020      Document: [id number]       Again, thank you for allowing me to participate in the care of your patient.        Sincerely,        Cuong Doshi MD

## 2020-08-13 NOTE — PROGRESS NOTES
"Sadia Rider is a 75 year old female who is being evaluated via a billable telephone visit.      The patient has been notified of following:     \"This telephone visit will be conducted via a call between you and your physician/provider. We have found that certain health care needs can be provided without the need for a physical exam.  This service lets us provide the care you need with a short phone conversation.  If a prescription is necessary we can send it directly to your pharmacy.  If lab work is needed we can place an order for that and you can then stop by our lab to have the test done at a later time.    Telephone visits are billed at different rates depending on your insurance coverage. During this emergency period, for some insurers they may be billed the same as an in-person visit.  Please reach out to your insurance provider with any questions.    If during the course of the call the physician/provider feels a telephone visit is not appropriate, you will not be charged for this service.\"    Patient has given verbal consent for Telephone visit?  Yes    What phone number would you like to be contacted at? [phone]    How would you like to obtain your AVS? Senthil Lee CMA on 8/13/2020 at 9:38 AM    Phone call duration: 25 minutes          "

## 2020-08-14 NOTE — PROGRESS NOTES
Visit Date:   08/13/2020      HISTORY OF PRESENT ILLNESS:  Sadia Rider is a 75-year-old patient who is being evaluated today by a billable telephone visit due to a public health emergency with coronavirus.  The patient has given consent.  Sadia was recently diagnosed with a T1b N0 M0 infiltrating ductal carcinoma of the right breast in the central portion, ER/MA strongly positive, HER-2 receptor negative.  She had an Oncotype sent out, which came back with a recurrence score of 13.  She had a right-sided lumpectomy done and then decided against any adjuvant chemotherapy.  I started her on adjuvant Arimidex, which she is currently on.  She seems to be having good tolerance to it.  She does get some hot flashes as well as some ongoing fatigue, but overall she feels like her tolerance to the drug has been quite good.  We discussed the effect on bone density, and she is aware.      Sadia also has had hyperbilirubinemia on and off since 2011.  I repeated her liver function tests as well as a liver ultrasound.  Her last bilirubin was in the normal range, and a liver ultrasound  I reviewed with her today, and that was normal.  I do not think there is anything of any clinical significance going on, and I have reassured her today.      REVIEW OF SYSTEMS:  A 12-point comprehensive review of systems is unremarkable today.  She denies any respiratory, cardiovascular, genitourinary, musculoskeletal, neurological or gastrointestinal symptoms that are worrisome.      PAIN ASSESSMENT:  She is in no pain today.      MEDICATIONS AND ALLERGIES:  Outlined in the nursing records.      SOCIAL HISTORY:  The patient is a nonsmoker.  She is able to do all of her activities of daily living.      PHYSICAL EXAMINATION:  Has been deferred today because this is a telephone visit.      IMPRESSION AND PLAN:  This is a 75-year-old patient with a history of right-sided breast cancer.  It was a T1b N0 M0 infiltrating ductal carcinoma, ER/MA  positive, HER-2 receptor negative.  She is status post lumpectomy, decided against any adjuvant radiation therapy, and is on Arimidex.  She also takes calcium and vitamin D for bone health.  She seems to be tolerating the Arimidex well without any major problems.  She has also had some issues with hyperbilirubinemia.  I have repeated her liver function tests as well as a liver ultrasound, and everything actually looks normal on the repeat testing.  I have discussed the repeat testing with her today.  She is happy with the outcome.  All of her questions have been answered to the best of my ability.        Telephone conversation with her today has been  25 minutes.  The majority of time was spent on counseling and direction of patient care.         CUONG DOSHI MD             D: 2020   T: 2020   MT: PARKER      Name:     CHANEL GASTON   MRN:      [id number]        Account:      [id number]   :      1945           Visit Date:   2020      Document: [id number]

## 2020-08-16 ENCOUNTER — HOSPITAL ENCOUNTER (OUTPATIENT)
Dept: MRI IMAGING | Facility: CLINIC | Age: 75
Discharge: HOME | End: 2020-08-16
Attending: FAMILY MEDICINE

## 2020-08-16 DIAGNOSIS — C50.411 MALIGNANT NEOPLASM OF UPPER-OUTER QUADRANT OF RIGHT BREAST IN FEMALE, ESTROGEN RECEPTOR POSITIVE (H): ICD-10-CM

## 2020-08-16 DIAGNOSIS — R51.9 NONINTRACTABLE EPISODIC HEADACHE, UNSPECIFIED HEADACHE TYPE: ICD-10-CM

## 2020-08-16 DIAGNOSIS — Z17.0 MALIGNANT NEOPLASM OF UPPER-OUTER QUADRANT OF RIGHT BREAST IN FEMALE, ESTROGEN RECEPTOR POSITIVE (H): ICD-10-CM

## 2020-08-16 DIAGNOSIS — R42 DIZZINESS: ICD-10-CM

## 2020-08-16 LAB
CREAT BLD-MCNC: 1 MG/DL (ref 0.6–1.1)
GFR SERPL CREATININE-BSD FRML MDRD: 54 ML/MIN/1.73M2

## 2020-09-14 ENCOUNTER — RECORDS - HEALTHEAST (OUTPATIENT)
Dept: ADMINISTRATIVE | Facility: OTHER | Age: 75
End: 2020-09-14

## 2020-09-27 ENCOUNTER — COMMUNICATION - HEALTHEAST (OUTPATIENT)
Dept: FAMILY MEDICINE | Facility: CLINIC | Age: 75
End: 2020-09-27

## 2020-09-27 DIAGNOSIS — M81.0 AGE-RELATED OSTEOPOROSIS WITHOUT CURRENT PATHOLOGICAL FRACTURE: ICD-10-CM

## 2020-10-01 ENCOUNTER — COMMUNICATION - HEALTHEAST (OUTPATIENT)
Dept: FAMILY MEDICINE | Facility: CLINIC | Age: 75
End: 2020-10-01

## 2020-10-01 DIAGNOSIS — E78.2 MIXED HYPERLIPIDEMIA: ICD-10-CM

## 2020-10-29 ENCOUNTER — RECORDS - HEALTHEAST (OUTPATIENT)
Dept: ADMINISTRATIVE | Facility: OTHER | Age: 75
End: 2020-10-29

## 2020-10-29 ENCOUNTER — ONCOLOGY VISIT (OUTPATIENT)
Dept: ONCOLOGY | Facility: CLINIC | Age: 75
End: 2020-10-29
Attending: SURGERY
Payer: COMMERCIAL

## 2020-10-29 VITALS
BODY MASS INDEX: 28.9 KG/M2 | OXYGEN SATURATION: 97 % | DIASTOLIC BLOOD PRESSURE: 82 MMHG | RESPIRATION RATE: 16 BRPM | HEART RATE: 64 BPM | TEMPERATURE: 97.5 F | SYSTOLIC BLOOD PRESSURE: 190 MMHG | WEIGHT: 171 LBS

## 2020-10-29 DIAGNOSIS — Z17.0 MALIGNANT NEOPLASM OF UPPER-OUTER QUADRANT OF RIGHT BREAST IN FEMALE, ESTROGEN RECEPTOR POSITIVE (H): Primary | ICD-10-CM

## 2020-10-29 DIAGNOSIS — C50.411 MALIGNANT NEOPLASM OF UPPER-OUTER QUADRANT OF RIGHT BREAST IN FEMALE, ESTROGEN RECEPTOR POSITIVE (H): Primary | ICD-10-CM

## 2020-10-29 PROCEDURE — G0463 HOSPITAL OUTPT CLINIC VISIT: HCPCS

## 2020-10-29 PROCEDURE — 99213 OFFICE O/P EST LOW 20 MIN: CPT | Performed by: SURGERY

## 2020-10-29 ASSESSMENT — PAIN SCALES - GENERAL: PAINLEVEL: NO PAIN (0)

## 2020-10-29 NOTE — NURSING NOTE
"Oncology Rooming Note    October 29, 2020 9:41 AM   Sadia Rider is a 75 year old female who presents for:    Chief Complaint   Patient presents with     Oncology Clinic Visit     Malignant neoplasm of upper-outer quadrant of right breast in female, estrogen receptor positive (H)     Initial Vitals: BP (!) 190/82   Pulse 64   Temp 97.5  F (36.4  C)   Resp 16   Wt 77.6 kg (171 lb)   SpO2 97%   BMI 28.90 kg/m   Estimated body mass index is 28.9 kg/m  as calculated from the following:    Height as of 5/5/20: 1.638 m (5' 4.5\").    Weight as of this encounter: 77.6 kg (171 lb). Body surface area is 1.88 meters squared.  No Pain (0) Comment: Data Unavailable   No LMP recorded. Patient is postmenopausal.  Allergies reviewed: Yes  Medications reviewed: Yes    Medications: Medication refills not needed today.  Pharmacy name entered into Flexion: [facility] [id number] [place] [id number] [address]    Clinical concerns: No new concerns today. Dr. Madison was notified.      Fadi Berg MA            "

## 2020-10-29 NOTE — LETTER
10/29/2020         RE: Sadia Rider  [address] [address]        Dear Colleague,    Thank you for referring your patient, Sadia Rider, to the [facility]. Please see a copy of my visit note below.    FOLLOW-UP  Oct 29, 2020    Sadia Rider is a 75 year old female who returns for follow-up for     Cancer Staging  Malignant neoplasm of upper-outer quadrant of right breast in female, estrogen receptor positive (H)  Staging form: Breast, AJCC 8th Edition  - Clinical: Stage IA (cT1c, cN0, cM0, G1, ER+, AK+, HER2-) - Signed by Grace Madison MD on 3/3/2020    Treatment to date:  1. Genetic testing (3/16/2020) - negative for pathogenic variants in: ELIZABET, BRCA1, BRCA2, CDH1, CHEK2, NBN, NF1, PALB2,   PTEN, STK11, TP53  2. Right wire-localized segmental mastectomy and axillary sentinel lymph node biopsy (3/26/2020)  3. Oncotype DX 13  4. Adjuvant arimidex (5/5/2020 to ongoing)    HPI:    Since her last visit, she has been doing well.  She denies any headaches, dizziness, vision changes, back pain, abdominal pain, bowel habit changes, rectal bleeding, melena, chest pain, shortness of breath, or unintentional weight loss.  She noted no masses in either breast, axilla, or neck. She denies any nipple discharge or nipple inversion.  She has good range of motion of her shoulders bilaterally and denies any upper extremity swelling.     She is tolerating the arimidex reasonably well. Continues to have aches and hot flashes and some GI symptoms, but manageable.  Some swelling laterally in the right chest wall.  She has tried some compression garments but did not like it.  No swelling in the arm.      BP (!) 190/82   Pulse 64   Temp 97.5  F (36.4  C)   Resp 16   Wt 77.6 kg (171 lb)   SpO2 97%   BMI 28.90 kg/m     Physical Exam  Constitutional:       Appearance: She is well-developed.   Chest:      Breasts: Breasts are symmetrical.         Right: No  inverted nipple, mass, nipple discharge, skin change or tenderness.         Left: No inverted nipple, mass, nipple discharge, skin change or tenderness.          Comments: Patient was examined in both supine and upright positions.   Lymphadenopathy:      Cervical: No cervical adenopathy.      Right cervical: No superficial, deep or posterior cervical adenopathy.     Left cervical: No superficial, deep or posterior cervical adenopathy.      Upper Body:      Right upper body: No supraclavicular, axillary or pectoral adenopathy.      Left upper body: No supraclavicular, axillary or pectoral adenopathy.      Comments: No lymphedema in bilateral upper extremities. SLNB incision unremarkable.   Skin:     General: Skin is warm and dry.        INVESTIGATIONS:    None    ASSESSMENT:    Sadia Rider is a 75 year old female with right breast cancer, 7 months s/p surgery.    She is doing well.  There is no clinical evidence of recurrence today.  We reviewed that breast cancer surveillance moving forward involves physical exam as well as annual mammography.  Her next mammogram is due February 2021.  She will be following up with Dr Harper as well.       Total time spent with the patient was 15 minutes, of which more than half was counseling.     PLAN:  1. Next mammogram due February 2021  2. Follow up with me in February just after mammogram    Grace Madison MD MSc [facility] FACS    Division of Surgical Oncology  [facility]       Again, thank you for allowing me to participate in the care of your patient.        Sincerely,        Grace Madison MD

## 2020-10-29 NOTE — PROGRESS NOTES
FOLLOW-UP  Oct 29, 2020    Sadia Rider is a 75 year old female who returns for follow-up for     Cancer Staging  Malignant neoplasm of upper-outer quadrant of right breast in female, estrogen receptor positive (H)  Staging form: Breast, AJCC 8th Edition  - Clinical: Stage IA (cT1c, cN0, cM0, G1, ER+, MA+, HER2-) - Signed by Grace Madison MD on 3/3/2020    Treatment to date:  1. Genetic testing (3/16/2020) - negative for pathogenic variants in: ELIZABET, BRCA1, BRCA2, CDH1, CHEK2, NBN, NF1, PALB2,   PTEN, STK11, TP53  2. Right wire-localized segmental mastectomy and axillary sentinel lymph node biopsy (3/26/2020)  3. Oncotype DX 13  4. Adjuvant arimidex (5/5/2020 to ongoing)    HPI:    Since her last visit, she has been doing well.  She denies any headaches, dizziness, vision changes, back pain, abdominal pain, bowel habit changes, rectal bleeding, melena, chest pain, shortness of breath, or unintentional weight loss.  She noted no masses in either breast, axilla, or neck. She denies any nipple discharge or nipple inversion.  She has good range of motion of her shoulders bilaterally and denies any upper extremity swelling.     She is tolerating the arimidex reasonably well. Continues to have aches and hot flashes and some GI symptoms, but manageable.  Some swelling laterally in the right chest wall.  She has tried some compression garments but did not like it.  No swelling in the arm.      BP (!) 190/82   Pulse 64   Temp 97.5  F (36.4  C)   Resp 16   Wt 77.6 kg (171 lb)   SpO2 97%   BMI 28.90 kg/m     Physical Exam  Constitutional:       Appearance: She is well-developed.   Chest:      Breasts: Breasts are symmetrical.         Right: No inverted nipple, mass, nipple discharge, skin change or tenderness.         Left: No inverted nipple, mass, nipple discharge, skin change or tenderness.          Comments: Patient was examined in both supine and upright positions.   Lymphadenopathy:      Cervical: No  cervical adenopathy.      Right cervical: No superficial, deep or posterior cervical adenopathy.     Left cervical: No superficial, deep or posterior cervical adenopathy.      Upper Body:      Right upper body: No supraclavicular, axillary or pectoral adenopathy.      Left upper body: No supraclavicular, axillary or pectoral adenopathy.      Comments: No lymphedema in bilateral upper extremities. SLNB incision unremarkable.   Skin:     General: Skin is warm and dry.        INVESTIGATIONS:    None    ASSESSMENT:    Sadia Rider is a 75 year old female with right breast cancer, 7 months s/p surgery.    She is doing well.  There is no clinical evidence of recurrence today.  We reviewed that breast cancer surveillance moving forward involves physical exam as well as annual mammography.  Her next mammogram is due February 2021.  She will be following up with Dr Harper as well.       Total time spent with the patient was 15 minutes, of which more than half was counseling.     PLAN:  1. Next mammogram due February 2021  2. Follow up with me in February just after mammogram    Grace Madison MD MSc [facility] FACS    Division of Surgical Oncology  [facility]

## 2020-11-03 ENCOUNTER — COMMUNICATION - HEALTHEAST (OUTPATIENT)
Dept: SCHEDULING | Facility: CLINIC | Age: 75
End: 2020-11-03

## 2020-11-06 ENCOUNTER — COMMUNICATION - HEALTHEAST (OUTPATIENT)
Dept: SCHEDULING | Facility: CLINIC | Age: 75
End: 2020-11-06

## 2020-11-08 ENCOUNTER — HEALTH MAINTENANCE LETTER (OUTPATIENT)
Age: 75
End: 2020-11-08

## 2020-11-09 ENCOUNTER — COMMUNICATION - HEALTHEAST (OUTPATIENT)
Dept: FAMILY MEDICINE | Facility: CLINIC | Age: 75
End: 2020-11-09

## 2020-11-09 DIAGNOSIS — Q15.0 JUVENILE GLAUCOMA: Primary | ICD-10-CM

## 2020-11-12 ENCOUNTER — OFFICE VISIT - HEALTHEAST (OUTPATIENT)
Dept: FAMILY MEDICINE | Facility: CLINIC | Age: 75
End: 2020-11-12

## 2020-11-12 DIAGNOSIS — E78.2 MIXED HYPERLIPIDEMIA: ICD-10-CM

## 2020-11-12 DIAGNOSIS — Z17.0 MALIGNANT NEOPLASM OF UPPER-OUTER QUADRANT OF RIGHT BREAST IN FEMALE, ESTROGEN RECEPTOR POSITIVE (H): ICD-10-CM

## 2020-11-12 DIAGNOSIS — I10 ESSENTIAL HYPERTENSION: ICD-10-CM

## 2020-11-12 DIAGNOSIS — C50.411 MALIGNANT NEOPLASM OF UPPER-OUTER QUADRANT OF RIGHT BREAST IN FEMALE, ESTROGEN RECEPTOR POSITIVE (H): ICD-10-CM

## 2020-11-12 DIAGNOSIS — R07.89 ATYPICAL CHEST PAIN: ICD-10-CM

## 2020-11-23 ENCOUNTER — OFFICE VISIT (OUTPATIENT)
Dept: OPHTHALMOLOGY | Facility: CLINIC | Age: 75
End: 2020-11-23
Attending: OPHTHALMOLOGY
Payer: COMMERCIAL

## 2020-11-23 DIAGNOSIS — Q15.0 JUVENILE GLAUCOMA: ICD-10-CM

## 2020-11-23 PROCEDURE — 92133 CPTRZD OPH DX IMG PST SGM ON: CPT | Performed by: OPHTHALMOLOGY

## 2020-11-23 PROCEDURE — 76514 ECHO EXAM OF EYE THICKNESS: CPT | Performed by: OPHTHALMOLOGY

## 2020-11-23 PROCEDURE — G0463 HOSPITAL OUTPT CLINIC VISIT: HCPCS

## 2020-11-23 PROCEDURE — 92083 EXTENDED VISUAL FIELD XM: CPT | Performed by: OPHTHALMOLOGY

## 2020-11-23 PROCEDURE — 92014 COMPRE OPH EXAM EST PT 1/>: CPT | Performed by: OPHTHALMOLOGY

## 2020-11-23 RX ORDER — METOPROLOL TARTRATE 25 MG/1
12.5 TABLET, FILM COATED ORAL
COMMUNITY
Start: 2020-11-02 | End: 2021-02-22

## 2020-11-23 ASSESSMENT — VISUAL ACUITY
OD_CC: 20/60
CORRECTION_TYPE: GLASSES
METHOD: SNELLEN - LINEAR
OD_CC+: -2
OS_CC: 20/150

## 2020-11-23 ASSESSMENT — REFRACTION_WEARINGRX
OD_AXIS: 120
OD_SPHERE: -4.50
OS_CYLINDER: SPHERE
OS_SPHERE: -0.50
OD_CYLINDER: +1.75

## 2020-11-23 ASSESSMENT — TONOMETRY
OD_IOP_MMHG: 06
IOP_METHOD: APPLANATION
OS_IOP_MMHG: 02

## 2020-11-23 ASSESSMENT — PACHYMETRY
OS_CT(UM): 558
OD_CT(UM): 542

## 2020-11-23 ASSESSMENT — CONF VISUAL FIELD
OD_INFERIOR_TEMPORAL_RESTRICTION: 3
OD_INFERIOR_NASAL_RESTRICTION: 3
OD_SUPERIOR_NASAL_RESTRICTION: 3
OS_SUPERIOR_TEMPORAL_RESTRICTION: 2
OS_INFERIOR_TEMPORAL_RESTRICTION: 2
OS_INFERIOR_NASAL_RESTRICTION: 1
METHOD: COUNTING FINGERS
OS_SUPERIOR_NASAL_RESTRICTION: 2

## 2020-11-23 ASSESSMENT — CUP TO DISC RATIO
OD_RATIO: 0.6
OS_RATIO: 0.6

## 2020-11-23 NOTE — NURSING NOTE
Chief Complaints and History of Present Illnesses   Patient presents with     Primary Open Angle Glaucoma Follow Up     4 month follow up both eyes.     Chief Complaint(s) and History of Present Illness(es)     Primary Open Angle Glaucoma Follow Up     Laterality: both eyes    Comments: 4 month follow up both eyes.              Comments     Pt states vision is not as clear in her RE. No eye pain today.  No new flashes or floaters. No redness or dryness.    JARAD Carmichael November 23, 2020 10:44 AM

## 2020-11-23 NOTE — PROGRESS NOTES
CC: follow up glaucoma   HPI: Sadia Rider is a  75 year old year-old patient with history of  Primary open angle glaucoma (POAG) here for follow up. Reports no changes in vision; no flashes and floaters     Retinal Imaging:  OVF 11/23/20:   OD:Inf arcuate with paracentral dec sens; MD -10.1 from -9.5  OS:Sup arcuate with paracentral dec sens affecting central fixation; MD -20.1 from LATA -17.4     ONFL Optical Coherence Tomography 11/23/20     OD:Severe RNFL thinning- stable  and OS:Mod RNFL thinning - stable     Assessment & Plan:    1)POAG/JOAG   -- s/p ALT OU in 2009,   -- s/p Trab OS in 2004 by Dr. Mathis, s/p Trab OD in 1990 by Dr. 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 Prescription next follow up        ~~~~~~~~~~~~~~~~~~~~~~~~~~~~~~~~~~   Complete documentation of historical and exam elements from today's encounter can be found in the full encounter summary report (not reduplicated in this progress note).  I personally obtained the chief complaint(s) and history of present illness.  I confirmed and edited as necessary the review of systems, past medical/surgical history, family history, social history, and examination findings as documented by others; and I examined the patient myself.  I personally reviewed the relevant tests, images, and reports as  documented above.  I formulated and edited as necessary the assessment and plan and discussed the findings and management plan with the patient and family    Tayler Villela MD   of Ophthalmology.  Retina Service   Department of Ophthalmology and Visual Neurosciences   [facility]  Phone: [phone]   Fax: [phone]

## 2020-12-10 ENCOUNTER — VIRTUAL VISIT (OUTPATIENT)
Dept: ONCOLOGY | Facility: CLINIC | Age: 75
End: 2020-12-10
Attending: INTERNAL MEDICINE
Payer: COMMERCIAL

## 2020-12-10 DIAGNOSIS — Z17.0 MALIGNANT NEOPLASM OF UPPER-OUTER QUADRANT OF RIGHT BREAST IN FEMALE, ESTROGEN RECEPTOR POSITIVE (H): Primary | ICD-10-CM

## 2020-12-10 DIAGNOSIS — C50.411 MALIGNANT NEOPLASM OF UPPER-OUTER QUADRANT OF RIGHT BREAST IN FEMALE, ESTROGEN RECEPTOR POSITIVE (H): Primary | ICD-10-CM

## 2020-12-10 PROCEDURE — 999N001193 HC VIDEO/TELEPHONE VISIT; NO CHARGE

## 2020-12-10 PROCEDURE — 99214 OFFICE O/P EST MOD 30 MIN: CPT | Mod: 95 | Performed by: INTERNAL MEDICINE

## 2020-12-10 NOTE — PROGRESS NOTES
"Sadia Rider is a 75 year old female who is being evaluated via a billable telephone visit.      The patient has been notified of following:     \"This telephone visit will be conducted via a call between you and your physician/provider. We have found that certain health care needs can be provided without the need for a physical exam.  This service lets us provide the care you need with a short phone conversation.  If a prescription is necessary we can send it directly to your pharmacy.  If lab work is needed we can place an order for that and you can then stop by our lab to have the test done at a later time.    Telephone visits are billed at different rates depending on your insurance coverage. During this emergency period, for some insurers they may be billed the same as an in-person visit.  Please reach out to your insurance provider with any questions.    If during the course of the call the physician/provider feels a telephone visit is not appropriate, you will not be charged for this service.\"    Patient has given verbal consent for Telephone visit?  Yes    What phone number would you like to be contacted at? [phone]    How would you like to obtain your AVS? Senthil     Would like to discuss if hypertension is a side affect of chemo medication. Patient would like to discuss if it would be ok to go to dentist after getting covid vaccine.    Ghada Lee CMA on 12/10/2020 at 3:05 PM            "

## 2020-12-10 NOTE — LETTER
"    12/10/2020         RE: Sadia Rider  [address] [address]        Dear Colleague,    Thank you for referring your patient, Sadia Rider, to the [facility]. Please see a copy of my visit note below.    Sadia Rider is a 75 year old female who is being evaluated via a billable telephone visit.      The patient has been notified of following:     \"This telephone visit will be conducted via a call between you and your physician/provider. We have found that certain health care needs can be provided without the need for a physical exam.  This service lets us provide the care you need with a short phone conversation.  If a prescription is necessary we can send it directly to your pharmacy.  If lab work is needed we can place an order for that and you can then stop by our lab to have the test done at a later time.    Telephone visits are billed at different rates depending on your insurance coverage. During this emergency period, for some insurers they may be billed the same as an in-person visit.  Please reach out to your insurance provider with any questions.    If during the course of the call the physician/provider feels a telephone visit is not appropriate, you will not be charged for this service.\"    Patient has given verbal consent for Telephone visit?  Yes    What phone number would you like to be contacted at? [phone]    How would you like to obtain your AVS? MyChart     Would like to discuss if hypertension is a side affect of chemo medication. Patient would like to discuss if it would be ok to go to dentist after getting covid vaccine.    Ghada Lee CMA on 12/10/2020 at 3:05 PM              Visit Date:   12/10/2020      Sadia Rider has been evaluated today by a telephone visit due to public health emergency with coronavirus and she has given consent.      CHIEF COMPLAINT:  Recent diagnosis of right-sided breast cancer.    "   HISTORY OF PRESENTING COMPLAINT:  Sadia is a 75-year-old postmenopausal patient who was diagnosed with a right-sided breast cancer in 02/2020.  She had a biopsy done at the 12 o'clock position of the right breast, which showed and invasive ductal carcinoma, grade 1, with associated DCIS, ER/ND strongly positive, HER-2 receptor negative by FISH.  She had a right-sided lumpectomy done in March.  Tumor size was 0.9 cm.  All the margins were negative and her sentinel lymph nodes were negative.  Final stage IV disease was a T1b N0 M0 infiltrating ductal carcinoma of the right breast.  She had an Oncotype sent out, which came back with a recurrence score of 13, in the low risk of recurrence range.  She has started on an Arimidex and appears to be tolerating it well.  She is also on Fosamax for her bones.  She is having some problems with hypertension and has been recently started on metoprolol.  I do not think her hypertension is related to the Arimidex, but if she cannot get control of the hypertension with medication we may decide to come off the Arimidex for 3 months.  I have discussed this with her today and she is in agreement.      I have discussed a survivorship care plan with the patient today.  We will send out a paper copy of her survivorship care plan which outlines her care team and her plan for followup.      PAST MEDICAL HISTORY:  Remarkable for a history of right-sided breast cancer, history of hypertension, history of glaucoma, history of mild osteopenia.      SOCIAL HISTORY:  The patient is .  She has got a 51-year-old daughter.  She is a nonsmoker, drinks alcohol very rarely.      MENOPAUSAL HISTORY:  She is postmenopausal.      FAMILY HISTORY:  Negative for breast cancer.      GENETIC TESTING:  The patient did have genetic testing done, which was negative.      MEDICATIONS AND ALLERGIES:  Outlined in the nursing records.      REVIEW OF SYSTEMS:  A 12-point comprehensive review of systems, apart  from what is outlined above, is otherwise unremarkable.      PHYSICAL EXAMINATION:  Has been deferred because this is a telephone visit.      IMPRESSION:  A 75-year-old patient with a history of right-sided breast cancer.  Her final pathological stage was a T1b N0 M0 invasive ductal carcinoma, grade 1, ER/ND strongly positive, HER-2 receptor negative.  Her Oncotype came back in the low risk of recurrence range with a score of 13.  The patient decided against radiation therapy.  She is currently on adjuvant Arimidex.  She is going to stay on the Fosamax for her bones.  I do not think the Arimidex has got anything to do with her hypertension, but if she cannot get her hypertension under control, then we could stop the Arimidex for 3 months and see if the blood pressure settles.  I have discussed this with her today and she is in agreement.  All of her concerns have been addressed today.         CUONG DOSHI MD             D: 12/10/2020   T: 12/10/2020   MT: MARI      Name:     CHANEL GASTON   MRN:      [id number]        Account:      [id number]   :      1945           Visit Date:   12/10/2020      Document: [id number]        Again, thank you for allowing me to participate in the care of your patient.        Sincerely,        Cuong Doshi MD

## 2020-12-10 NOTE — LETTER
"    12/10/2020         RE: Sadia Rider  [address] [address]        Dear Colleague,    Thank you for referring your patient, Sadia Rider, to the [facility]. Please see a copy of my visit note below.    Sadia Rider is a 75 year old female who is being evaluated via a billable telephone visit.      The patient has been notified of following:     \"This telephone visit will be conducted via a call between you and your physician/provider. We have found that certain health care needs can be provided without the need for a physical exam.  This service lets us provide the care you need with a short phone conversation.  If a prescription is necessary we can send it directly to your pharmacy.  If lab work is needed we can place an order for that and you can then stop by our lab to have the test done at a later time.    Telephone visits are billed at different rates depending on your insurance coverage. During this emergency period, for some insurers they may be billed the same as an in-person visit.  Please reach out to your insurance provider with any questions.    If during the course of the call the physician/provider feels a telephone visit is not appropriate, you will not be charged for this service.\"    Patient has given verbal consent for Telephone visit?  Yes    What phone number would you like to be contacted at? [phone]    How would you like to obtain your AVS? MyChart     Would like to discuss if hypertension is a side affect of chemo medication. Patient would like to discuss if it would be ok to go to dentist after getting covid vaccine.    Ghada Lee CMA on 12/10/2020 at 3:05 PM              Visit Date:   12/10/2020      Sadia Rider has been evaluated today by a telephone visit due to public health emergency with coronavirus and she has given consent.      CHIEF COMPLAINT:  Recent diagnosis of right-sided breast cancer.    "   HISTORY OF PRESENTING COMPLAINT:  Sadia is a 75-year-old postmenopausal patient who was diagnosed with a right-sided breast cancer in 02/2020.  She had a biopsy done at the 12 o'clock position of the right breast, which showed and invasive ductal carcinoma, grade 1, with associated DCIS, ER/[place] strongly positive, HER-2 receptor negative by FISH.  She had a right-sided lumpectomy done in March.  Tumor size was 0.9 cm.  All the margins were negative and her sentinel lymph nodes were negative.  Final stage IV disease was a T1b N0 M0 infiltrating ductal carcinoma of the right breast.  She had an Oncotype sent out, which came back with a recurrence score of 13, in the low risk of recurrence range.  She has started on an Arimidex and appears to be tolerating it well.  She is also on Fosamax for her bones.  She is having some problems with hypertension and has been recently started on metoprolol.  I do not think her hypertension is related to the Arimidex, but if she cannot get control of the hypertension with medication we may decide to come off the Arimidex for 3 months.  I have discussed this with her today and she is in agreement.      I have discussed a survivorship care plan with the patient today.  We will send out a paper copy of her survivorship care plan which outlines her care team and her plan for followup.      PAST MEDICAL HISTORY:  Remarkable for a history of right-sided breast cancer, history of hypertension, history of glaucoma, history of mild osteopenia.      SOCIAL HISTORY:  The patient is .  She has got a 51-year-old daughter.  She is a nonsmoker, drinks alcohol very rarely.      MENOPAUSAL HISTORY:  She is postmenopausal.      FAMILY HISTORY:  Negative for breast cancer.      GENETIC TESTING:  The patient did have genetic testing done, which was negative.      MEDICATIONS AND ALLERGIES:  Outlined in the nursing records.      REVIEW OF SYSTEMS:  A 12-point comprehensive review of systems, apart  from what is outlined above, is otherwise unremarkable.      PHYSICAL EXAMINATION:  Has been deferred because this is a telephone visit.      IMPRESSION:  A 75-year-old patient with a history of right-sided breast cancer.  Her final pathological stage was a T1b N0 M0 invasive ductal carcinoma, grade 1, ER/OR strongly positive, HER-2 receptor negative.  Her Oncotype came back in the low risk of recurrence range with a score of 13.  The patient decided against radiation therapy.  She is currently on adjuvant Arimidex.  She is going to stay on the Fosamax for her bones.  I do not think the Arimidex has got anything to do with her hypertension, but if she cannot get her hypertension under control, then we could stop the Arimidex for 3 months and see if the blood pressure settles.  I have discussed this with her today and she is in agreement.  All of her concerns have been addressed today.         CUONG DOSHI MD             D: 12/10/2020   T: 12/10/2020   MT: MARI      Name:     CHANEL GASTON   MRN:      [id number]        Account:      [id number]   :      1945           Visit Date:   12/10/2020      Document: [id number]        Again, thank you for allowing me to participate in the care of your patient.        Sincerely,        Cuong Doshi MD

## 2020-12-11 NOTE — PROGRESS NOTES
Visit Date:   12/10/2020      Sadia Rider has been evaluated today by a telephone visit due to public health emergency with coronavirus and she has given consent.      CHIEF COMPLAINT:  Recent diagnosis of right-sided breast cancer.      HISTORY OF PRESENTING COMPLAINT:  Sadia is a 75-year-old postmenopausal patient who was diagnosed with a right-sided breast cancer in 02/2020.  She had a biopsy done at the 12 o'clock position of the right breast, which showed and invasive ductal carcinoma, grade 1, with associated DCIS, ER/ME strongly positive, HER-2 receptor negative by FISH.  She had a right-sided lumpectomy done in March.  Tumor size was 0.9 cm.  All the margins were negative and her sentinel lymph nodes were negative.  Final stage IV disease was a T1b N0 M0 infiltrating ductal carcinoma of the right breast.  She had an Oncotype sent out, which came back with a recurrence score of 13, in the low risk of recurrence range.  She has started on an Arimidex and appears to be tolerating it well.  She is also on Fosamax for her bones.  She is having some problems with hypertension and has been recently started on metoprolol.  I do not think her hypertension is related to the Arimidex, but if she cannot get control of the hypertension with medication we may decide to come off the Arimidex for 3 months.  I have discussed this with her today and she is in agreement.      I have discussed a survivorship care plan with the patient today.  We will send out a paper copy of her survivorship care plan which outlines her care team and her plan for followup.      PAST MEDICAL HISTORY:  Remarkable for a history of right-sided breast cancer, history of hypertension, history of glaucoma, history of mild osteopenia.      SOCIAL HISTORY:  The patient is .  She has got a 51-year-old daughter.  She is a nonsmoker, drinks alcohol very rarely.      MENOPAUSAL HISTORY:  She is postmenopausal.      FAMILY HISTORY:  Negative for  breast cancer.      GENETIC TESTING:  The patient did have genetic testing done, which was negative.      MEDICATIONS AND ALLERGIES:  Outlined in the nursing records.      REVIEW OF SYSTEMS:  A 12-point comprehensive review of systems, apart from what is outlined above, is otherwise unremarkable.      PHYSICAL EXAMINATION:  Has been deferred because this is a telephone visit.      IMPRESSION:  A 75-year-old patient with a history of right-sided breast cancer.  Her final pathological stage was a T1b N0 M0 invasive ductal carcinoma, grade 1, ER/IN strongly positive, HER-2 receptor negative.  Her Oncotype came back in the low risk of recurrence range with a score of 13.  The patient decided against radiation therapy.  She is currently on adjuvant Arimidex.  She is going to stay on the Fosamax for her bones.  I do not think the Arimidex has got anything to do with her hypertension, but if she cannot get her hypertension under control, then we could stop the Arimidex for 3 months and see if the blood pressure settles.  I have discussed this with her today and she is in agreement.  All of her concerns have been addressed today.         CUONG DOSHI MD             D: 12/10/2020   T: 12/10/2020   MT: MARI      Name:     CHANEL GASTON   MRN:      [id number]        Account:      [id number]   :      1945           Visit Date:   12/10/2020      Document: [id number]

## 2020-12-21 ENCOUNTER — COMMUNICATION - HEALTHEAST (OUTPATIENT)
Dept: FAMILY MEDICINE | Facility: CLINIC | Age: 75
End: 2020-12-21

## 2020-12-21 DIAGNOSIS — I10 HYPERTENSION, UNSPECIFIED TYPE: ICD-10-CM

## 2020-12-30 ENCOUNTER — OFFICE VISIT - HEALTHEAST (OUTPATIENT)
Dept: FAMILY MEDICINE | Facility: CLINIC | Age: 75
End: 2020-12-30

## 2020-12-30 DIAGNOSIS — I15.9 SECONDARY HYPERTENSION: ICD-10-CM

## 2021-01-13 ENCOUNTER — OFFICE VISIT - HEALTHEAST (OUTPATIENT)
Dept: FAMILY MEDICINE | Facility: CLINIC | Age: 76
End: 2021-01-13

## 2021-01-13 DIAGNOSIS — I15.9 SECONDARY HYPERTENSION: ICD-10-CM

## 2021-01-13 ASSESSMENT — MIFFLIN-ST. JEOR: SCORE: 1277.86

## 2021-01-27 PROBLEM — Z17.0 MALIGNANT NEOPLASM OF UPPER-OUTER QUADRANT OF RIGHT BREAST IN FEMALE, ESTROGEN RECEPTOR POSITIVE (H): Status: ACTIVE | Noted: 2020-03-03

## 2021-01-27 PROBLEM — C50.411 MALIGNANT NEOPLASM OF UPPER-OUTER QUADRANT OF RIGHT BREAST IN FEMALE, ESTROGEN RECEPTOR POSITIVE (H): Status: ACTIVE | Noted: 2020-03-03

## 2021-01-30 ENCOUNTER — IMMUNIZATION (OUTPATIENT)
Dept: NURSING | Facility: CLINIC | Age: 76
End: 2021-01-30
Payer: COMMERCIAL

## 2021-01-30 PROCEDURE — 0001A PR COVID VAC PFIZER DIL RECON 30 MCG/0.3 ML IM: CPT

## 2021-01-30 PROCEDURE — 91300 PR COVID VAC PFIZER DIL RECON 30 MCG/0.3 ML IM: CPT

## 2021-02-20 ENCOUNTER — IMMUNIZATION (OUTPATIENT)
Dept: NURSING | Facility: CLINIC | Age: 76
End: 2021-02-20
Payer: COMMERCIAL

## 2021-02-20 PROCEDURE — 0002A PR COVID VAC PFIZER DIL RECON 30 MCG/0.3 ML IM: CPT

## 2021-02-20 PROCEDURE — 91300 PR COVID VAC PFIZER DIL RECON 30 MCG/0.3 ML IM: CPT

## 2021-02-22 ENCOUNTER — ANCILLARY PROCEDURE (OUTPATIENT)
Dept: MAMMOGRAPHY | Facility: CLINIC | Age: 76
End: 2021-02-22
Attending: SURGERY
Payer: COMMERCIAL

## 2021-02-22 ENCOUNTER — OFFICE VISIT (OUTPATIENT)
Dept: DERMATOLOGY | Facility: CLINIC | Age: 76
End: 2021-02-22
Payer: COMMERCIAL

## 2021-02-22 DIAGNOSIS — Z12.31 ENCOUNTER FOR SCREENING MAMMOGRAM FOR MALIGNANT NEOPLASM OF BREAST: ICD-10-CM

## 2021-02-22 DIAGNOSIS — L82.1 SEBORRHEIC KERATOSIS: ICD-10-CM

## 2021-02-22 DIAGNOSIS — Z17.0 MALIGNANT NEOPLASM OF UPPER-OUTER QUADRANT OF RIGHT BREAST IN FEMALE, ESTROGEN RECEPTOR POSITIVE (H): ICD-10-CM

## 2021-02-22 DIAGNOSIS — Z12.83 SKIN CANCER SCREENING: Primary | ICD-10-CM

## 2021-02-22 DIAGNOSIS — C50.411 MALIGNANT NEOPLASM OF UPPER-OUTER QUADRANT OF RIGHT BREAST IN FEMALE, ESTROGEN RECEPTOR POSITIVE (H): ICD-10-CM

## 2021-02-22 DIAGNOSIS — Z85.828 HISTORY OF BASAL CELL CANCER: ICD-10-CM

## 2021-02-22 DIAGNOSIS — D48.5 NEOPLASM OF UNCERTAIN BEHAVIOR OF SKIN: ICD-10-CM

## 2021-02-22 DIAGNOSIS — D22.9 MULTIPLE BENIGN NEVI: ICD-10-CM

## 2021-02-22 PROCEDURE — 11103 TANGNTL BX SKIN EA SEP/ADDL: CPT | Performed by: PHYSICIAN ASSISTANT

## 2021-02-22 PROCEDURE — 77063 BREAST TOMOSYNTHESIS BI: CPT | Mod: GC | Performed by: RADIOLOGY

## 2021-02-22 PROCEDURE — 88305 TISSUE EXAM BY PATHOLOGIST: CPT | Performed by: PATHOLOGY

## 2021-02-22 PROCEDURE — 99214 OFFICE O/P EST MOD 30 MIN: CPT | Mod: 25 | Performed by: PHYSICIAN ASSISTANT

## 2021-02-22 PROCEDURE — 11102 TANGNTL BX SKIN SINGLE LES: CPT | Performed by: PHYSICIAN ASSISTANT

## 2021-02-22 PROCEDURE — 77067 SCR MAMMO BI INCL CAD: CPT | Mod: GC | Performed by: RADIOLOGY

## 2021-02-22 RX ORDER — LISINOPRIL 10 MG/1
5 TABLET ORAL DAILY
COMMUNITY
Start: 2021-01-13 | End: 2021-10-05

## 2021-02-22 RX ORDER — LIDOCAINE HYDROCHLORIDE AND EPINEPHRINE 10; 10 MG/ML; UG/ML
3 INJECTION, SOLUTION INFILTRATION; PERINEURAL ONCE
Status: DISCONTINUED | OUTPATIENT
Start: 2021-02-22 | End: 2023-11-07

## 2021-02-22 ASSESSMENT — PAIN SCALES - GENERAL: PAINLEVEL: NO PAIN (0)

## 2021-02-22 NOTE — PROGRESS NOTES
[facility] Dermatology Note  Encounter Date: Feb 22, 2021  Office Visit      Dermatology Problem List:  0. NUB x2 - R ear lobe/lobule, R medial mid cheek s/ p bx 2/22/21  1. History of  NMSC  - BCC - right lower cheek, s/p Mohs 8/30/17  - BCC - R nose s/p excision 2012  2. History of intertrigo - OTC anti-fungal  3. Skin cancer screening, 2/22/2021    Social: .   ____________________________________________    Assessment & Plan:  # History of nonmelanoma skin cancer, no clincial evidence of recurrence.   - Monitor: Patient to continue monitoring at home and will contact the clinic for any changes.  - continue with annual skin exams     # Multiple clinically benign nevi on the trunk/skin cancer screening.   - ABCDEs: Counseled ABCDEs of melanoma: Asymmetry, Border (irregularity), Color (not uniform, changes in color), Diameter (greater than 6 mm which is about the size of a pencil eraser), and Evolving (any changes in preexisting moles).  - Sun protection: Counseled SPF30+ sunscreen, UPF clothing, sun avoidance, tanning bed avoidance.     # Neoplasm of uncertain behavior on the R ear lobe/lobule. The differential diagnosis includes BCC vs other.   - Shave biopsy performed today (see procedure note(s) below).    # Neoplasm of uncertain behavior on the R medial mid cheek. The differential diagnosis includes BCC vs other.   - Shave biopsy performed today (see procedure note(s) below).     #SKs  -reassured benign    Procedures Performed:   - Shave biopsy x2 procedure note, location(s): R ear lobe, R medial mid cheek. After discussion of benefits and risks including but not limited to bleeding, infection, scar, incomplete removal, recurrence, and non-diagnostic biopsy, written consent and photographs were obtained. The area was cleaned with isopropyl alcohol. 0.5mL of 1% lidocaine with epinephrine was injected to obtain adequate anesthesia of lesion(s). Shave biopsy at site(s) performed.  Hemostasis was achieved with aluminium chloride. Petrolatum ointment and a sterile dressing were applied. The patient tolerated the procedure and no complications were noted. The patient was provided with verbal and written post care instructions.      Follow-up: 1 year(s) in-person, or earlier for new or changing lesions    Staff:     All risks, benefits and alternatives were discussed with patient.  Patient is in agreement and understands the assessment and plan.  All questions were answered.    Bonny Andrew PA-C, MPAS  [facility]: Phone: [phone], Fax: [phone]  [facility]: Phone: [phone],  Fax: [phone]  ____________________________________________    CC: Skin Check (Annual skin check - spot near ear of concern today.)      HPI:  Ms. Sadia Rider is a 75 year old female who presents today as a return patient for a FBSE. Hx BCC x2 in the past. Notes an area of concern today near the ear. It has been present a while, itchy. Notes increased anxiety with procedures of the face as she has had several in the past year.     Patient is otherwise feeling well, without additional concerns.    Labs:  none    Physical Exam:  Vitals: There were no vitals taken for this visit.  SKIN: Full skin, which includes the head/face, both arms, chest, back, abdomen,both legs, genitalia and/or groin buttocks, digits and/or nails, was examined.   - R ear lobe/loble - 3mm pink papule  - R medial mid cheek - 4mm pink shiny papule  - There is no erythema, telangectasias, nodularity, or pigmentation on the nose and R lower cheek.   - Multiple regular brown pigmented macules and papules are identified on the trunk and extremities.   -There are waxy stuck on tan to brown papules on the back.   - No other lesions of concern on areas examined.     Medications:  Current Outpatient Medications   Medication     Alendronate Sodium 70 MG  TBEF     anastrozole (ARIMIDEX) 1 MG tablet     aspirin 81 MG tablet     calcium carb-cholecalciferol 600-500 MG-UNIT CAPS     Cholecalciferol (VITAMIN D3 PO)     lisinopril (ZESTRIL) 10 MG tablet     Multiple Vitamins-Minerals (CENTRUM ULTRA WOMENS PO)     simvastatin (ZOCOR) 10 MG tablet     No current facility-administered medications for this visit.       Past Medical/Surgical History:   Patient Active Problem List   Diagnosis     Juvenile glaucoma     Myopia of both eyes     Stress fracture of metatarsal bone, right, with routine healing, subsequent encounter     Osteoporosis     Malignant neoplasm of upper-outer quadrant of right breast in female, estrogen receptor positive (H)     Conjugated hyperbilirubinemia     Past Medical History:   Diagnosis Date     Basal cell carcinoma      Breast cancer (H)      Glaucoma      POAG ADV     Hypertension      Osteoporosis      Other chronic pain      PONV (postoperative nausea and vomiting)     many years ago.     Tear film insufficiency, unspecified      CC Dr. Jackson on close of this encounter.

## 2021-02-22 NOTE — LETTER
2/22/2021       RE: Sadia Rider  [address]  [facility] [address]     Dear Colleague,    Thank you for referring your patient, Sadia Rider, to the [facility] [place] [facility]. Please see a copy of my visit note below.    [facility] Dermatology Note  Encounter Date: Feb 22, 2021  Office Visit      Dermatology Problem List:  0. NUB x2 - R ear lobe/lobule, R medial mid cheek s/ p bx 2/22/21  1. History of  NMSC  - BCC - right lower cheek, s/p Mohs 8/30/17  - BCC - R nose s/p excision 2012  2. History of intertrigo - OTC anti-fungal  3. Skin cancer screening, 2/22/2021    Social: .   ____________________________________________    Assessment & Plan:  # History of nonmelanoma skin cancer, no clincial evidence of recurrence.   - Monitor: Patient to continue monitoring at home and will contact the clinic for any changes.  - continue with annual skin exams     # Multiple clinically benign nevi on the trunk/skin cancer screening.   - ABCDEs: Counseled ABCDEs of melanoma: Asymmetry, Border (irregularity), Color (not uniform, changes in color), Diameter (greater than 6 mm which is about the size of a pencil eraser), and Evolving (any changes in preexisting moles).  - Sun protection: Counseled SPF30+ sunscreen, UPF clothing, sun avoidance, tanning bed avoidance.     # Neoplasm of uncertain behavior on the R ear lobe/lobule. The differential diagnosis includes BCC vs other.   - Shave biopsy performed today (see procedure note(s) below).    # Neoplasm of uncertain behavior on the R medial mid cheek. The differential diagnosis includes BCC vs other.   - Shave biopsy performed today (see procedure note(s) below).     #SKs  -reassured benign    Procedures Performed:   - Shave biopsy x2 procedure note, location(s): R ear lobe, R medial mid cheek. After discussion of benefits and risks including but not  limited to bleeding, infection, scar, incomplete removal, recurrence, and non-diagnostic biopsy, written consent and photographs were obtained. The area was cleaned with isopropyl alcohol. 0.5mL of 1% lidocaine with epinephrine was injected to obtain adequate anesthesia of lesion(s). Shave biopsy at site(s) performed. Hemostasis was achieved with aluminium chloride. Petrolatum ointment and a sterile dressing were applied. The patient tolerated the procedure and no complications were noted. The patient was provided with verbal and written post care instructions.      Follow-up: 1 year(s) in-person, or earlier for new or changing lesions    Staff:     All risks, benefits and alternatives were discussed with patient.  Patient is in agreement and understands the assessment and plan.  All questions were answered.    Bonny Andrew PA-C, [facility]  [facility] [place]: Phone: [phone], Fax: [phone]  [facility]: Phone: [phone],  Fax: [phone]  ____________________________________________    CC: Skin Check (Annual skin check - spot near ear of concern today.)      HPI:  Ms. Sadia Rider is a 75 year old female who presents today as a return patient for a FBSE. Hx BCC x2 in the past. Notes an area of concern today near the ear. It has been present a while, itchy. Notes increased anxiety with procedures of the face as she has had several in the past year.     Patient is otherwise feeling well, without additional concerns.    Labs:  none    Physical Exam:  Vitals: There were no vitals taken for this visit.  SKIN: Full skin, which includes the head/face, both arms, chest, back, abdomen,both legs, genitalia and/or groin buttocks, digits and/or nails, was examined.   - R ear lobe/loble - 3mm pink papule  - R medial mid cheek - 4mm pink shiny papule  - There is no erythema, telangectasias, nodularity, or pigmentation on the nose and R  lower cheek.   - Multiple regular brown pigmented macules and papules are identified on the trunk and extremities.   -There are waxy stuck on tan to brown papules on the back.   - No other lesions of concern on areas examined.     Medications:  Current Outpatient Medications   Medication     Alendronate Sodium 70 MG TBEF     anastrozole (ARIMIDEX) 1 MG tablet     aspirin 81 MG tablet     calcium carb-cholecalciferol 600-500 MG-UNIT CAPS     Cholecalciferol (VITAMIN D3 PO)     lisinopril (ZESTRIL) 10 MG tablet     Multiple Vitamins-Minerals (CENTRUM ULTRA WOMENS PO)     simvastatin (ZOCOR) 10 MG tablet     No current facility-administered medications for this visit.       Past Medical/Surgical History:   Patient Active Problem List   Diagnosis     Juvenile glaucoma     Myopia of both eyes     Stress fracture of metatarsal bone, right, with routine healing, subsequent encounter     Osteoporosis     Malignant neoplasm of upper-outer quadrant of right breast in female, estrogen receptor positive (H)     Conjugated hyperbilirubinemia     Past Medical History:   Diagnosis Date     Basal cell carcinoma      Breast cancer (H)      Glaucoma      POAG ADV     Hypertension      Osteoporosis      Other chronic pain      PONV (postoperative nausea and vomiting)     many years ago.     Tear film insufficiency, unspecified      CC Dr. Jackson on close of this encounter.

## 2021-02-22 NOTE — NURSING NOTE
Dermatology Rooming Note    Sadia Rider's goals for this visit include:   Chief Complaint   Patient presents with     Skin Check     Annual skin check - spot near ear of concern today.     Cathy Larry, CMA

## 2021-02-22 NOTE — NURSING NOTE
Lidocaine-epinephrine 1-1:371318 % injection   1.5mL once for one use, starting 2/22/2021 ending 2/22/2021,  2mL disp, R-0, injection  Injected by Cathy Larry, CMA

## 2021-02-22 NOTE — PATIENT INSTRUCTIONS

## 2021-02-23 LAB
COPATH REPORT: NORMAL
RADIOLOGIST FLAGS: NORMAL

## 2021-02-24 DIAGNOSIS — R17 ELEVATED BILIRUBIN: Primary | ICD-10-CM

## 2021-02-24 DIAGNOSIS — C50.411 MALIGNANT NEOPLASM OF UPPER-OUTER QUADRANT OF RIGHT FEMALE BREAST (H): ICD-10-CM

## 2021-02-24 DIAGNOSIS — Z17.0 ESTROGEN RECEPTOR POSITIVE STATUS (ER+): ICD-10-CM

## 2021-02-24 DIAGNOSIS — Z53.9 ERRONEOUS ENCOUNTER--DISREGARD: ICD-10-CM

## 2021-02-24 DIAGNOSIS — Q15.0 JUVENILE GLAUCOMA: Primary | ICD-10-CM

## 2021-02-24 ASSESSMENT — ENCOUNTER SYMPTOMS
SMELL DISTURBANCE: 0
FEVER: 1
DECREASED APPETITE: 1
SINUS CONGESTION: 0
FLANK PAIN: 0
ALTERED TEMPERATURE REGULATION: 1
STIFFNESS: 0
JOINT SWELLING: 0
SORE THROAT: 0
MYALGIAS: 1
WEIGHT LOSS: 0
POLYPHAGIA: 0
DYSURIA: 0
HEMATURIA: 0
POLYDIPSIA: 0
WHEEZING: 0
SHORTNESS OF BREATH: 0
ARTHRALGIAS: 0
WEIGHT GAIN: 0
SNORES LOUDLY: 0
MUSCLE WEAKNESS: 0
NIGHT SWEATS: 1
SINUS PAIN: 0
MUSCLE CRAMPS: 1
NECK MASS: 0
COUGH DISTURBING SLEEP: 1
SPUTUM PRODUCTION: 1
HEMOPTYSIS: 0
NECK PAIN: 0
COUGH: 1
INCREASED ENERGY: 0
HOARSE VOICE: 0
DYSPNEA ON EXERTION: 0
DIFFICULTY URINATING: 0
CHILLS: 1
TASTE DISTURBANCE: 0
FATIGUE: 1
HALLUCINATIONS: 0
POSTURAL DYSPNEA: 0
BACK PAIN: 0
TROUBLE SWALLOWING: 0

## 2021-02-25 ENCOUNTER — ONCOLOGY VISIT (OUTPATIENT)
Dept: ONCOLOGY | Facility: CLINIC | Age: 76
End: 2021-02-25
Attending: SURGERY
Payer: COMMERCIAL

## 2021-02-25 ENCOUNTER — RECORDS - HEALTHEAST (OUTPATIENT)
Dept: ADMINISTRATIVE | Facility: OTHER | Age: 76
End: 2021-02-25

## 2021-02-25 ENCOUNTER — OFFICE VISIT (OUTPATIENT)
Dept: OPHTHALMOLOGY | Facility: CLINIC | Age: 76
End: 2021-02-25
Attending: OPHTHALMOLOGY
Payer: COMMERCIAL

## 2021-02-25 VITALS
HEIGHT: 64 IN | DIASTOLIC BLOOD PRESSURE: 74 MMHG | TEMPERATURE: 97.8 F | BODY MASS INDEX: 28.94 KG/M2 | OXYGEN SATURATION: 99 % | RESPIRATION RATE: 14 BRPM | HEART RATE: 63 BPM | WEIGHT: 169.5 LBS | SYSTOLIC BLOOD PRESSURE: 163 MMHG

## 2021-02-25 DIAGNOSIS — H44.40 HYPOTONY OF EYE: ICD-10-CM

## 2021-02-25 DIAGNOSIS — Q15.0 JUVENILE GLAUCOMA: ICD-10-CM

## 2021-02-25 DIAGNOSIS — Q15.0 JUVENILE GLAUCOMA: Primary | ICD-10-CM

## 2021-02-25 DIAGNOSIS — Z96.1 PSEUDOPHAKIA OF BOTH EYES: Primary | ICD-10-CM

## 2021-02-25 DIAGNOSIS — C50.411 MALIGNANT NEOPLASM OF UPPER-OUTER QUADRANT OF RIGHT BREAST IN FEMALE, ESTROGEN RECEPTOR POSITIVE (H): Primary | ICD-10-CM

## 2021-02-25 DIAGNOSIS — Z17.0 MALIGNANT NEOPLASM OF UPPER-OUTER QUADRANT OF RIGHT BREAST IN FEMALE, ESTROGEN RECEPTOR POSITIVE (H): Primary | ICD-10-CM

## 2021-02-25 PROBLEM — Z53.9 ERRONEOUS ENCOUNTER--DISREGARD: Status: ACTIVE | Noted: 2021-02-25

## 2021-02-25 PROBLEM — Z53.9 ERRONEOUS ENCOUNTER--DISREGARD: Status: RESOLVED | Noted: 2021-02-25 | Resolved: 2021-02-25

## 2021-02-25 PROCEDURE — G0463 HOSPITAL OUTPT CLINIC VISIT: HCPCS

## 2021-02-25 PROCEDURE — 92081 LIMITED VISUAL FIELD XM: CPT | Performed by: OPHTHALMOLOGY

## 2021-02-25 PROCEDURE — 99207 DRIVERS LICENSE KINETIC & STATIC OU: CPT | Mod: 26 | Performed by: OPHTHALMOLOGY

## 2021-02-25 PROCEDURE — 92015 DETERMINE REFRACTIVE STATE: CPT

## 2021-02-25 PROCEDURE — 92134 CPTRZ OPH DX IMG PST SGM RTA: CPT | Performed by: OPHTHALMOLOGY

## 2021-02-25 PROCEDURE — 92083 EXTENDED VISUAL FIELD XM: CPT | Performed by: OPHTHALMOLOGY

## 2021-02-25 PROCEDURE — 99213 OFFICE O/P EST LOW 20 MIN: CPT | Mod: GC | Performed by: OPHTHALMOLOGY

## 2021-02-25 PROCEDURE — 99213 OFFICE O/P EST LOW 20 MIN: CPT | Performed by: SURGERY

## 2021-02-25 ASSESSMENT — CONF VISUAL FIELD
OS_INFERIOR_NASAL_RESTRICTION: 2
OS_SUPERIOR_TEMPORAL_RESTRICTION: 2
OD_INFERIOR_NASAL_RESTRICTION: 3
OD_SUPERIOR_NASAL_RESTRICTION: 3
OS_INFERIOR_TEMPORAL_RESTRICTION: 2
OS_SUPERIOR_NASAL_RESTRICTION: 2

## 2021-02-25 ASSESSMENT — TONOMETRY
OD_IOP_MMHG: 09
IOP_METHOD: APPLANATION
OS_IOP_MMHG: 06
OS_IOP_MMHG: 4
OD_IOP_MMHG: 6
IOP_METHOD: TONOPEN

## 2021-02-25 ASSESSMENT — CUP TO DISC RATIO
OD_RATIO: 0.6
OS_RATIO: 0.6

## 2021-02-25 ASSESSMENT — REFRACTION_WEARINGRX
OS_CYLINDER: SPHERE
OD_CYLINDER: +1.75
OD_AXIS: 120
OD_SPHERE: -4.50
OS_SPHERE: -0.50

## 2021-02-25 ASSESSMENT — REFRACTION_MANIFEST
OD_SPHERE: -5.00
OD_ADD: +2.75
OS_ADD: +2.75
OD_AXIS: 112
OS_CYLINDER: +0.50
OD_CYLINDER: +1.25
OS_SPHERE: -1.00
OS_AXIS: 115

## 2021-02-25 ASSESSMENT — VISUAL ACUITY
OS_CC: 20/150
METHOD: SNELLEN - LINEAR
OD_CC: 20/60
CORRECTION_TYPE: GLASSES

## 2021-02-25 ASSESSMENT — MIFFLIN-ST. JEOR: SCORE: 1256.6

## 2021-02-25 ASSESSMENT — PAIN SCALES - GENERAL: PAINLEVEL: NO PAIN (0)

## 2021-02-25 NOTE — PROGRESS NOTES
Chief Complaint/Presenting Concern: Here for glaucoma evaluation     History of Present Illness:   Sadia Rider is a 75 year old patient who presents for evaluation of glaucoma. Patient previously followed up by Dr. Mathis. Reports doing well, no recent change in vision, no eye pain. Reports that at times her vision fluctuates in quality. No using any eye drops currently.     Relevant Past Medical/Family/Social History: None relevant     Relevant Review of Systems: None relevant       Diagnosis: Juvenile Open Angle Glaucoma each eye   Previous glaucoma surgery/laser:  s/p ALT OU in 2009  s/p Trab OS in 2004 by Dr. Mathis  s/p Trab OD in 1990 by Dr. Muñoz  s/p Trab OU at 27 yo  Currently Meds: None   Family history: positive daughter, mother, brother, great-grandma -- all on gtts, surgery and mother lost vision   Meds AEs/intolerance: AGN - contact dermatitis     PMHx: No history of Asthma and respiratory problems/Cardiac/Renal/Kidney stones/Sulfa Allergy    Previous testing:  Visual field 11/23/2020  Right eye - inferior nasal step and paracentral defect, slightly more depressed at point but also less depressed at other points, likely fluctuations, reliable  Left eye - inferior nasal/arcuate defect, central defect  Fluctuations, reliable  OCT Optic Nerve RNFL Spectralis 11/23/2020  right eye: superior and inferior RNFL thinning, stable   left eye: superior and inferior RNFL thinning, possible worsening of IN thinning likely fluctuating       Today's testing:  IOP 6/4 mmHg   Visual field February 25, 2021:  Right eye - inferior nasal step and paracentral defect, stable, reliable    Additional Ocular History:    2. 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    Doing well, stable with no evidence of hypotony signs of exam.     Careful precautions given to patient to watch out for any  signs and symptoms of bleb leak/infection. Advised to avoid eye rubbing and lake water.     RTC 3-4 months    Skip Gabriel MD  Ophthalmology PGY-3      Physician Attestation     Attending Physician Attestation:  Complete documentation of historical and exam elements from today's encounter can be found in the full encounter summary report (not reduplicated in this progress note). I personally obtained the chief complaint(s) and history of present illness. I confirmed and edited as necessary the review of systems, past medical/surgical history, family history, social history, and examination findings as documented by others; and I examined the patient myself. I personally reviewed the relevant tests, images, and reports as documented above. I personally reviewed the ophthalmic test(s) associated with this encounter, agree with the interpretation(s) as documented by the resident/fellow and have edited the corresponding report(s) as necessary. I formulated and edited as necessary the assessment and plan and discussed the findings and management plan with the patient and any family members present at the time of the visit.  Ramon Morin M.D., Glaucoma, February 24, 2021

## 2021-02-25 NOTE — LETTER
"    2/25/2021         RE: Sadia Rider  [address] [facility] [address]        Dear Colleague,    Thank you for referring your patient, Sadia Rider, to the [facility] BREAST [facility]. Please see a copy of my visit note below.    FOLLOW-UP  Feb 25, 2021    Sadia Rider is a 75 year old female who returns for follow-up for     Cancer Staging  Malignant neoplasm of upper-outer quadrant of right breast in female, estrogen receptor positive (H)  Staging form: Breast, AJCC 8th Edition  - Clinical: Stage IA (cT1c, cN0, cM0, G1, ER+, OK+, HER2-) - Signed by Grace Madison MD on 3/3/2020      Treatment to date:  1. Genetic testing (3/16/2020) - negative for pathogenic variants in: ELIZABET, BRCA1, BRCA2, CDH1, CHEK2, NBN, NF1, PALB2,   PTEN, STK11, TP53  2. Right wire-localized segmental mastectomy and axillary sentinel lymph node biopsy (3/26/2020)  3. Oncotype DX 13  4. Adjuvant arimidex (5/5/2020 to ongoing)    HPI:    Since her last visit, she has been doing well.  She denies any headaches, dizziness, vision changes, back pain, abdominal pain, bowel habit changes, rectal bleeding, melena, chest pain, shortness of breath, or unintentional weight loss.  She noted no masses in either breast, axilla, or neck. She denies any nipple discharge or nipple inversion.  She has good range of motion of her shoulders bilaterally and denies any upper extremity swelling.     She got her 2nd dose of COVID-19 vaccine on 2/20/2021.    BP (!) 163/74 (BP Location: Right arm, Patient Position: Chair, Cuff Size: Adult Regular)   Pulse 63   Temp 97.8  F (36.6  C)   Resp 14   Ht 1.638 m (5' 4.49\")   Wt 76.9 kg (169 lb 8 oz)   SpO2 99%   BMI 28.66 kg/m     Physical Exam  Constitutional:       Appearance: She is well-developed.   Chest:      Breasts: Breasts are symmetrical.         Right: No inverted nipple, mass, nipple discharge, skin change or tenderness.         Left: No inverted nipple, " mass, nipple discharge, skin change or tenderness.          Comments: Patient was examined in both supine and upright positions.   Lymphadenopathy:      Cervical: No cervical adenopathy.      Right cervical: No superficial, deep or posterior cervical adenopathy.     Left cervical: No superficial, deep or posterior cervical adenopathy.      Upper Body:      Right upper body: No supraclavicular, axillary or pectoral adenopathy.      Left upper body: No supraclavicular, axillary or pectoral adenopathy.      Comments: No lymphedema in bilateral upper extremities.   Skin:     General: Skin is warm and dry.          INVESTIGATIONS:    Bilateral Screening Mammogram (2/22/2021) showed:  BREAST DENSITY: Scattered fibroglandular densities.  COMMENTS: No suspicious finding,  Post consideration therapy changes in right breast. There are left axillary plump lymph nodes most likely related to COVID vaccination.  IMPRESSION: BI-RADS CATEGORY: 2 - Benign.    ASSESSMENT:    Sadia Rider is a 75 year old female with right breast cancer nearly 1 year s/p surgery.    She is doing well.  There is no clinical evidence of recurrence today. Her next mammogram is due February 2022.  She will be following up with Dr Harper as well.       We discussed the finding on mammogram regarding her left axillary nodes. I personally reviewed her mammogram today. They are not palpable today and were likely reactive from the COVID-19 vaccination she had received 2 days prior to the mammogram.  I've not recommended any specific follow up at this time.  She will reach out if she develops any concerning signs or symptoms of either breast or axilla.      I will see her on an as needed basis.    Total time spent with the patient was 15 minutes, of which 75% was counseling.     PLAN:  1. Next mammogram in February 2022  2. Follow up with Dr [facility]    Division of Surgical Oncology  [facility]      22 minutes spent on the date of the encounter doing chart review, review of test results, interpretation of tests, patient visit and documentation.

## 2021-02-25 NOTE — NURSING NOTE
Chief Complaints and History of Present Illnesses   Patient presents with     Glaucoma Follow-Up     Chief Complaint(s) and History of Present Illness(es)     Glaucoma Follow-Up     Laterality: both eyes              Comments     Pt. States that she is doing well. VA seems stable BE. No pain or dryness BE. No new floaters BE.  Candelaria RAMIREZ 8:31 AM February 25, 2021

## 2021-02-25 NOTE — PROGRESS NOTES
"FOLLOW-UP  Feb 25, 2021    Sadia Rider is a 75 year old female who returns for follow-up for     Cancer Staging  Malignant neoplasm of upper-outer quadrant of right breast in female, estrogen receptor positive (H)  Staging form: Breast, AJCC 8th Edition  - Clinical: Stage IA (cT1c, cN0, cM0, G1, ER+, MT+, HER2-) - Signed by Grace Madison MD on 3/3/2020      Treatment to date:  1. Genetic testing (3/16/2020) - negative for pathogenic variants in: ELIZABET, BRCA1, BRCA2, CDH1, CHEK2, NBN, NF1, PALB2,   PTEN, STK11, TP53  2. Right wire-localized segmental mastectomy and axillary sentinel lymph node biopsy (3/26/2020)  3. Oncotype DX 13  4. Adjuvant arimidex (5/5/2020 to ongoing)    HPI:    Since her last visit, she has been doing well.  She denies any headaches, dizziness, vision changes, back pain, abdominal pain, bowel habit changes, rectal bleeding, melena, chest pain, shortness of breath, or unintentional weight loss.  She noted no masses in either breast, axilla, or neck. She denies any nipple discharge or nipple inversion.  She has good range of motion of her shoulders bilaterally and denies any upper extremity swelling.     She got her 2nd dose of COVID-19 vaccine on 2/20/2021.    BP (!) 163/74 (BP Location: Right arm, Patient Position: Chair, Cuff Size: Adult Regular)   Pulse 63   Temp 97.8  F (36.6  C)   Resp 14   Ht 1.638 m (5' 4.49\")   Wt 76.9 kg (169 lb 8 oz)   SpO2 99%   BMI 28.66 kg/m     Physical Exam  Constitutional:       Appearance: She is well-developed.   Chest:      Breasts: Breasts are symmetrical.         Right: No inverted nipple, mass, nipple discharge, skin change or tenderness.         Left: No inverted nipple, mass, nipple discharge, skin change or tenderness.          Comments: Patient was examined in both supine and upright positions.   Lymphadenopathy:      Cervical: No cervical adenopathy.      Right cervical: No superficial, deep or posterior cervical adenopathy.     Left " cervical: No superficial, deep or posterior cervical adenopathy.      Upper Body:      Right upper body: No supraclavicular, axillary or pectoral adenopathy.      Left upper body: No supraclavicular, axillary or pectoral adenopathy.      Comments: No lymphedema in bilateral upper extremities.   Skin:     General: Skin is warm and dry.          INVESTIGATIONS:    Bilateral Screening Mammogram (2/22/2021) showed:  BREAST DENSITY: Scattered fibroglandular densities.  COMMENTS: No suspicious finding,  Post consideration therapy changes in right breast. There are left axillary plump lymph nodes most likely related to COVID vaccination.  IMPRESSION: BI-RADS CATEGORY: 2 - Benign.    ASSESSMENT:    Sadia Rider is a 75 year old female with right breast cancer nearly 1 year s/p surgery.    She is doing well.  There is no clinical evidence of recurrence today. Her next mammogram is due February 2022.  She will be following up with Dr Harper as well.       We discussed the finding on mammogram regarding her left axillary nodes. I personally reviewed her mammogram today. They are not palpable today and were likely reactive from the COVID-19 vaccination she had received 2 days prior to the mammogram.  I've not recommended any specific follow up at this time.  She will reach out if she develops any concerning signs or symptoms of either breast or axilla.      I will see her on an as needed basis.    Total time spent with the patient was 15 minutes, of which 75% was counseling.     PLAN:  1. Next mammogram in February 2022  2. Follow up with Dr Leroy Madison MD MSc [facility] FACS    Division of Surgical Oncology  [facility]     22 minutes spent on the date of the encounter doing chart review, review of test results, interpretation of tests, patient visit and documentation.

## 2021-03-22 DIAGNOSIS — Z17.0 MALIGNANT NEOPLASM OF UPPER-OUTER QUADRANT OF RIGHT BREAST IN FEMALE, ESTROGEN RECEPTOR POSITIVE (H): ICD-10-CM

## 2021-03-22 DIAGNOSIS — C50.411 MALIGNANT NEOPLASM OF UPPER-OUTER QUADRANT OF RIGHT BREAST IN FEMALE, ESTROGEN RECEPTOR POSITIVE (H): ICD-10-CM

## 2021-03-22 RX ORDER — ANASTROZOLE 1 MG/1
1 TABLET ORAL DAILY
Qty: 90 TABLET | Refills: 3 | Status: SHIPPED | OUTPATIENT
Start: 2021-03-22 | End: 2022-03-06

## 2021-03-22 NOTE — TELEPHONE ENCOUNTER
Pending Prescriptions:                       Disp   Refills    anastrozole (ARIMIDEX) 1 MG tablet        90 tab*3            Sig: Take 1 tablet (1 mg) by mouth daily          Last Written Prescription Date:  5/5/2020  Last Fill Quantity: 90,   # refills: 3  Last Office Visit: 12/10/2020  Future Office visit:    Next 5 appointments (look out 90 days)    Apr 14, 2021 10:30 AM  Return Visit with Ezra Harper MD  [facility] [address]  [facility] [address]  [phone]           Routing refill request to provider for review/approval  Leandra Perdomo RN on 3/22/2021 at 3:43 PM

## 2021-03-28 ENCOUNTER — HEALTH MAINTENANCE LETTER (OUTPATIENT)
Age: 76
End: 2021-03-28

## 2021-04-14 ENCOUNTER — OFFICE VISIT (OUTPATIENT)
Dept: ONCOLOGY | Facility: CLINIC | Age: 76
End: 2021-04-14
Attending: INTERNAL MEDICINE
Payer: COMMERCIAL

## 2021-04-14 VITALS
HEART RATE: 59 BPM | HEIGHT: 65 IN | TEMPERATURE: 97.8 F | WEIGHT: 169 LBS | DIASTOLIC BLOOD PRESSURE: 77 MMHG | SYSTOLIC BLOOD PRESSURE: 153 MMHG | RESPIRATION RATE: 16 BRPM | BODY MASS INDEX: 28.16 KG/M2 | OXYGEN SATURATION: 99 %

## 2021-04-14 DIAGNOSIS — Z17.0 MALIGNANT NEOPLASM OF UPPER-OUTER QUADRANT OF RIGHT BREAST IN FEMALE, ESTROGEN RECEPTOR POSITIVE (H): Primary | ICD-10-CM

## 2021-04-14 DIAGNOSIS — M81.0 AGE-RELATED OSTEOPOROSIS WITHOUT CURRENT PATHOLOGICAL FRACTURE: ICD-10-CM

## 2021-04-14 DIAGNOSIS — C50.411 MALIGNANT NEOPLASM OF UPPER-OUTER QUADRANT OF RIGHT BREAST IN FEMALE, ESTROGEN RECEPTOR POSITIVE (H): Primary | ICD-10-CM

## 2021-04-14 PROCEDURE — 99214 OFFICE O/P EST MOD 30 MIN: CPT | Performed by: INTERNAL MEDICINE

## 2021-04-14 PROCEDURE — G0463 HOSPITAL OUTPT CLINIC VISIT: HCPCS

## 2021-04-14 ASSESSMENT — MIFFLIN-ST. JEOR: SCORE: 1250.58

## 2021-04-14 ASSESSMENT — PAIN SCALES - GENERAL: PAINLEVEL: NO PAIN (0)

## 2021-04-14 NOTE — LETTER
4/14/2021         RE: Sadia Rider  [address] [address]        Dear Colleague,    Thank you for referring your patient, Sadia Rider, to the [facility]. Please see a copy of my visit note below.    Visit Date:   04/14/2021      Sadia Rider is a 76-year-old patient who is being evaluated today for a right-sided breast cancer.      CHIEF COMPLAINT:   1.  An invasive ductal carcinoma of the right breast, ER/AK positive, HER-2 receptor negative.   2.  Oncotype with a recurrence score of 13.  3. Reviewed and distributed survivorship care plan       HISTORY OF PRESENTING COMPLAINT:  Sadia is a 76-year-old postmenopausal patient who was diagnosed with right-sided breast cancer in 02/2020.  It was a small tumor, 0.9 cm with negative lymph nodes, T1b N0 M0 infiltrating ductal carcinoma of the right breast, estrogen receptor positive.  Her Oncotype came back in the low risk of recurrence range.  The patient started on adjuvant Arimidex and seems to be tolerating it well.  She denies any major arthralgias or myalgias or hot flashes.  We are going to continue on the Arimidex.  The patient had a DEXA scan done in 01/2020.  I have reviewed that today.  She has some very mild thinning in the bones, but really her bone density looks fairly good.  We will repeat that again towards the end of this year.  She has nothing concerning today, except she does get some intermittent left leg pain.  She does have some problems also with her left knee.  She denies any respiratory, cardiovascular, genitourinary, gastrointestinal or neurological symptomatology that is worrisome.   I have gone through her survivorship care plan and given her a paper copy.      MEDICATIONS AND ALLERGIES:  Outlined in the nursing records.      GENETIC TESTING:  She did have genetic testing done in 03/2020, which was negative.      PAST MEDICAL HISTORY:  History of right-sided breast cancer,  history of glaucoma, history of mild osteopenia, history of arthritis.      SOCIAL HISTORY:  The patient is .  She has one 51-year-old daughter.  She is a nonsmoker.      PHYSICAL EXAMINATION:   GENERAL:  She is well-appearing lady in no acute distress.   NECK:  Has no masses or goiter.  Trachea is central.   LYMPHATICS:  There is no cervical, supraclavicular or infraclavicular adenopathy.   CHEST:  Clear to auscultation and percussion bilaterally.   HEART:  Sounds 1, 2 normal.  No added sounds or murmurs.   BREASTS:  Right breast is status post right-sided lumpectomy.  The scar looks good.  Axillary scar also looks good, no further palpable masses.  Left breast normal, no palpable masses.  Right and left axillae negative.   GASTROINTESTINAL:  Abdomen is soft and nontender, no hepatosplenomegaly.   EXTREMITIES:  Legs without tenderness or edema. No obvious cause for intermittent leg pain . No clinical evidence of clot.  NEUROLOGIC:  The patient is alert and oriented x 3.   PSYCHIATRIC:  Normal.   SKIN:  Has no rashes or lesions.      DATA REVIEW:  I have reviewed a mammogram that she had done on 02/22/2021, and that was normal with just some postsurgical change on the right side.  I have also reviewed her previous bone density.  She is up-to-date on lab work, which was done in the summer of 2020.      IMPRESSION AND PLAN:  This is a 76-year-old postmenopausal patient.  Her final pathological stage of disease is a T1b N0 M0 invasive ductal carcinoma of the right breast, ER/TN positive, HER-2 receptor negative.  Oncotype score of 13.  She will continue on adjuvant Arimidex.  There is nothing on her clinical exam to suggest a clot in the left leg.  I suspect that some of her left leg pain is referred pain from her knee.  Our plan would be to get another bone density this year and see her back in about 6 months.  All of the above has been discussed with her, and she is in agreement with the plan.         CUONG  AMEE DOSHI MD             D: 2021   T: 2021   MT: PARKER      Name:     CHANEL GASTON   MRN:      [id number]        Account:      [id number]   :      1945           Visit Date:   2021      Document: [id number]          Again, thank you for allowing me to participate in the care of your patient.        Sincerely,        Ezra Doshi MD

## 2021-04-14 NOTE — NURSING NOTE
"Oncology Rooming Note    April 14, 2021 10:55 AM   Sadia Rider is a 76 year old female who presents for:    Chief Complaint   Patient presents with     Oncology Clinic Visit     Malignant neoplasm of upper-outer quadrant of right breast in female     Initial Vitals: BP (!) 153/77   Pulse 59   Temp 97.8  F (36.6  C) (Tympanic)   Resp 16   Ht 1.64 m (5' 4.57\")   Wt 76.7 kg (169 lb)   SpO2 99%   BMI 28.50 kg/m   Estimated body mass index is 28.5 kg/m  as calculated from the following:    Height as of this encounter: 1.64 m (5' 4.57\").    Weight as of this encounter: 76.7 kg (169 lb). Body surface area is 1.87 meters squared.  No Pain (0) Comment: Data Unavailable   No LMP recorded. Patient is postmenopausal.  Allergies reviewed: Yes  Medications reviewed: Yes    Medications: Medication refills not needed today.  Pharmacy name entered into [employer]: [facility] [id number] [place] [id number] [address]    Clinical concerns: follow up       Zahra Oliva CMA              "

## 2021-04-14 NOTE — PROGRESS NOTES
Visit Date:   04/14/2021      Sadia Rider is a 76-year-old patient who is being evaluated today for a right-sided breast cancer.      CHIEF COMPLAINT:   1.  An invasive ductal carcinoma of the right breast, ER/SD positive, HER-2 receptor negative.   2.  Oncotype with a recurrence score of 13.  3. Reviewed and distributed survivorship care plan       HISTORY OF PRESENTING COMPLAINT:  Sadia is a 76-year-old postmenopausal patient who was diagnosed with right-sided breast cancer in 02/2020.  It was a small tumor, 0.9 cm with negative lymph nodes, T1b N0 M0 infiltrating ductal carcinoma of the right breast, estrogen receptor positive.  Her Oncotype came back in the low risk of recurrence range.  The patient started on adjuvant Arimidex and seems to be tolerating it well.  She denies any major arthralgias or myalgias or hot flashes.  We are going to continue on the Arimidex.  The patient had a DEXA scan done in 01/2020.  I have reviewed that today.  She has some very mild thinning in the bones, but really her bone density looks fairly good.  We will repeat that again towards the end of this year.  She has nothing concerning today, except she does get some intermittent left leg pain.  She does have some problems also with her left knee.  She denies any respiratory, cardiovascular, genitourinary, gastrointestinal or neurological symptomatology that is worrisome.   I have gone through her survivorship care plan and given her a paper copy.      MEDICATIONS AND ALLERGIES:  Outlined in the nursing records.      GENETIC TESTING:  She did have genetic testing done in 03/2020, which was negative.      PAST MEDICAL HISTORY:  History of right-sided breast cancer, history of glaucoma, history of mild osteopenia, history of arthritis.      SOCIAL HISTORY:  The patient is .  She has one 51-year-old daughter.  She is a nonsmoker.      PHYSICAL EXAMINATION:   GENERAL:  She is well-appearing lady in no acute distress.   NECK:   Has no masses or goiter.  Trachea is central.   LYMPHATICS:  There is no cervical, supraclavicular or infraclavicular adenopathy.   CHEST:  Clear to auscultation and percussion bilaterally.   HEART:  Sounds 1, 2 normal.  No added sounds or murmurs.   BREASTS:  Right breast is status post right-sided lumpectomy.  The scar looks good.  Axillary scar also looks good, no further palpable masses.  Left breast normal, no palpable masses.  Right and left axillae negative.   GASTROINTESTINAL:  Abdomen is soft and nontender, no hepatosplenomegaly.   EXTREMITIES:  Legs without tenderness or edema. No obvious cause for intermittent leg pain . No clinical evidence of clot.  NEUROLOGIC:  The patient is alert and oriented x 3.   PSYCHIATRIC:  Normal.   SKIN:  Has no rashes or lesions.      DATA REVIEW:  I have reviewed a mammogram that she had done on 2021, and that was normal with just some postsurgical change on the right side.  I have also reviewed her previous bone density.  She is up-to-date on lab work, which was done in the summer of .      IMPRESSION AND PLAN:  This is a 76-year-old postmenopausal patient.  Her final pathological stage of disease is a T1b N0 M0 invasive ductal carcinoma of the right breast, ER/LA positive, HER-2 receptor negative.  Oncotype score of 13.  She will continue on adjuvant Arimidex.  There is nothing on her clinical exam to suggest a clot in the left leg.  I suspect that some of her left leg pain is referred pain from her knee.  Our plan would be to get another bone density this year and see her back in about 6 months.  All of the above has been discussed with her, and she is in agreement with the plan.         CUONG DOSHI MD             D: 2021   T: 2021   MT: PARKER      Name:     CHANEL GASTON   MRN:      [id number]        Account:      [id number]   :      1945           Visit Date:   2021      Document: [id number]

## 2021-04-19 ENCOUNTER — OFFICE VISIT - HEALTHEAST (OUTPATIENT)
Dept: FAMILY MEDICINE | Facility: CLINIC | Age: 76
End: 2021-04-19

## 2021-04-19 DIAGNOSIS — C50.411 MALIGNANT NEOPLASM OF UPPER-OUTER QUADRANT OF RIGHT BREAST IN FEMALE, ESTROGEN RECEPTOR POSITIVE (H): ICD-10-CM

## 2021-04-19 DIAGNOSIS — Z17.0 MALIGNANT NEOPLASM OF UPPER-OUTER QUADRANT OF RIGHT BREAST IN FEMALE, ESTROGEN RECEPTOR POSITIVE (H): ICD-10-CM

## 2021-04-19 DIAGNOSIS — I10 ESSENTIAL HYPERTENSION: ICD-10-CM

## 2021-04-19 DIAGNOSIS — M81.0 AGE-RELATED OSTEOPOROSIS WITHOUT CURRENT PATHOLOGICAL FRACTURE: ICD-10-CM

## 2021-04-19 DIAGNOSIS — Z86.39 HISTORY OF HYPOTHYROIDISM: ICD-10-CM

## 2021-04-19 DIAGNOSIS — E78.2 MIXED HYPERLIPIDEMIA: ICD-10-CM

## 2021-04-19 DIAGNOSIS — E55.9 VITAMIN D DEFICIENCY: ICD-10-CM

## 2021-04-19 LAB
ALBUMIN SERPL-MCNC: 4.2 G/DL (ref 3.5–5)
ALP SERPL-CCNC: 49 U/L (ref 45–120)
ALT SERPL W P-5'-P-CCNC: 21 U/L (ref 0–45)
ANION GAP SERPL CALCULATED.3IONS-SCNC: 9 MMOL/L (ref 5–18)
AST SERPL W P-5'-P-CCNC: 21 U/L (ref 0–40)
BILIRUB SERPL-MCNC: 1.2 MG/DL (ref 0–1)
BUN SERPL-MCNC: 21 MG/DL (ref 8–28)
CALCIUM SERPL-MCNC: 9.5 MG/DL (ref 8.5–10.5)
CHLORIDE BLD-SCNC: 105 MMOL/L (ref 98–107)
CHOLEST SERPL-MCNC: 147 MG/DL
CO2 SERPL-SCNC: 25 MMOL/L (ref 22–31)
CREAT SERPL-MCNC: 0.89 MG/DL (ref 0.6–1.1)
FASTING STATUS PATIENT QL REPORTED: YES
GFR SERPL CREATININE-BSD FRML MDRD: >60 ML/MIN/1.73M2
GLUCOSE BLD-MCNC: 92 MG/DL (ref 70–125)
HDLC SERPL-MCNC: 42 MG/DL
LDLC SERPL CALC-MCNC: 73 MG/DL
POTASSIUM BLD-SCNC: 5 MMOL/L (ref 3.5–5)
PROT SERPL-MCNC: 6.8 G/DL (ref 6–8)
SODIUM SERPL-SCNC: 139 MMOL/L (ref 136–145)
T4 FREE SERPL-MCNC: 0.9 NG/DL (ref 0.7–1.8)
TRIGL SERPL-MCNC: 161 MG/DL
TSH SERPL DL<=0.005 MIU/L-ACNC: 3.4 UIU/ML (ref 0.3–5)

## 2021-04-19 ASSESSMENT — MIFFLIN-ST. JEOR: SCORE: 1274.75

## 2021-04-20 LAB
25(OH)D3 SERPL-MCNC: 38.6 NG/ML (ref 30–80)
25(OH)D3 SERPL-MCNC: 38.6 NG/ML (ref 30–80)

## 2021-05-25 ENCOUNTER — RECORDS - HEALTHEAST (OUTPATIENT)
Dept: ADMINISTRATIVE | Facility: CLINIC | Age: 76
End: 2021-05-25

## 2021-05-28 ENCOUNTER — OFFICE VISIT (OUTPATIENT)
Dept: OPHTHALMOLOGY | Facility: CLINIC | Age: 76
End: 2021-05-28
Attending: OPHTHALMOLOGY
Payer: COMMERCIAL

## 2021-05-28 DIAGNOSIS — Z96.1 PSEUDOPHAKIA OF BOTH EYES: ICD-10-CM

## 2021-05-28 DIAGNOSIS — H44.40 HYPOTONY OF EYE: ICD-10-CM

## 2021-05-28 DIAGNOSIS — Q15.0 JUVENILE GLAUCOMA: Primary | ICD-10-CM

## 2021-05-28 PROCEDURE — 99212 OFFICE O/P EST SF 10 MIN: CPT | Mod: GC | Performed by: OPHTHALMOLOGY

## 2021-05-28 PROCEDURE — 92134 CPTRZ OPH DX IMG PST SGM RTA: CPT | Performed by: OPHTHALMOLOGY

## 2021-05-28 PROCEDURE — G0463 HOSPITAL OUTPT CLINIC VISIT: HCPCS

## 2021-05-28 ASSESSMENT — TONOMETRY
OD_IOP_MMHG: 08
OS_IOP_MMHG: 2
OS_IOP_MMHG: 07
IOP_METHOD: TONOPEN
IOP_METHOD: TONOPEN
OD_IOP_MMHG: 5

## 2021-05-28 ASSESSMENT — REFRACTION_WEARINGRX
OS_SPHERE: -1.00
OD_CYLINDER: +1.25
OD_AXIS: 112
OD_SPHERE: -5.00
OS_ADD: +2.75
OS_AXIS: 115
OS_CYLINDER: +0.50
OD_ADD: +2.75

## 2021-05-28 ASSESSMENT — CUP TO DISC RATIO
OD_RATIO: 0.6
OS_RATIO: 0.6

## 2021-05-28 ASSESSMENT — CONF VISUAL FIELD
OS_SUPERIOR_NASAL_RESTRICTION: 2
OD_INFERIOR_TEMPORAL_RESTRICTION: 3
OS_SUPERIOR_TEMPORAL_RESTRICTION: 2
OD_SUPERIOR_NASAL_RESTRICTION: 3
OD_INFERIOR_NASAL_RESTRICTION: 3
OS_INFERIOR_NASAL_RESTRICTION: 1
OS_INFERIOR_TEMPORAL_RESTRICTION: 2

## 2021-05-28 ASSESSMENT — VISUAL ACUITY
METHOD: SNELLEN - LINEAR
OD_CC: 20/60
OS_CC: 20/150

## 2021-05-28 NOTE — NURSING NOTE
"Chief Complaints and History of Present Illnesses   Patient presents with     Follow Up     Juvenile Open Angle Glaucoma each eye      Chief Complaint(s) and History of Present Illness(es)     Follow Up     Laterality: both eyes    Associated symptoms: flashes and floaters.  Negative for eye pain    Comments: Juvenile Open Angle Glaucoma each eye               Comments     Pt states no change in [place] since last visit BE  Pt states floaters and occ flashes both eyes, \"not new\"    Keyanna Brothers COT 9:13 AM May 28, 2021                     "

## 2021-05-28 NOTE — PROGRESS NOTES
Chief Complaint/Presenting Concern: Here for glaucoma follow up    History of Present Illness:   Sadia Rider is a 75 year old patient who presents for glaucoma follow up. Patient previously followed up by Dr. Mathis. Reports doing well, no recent change in vision, no eye pain. Reports that at times her vision fluctuates in quality. No using any eye drops currently.     Relevant Past Medical/Family/Social History: None relevant     Relevant Review of Systems: None relevant       Diagnosis: Juvenile Open Angle Glaucoma each eye   Previous glaucoma surgery/laser:  s/p ALT OU in 2009  s/p Trab OS in 2004 by Dr. Mathis  s/p Trab OD in 1990 by Dr. Muñoz  s/p Trab OU at 29 yo  Currently Meds: None   Family history: positive daughter, mother, brother, great-grandma -- all on gtts, surgery and mother lost vision   Meds AEs/intolerance: AGN - contact dermatitis     PMHx: No history of Asthma and respiratory problems/Cardiac/Renal/Kidney stones/Sulfa Allergy  Previous testing:  Visual field 11/23/2020  Right eye - inferior nasal step and paracentral defect, slightly more depressed at point but also less depressed at other points, likely fluctuations, reliable  Left eye - inferior nasal/arcuate defect, central defect  Fluctuations, reliable  Visual field February 25, 2021:  Right eye - inferior nasal step and paracentral defect, stable, reliable  OCT Optic Nerve RNFL Spectralis 11/23/2020  right eye: superior and inferior RNFL thinning, stable   left eye: superior and inferior RNFL thinning, possible worsening of IN thinning likely fluctuating     Today's testing:  IOP 5/2 mmHg  AC formed, no gross corneal edema, no hypotony maculopathy, no choroidals     Additional Ocular History:    2. 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    Doing well, stable with no evidence of hypotony signs of exam.      Careful precautions given to patient to watch out for any signs and symptoms of bleb leak/infection. Advised to avoid eye rubbing and lake water.     RTC 3-4 months    Stepan Sesay MD  Ophthalmology PGY-3  [facility]       Physician Attestation     Attending Physician Attestation:  Complete documentation of historical and exam elements from today's encounter can be found in the full encounter summary report (not reduplicated in this progress note). I personally obtained the chief complaint(s) and history of present illness. I confirmed and edited as necessary the review of systems, past medical/surgical history, family history, social history, and examination findings as documented by others; and I examined the patient myself. I personally reviewed the relevant tests, images, and reports as documented above. I personally reviewed the ophthalmic test(s) associated with this encounter. I formulated and edited as necessary the assessment and plan and discussed the findings and management plan with the patient and any family members present at the time of the visit.  Ramon Morin M.D., Glaucoma, May 28, 2021

## 2021-05-30 VITALS — WEIGHT: 168 LBS | BODY MASS INDEX: 27.99 KG/M2 | HEIGHT: 65 IN

## 2021-05-30 NOTE — PROGRESS NOTES
Assessment and Plan:       1. Routine general medical examination at a health care facility    2. Mixed hyperlipidemia     Labs were drawn. She will continue her same medications. We reviewed dietary recommendations, including low salt and high fiber diet, and  recommendations for regular exercise/activity.  We discussed limiting saturated and trans fats in her diet and using more of the good fats such as olive oil, canola oil, flax seed and peanut oil, etc.     - Lipid [place] FASTING  - Comprehensive Metabolic Panel    3. Age-related osteoporosis without current pathological fracture     We discussed recommended follow up with DEXA and she is due anytime. She will call to schedule.     4. Chronic rhinitis        The patient's current medical problems were reviewed.    I have had an Advance Directives discussion with the patient.  The following high BMI interventions were performed this visit: encouragement to exercise and lifestyle education regarding diet  The following health maintenance schedule was reviewed with the patient and provided in printed form in the after visit summary:   Health Maintenance   Topic Date Due     ZOSTER VACCINES (2 of 3) 08/26/2008     INFLUENZA VACCINE RULE BASED (1) 08/01/2019     FALL RISK ASSESSMENT  11/20/2019     DXA SCAN  03/08/2020     COLONOSCOPY  09/11/2020     MAMMOGRAM  02/18/2021     ADVANCE DIRECTIVES DISCUSSED WITH PATIENT  11/16/2022     TD 18+ HE  07/14/2025     PNEUMOCOCCAL POLYSACCHARIDE VACCINE AGE 65 AND OVER  Completed     PNEUMOCOCCAL CONJUGATE VACCINE FOR ADULTS (PCV13 OR PREVNAR)  Completed        Subjective:     Chief Complaint: Sadia Rider is an 74 y.o. female here for an Annual Wellness visit.     HPI:  Fasting today? No  Hyperlipidemia      Patient is also here for follow-up of dyslipidemia. A repeat fasting lipid profile was done. Compliance with treatment has been good. Patient denies muscle pain associated with her medications. Cardiac symptoms:  mild chronic right lower extremity edema. The swelling does go down overnight. Patient denies chest pain, dyspnea, exertional chest pressure/discomfort, fatigue, near-syncope and palpitations.       The patient exercises several times per week, walking. Weight trend: stable.  Previous history of cardiac disease includes: none.      Review of Systems:      Energy ok. Sleeping well. Mood ok. No significant anxiety.  Vision and hearing are normal.   Bowels are normal.     Please see above.  The rest of the review of systems are negative for all systems.    Patient Care Team:  Delfina Boyle MD as PCP - General     Patient Active Problem List   Diagnosis     Vitamin D Deficiency     Basal Cell Carcinoma Of The Skin     Chronic Rhinitis     Osteoporosis     Unspecified glaucoma     Mixed hyperlipidemia     S/P cholecystectomy     History of hypothyroidism     Past Medical History:   Diagnosis Date     Acute cholecystitis 4/16/2016     High cholesterol      History of vertebral compression fracture      Rheumatoid osteoperiostitis (H)      Skin cancer, basal cell       Past Surgical History:   Procedure Laterality Date     EYE SURGERY       LAPAROSCOPIC CHOLECYSTECTOMY N/A 4/17/2016    Procedure: CHOLECYSTECTOMY LAPAROSCOPIC;  Surgeon: Sumit Espino MD;  Location: [facility] OR;  Service:       Family History   Problem Relation Age of Onset     Cholelithiasis Father      Diabetes Brother      Cholelithiasis Brother      Diabetes Mother       Social History     Socioeconomic History     Marital status:      Spouse name: Not on file     Number of children: Not on file     Years of education: Not on file     Highest education level: Not on file   Occupational History     Not on file   Social Needs     Financial resource strain: Not on file     Food insecurity:     Worry: Not on file     Inability: Not on file     Transportation needs:     Medical: Not on file     Non-medical: Not on file   Tobacco Use  "    Smoking status: Never Smoker     Smokeless tobacco: Never Used   Substance and Sexual Activity     Alcohol use: Yes     Comment: Very rarely     Drug use: No     Sexual activity: Not on file   Lifestyle     Physical activity:     Days per week: Not on file     Minutes per session: Not on file     Stress: Not on file   Relationships     Social connections:     Talks on phone: Not on file     Gets together: Not on file     Attends Jain service: Not on file     Active member of club or organization: Not on file     Attends meetings of clubs or organizations: Not on file     Relationship status: Not on file     Intimate partner violence:     Fear of current or ex partner: Not on file     Emotionally abused: Not on file     Physically abused: Not on file     Forced sexual activity: Not on file   Other Topics Concern     Not on file   Social History Narrative     Not on file      Current Outpatient Medications   Medication Sig Dispense Refill     alendronate (FOSAMAX) 70 MG tablet Take 1 tab PO on an empty stomach with at least 8 oz of water, stay upright (sitting or standing) for at least 30-60 min after taking 12 tablet 2     aspirin 81 MG EC tablet Take 81 mg by mouth bedtime.        calcium carbonate-vitamin D3 (CALCIUM 600 WITH VITAMIN D3) 600 mg(1,500mg) -500 unit cap Take by mouth.       calcium polycarbophil (FIBERCON) 625 mg tablet Take 625 mg by mouth daily as needed.        cholecalciferol, vitamin D3, 1,000 unit tablet Take 1,000 Units by mouth daily.       multivitamin therapeutic (THERAGRAN) tablet Take 1 tablet by mouth daily.        simvastatin (ZOCOR) 20 MG tablet Take 0.5 tablets (10 mg total) by mouth at bedtime. 45 tablet 3     No current facility-administered medications for this visit.       Objective:   Vital Signs:   Visit Vitals  /78 (Patient Position: Sitting, Cuff Size: Adult Large)   Pulse 74   Ht 5' 4.5\" (1.638 m)   Wt 173 lb 7 oz (78.7 kg)   SpO2 98%   BMI 29.31 kg/m     "     VisionScreening:  No exam data present     PHYSICAL EXAM  General: alert, pleasant, and no distress  Head: Normocephalic, without obvious abnormality, atraumatic  Eyes: conjunctivae and sclerae normal and pupils equal, round, reactive to   light and accomodation  Ears: normal TM's and external ear canals both ears  Nose: no discharge, no sinus tenderness  Throat: lips, mucosa, and tongue normal; teeth and gums normal  Neck: no adenopathy, supple, symmetrical, trachea midline and thyroid normal  Back: symmetric, ROM normal. No CVA tenderness.  Lungs: clear to auscultation bilaterally  Breasts: exam deferred  Heart : regular rate and rhythm and no murmurs  Abdomen:  bowel sounds normal, no masses palpable and soft, non-tender  Pelvic: exam deferred   Extremities: extremities normal, atraumatic, no cyanosis or edema  Pulses: 2+ and symmetric  Skin: Skin color, texture, turgor normal. No rashes or lesions  Lymph nodes: Cervical, supraclavicular, and axillary nodes normal.  Neurologic: Grossly normal            Assessment Results 7/9/2019   Activities of Daily Living No help needed   Instrumental Activities of Daily Living No help needed   Mini Cog Total Score 5   Some recent data might be hidden     A Mini-Cog score of 0-2 suggests the possibility of dementia, score of 3-5 suggests no dementia    Identified Health Risks:     The patient was counseled and encouraged to consider modifying their diet and eating habits. She was provided with information on recommended healthy diet options.  Patient's advanced directive was discussed and I am comfortable with the patient's wishes.      Results for orders placed or performed in visit on 07/09/19   Lipid [place] FASTING   Result Value Ref Range    Cholesterol 152 <=199 mg/dL    Triglycerides 172 (H) <=149 mg/dL    HDL Cholesterol 45 (L) >=50 mg/dL    LDL Calculated 73 <=129 mg/dL    Patient Fasting > 8hrs? Yes    Comprehensive Metabolic Panel   Result Value Ref Range     Sodium 140 136 - 145 mmol/L    Potassium 4.0 3.5 - 5.0 mmol/L    Chloride 107 98 - 107 mmol/L    CO2 24 22 - 31 mmol/L    Anion Gap, Calculation 9 5 - 18 mmol/L    Glucose 84 70 - 125 mg/dL    BUN 18 8 - 28 mg/dL    Creatinine 0.86 0.60 - 1.10 mg/dL    GFR MDRD Af Amer >60 >60 mL/min/1.73m2    GFR MDRD Non Af Amer >60 >60 mL/min/1.73m2    Bilirubin, Total 1.2 (H) 0.0 - 1.0 mg/dL    Calcium 10.2 8.5 - 10.5 mg/dL    Protein, Total 7.3 6.0 - 8.0 g/dL    Albumin 4.4 3.5 - 5.0 g/dL    Alkaline Phosphatase 51 45 - 120 U/L    AST 21 0 - 40 U/L    ALT 22 0 - 45 U/L

## 2021-05-31 VITALS — BODY MASS INDEX: 29.02 KG/M2 | HEIGHT: 65 IN | WEIGHT: 174.19 LBS

## 2021-06-01 NOTE — TELEPHONE ENCOUNTER
Refill Approved    Rx renewed per Medication Renewal Policy. Medication was last renewed on .  simvastatin (ZOCOR) 20 MG tablet 45 tablet 3 2018     alendronate (FOSAMAX) 70 MG tablet 12 tablet 2 2019   OV 19      Deanne Daniels, [facility] Triage/Med Refill 2019     Requested Prescriptions   Pending Prescriptions Disp Refills     simvastatin (ZOCOR) 20 MG tablet [Pharmacy Med Name: SIMVASTATIN 20 MG TABLET] 45 tablet 3     Sig: TAKE 1/2 TABLET BY MOUTH DAILY AT BEDTIME       Statins Refill Protocol (Hmg CoA Reductase Inhibitors) Passed - 2019  4:55 PM        Passed - PCP or prescribing provider visit in past 12 months      Last office visit with prescriber/PCP: 2018 Delfina Boyle MD OR same dept: 2018 Delfina Boyle MD OR same specialty: 2018 Delfina Boyle MD  Last physical: 2019 Last MTM visit: Visit date not found   Next visit within 3 mo: Visit date not found  Next physical within 3 mo: Visit date not found  Prescriber OR PCP: Delfina Boyle MD  Last diagnosis associated with med order: 1. Mixed hyperlipidemia  - simvastatin (ZOCOR) 20 MG tablet [Pharmacy Med Name: SIMVASTATIN 20 MG TABLET]; TAKE 1/2 TABLET BY MOUTH DAILY AT BEDTIME  Dispense: 45 tablet; Refill: 3    2. Age-related osteoporosis without current pathological fracture  - alendronate (FOSAMAX) 70 MG tablet [Pharmacy Med Name: ALENDRONATE SODIUM 70 MG TAB]; 1 TAB ON EMPTY STOMACH ONCE WEEKLY WITH AT LEAST 8 OZ WATER, STAY UPRIGHT FOR 30-60 MIN AFTER TAKING  Dispense: 12 tablet; Refill: 2    If protocol passes may refill for 12 months if within 3 months of last provider visit (or a total of 15 months).             alendronate (FOSAMAX) 70 MG tablet [Pharmacy Med Name: ALENDRONATE SODIUM 70 MG TAB] 12 tablet 2     Si TAB ON EMPTY STOMACH ONCE WEEKLY WITH AT LEAST 8 OZ WATER, STAY UPRIGHT FOR 30-60 MIN AFTER TAKING       Biphosphonates Refill Protocol Passed  - 9/29/2019  4:55 PM        Passed - PCP or prescribing provider visit in last 12 months     Last office visit with prescriber/PCP: 11/20/2018 Delfina Boyle MD OR same dept: 11/20/2018 Delfina Boyle MD OR same specialty: 11/20/2018 Delfina Boyle MD  Last physical: 7/9/2019 Last MTM visit: Visit date not found   Next visit within 3 mo: Visit date not found  Next physical within 3 mo: Visit date not found  Prescriber OR PCP: Delfina Boyle MD  Last diagnosis associated with med order: 1. Mixed hyperlipidemia  - simvastatin (ZOCOR) 20 MG tablet [Pharmacy Med Name: SIMVASTATIN 20 MG TABLET]; TAKE 1/2 TABLET BY MOUTH DAILY AT BEDTIME  Dispense: 45 tablet; Refill: 3    2. Age-related osteoporosis without current pathological fracture  - alendronate (FOSAMAX) 70 MG tablet [Pharmacy Med Name: ALENDRONATE SODIUM 70 MG TAB]; 1 TAB ON EMPTY STOMACH ONCE WEEKLY WITH AT LEAST 8 OZ WATER, STAY UPRIGHT FOR 30-60 MIN AFTER TAKING  Dispense: 12 tablet; Refill: 2    If protocol passes may refill for 12 months if within 3 months of last provider visit (or a total of 15 months).             Passed - Serum creatinine in last 12 months     Creatinine   Date Value Ref Range Status   07/09/2019 0.86 0.60 - 1.10 mg/dL Final

## 2021-06-02 ENCOUNTER — RECORDS - HEALTHEAST (OUTPATIENT)
Dept: ADMINISTRATIVE | Facility: CLINIC | Age: 76
End: 2021-06-02

## 2021-06-02 VITALS — WEIGHT: 174.38 LBS | BODY MASS INDEX: 29.05 KG/M2 | HEIGHT: 65 IN

## 2021-06-03 VITALS — WEIGHT: 173.44 LBS | BODY MASS INDEX: 28.9 KG/M2 | HEIGHT: 65 IN

## 2021-06-03 NOTE — TELEPHONE ENCOUNTER
Orders being requested: Bone Density Scan  Reason service is needed/diagnosis: Order in system is   When are orders needed by: New Orders needed for after the  of the year, please call Patient to schedule  Where to send Orders: Please place in chart  Okay to leave detailed message?  No

## 2021-06-04 VITALS
OXYGEN SATURATION: 99 % | WEIGHT: 176.38 LBS | BODY MASS INDEX: 29.38 KG/M2 | DIASTOLIC BLOOD PRESSURE: 68 MMHG | HEIGHT: 65 IN | HEART RATE: 70 BPM | SYSTOLIC BLOOD PRESSURE: 142 MMHG

## 2021-06-05 VITALS
HEART RATE: 61 BPM | HEIGHT: 66 IN | DIASTOLIC BLOOD PRESSURE: 70 MMHG | BODY MASS INDEX: 27.21 KG/M2 | WEIGHT: 169.31 LBS | SYSTOLIC BLOOD PRESSURE: 130 MMHG | OXYGEN SATURATION: 99 %

## 2021-06-05 VITALS
HEART RATE: 72 BPM | WEIGHT: 170 LBS | HEIGHT: 66 IN | SYSTOLIC BLOOD PRESSURE: 136 MMHG | DIASTOLIC BLOOD PRESSURE: 66 MMHG | BODY MASS INDEX: 27.32 KG/M2 | RESPIRATION RATE: 18 BRPM | OXYGEN SATURATION: 97 %

## 2021-06-05 VITALS
DIASTOLIC BLOOD PRESSURE: 70 MMHG | BODY MASS INDEX: 27.45 KG/M2 | WEIGHT: 170.1 LBS | SYSTOLIC BLOOD PRESSURE: 160 MMHG | HEART RATE: 69 BPM | OXYGEN SATURATION: 98 %

## 2021-06-05 VITALS
RESPIRATION RATE: 16 BRPM | HEART RATE: 54 BPM | BODY MASS INDEX: 27.19 KG/M2 | DIASTOLIC BLOOD PRESSURE: 64 MMHG | WEIGHT: 168.44 LBS | SYSTOLIC BLOOD PRESSURE: 140 MMHG | OXYGEN SATURATION: 99 %

## 2021-06-06 NOTE — PATIENT INSTRUCTIONS - HE
Hold AM medications day of surgery.    Follow instructions from surgery center staff regarding oral intake

## 2021-06-06 NOTE — TELEPHONE ENCOUNTER
Who is calling:  Sadia   Reason for Call:  Sadia has some questions about her lab results from 3/6/20, that she would like to discuss with Dr. Boyle and also some other concerns.   Date of last appointment with primary care: 3/6/20  Okay to leave a detailed message: Yes

## 2021-06-06 NOTE — TELEPHONE ENCOUNTER
Pt would like to know how much vitamin D she should be taking with this test result. Currently she's taking 3500 units.  Also, she's concerned about her bilirubin.

## 2021-06-06 NOTE — PROGRESS NOTES
Preoperative Exam    Scheduled Procedure: Lumpectomy with Pioneer Node Biopsy  Surgery Date: 3/12/2020  Surgery Location: [facility]    Surgeon:      Assessment/Plan:     1. Preop examination    2. Malignant neoplasm of upper-outer quadrant of right breast in female, estrogen receptor positive (H)  - XR Chest 2 Views; Future    3. Mixed hyperlipidemia  - Electrocardiogram Perform - Clinic  - 2(CBC w/o Differential)  - Comprehensive Metabolic Panel    4. Adjustment disorder with anxious mood  - LORazepam (ATIVAN) 0.5 MG tablet; Take 0.5-1 tablets (0.25-0.5 mg total) by mouth every 8 (eight) hours as needed for anxiety.  Dispense: 10 tablet; Refill: 0    5. History of hypothyroidism  - Thyroid Stimulating Hormone (TSH)  - T4, Free    6. Vitamin D deficiency  - Vitamin D, Total (25-Hydroxy)        Surgical Procedure Risk: Low (reported cardiac risk generally < 1%)  Have you had prior anesthesia?: Yes  Have you or any family members had a previous anesthesia reaction:  No  Do you or any family members have a history of a clotting or bleeding disorder?: No  Cardiac Risk Assessment: no increased risk for major cardiac complications    APPROVAL GIVEN to proceed with proposed procedure, without further diagnostic evaluation      Functional Status: Independent  Patient plans to recover at home with family.     Subjective:      Sadia Rider is a 74 y.o. female who presents for a preoperative consultation. Abnormal mammogram 2/19 with subsequent workup positive for invasive ductal carcinoma and DCIS.     Some smoke/chemicl exposures. Worries about the possibility of lung cancer and wonders if she should have a CXR. She denies recent fever, chills, cough, URI symptoms, dyspnea, abdominal pain, nausea, vomiting or diarrhea.      All other systems reviewed and are negative, other than those listed in the HPI.    Pertinent History  Do you have difficulty breathing or chest pain after walking up a flight of stairs:  No  History of obstructive sleep apnea: No  Steroid use in the last 6 months: No  Frequent Aspirin/NSAID use: Yes:    Prior Blood Transfusion: No  Prior Blood Transfusion Reaction: No  If for some reason prior to, during or after the procedure, if it is medically indicated, would you be willing to have a blood transfusion?:  There is no transfusion refusal.    Current Outpatient Medications   Medication Sig Dispense Refill     alendronate (FOSAMAX) 70 MG tablet 1 TAB ON EMPTY STOMACH ONCE WEEKLY WITH AT LEAST 8 OZ WATER, STAY UPRIGHT FOR 30-60 MIN AFTER TAKING 12 tablet 3     aspirin 81 MG EC tablet Take 81 mg by mouth bedtime.        calcium carbonate-vitamin D3 (CALCIUM 600 WITH VITAMIN D3) 600 mg(1,500mg) -500 unit cap Take by mouth.       calcium polycarbophil (FIBERCON) 625 mg tablet Take 625 mg by mouth daily as needed.        cholecalciferol, vitamin D3, 1,000 unit tablet Take 1,000 Units by mouth daily.       multivitamin therapeutic (THERAGRAN) tablet Take 1 tablet by mouth daily.        simvastatin (ZOCOR) 20 MG tablet TAKE 1/2 TABLET BY MOUTH DAILY AT BEDTIME 45 tablet 3     LORazepam (ATIVAN) 0.5 MG tablet Take 0.5-1 tablets (0.25-0.5 mg total) by mouth every 8 (eight) hours as needed for anxiety. 10 tablet 0     No current facility-administered medications for this visit.         Allergies   Allergen Reactions     Brimonidine Itching     Other reaction(s): Blisters       Patient Active Problem List   Diagnosis     Vitamin D Deficiency     Basal Cell Carcinoma Of The Skin     Chronic Rhinitis     Osteoporosis     Unspecified glaucoma     Mixed hyperlipidemia     S/P cholecystectomy     History of hypothyroidism       Past Medical History:   Diagnosis Date     Acute cholecystitis 4/16/2016     High cholesterol      History of vertebral compression fracture      Rheumatoid osteoperiostitis (H)      Skin cancer, basal cell        Past Surgical History:   Procedure Laterality Date     EYE SURGERY        "LAPAROSCOPIC CHOLECYSTECTOMY N/A 4/17/2016    Procedure: CHOLECYSTECTOMY LAPAROSCOPIC;  Surgeon: Sumit Espino MD;  Location: [facility] OR;  Service:        Social History     Socioeconomic History     Marital status:      Spouse name: Not on file     Number of children: Not on file     Years of education: Not on file     Highest education level: Not on file   Occupational History     Not on file   Social Needs     Financial resource strain: Not on file     Food insecurity:     Worry: Not on file     Inability: Not on file     Transportation needs:     Medical: Not on file     Non-medical: Not on file   Tobacco Use     Smoking status: Never Smoker     Smokeless tobacco: Never Used   Substance and Sexual Activity     Alcohol use: Yes     Comment: Very rarely     Drug use: No     Sexual activity: Not on file   Lifestyle     Physical activity:     Days per week: Not on file     Minutes per session: Not on file     Stress: Not on file   Relationships     Social connections:     Talks on phone: Not on file     Gets together: Not on file     Attends Jew service: Not on file     Active member of club or organization: Not on file     Attends meetings of clubs or organizations: Not on file     Relationship status: Not on file     Intimate partner violence:     Fear of current or ex partner: Not on file     Emotionally abused: Not on file     Physically abused: Not on file     Forced sexual activity: Not on file   Other Topics Concern     Not on file   Social History Narrative     Not on file       Patient Care Team:  Delfina Boyle MD as PCP - General  Delfina Boyle MD as Assigned PCP        Objective:     Vitals:    03/06/20 0948   BP: 142/68   Pulse: 70   SpO2: 99%   Weight: 176 lb 6 oz (80 kg)   Height: 5' 4.5\" (1.638 m)       Physical Exam   General: alert, pleasant, and no distress  Head: Normocephalic, without obvious abnormality, atraumatic  Eyes: conjunctivae and sclerae normal " and pupils equal, round, reactive to   light and accomodation  Ears: normal TM's and external ear canals both ears  Nose: no discharge, no sinus tenderness  Throat: lips, mucosa, and tongue normal; teeth and gums normal  Neck: no adenopathy, supple, symmetrical, trachea midline and thyroid normal  Back: symmetric, ROM normal. No CVA tenderness.  Lungs: clear to auscultation bilaterally  Heart : regular rate and rhythm and no murmurs  Abdomen:  bowel sounds normal, no masses palpable and soft, non-tender  Extremities: extremities normal, atraumatic, no cyanosis or edema  Pulses: 2+ and symmetric  Skin: Skin color, texture, turgor normal. No rashes or lesions  Lymph nodes: Cervical, supraclavicular, and axillary nodes normal.  Neurologic: Grossly normal              Patient Instructions     Hold AM medications day of surgery.    Follow instructions from surgery center staff regarding oral intake         Labs:  Results for orders placed or performed in visit on 03/06/20   HM2(CBC w/o Differential)   Result Value Ref Range    WBC 6.1 4.0 - 11.0 thou/uL    RBC 5.16 3.80 - 5.40 mill/uL    Hemoglobin 16.1 (H) 12.0 - 16.0 g/dL    Hematocrit 47.5 (H) 35.0 - 47.0 %    MCV 92 80 - 100 fL    MCH 31.3 27.0 - 34.0 pg    MCHC 34.0 32.0 - 36.0 g/dL    RDW 12.1 11.0 - 14.5 %    Platelets 254 140 - 440 thou/uL    MPV 8.7 7.0 - 10.0 fL   Comprehensive Metabolic Panel   Result Value Ref Range    Sodium 141 136 - 145 mmol/L    Potassium 4.2 3.5 - 5.0 mmol/L    Chloride 104 98 - 107 mmol/L    CO2 27 22 - 31 mmol/L    Anion Gap, Calculation 10 5 - 18 mmol/L    Glucose 88 70 - 125 mg/dL    BUN 19 8 - 28 mg/dL    Creatinine 0.89 0.60 - 1.10 mg/dL    GFR MDRD Af Amer >60 >60 mL/min/1.73m2    GFR MDRD Non Af Amer >60 >60 mL/min/1.73m2    Bilirubin, Total 1.3 (H) 0.0 - 1.0 mg/dL    Calcium 10.2 8.5 - 10.5 mg/dL    Protein, Total 7.3 6.0 - 8.0 g/dL    Albumin 4.4 3.5 - 5.0 g/dL    Alkaline Phosphatase 50 45 - 120 U/L    AST 22 0 - 40 U/L    ALT  28 0 - 45 U/L   Thyroid Stimulating Hormone (TSH)   Result Value Ref Range    TSH 3.62 0.30 - 5.00 uIU/mL   T4, Free   Result Value Ref Range    Free T4 0.8 0.7 - 1.8 ng/dL   Electrocardiogram Perform - Clinic   Result Value Ref Range    SYSTOLIC BLOOD PRESSURE      DIASTOLIC BLOOD PRESSURE      VENTRICULAR RATE 67 BPM    ATRIAL RATE 67 BPM    P-R INTERVAL 154 ms    QRS DURATION 82 ms    Q-T INTERVAL 428 ms    QTC CALCULATION (BEZET) 452 ms    P Axis 30 degrees    R AXIS 47 degrees    T AXIS 72 degrees    MUSE DIAGNOSIS       Normal sinus rhythm  Nonspecific ST abnormality  Abnormal ECG  When compared with ECG of 16-APR-2016 19:57,  No significant change was found  Confirmed by ROSEMARIE DENG, LES LOC: ([id number]) on 3/6/2020 2:38:00 PM          Immunization History   Administered Date(s) Administered     DT (pediatric) 01/01/1997     Hep A, historic 07/01/2008, 11/02/2009     Influenza high dose,seasonal,PF, 65+ yrs 08/30/2012, 09/04/2013, 10/13/2014, 10/01/2015, 09/13/2016, 10/04/2017     Influenza, inj, historic,unspecified 10/01/2015, 10/22/2018     Influenza, seasonal,quad inj 6-35 mos 09/10/2010, 09/13/2011     Pneumo Conj 13-V (2010&after) 07/14/2015     Pneumo Polysac 23-V 06/18/2010     Td, adult adsorbed, PF 07/14/2015     Td,adult,historic,unspecified 11/08/2007     Tdap 11/08/2007     ZOSTER, LIVE 07/01/2008           Electronically signed by Delfina Boyle MD 03/06/20 9:52 AM

## 2021-06-06 NOTE — TELEPHONE ENCOUNTER
Fax came in on patient form [facility] fax in Dr Domingo inbox.

## 2021-06-08 NOTE — PROGRESS NOTES
OV  1/23/2017  Assessment:     1. Osteoporosis     2. Metatarsal stress fracture     3. Hypothyroidism due to Hashimoto's thyroiditis  Thyroid Stimulating Hormone (TSH)    T4, Free       Plan:      Labs were drawn as noted. We reviewed her recent evaluation at the [facility] for her metatarsal fractures and discussed the potential causes. She will continue to follow up with ortho and we discussed the importance of supportive, well-fitted shoes with regular replacement to prevent undue wear. She will wait to return to her walking once given the all clear by ortho. As far as her bone health, it might be worthwhile for her to see endocrinology again to discuss one of the other treatments for osteoporosis and she will continue to get adequate vitamin D and calcium both through diet and supplements. She will follow up in 6-9 mos for her next med check otherwise.     Subjective:                Patient presents for follow up of right foot stress fractures. Onset of the symptoms was 2 months ago. Inciting event: none known. She had pain primarily of the right hip and knee and denies pain in the foot itself, and the foot began hurting after she had been wearing an ankle brace. Current symptoms include: foot pain only after walking more than a few minutes. Aggravating factors: walking . Symptoms have gradually improved. Patient has had no prior foot or ankle problems. Evaluation to date: Ortho consult: plain x-rays and CT scan. Treatment to date: avoidance of offending activity and aircast.      She had been walking 3 miles 4x weekly for several years, but had to back off a bit this spring after she had a cholecystectomy. She felt like it took her until late summer to get her energy back and she was only able to walk shorter distances for quite a while. She admits that she doesn't replace her tennis/walking shoes really frequently, and also scrubs her floors on her hands and knees. Her  wonders if the floor cleaning is to  "blame for the fractures. The symptoms occurred only 4 mos after stopping her fosamax for a drug holiday after 5 years of treatment. She has seen ortho at the [facility] for evaluation, and previously saw endocrinology there for her osteoporosis.     She also presents for follow up of hypothyroidism which she has not been treated for. She had reviewed the potential side effects and wanted to just monitor things for now. Current symptoms: none. Patient denies change in energy level, diarrhea, heat / cold intolerance and palpitations.     The following portions of the patient's history were reviewed and updated as appropriate: allergies, current medications, past family history, past medical history, past social history, past surgical history and problem list.    Review of Systems  Pertinent items are noted in HPI.    Objective:     Visit Vitals     /76     Pulse 60     Ht 5' 5\" (1.651 m)     Wt 168 lb (76.2 kg)     Breastfeeding No     BMI 27.96 kg/m2     Physical Exam:  GEN: Alert and oriented, NAD,  well nourished  SKIN:  Normal skin turgor, no lesions/rashes   HEENT: moist mucous membranes, no rhinorrhea.    NECK: Normal.  No adenopathy or thyromegaly.  CV: Regular rate and rhythm, no murmurs.   LUNGS: Clear to auscultation bilaterally.    ABDOMEN: Soft, non-tender, non-distended, no masses   EXTREMITY: No edema, cyanosis  NEURO: Grossly normal.       Laboratory:  Results for orders placed or performed in visit on 10/28/16   Lipid Cascade   Result Value Ref Range    Cholesterol 170 <=199 mg/dL    Triglycerides 253 (H) <=149 mg/dL    HDL Cholesterol 44 (L) >=50 mg/dL    LDL Calculated 75 <=129 mg/dL    Patient Fasting > 8hrs? Yes    Comprehensive Metabolic Panel   Result Value Ref Range    Sodium 141 136 - 145 mmol/L    Potassium 4.7 3.5 - 5.0 mmol/L    Chloride 105 98 - 107 mmol/L    CO2 26 22 - 31 mmol/L    Anion Gap, Calculation 10 5 - 18 mmol/L    Glucose 85 70 - 125 mg/dL    BUN 22 8 - 28 mg/dL    Creatinine " 0.79 0.60 - 1.10 mg/dL    GFR MDRD Af Amer >60 >60 mL/min/1.73m2    GFR MDRD Non Af Amer >60 >60 mL/min/1.73m2    Bilirubin, Total 1.1 (H) 0.0 - 1.0 mg/dL    Calcium 10.4 8.5 - 10.5 mg/dL    Protein, Total 7.2 6.0 - 8.0 g/dL    Albumin 4.2 3.5 - 5.0 g/dL    Alkaline Phosphatase 65 45 - 120 U/L    AST 20 0 - 40 U/L    ALT 23 0 - 45 U/L   Vitamin D, Total (25-Hydroxy)   Result Value Ref Range    Vitamin D, Total (25-Hydroxy) 38.4 30.0 - 80.0 ng/mL   HM2(CBC w/o Differential)   Result Value Ref Range    WBC 5.9 4.0 - 11.0 thou/uL    RBC 5.03 3.80 - 5.40 mill/uL    Hemoglobin 15.6 12.0 - 16.0 g/dL    Hematocrit 45.5 35.0 - 47.0 %    MCV 90 80 - 100 fL    MCH 31.0 27.0 - 34.0 pg    MCHC 34.3 32.0 - 36.0 g/dL    RDW 12.4 11.0 - 14.5 %    Platelets 237 140 - 440 thou/uL    MPV 8.1 7.0 - 10.0 fL   Thyroid Cascade   Result Value Ref Range    TSH 5.04 (H) 0.30 - 5.00 uIU/mL   T4, Free   Result Value Ref Range    Free T4 0.8 0.7 - 1.8 ng/dL

## 2021-06-09 NOTE — TELEPHONE ENCOUNTER
Orders being requested: labs (annual)  Reason service is needed/diagnosis: pt is requesting to do her annual labs.  She has a lab appt on 7/30 at [address] .  She thought she could just reduce her exposure by getting all her lab done at one time in one place.   When are orders needed by: Thurs 7/30 11:30  Where to send Orders: [facility]  Okay to leave detailed message?  Yes

## 2021-06-09 NOTE — PROGRESS NOTES
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    LVC (better reliability than 24-2) stable both eyes     Assessment :  1. Primary open angle glaucoma (POAG)    Trabeculectomy right eye in 1990    Trabeculectomy left eye 2004    S/p Argon laser trabeculoplasty both eyes 2009  Currently not using eyedrops  Allergy to Alphagan     2. posterior chamber intraocular lens (PCIOL) both eyes   Cataract extraction right eye 1998 and Cataract extraction left eye  2002  Post Yag right eye, now with posterior capsular opacity (PCO) left eye but baseline poor visual acuity so will recheck next time    Plan  Good intraocular pressure on no medications  (next summer do LVC  --do yearly given low intraocular pressure, inconsistent with 24-2)  return to clinic: 6 months for intraocular pressure check, dilated exam (patient concerned about age related macular degeneration) and OCT retinal nerve fiber layer      Attending Physician Attestation:  Complete documentation of historical and exam elements from today's encounter can be found in the full encounter summary report (not reduplicated in this progress note). I personally obtained the chief complaint(s) and history of present illness. I confirmed and edited asnecessary the review of systems, past medical/surgical history, family history, social history, and examination findings as documented by others; and I examined the patient myself. I personally reviewed the relevant tests, images, and reports as documented above. I formulated and edited as necessary the assessment and plan and discussed the findings and management plan with the patient and family.  - Evelyn Mathis MD 10:00 AM 7/24/2017     There are no Wet Read(s) to document. There is 1 Wet Read(s) to document.

## 2021-06-09 NOTE — TELEPHONE ENCOUNTER
Pt has blood draw ordered by cancer doctor 7/30/20.   Pt requesting Vitamin D, lipid panel and any other labs that Dr Boyle feel necessary to be drawn at the same time.     Patient wanted to ask since she's trying to limit where she goes.     HEstherDeGree, TOI

## 2021-06-09 NOTE — TELEPHONE ENCOUNTER
I can't place orders in [facility]. She could sure ask the oncologist to add them, but I will put them in our Epic in case they can take them as verbal orders.

## 2021-06-10 ENCOUNTER — RECORDS - HEALTHEAST (OUTPATIENT)
Dept: ADMINISTRATIVE | Facility: OTHER | Age: 76
End: 2021-06-10

## 2021-06-10 NOTE — PROGRESS NOTES
"Sadia Rider is a 75 y.o. female who is being evaluated via a billable telephone visit.      The patient has been notified of following:     \"This telephone visit will be conducted via a call between you and your physician/provider. We have found that certain health care needs can be provided without the need for a physical exam.  This service lets us provide the care you need with a short phone conversation.  If a prescription is necessary we can send it directly to your pharmacy.  If lab work is needed we can place an order for that and you can then stop by our lab to have the test done at a later time.    Telephone visits are billed at different rates depending on your insurance coverage. During this emergency period, for some insurers they may be billed the same as an in-person visit.  Please reach out to your insurance provider with any questions.    If during the course of the call the physician/provider feels a telephone visit is not appropriate, you will not be charged for this service.\"    Patient has given verbal consent to a Telephone visit? Yes    What phone number would you like to be contacted at? [phone]    Patient would like to receive their AVS by AVS Preference: Senthil.    Additional provider notes:         Sadia Rider is a 75 y.o. female with hyperlipidemia, breast cancer and osteoporosis who was contacted for evaluation of headaches and intermittent dizziness. She reports pain on the right side of her head for the last 6 mos that are intermittent and random. Pain feels like constricted blood vessels, an intermittent squeezing sensation that lasts 15 min or less and is intermittent. It is on the side of her head over the ear in a small area  about 1/2- 1 1/2 inch in diameter. She occasionally goes a couple weeks between episodes but overall they have been more frequent and more localized recently. There is no lasting tenderness. She denies effect on speech. She reports that the episodes " of light-headedness are very brief, and she's had about 6 episodes.         Assessment/Plan:  1. Nonintractable episodic headache, unspecified headache type  MR Brain COW Carotid With and Without Contrast   2. Dizziness  MR Brain COW Carotid With and Without Contrast   3. Claustrophobia  LORazepam (ATIVAN) 0.5 MG tablet   4. Malignant neoplasm of upper-outer quadrant of right breast in female, estrogen receptor positive (H)  MR Brain COW Carotid With and Without Contrast       We reviewed the likely/potential etiology(ies) for her headache and dizziness symptoms and we will proceed with an MRI of the head and MRA of Kashia of grace. We reviewed relative rest as well as increased fluids and rest, and she will call or return to clinic with any ongoing or worsening symptoms. She will otherwise continue her same medications and regular follow up with Oncology. She will f/u in 4-6 mos for routine med check/evaluation.       Phone call duration: 12  minutes    Delfina Boyle MD

## 2021-06-10 NOTE — TELEPHONE ENCOUNTER
Orders being requested:   See Lab tab.  Lipid  Vit D  Hep panel    Reason service is needed/diagnosis:     When are orders needed by:   STAT  Patient states she went to [facility] today and they didn't have our lab orders.  Caller states they marcelo her blood and needs orders faxed ASAP to complete test.  Please fax lab orders TODAY and notify the patient of the outcome to this request.  Fax: [phone]    Where to send Orders: Fx: [phone]     Okay to leave detailed message?  Yes

## 2021-06-10 NOTE — TELEPHONE ENCOUNTER
Who is calling:  The patient   Reason for Call:  The patient states she was calling to find out if the labs were done from 7/3/20 when she had requested to have the lab drawn at [facility] on [address]? This writer informed the patient the labs were faxed over but are still in the patient's chart as future. The patient is asking what does she need to do? Can the labs that were drawn on 7/30/20 at [place] be processed yet?    Date of last appointment with primary care:   Okay to leave a detailed message: Yes

## 2021-06-10 NOTE — TELEPHONE ENCOUNTER
Spoke with [place] lab staff, Camila. She will look into this and call back with update. She reports they weren't released but may still be able to run them with the blood they still have.    [facility] is running lipid and vitamin D today and will fax us the results.  Pt notified.

## 2021-06-11 NOTE — TELEPHONE ENCOUNTER
Refill Approved    Rx renewed per Medication Renewal Policy. Medication was last renewed on .    Luann Pardo, [facility] Triage/Med Refill 2020     Requested Prescriptions   Pending Prescriptions Disp Refills     alendronate (FOSAMAX) 70 MG tablet [Pharmacy Med Name: ALENDRONATE SODIUM 70 MG TAB] 12 tablet 3     Si TAB ON EMPTY STOMACH ONCE WEEKLY WITH AT LEAST 8 OZ WATER, STAY UPRIGHT FOR 30-60 MIN AFTER TAKING       Biphosphonates Refill Protocol Passed - 2020 12:41 AM        Passed - PCP or prescribing provider visit in last 12 months     Last office visit with prescriber/PCP: 2018 Delfina Boyle MD OR same dept: Visit date not found OR same specialty: 2018 Delfina Boyle MD  Last physical: 3/6/2020 Last MTM visit: Visit date not found   Next visit within 3 mo: Visit date not found  Next physical within 3 mo: Visit date not found  Prescriber OR PCP: Delfina Boyle MD  Last diagnosis associated with med order: 1. Age-related osteoporosis without current pathological fracture  - alendronate (FOSAMAX) 70 MG tablet [Pharmacy Med Name: ALENDRONATE SODIUM 70 MG TAB]; 1 TAB ON EMPTY STOMACH ONCE WEEKLY WITH AT LEAST 8 OZ WATER, STAY UPRIGHT FOR 30-60 MIN AFTER TAKING  Dispense: 12 tablet; Refill: 3    If protocol passes may refill for 12 months if within 3 months of last provider visit (or a total of 15 months).             Passed - Serum creatinine in last 12 months     Creatinine   Date Value Ref Range Status   2020 0.89 0.60 - 1.10 mg/dL Final

## 2021-06-12 NOTE — TELEPHONE ENCOUNTER
FNA called Sadia back at 1 PM    - /71 KS 61  - no chest pain, no dizziness, no vomiting, no change in vision.  - took metoprolol at 11-11:30 AM as advised by FNA.    She has questions about parameters in taking metoprolol. In basket message routed to PCP's care team.      Evette Mesa RN/[place] Nurse Advisor

## 2021-06-12 NOTE — TELEPHONE ENCOUNTER
Refill Approved    Rx renewed per Medication Renewal Policy. Medication was last renewed on 9/29/19.    Luann Pardo, [facility] Triage/Med Refill 10/5/2020     Requested Prescriptions   Pending Prescriptions Disp Refills     simvastatin (ZOCOR) 20 MG tablet [Pharmacy Med Name: SIMVASTATIN 20 MG TABLET] 45 tablet 3     Sig: TAKE 1/2 TABLET BY MOUTH DAILY AT BEDTIME       Statins Refill Protocol (Hmg CoA Reductase Inhibitors) Passed - 10/1/2020  9:25 AM        Passed - PCP or prescribing provider visit in past 12 months      Last office visit with prescriber/PCP: 11/20/2018 Delfina Boyle MD OR same dept: Visit date not found OR same specialty: 11/20/2018 Delfina Boyle MD  Last physical: 3/6/2020 Last MTM visit: Visit date not found   Next visit within 3 mo: Visit date not found  Next physical within 3 mo: Visit date not found  Prescriber OR PCP: Delfina Boyle MD  Last diagnosis associated with med order: 1. Mixed hyperlipidemia  - simvastatin (ZOCOR) 20 MG tablet [Pharmacy Med Name: SIMVASTATIN 20 MG TABLET]; TAKE 1/2 TABLET BY MOUTH DAILY AT BEDTIME  Dispense: 45 tablet; Refill: 3    If protocol passes may refill for 12 months if within 3 months of last provider visit (or a total of 15 months).

## 2021-06-12 NOTE — TELEPHONE ENCOUNTER
Spoke with pt.   Please leave message in bucket for TOI Castillo   if a Blinkiversehart message regarding her BP/ pulse readings is not received before  Noon on 11/10/2020, then pt needs to be called.

## 2021-06-12 NOTE — TELEPHONE ENCOUNTER
Spoke with pt on the phone, she has an apt on 11/12/2020. She would like reading to be evaluated. She is wondering if the apt she has is still needed since her pulse was under control this past weekend.

## 2021-06-12 NOTE — TELEPHONE ENCOUNTER
Recommend taking it 2x daily through the weekend and only hold it if the HR is <50. She could give us an update or come in for a nurse/CSS visit Monday.

## 2021-06-12 NOTE — TELEPHONE ENCOUNTER
Sadia calls to report her BP readings. She started metoprolol 12.5 mg two times a day on 11/2 after hospital discharge.    11/4  AM - 174/80, AZ 58  PM -137/71, AZ 63    11/5  AM - 164/79, AZ 58  PM - 166/79, AZ 61    11/6   /77 [place] 58  During this call - 192/76 [place] 65    Also reports that her position may not have been right when her BP was taken. She now knows how to sit properly during BP checks - feet on the floor, sit upright, proper arm and hand position.    She reports skipping her AM metoprolol as she was advised to hold if pulse is < 60. She has not taken her AM metoprolol x 2 days. Given her BP this AM, FNA advised to have her take her meds, recheck BP in 1 hr. FNA to call back at 12:30 PM for BP recheck.    FNA routed this message to PCP for further recommendations.      Disposition:    Advised to be seen within 3 days. PCP not available until the 12th. Prefers to be seen by PCP as scheduled on 11/12.        Evette Mesa RN/[place] Nurse Advisor        Reason for Disposition    Systolic BP >= 180 OR Diastolic >= 110, and missed most recent dose of blood pressure medication    Additional Information    Negative: Sounds like a life-threatening emergency to the triager    Negative: Pregnant > 20 weeks or postpartum (< 6 weeks after delivery) and new hand or face swelling    Negative: Pregnant > 20 weeks and BP > 140/90    Negative: Systolic BP >= 160 OR Diastolic >= 100, and any cardiac or neurologic symptoms (e.g., chest pain, difficulty breathing, unsteady gait, blurred vision)    Negative: Patient sounds very sick or weak to the triager    Negative: BP Systolic BP >= 140 OR Diastolic >= 90 and postpartum (from 0 to 6 weeks after delivery)    Protocols used: HIGH BLOOD PRESSURE-A-OH

## 2021-06-13 NOTE — PROGRESS NOTES
Assessment:         1. Atypical chest pain     2. Essential hypertension, new onset     3. Malignant neoplasm of upper-outer quadrant of right breast in female, estrogen receptor positive (H)     4. Mixed hyperlipidemia              Plan:          Hospital records were personally reviewed. Hospital labs were reviewed. Blood pressure is under fair control. We reviewed her current medications and she will continue the same pending additional lab results. We discussed the parameters for holding the metoprolol medication that she was given from the hospistal and she should only hold it if pulse is <50. She will monitor for light-headedness. I would like her to follow up in the next few weeks for a recheck of her BP, and she will call with any concerns. We reviewed dietary recommendations, including low salt and high fiber diet, and recommendations for regular exercise/activity. She will plan to follow up in 4-6 mos for repeat fasting labs and med check, sooner if any difficulties.         Subjective:        Fasting today? Yes  Hypertension & Hyperlipidemia      Sadia Rider is a 75 y.o. female here for follow-up of recent hospitalization for chest pain and elevated blood pressure. She presented with an episode of mid-chest heaviness and headache. She had just climbed up 5 flights of stairs in a parking ramp without difficulty, but while she was walking to the car, she developed a brief headache. She developed some mild chest pain and headache later on while sitting in a chair listening to music. Her  checked her BP and it was 224/101 by their home machine. She had no associated nausea, dyspnea or diaphoresis. She reports some recurrent  chest discomfort for the last couple mos, typically after activity. She also notes that BP has been high at various appointments recently as well. BP was 170-190s systolic and 9-130s diastolic in the ER. She had negative troponins, and a negative nuclear stress test and CT angio  of chest. She has been on arimidex for breast cancer, and has longstanding hyperlipidemia, but has never had issues with BP. She has not had any additional symptoms since discharge.       The following portions of the patient's history were reviewed and updated as appropriate: allergies, current medications, past family history, past medical history, past social history, past surgical history and problem list.    Review of Systems  A 12 point comprehensive review of systems was negative except as noted.          Objective:        Vitals:    11/12/20 1013 11/12/20 1016 11/12/20 1019   BP: 168/70 160/78 163/63   Pulse: 63  60   Resp: 16     SpO2: 99%     Weight: 168 lb 7 oz (76.4 kg)            General:    Alert, cooperative, no distress   Head:    Normocephalic, without obvious abnormality, atraumatic   Eyes:    PERRL, conjunctiva/corneas clear, EOM's intact    Ears:    Normal TM's and external ear canals, both ears   Nose:   Nares normal, mucosa normal, no drainage or sinus tenderness   Throat:   Lips, mucosa, and tongue normal; teeth and gums normal   Neck:   Supple, symmetrical,  no adenopathy;  thyroid:  normal   Back:     Symmetric, ROM normal, no CVA tenderness   Lungs:     Clear to auscultation bilaterally, respirations unlabored   CV:    Regular rate and rhythm   Abdomen:     Soft, non-tender, no masses, no organomegaly   Extremities:   Extremities normal, atraumatic, no cyanosis or edema   Pulses:   2+ and symmetric all extremities   Skin:   Skin color, texture, turgor normal, no rashes or lesions   Neurologic:   normal strength and tone throughout     Results for orders placed or performed during the hospital encounter of 11/01/20   Comprehensive metabolic panel   Result Value Ref Range    Sodium 143 136 - 145 mmol/L    Potassium 3.9 3.5 - 5.0 mmol/L    Chloride 108 (H) 98 - 107 mmol/L    CO2 27 22 - 31 mmol/L    Anion Gap, Calculation 8 5 - 18 mmol/L    Glucose 108 70 - 125 mg/dL    BUN 22 8 - 28 mg/dL     Creatinine 0.92 0.60 - 1.10 mg/dL    GFR MDRD Af Amer >60 >60 mL/min/1.73m2    GFR MDRD Non Af Amer 60 (L) >60 mL/min/1.73m2    Bilirubin, Total 0.6 0.0 - 1.0 mg/dL    Calcium 10.1 8.5 - 10.5 mg/dL    Protein, Total 7.0 6.0 - 8.0 g/dL    Albumin 4.3 3.5 - 5.0 g/dL    Alkaline Phosphatase 51 45 - 120 U/L    AST 19 0 - 40 U/L    ALT 22 0 - 45 U/L   Lipase   Result Value Ref Range    Lipase 34 0 - 52 U/L   Protime-INR   Result Value Ref Range    INR 0.97 0.90 - 1.10   Urinalysis-UC if Indicated   Result Value Ref Range    Color, UA Straw Colorless, Yellow, Straw, Light Yellow    Clarity, UA Clear Clear    Glucose, UA Negative Negative    Bilirubin, UA Negative Negative    Ketones, UA Negative Negative    Specific Gravity, UA 1.020 1.001 - 1.030    Blood, UA Negative Negative    pH, UA 5.0 4.5 - 8.0    Protein, UA Negative Negative mg/dL    Urobilinogen, UA <2.0 E.U./dL <2.0 E.U./dL, 2.0 E.U./dL    Nitrite, UA Negative Negative    Leukocytes, UA Negative Negative   Troponin I   Result Value Ref Range    Troponin I <0.01 0.00 - 0.29 ng/mL   HM1 (CBC with Diff)   Result Value Ref Range    WBC 6.3 4.0 - 11.0 thou/uL    RBC 4.85 3.80 - 5.40 mill/uL    Hemoglobin 15.0 12.0 - 16.0 g/dL    Hematocrit 43.5 35.0 - 47.0 %    MCV 90 80 - 100 fL    MCH 30.9 27.0 - 34.0 pg    MCHC 34.5 32.0 - 36.0 g/dL    RDW 12.8 11.0 - 14.5 %    Platelets 231 140 - 440 thou/uL    MPV 10.8 8.5 - 12.5 fL    Neutrophils % 54 50 - 70 %    Lymphocytes % 34 20 - 40 %    Monocytes % 7 2 - 10 %    Eosinophils % 4 0 - 6 %    Basophils % 1 0 - 2 %    Immature Granulocyte % 0 <=0 %    Neutrophils Absolute 3.4 2.0 - 7.7 thou/uL    Lymphocytes Absolute 2.2 0.8 - 4.4 thou/uL    Monocytes Absolute 0.5 0.0 - 0.9 thou/uL    Eosinophils Absolute 0.2 0.0 - 0.4 thou/uL    Basophils Absolute 0.0 0.0 - 0.2 thou/uL    Immature Granulocyte Absolute 0.0 <=0.0 thou/uL   D-dimer, Quantitative   Result Value Ref Range    D-Dimer, Quant 0.34 <=0.50 FEU ug/mL   COVID-19 Virus  PCR MRF    Specimen: Respiratory   Result Value Ref Range    COVID-19 VIRUS SPECIMEN SOURCE Nasopharyngeal     2019-nCOV       Test received-See reflex to IDDL test SARS CoV2 (COVID-19) Virus RT-PCR   Comprehensive metabolic panel   Result Value Ref Range    Sodium 144 136 - 145 mmol/L    Potassium 4.2 3.5 - 5.0 mmol/L    Chloride 107 98 - 107 mmol/L    CO2 29 22 - 31 mmol/L    Anion Gap, Calculation 8 5 - 18 mmol/L    Glucose 100 70 - 125 mg/dL    BUN 18 8 - 28 mg/dL    Creatinine 0.82 0.60 - 1.10 mg/dL    GFR MDRD Af Amer >60 >60 mL/min/1.73m2    GFR MDRD Non Af Amer >60 >60 mL/min/1.73m2    Bilirubin, Total 0.7 0.0 - 1.0 mg/dL    Calcium 9.5 8.5 - 10.5 mg/dL    Protein, Total 6.5 6.0 - 8.0 g/dL    Albumin 3.9 3.5 - 5.0 g/dL    Alkaline Phosphatase 44 (L) 45 - 120 U/L    AST 18 0 - 40 U/L    ALT 22 0 - 45 U/L   Troponin I   Result Value Ref Range    Troponin I <0.01 0.00 - 0.29 ng/mL   HM1 (CBC with Diff)   Result Value Ref Range    WBC 6.3 4.0 - 11.0 thou/uL    RBC 4.67 3.80 - 5.40 mill/uL    Hemoglobin 14.1 12.0 - 16.0 g/dL    Hematocrit 42.6 35.0 - 47.0 %    MCV 91 80 - 100 fL    MCH 30.2 27.0 - 34.0 pg    MCHC 33.1 32.0 - 36.0 g/dL    RDW 13.1 11.0 - 14.5 %    Platelets 225 140 - 440 thou/uL    MPV 11.0 8.5 - 12.5 fL    Neutrophils % 57 50 - 70 %    Lymphocytes % 32 20 - 40 %    Monocytes % 7 2 - 10 %    Eosinophils % 4 0 - 6 %    Basophils % 1 0 - 2 %    Immature Granulocyte % 0 <=0 %    Neutrophils Absolute 3.6 2.0 - 7.7 thou/uL    Lymphocytes Absolute 2.0 0.8 - 4.4 thou/uL    Monocytes Absolute 0.4 0.0 - 0.9 thou/uL    Eosinophils Absolute 0.2 0.0 - 0.4 thou/uL    Basophils Absolute 0.0 0.0 - 0.2 thou/uL    Immature Granulocyte Absolute 0.0 <=0.0 thou/uL   SARS-CoV-2 (COVID-19) RT-PCR-IDDL    Specimen: Respiratory   Result Value Ref Range    SARS-CoV-2 Virus Specimen Source Nasopharyngeal     SARS-CoV-2 PCR Result NEGATIVE     SARS-COV-2 PCR COMMENT       Testing was performed using the [employer] SARS-CoV-2  Assay on the [place]   ECG 12 lead nursing unit performed   Result Value Ref Range    SYSTOLIC BLOOD PRESSURE      DIASTOLIC BLOOD PRESSURE      VENTRICULAR RATE 72 BPM    ATRIAL RATE 72 BPM    P-R INTERVAL 164 ms    QRS DURATION 84 ms    Q-T INTERVAL 414 ms    QTC CALCULATION (BEZET) 453 ms    P Axis 74 degrees    R AXIS 85 degrees    T AXIS 77 degrees    MUSE DIAGNOSIS       Normal sinus rhythm with sinus arrhythmia  Nonspecific ST abnormality  Abnormal ECG  When compared with ECG of 06-MAR-2020 09:59,  No significant change was found  Confirmed by SEE ED PROVIDER NOTE FOR, ECG INTERPRETATION ([id number]),  Nishi Almodovar ([id number]) on 11/11/2020 8:51:48 AM     ECG 12 lead nursing unit performed   Result Value Ref Range    SYSTOLIC BLOOD PRESSURE 175 mmHg    DIASTOLIC BLOOD PRESSURE 89 mmHg    VENTRICULAR RATE 57 BPM    ATRIAL RATE 57 BPM    P-R INTERVAL 162 ms    QRS DURATION 80 ms    Q-T INTERVAL 446 ms    QTC CALCULATION (BEZET) 434 ms    P Axis 50 degrees    R AXIS 32 degrees    T AXIS 59 degrees    MUSE DIAGNOSIS       Sinus bradycardia  Low voltage QRS  Borderline ECG  When compared with ECG of 02-NOV-2020 00:04,  No significant change was found  Confirmed by SEE ED PROVIDER NOTE FOR, ECG INTERPRETATION ([id number]),  Nishi Almodovar ([id number]) on 11/11/2020 8:51:47 AM     NM Exercise Stress Test   Result Value Ref Range    Pharmacologic Protocol  [place]     Test Type Treadmill     Baseline HR 68     Baseline Systolic      Baseline Diastolic BP 66     Last Stress      Last Stress Systolic      Last Stress Diastolic BP 76     Target      PERCENT HR 85%     Exercise duration (min) 4     Exercise duration (sec) 30     Estimated workload 6.4     Exercise Stage Reached  Stage 2     ST Deviation Elevation aVL 0.6mm     Deviation Time III -0.8mm     ST Elevation Amount aVL 0.7mm     ST Deviation Amount he III -1.3mm     Final Resting /77     Final Resting HR 78     Max Treadmill  Speed 2.5     Max Treadmill Grade 12.0     Peak Systolic /100     Peak Diastolic /100     Max      Stress Phase Resting     Stress Resting Pt Position STANDING     Current HR 68     Current /66     Stress Phase Resting     Stress Resting Pt Position MANUAL EVENT     Current      Current /76     Stress Phase Stress     Stage Minute EXE 00:00     Exercise Stage STAGE 1     Current HR 78     Current /70     Stress Phase Stress     Stage Minute EXE 01:00     Exercise Stage STAGE 1     Current HR 87     Current /70     Stress Phase Stress     Stage Minute EXE 01:51     Exercise Stage STAGE 1     Current      Current /72     Stress Phase Stress     Stage Minute EXE 02:00     Exercise Stage STAGE 1     Current      Current /72     Stress Phase Stress     Stage Minute EXE 03:00     Exercise Stage STAGE 2     Current      Current /72     Stress Phase Stress     Stage Minute EXE 04:00     Exercise Stage STAGE 2     Current      Current /72     Stress Phase Stress     Stage Minute EXE 04:07     Exercise Stage STAGE 2     Current      Current /76     Stress Phase Stress     Stage Minute EXE 04:13     Exercise Stage STAGE 2     Current      Current /76     Stress Phase Stress     Stage Minute EXE 04:30     Exercise Stage STAGE 2     Current      Current /76     Stress Phase Recovery     Stage Minute REC 00:15     Exercise Stage Recovery     Current      Current /76     Stress Phase Recovery     Stage Minute REC 00:29     Exercise Stage Recovery     Current      Current /76     Stress Phase Recovery     Stage Minute REC 00:35     Exercise Stage Recovery     Current      Current /80     Stress Phase Recovery     Stage Minute REC 01:29     Exercise Stage Recovery     Current      Current /80     Stress Phase Recovery     Stage Minute REC 02:29      Exercise Stage Recovery     Current HR 90     Current /80     Stress Phase Recovery     Stage Minute REC 02:30     Exercise Stage Recovery     Current HR 90     Current /86     Stress Phase Recovery     Stage Minute REC 03:29     Exercise Stage Recovery     Current HR 82     Current /86     Stress Phase Recovery     Stage Minute REC 03:54     Exercise Stage Recovery     Current HR 83     Current /100     Stress Phase Recovery     Stage Minute REC 04:29     Exercise Stage Recovery     Current HR 83     Current /100     Stress Phase Recovery     Stage Minute REC 05:14     Exercise Stage Recovery     Current HR 79     Current /77     Stress Phase Recovery     Stage Minute REC 05:29     Exercise Stage Recovery     Current HR 79     Current /77     Stress Phase Recovery     Stage Minute REC 06:06     Exercise Stage Recovery     Current HR 78     Current /77     Calculated Percent HR 87 %    Rate Pressure Product 21,420.0

## 2021-06-14 NOTE — PROGRESS NOTES
S:  Sadia presents for recheck of blood pressure.  She brought in blood pressure readings that were taken every day.  She averaged in the 110/70 range consistently with no elevated blood pressures.  She is had no cough, ankle edema or chest pain or shortness of breath.    Medications: Arimidex 1 mg daily, Fosamax 70 mg weekly, aspirin 81 mg daily, lisinopril 10 mg daily and multivitamin daily and simvastatin 20 mg daily.    O:   Blood pressure 136/66, pulse 82 respiration 18 weight 170  Lungs clear to auscultation  Heart regular rate and rhythm  No ankle edema  Skin Pink and dry    A:   Hypertension    P:   Continue lisinopril 10 mg daily  RTC in November for recheck of blood work.  Potassium and creatinine and November 2020 reviewed.

## 2021-06-14 NOTE — PROGRESS NOTES
Assessment:         1. Mixed hyperlipidemia  Lipid [place]    Comprehensive Metabolic Panel    Vitamin D, Total (25-Hydroxy)   2. Hypothyroidism due to Hashimoto's thyroiditis  Thyroid Cascade   3. Vitamin D deficiency     4. Osteoporosis            Plan:          Fasting labs were drawn. Blood pressure is under adequate control. We reviewed her current medications and she will continue the same pending additional lab results. We reviewed dietary recommendations, including low salt and high fiber diet, and recommendations for regular exercise/activity. We reviewed her calcium and Vit D supplementation and she will continue regular weight-bearing exercise. We will repeat her DEXA to see if we need to treat more aggressively. She will plan to follow up in 4-6 mos for repeat fasting labs and med check, sooner if any difficulties.         Subjective:        Fasting today? Yes  Hyperlipidemia      Patient is here for follow-up of dyslipidemia. A repeat fasting lipid profile was done. Compliance with treatment has been good. Patient denies muscle pain associated with her medications. Current symptoms: none.  Patient denies chest pain, dyspnea, exertional chest pressure/discomfort, fatigue, lower extremity edema and near-syncope. The patient exercises several times per week at [employer]. Weight trend: stable.     Previous history of cardiac disease includes: none.      Hypothyroidism   Patient also presents for follow up of hypothyroidism. Current symptoms: none. Patient denies change in energy level, diarrhea, heat / cold intolerance and palpitations. She has been ingesting brazil nuts, sunflower seeds, broccoli, etc for thyroid.     The following portions of the patient's history were reviewed and updated as appropriate: allergies, current medications, past family history, past medical history, past social history, past surgical history and problem list.    Review of Systems  A 12 point comprehensive review of  "systems was negative except as noted.   Neck cracking at times - no sig pain       Objective:      /80  Pulse 78  Ht 5' 5\" (1.651 m)  Wt 174 lb 3 oz (79 kg)  BMI 28.99 kg/m2      General:    Alert, cooperative, no distress   Head:    Normocephalic, without obvious abnormality, atraumatic   Eyes:    PERRL, conjunctiva/corneas clear, EOM's intact    Ears:    Normal TM's and external ear canals, both ears   Nose:   Nares normal, mucosa normal, no drainage or sinus tenderness   Throat:   Lips, mucosa, and tongue normal; teeth and gums normal   Neck:   Supple, symmetrical,  no adenopathy;  thyroid:  normal   Back:     Symmetric, ROM normal, no CVA tenderness   Lungs:     Clear to auscultation bilaterally, respirations unlabored   CV:    Regular rate and rhythm   Abdomen:     Soft, non-tender, no masses, no organomegaly   Extremities:   Extremities normal, atraumatic, no cyanosis or edema   Pulses:   2+ and symmetric all extremities   Skin:   Skin color, texture, turgor normal, no rashes or lesions   Neurologic:   normal strength and tone throughout         Results for orders placed or performed in visit on 11/16/17   Lipid Cascade   Result Value Ref Range    Cholesterol 155 <=199 mg/dL    Triglycerides 175 (H) <=149 mg/dL    HDL Cholesterol 42 (L) >=50 mg/dL    LDL Calculated 78 <=129 mg/dL    Patient Fasting > 8hrs? Yes    Comprehensive Metabolic Panel   Result Value Ref Range    Sodium 141 136 - 145 mmol/L    Potassium 4.2 3.5 - 5.0 mmol/L    Chloride 107 98 - 107 mmol/L    CO2 24 22 - 31 mmol/L    Anion Gap, Calculation 10 5 - 18 mmol/L    Glucose 96 70 - 125 mg/dL    BUN 19 8 - 28 mg/dL    Creatinine 0.88 0.60 - 1.10 mg/dL    GFR MDRD Af Amer >60 >60 mL/min/1.73m2    GFR MDRD Non Af Amer >60 >60 mL/min/1.73m2    Bilirubin, Total 0.9 0.0 - 1.0 mg/dL    Calcium 9.7 8.5 - 10.5 mg/dL    Protein, Total 6.8 6.0 - 8.0 g/dL    Albumin 3.9 3.5 - 5.0 g/dL    Alkaline Phosphatase 61 45 - 120 U/L    AST 22 0 - 40 U/L "    ALT 28 0 - 45 U/L   Thyroid Cascade   Result Value Ref Range    TSH 4.01 0.30 - 5.00 uIU/mL   Vitamin D, Total (25-Hydroxy)   Result Value Ref Range    Vitamin D, Total (25-Hydroxy) 37.5 30.0 - 80.0 ng/mL

## 2021-06-14 NOTE — TELEPHONE ENCOUNTER
Refill Approved    Rx renewed per Medication Renewal Policy. Medication was last renewed on 11/2/20.    Luann Pardo, [facility] Triage/Med Refill 12/23/2020     Requested Prescriptions   Pending Prescriptions Disp Refills     metoprolol tartrate (LOPRESSOR) 25 MG tablet 60 tablet 0     Sig: Take 0.5 tablets (12.5 mg total) by mouth 2 (two) times a day.       Beta-Blockers Refill Protocol Passed - 12/21/2020 11:20 AM        Passed - PCP or prescribing provider visit in past 12 months or next 3 months     Last office visit with prescriber/PCP: 11/12/2020 Delfina Boyel MD OR same dept: 11/12/2020 Delfina Boyle MD OR same specialty: 11/12/2020 Delfina Boyle MD  Last physical: 3/6/2020 Last MTM visit: Visit date not found   Next visit within 3 mo: Visit date not found  Next physical within 3 mo: Visit date not found  Prescriber OR PCP: Delfina Boyle MD  Last diagnosis associated with med order: 1. Hypertension, unspecified type  - metoprolol tartrate (LOPRESSOR) 25 MG tablet; Take 0.5 tablets (12.5 mg total) by mouth 2 (two) times a day.  Dispense: 60 tablet; Refill: 0    If protocol passes may refill for 12 months if within 3 months of last provider visit (or a total of 15 months).             Passed - Blood pressure filed in past 12 months     BP Readings from Last 1 Encounters:   11/12/20 140/64

## 2021-06-14 NOTE — PROGRESS NOTES
S:  Very pleasant 75-year-old female who presents today for recheck of hypertension.  She brings multiple readings of blood pressure all which are slightly elevated in the 160 range.  Today blood pressure 160/70.  She has had no chest pain or shortness of breath but occasionally feels slightly dizzy when she gets up quickly.  She also notes that her pulse is running in the 50s.    Medications: Fosamax, Arimidex, aspirin, calcium supplement, metoprolol, and simvastatin.    O:   Blood pressure 160/70, pulse 69, respiration 12, weight 170, O2 sat 90% on room air  HEENT normal  Lungs-clear to auscultation  Cardiac regular rate and rhythm  No ankle edema  Skin-Pink and dry    A:   Hypertension  Bradycardia    P:   Discontinue metoprolol  Start lisinopril 10 mg daily.  Creatinine is within normal limits and potassium is within normal limits of last check.  RTC 2 weeks

## 2021-06-16 NOTE — PROGRESS NOTES
Assessment:         1. Essential hypertension     2. Mixed hyperlipidemia  Lipid [place] FASTING    Comprehensive Metabolic Panel    JIC LAV    JIC RED   3. History of hypothyroidism  Thyroid Stimulating Hormone (TSH)    T4, Free   4. Malignant neoplasm of upper-outer quadrant of right breast in female, estrogen receptor positive (H)     5. Vitamin D deficiency  Vitamin D, Total (25-Hydroxy)   6. Age-related osteoporosis without current pathological fracture              Plan:         Fasting labs were drawn. Blood pressure is under adequate control. We reviewed her current medications and she will continue the same pending additional lab results. We reviewed dietary recommendations, including low salt and high fiber diet, and recommendations for regular exercise/activity. As far as her osteoporosis, she will continue with the fosamax, and adequate vit D and calcium. We reviewed indications for re-evaluation of her knee pain, and options for treatment, and she will consider those. She will continue regular follow up with oncology, and will plan to follow up in 4-6 mos for repeat fasting labs and med check, sooner if any difficulties.         Subjective:        Fasting today? Yes  Hypertension & Hyperlipidemia      Sadia Rider is a 76 y.o. female here for follow-up of elevated blood pressure. A repeat fasting lipid profile was done. Compliance with treatment has been good. Patient denies muscle pain associated with her medications. Associated signs and symptoms: tiredness/fatigue and dizziness. She notes that she had extreme exhaustion and dizziness on the metoprolol and symptoms resolved after holding it for 1 day, so she was switched over to lisinopril. Denies chest pain, dyspnea, palpitations and peripheral edema. The patient exercises several times per week. Weight trend: fluctuating a bit.        Previous history of cardiac disease includes: none.          She notes some right ankle swelling and some  "intermittent left knee pain. She denies any instabilty or locking.     The following portions of the patient's history were reviewed and updated as appropriate: allergies, current medications, past family history, past medical history, past social history, past surgical history and problem list.    Review of Systems  A 12 point comprehensive review of systems was negative except as noted.          Objective:        Vitals:    04/19/21 0930   BP: 130/70   Patient Position: Sitting   Cuff Size: Adult Regular   Pulse: 61   SpO2: 99%   Weight: 169 lb 5 oz (76.8 kg)   Height: 5' 6\" (1.676 m)          General:    Alert, cooperative, no distress   Head:    Normocephalic, without obvious abnormality, atraumatic   Eyes:    PERRL, conjunctiva/corneas clear, EOM's intact    Ears:    Normal TM's and external ear canals, both ears   Nose:   Nares normal, mucosa normal, no drainage or sinus tenderness   Throat:   Lips, mucosa, and tongue normal; teeth and gums normal   Neck:   Supple, symmetrical,  no adenopathy;  thyroid:  normal   Back:     Symmetric, ROM normal, no CVA tenderness   Lungs:     Clear to auscultation bilaterally, respirations unlabored   CV:    Regular rate and rhythm   Abdomen:     Soft, non-tender, no masses, no organomegaly   Extremities:   Extremities normal, atraumatic, no cyanosis or edema   Pulses:   2+ and symmetric all extremities   Skin:   Skin color, texture, turgor normal, no rashes or lesions   Neurologic:   normal strength and tone throughout        Results for orders placed or performed in visit on 04/19/21   Lipid [place] FASTING   Result Value Ref Range    Cholesterol 147 <=199 mg/dL    Triglycerides 161 (H) <=149 mg/dL    HDL Cholesterol 42 (L) >=50 mg/dL    LDL Calculated 73 <=129 mg/dL    Patient Fasting > 8hrs? Yes    Comprehensive Metabolic Panel   Result Value Ref Range    Sodium 139 136 - 145 mmol/L    Potassium 5.0 3.5 - 5.0 mmol/L    Chloride 105 98 - 107 mmol/L    CO2 25 22 - 31 mmol/L "    Anion Gap, Calculation 9 5 - 18 mmol/L    Glucose 92 70 - 125 mg/dL    BUN 21 8 - 28 mg/dL    Creatinine 0.89 0.60 - 1.10 mg/dL    GFR MDRD Af Amer >60 >60 mL/min/1.73m2    GFR MDRD Non Af Amer >60 >60 mL/min/1.73m2    Bilirubin, Total 1.2 (H) 0.0 - 1.0 mg/dL    Calcium 9.5 8.5 - 10.5 mg/dL    Protein, Total 6.8 6.0 - 8.0 g/dL    Albumin 4.2 3.5 - 5.0 g/dL    Alkaline Phosphatase 49 45 - 120 U/L    AST 21 0 - 40 U/L    ALT 21 0 - 45 U/L   Vitamin D, Total (25-Hydroxy)   Result Value Ref Range    Vitamin D, Total (25-Hydroxy) 38.6 30.0 - 80.0 ng/mL   Thyroid Stimulating Hormone (TSH)   Result Value Ref Range    TSH 3.40 0.30 - 5.00 uIU/mL   T4, Free   Result Value Ref Range    Free T4 0.9 0.7 - 1.8 ng/dL

## 2021-06-17 NOTE — PATIENT INSTRUCTIONS - HE
Patient Instructions by Delfina Boyle MD at 7/9/2019  1:40 PM     Author: Delfina Boyle MD Service: -- Author Type: Physician    Filed: 7/9/2019  1:40 PM Encounter Date: 7/9/2019 Status: Signed    : Delfina Boyle MD (Physician)         Patient Education   Understanding USDA MyPlate  The USDA (US Department of Agriculture) has guidelines to help you make healthy food choices. These are called MyPlate. MyPlate shows the food groups that make up healthy meals using the image of a place setting. Before you eat, think about the healthiest choices for what to put onto your plate or into your cup or bowl. To learn more about building a healthy plate, visit www.choosemyplate.gov.       The Food Groups    Fruits: Any fruit or 100% fruit juice counts as part of the Fruit Group. Fruits may be fresh, canned, frozen, or dried, and may be whole, cut-up, or pureed. Make half your plate fruits and vegetables.    Vegetables: Any vegetable or 100% vegetable juice counts as a member of the Vegetable Group. Vegetables may be fresh, frozen, canned, or dried. They can be served raw or cooked and may be whole, cut-up, or mashed. Make half your plate fruits and vegetables.     Grains: All foods made from grains are part of the Grains Group. These include wheat, rice, oats, cornmeal, and barley such as bread, pasta, oatmeal, cereal, tortillas, and grits. Grains should be no more than a quarter of your plate. At least half of your grains should be whole grains.    Protein: This group includes meat, poultry, seafood, beans and peas, eggs, processed soy products (like tofu), nuts (including nut butters), and seeds. Make protein choices no more than a quarter of your plate. Meat and poultry choices should be lean or low fat.    Dairy: All fluid milk products and foods made from milk that contain calcium, like yogurt and cheese are part of the Dairy Group. (Foods that have little calcium, such as cream,  butter, and cream cheese, are not part of the group.) Most dairy choices should be low-fat or fat-free.    Oils: These are fats that are liquid at room temperature. They include canola, corn, olive, soybean, and sunflower oil. Foods that are mainly oil include mayonnaise, certain salad dressings, and soft margarines. You should have only 5 to 7 teaspoons of oils a day. You probably already get this much from the food you eat.  Use [employer] to Help Build Your Meals  The SuperTracker can help you plan and track your meals and activity. You can look up individual foods to see or compare their nutritional value. You can get guidelines for what and how much you should eat. You can compare your food choices. And you can assess personal physical activities and see ways you can improve. Go to www.[employer].gov/[employer]/.    1563-9720 [employer]. [address] [place] [address]. All rights reserved. This information is not intended as a substitute for professional medical care. Always follow your healthcare professional's instructions.             Advance Directive  Patients advance directive was discussed and I am comfortable with the patients wishes.  Patient Education   Personalized Prevention Plan  You are due for the preventive services outlined below.  Your care team is available to assist you in scheduling these services.  If you have already completed any of these items, please share that information with your care team to update in your medical record.  Health Maintenance   Topic Date Due   ? ZOSTER VACCINES (2 of 3) 08/26/2008   ? INFLUENZA VACCINE RULE BASED (1) 08/01/2019   ? FALL RISK ASSESSMENT  11/20/2019   ? DXA SCAN  03/08/2020   ? COLONOSCOPY  09/11/2020   ? MAMMOGRAM  02/18/2021   ? ADVANCE DIRECTIVES DISCUSSED WITH PATIENT  11/16/2022   ? TD 18+ HE  07/14/2025   ? PNEUMOCOCCAL POLYSACCHARIDE VACCINE AGE 65 AND OVER  Completed   ? PNEUMOCOCCAL CONJUGATE VACCINE FOR  ADULTS (PCV13 OR PREVNAR)  Completed

## 2021-06-21 NOTE — PROGRESS NOTES
Assessment:         1. Mixed hyperlipidemia  Lipid [place] FASTING    Comprehensive Metabolic Panel    simvastatin (ZOCOR) 20 MG tablet   2. Hypothyroidism due to Hashimoto's thyroiditis  Thyroid Stimulating Hormone (TSH)    T4, Free   3. Vitamin D deficiency  Vitamin D, Total (25-Hydroxy)   4. Age-related osteoporosis without current pathological fracture  DXA Bone Density Scan            Plan:          Fasting labs were drawn. Blood pressure is borderline, and I recommended that she keep an eye on it and follow up if it is consistently elevated. We reviewed her current medications and she will continue the same pending additional lab results. We reviewed dietary recommendations, including low salt and high fiber diet, and recommendations for regular exercise/activity. We reviewed indications for treatment of her anxiety symptoms, and she will follow up with worsening symptoms. She will continue the fosamax, calcium and vit D and regular weight-bearing exercise, and she will be due for a repeat DEXA in the spring. That order was placed.  She will plan to follow up in 6-12 mos for repeat fasting labs and med check, sooner if any difficulties.         Subjective:        Fasting today? Yes  Hyperlipidemia      Patient is here for follow-up of dyslipidemia , osteoporosis and history of hypothyroidism. A repeat fasting lipid profile was done. Compliance with treatment has been good. Patient denies muscle pain associated with her medications. Current symptoms: recent fatigue. Patient denies chest pain, dyspnea, exertional chest pressure/discomfort, irregular heart beat, lower extremity edema and near-syncope. The patient exercises several times per week. Weight trend: stable.     Previous history of cardiac disease includes: none.    Hypothyroidism       Patient also presents for follow up of hypothyroidism history. Current symptoms: change in energy level recently. Patient denies diarrhea, heat / cold intolerance and  "palpitations.         Some mild increase in anxiety recently without significant impairment. She denies mood issues. No difficulty with sleep.         The following portions of the patient's history were reviewed and updated as appropriate: allergies, current medications, past family history, past medical history, past social history, past surgical history and problem list.    Review of Systems  A 12 point comprehensive review of systems was negative except as noted.        Objective:        Vitals:    11/20/18 1033   BP: 146/66   Pulse: 64   Weight: 174 lb 6 oz (79.1 kg)   Height: 5' 5\" (1.651 m)          General:    Alert, cooperative, no distress   Head:    Normocephalic, without obvious abnormality, atraumatic   Eyes:    PERRL, conjunctiva/corneas clear, EOM's intact    Ears:    Normal TM's and external ear canals, both ears   Nose:   Nares normal, mucosa normal, no drainage or sinus tenderness   Throat:   Lips, mucosa, and tongue normal; teeth and gums normal   Neck:   Supple, symmetrical,  no adenopathy;  thyroid:  normal   Back:     Symmetric, ROM normal, no CVA tenderness   Lungs:     Clear to auscultation bilaterally, respirations unlabored   CV:    Regular rate and rhythm   Abdomen:     Soft, non-tender, no masses, no organomegaly   Extremities:   Extremities normal, atraumatic, no cyanosis or edema   Pulses:   2+ and symmetric all extremities   Skin:   Skin color, texture, turgor normal, no rashes or lesions   Neurologic:   normal strength and tone throughout         Results for orders placed or performed in visit on 11/20/18   Lipid [place] FASTING   Result Value Ref Range    Cholesterol 155 <=199 mg/dL    Triglycerides 179 (H) <=149 mg/dL    HDL Cholesterol 48 (L) >=50 mg/dL    LDL Calculated 71 <=129 mg/dL    Patient Fasting > 8hrs? Yes    Comprehensive Metabolic Panel   Result Value Ref Range    Sodium 142 136 - 145 mmol/L    Potassium 4.9 3.5 - 5.0 mmol/L    Chloride 108 (H) 98 - 107 mmol/L    CO2 25 " 22 - 31 mmol/L    Anion Gap, Calculation 9 5 - 18 mmol/L    Glucose 89 70 - 125 mg/dL    BUN 23 8 - 28 mg/dL    Creatinine 0.80 0.60 - 1.10 mg/dL    GFR MDRD Af Amer >60 >60 mL/min/1.73m2    GFR MDRD Non Af Amer >60 >60 mL/min/1.73m2    Bilirubin, Total 1.2 (H) 0.0 - 1.0 mg/dL    Calcium 10.3 8.5 - 10.5 mg/dL    Protein, Total 7.2 6.0 - 8.0 g/dL    Albumin 4.1 3.5 - 5.0 g/dL    Alkaline Phosphatase 53 45 - 120 U/L    AST 20 0 - 40 U/L    ALT 27 0 - 45 U/L   Thyroid Stimulating Hormone (TSH)   Result Value Ref Range    TSH 3.05 0.30 - 5.00 uIU/mL   T4, Free   Result Value Ref Range    Free T4 0.8 0.7 - 1.8 ng/dL   Vitamin D, Total (25-Hydroxy)   Result Value Ref Range    Vitamin D, Total (25-Hydroxy) 40.1 30.0 - 80.0 ng/mL

## 2021-06-22 ENCOUNTER — RECORDS - HEALTHEAST (OUTPATIENT)
Dept: BONE DENSITY | Facility: CLINIC | Age: 76
End: 2021-06-22

## 2021-06-22 ENCOUNTER — RECORDS - HEALTHEAST (OUTPATIENT)
Dept: ADMINISTRATIVE | Facility: OTHER | Age: 76
End: 2021-06-22

## 2021-06-22 ENCOUNTER — TRANSFERRED RECORDS (OUTPATIENT)
Dept: HEALTH INFORMATION MANAGEMENT | Facility: CLINIC | Age: 76
End: 2021-06-22

## 2021-06-22 DIAGNOSIS — Z17.0 ESTROGEN RECEPTOR POSITIVE STATUS (ER+): ICD-10-CM

## 2021-06-22 DIAGNOSIS — C50.411 MALIGNANT NEOPLASM OF UPPER-OUTER QUADRANT OF RIGHT FEMALE BREAST (H): ICD-10-CM

## 2021-06-22 DIAGNOSIS — M81.0 AGE-RELATED OSTEOPOROSIS WITHOUT CURRENT PATHOLOGICAL FRACTURE: ICD-10-CM

## 2021-06-23 NOTE — TELEPHONE ENCOUNTER
Refill Approved    Rx renewed per Medication Renewal Policy. Medication was last renewed on 3/16/18.    Ov: 11/20/18    Erika Alegria, [facility] Triage/Med Refill 1/16/2019     Requested Prescriptions   Pending Prescriptions Disp Refills     alendronate (FOSAMAX) 70 MG tablet 12 tablet 4     Sig: Take 1 tab PO on an empty stomach with at least 8 oz of water, stay upright (sitting or standing) for at least 30-60 min after taking    Biphosphonates Refill Protocol Passed - 1/16/2019  2:39 PM       Passed - PCP or prescribing provider visit in last 12 months    Last office visit with prescriber/PCP: 11/20/2018 Delfina Boyle MD OR same dept: 11/20/2018 Delfina Boyle MD OR same specialty: 11/20/2018 Delfina Boyle MD  Last physical: Visit date not found Last MTM visit: Visit date not found   Next visit within 3 mo: Visit date not found  Next physical within 3 mo: Visit date not found  Prescriber OR PCP: Delfina Boyle MD  Last diagnosis associated with med order: There are no diagnoses linked to this encounter.  If protocol passes may refill for 12 months if within 3 months of last provider visit (or a total of 15 months).            Passed - Serum creatinine in last 12 months    Creatinine   Date Value Ref Range Status   11/20/2018 0.80 0.60 - 1.10 mg/dL Final

## 2021-06-27 ENCOUNTER — COMMUNICATION - HEALTHEAST (OUTPATIENT)
Dept: FAMILY MEDICINE | Facility: CLINIC | Age: 76
End: 2021-06-27

## 2021-06-27 DIAGNOSIS — M81.0 AGE-RELATED OSTEOPOROSIS WITHOUT CURRENT PATHOLOGICAL FRACTURE: ICD-10-CM

## 2021-06-29 ENCOUNTER — HOSPITAL ENCOUNTER (OUTPATIENT)
Dept: CARDIOLOGY | Facility: CLINIC | Age: 76
Discharge: HOME | End: 2021-06-29
Attending: FAMILY MEDICINE
Payer: COMMERCIAL

## 2021-06-29 DIAGNOSIS — I10 ESSENTIAL HYPERTENSION: ICD-10-CM

## 2021-06-29 LAB
AORTIC ROOT: 3.2 CM
AORTIC VALVE MEAN VELOCITY: 83.2 CM/S
ASCENDING AORTA: 3.1 CM
AV DIMENSIONLESS INDEX VTI: 0.8
AV MEAN GRADIENT: 3 MMHG
AV PEAK GRADIENT: 7.3 MMHG
AV VALVE AREA: 3.2 CM2
AV VELOCITY RATIO: 0.8
BSA FOR ECHO PROCEDURE: 1.89 M2
CV BLOOD PRESSURE: ABNORMAL MMHG
CV ECHO HEIGHT: 66 IN
CV ECHO WEIGHT: 169 LBS
DOP CALC AO PEAK VEL: 135 CM/S
DOP CALC AO VTI: 29.4 CM
DOP CALC LVOT AREA: 3.8 CM2
DOP CALC LVOT DIAMETER: 2.2 CM
DOP CALC LVOT PEAK VEL: 106 CM/S
DOP CALC LVOT STROKE VOLUME: 94.6 CM3
DOP CALCLVOT PEAK VEL VTI: 24.9 CM
EJECTION FRACTION: 68 % (ref 55–75)
FRACTIONAL SHORTENING: 38.1 % (ref 28–44)
INTERVENTRICULAR SEPTUM IN END DIASTOLE: 0.69 CM (ref 0.6–0.9)
IVS/PW RATIO: 0.8
LA AREA 1: 14 CM2
LA AREA 2: 15 CM2
LEFT ATRIUM LENGTH: 4.6 CM
LEFT ATRIUM SIZE: 4.2 CM
LEFT ATRIUM VOLUME INDEX: 20.5 ML/M2
LEFT ATRIUM VOLUME: 38.8 ML
LEFT VENTRICLE CARDIAC INDEX: 3.6 L/MIN/M2
LEFT VENTRICLE CARDIAC OUTPUT: 6.8 L/MIN
LEFT VENTRICLE DIASTOLIC VOLUME INDEX: 63.5 CM3/M2 (ref 29–61)
LEFT VENTRICLE DIASTOLIC VOLUME: 120 CM3 (ref 46–106)
LEFT VENTRICLE HEART RATE: 72 BPM
LEFT VENTRICLE MASS INDEX: 60.2 G/M2
LEFT VENTRICLE SYSTOLIC VOLUME INDEX: 20.3 CM3/M2 (ref 8–24)
LEFT VENTRICLE SYSTOLIC VOLUME: 38.3 CM3 (ref 14–42)
LEFT VENTRICULAR INTERNAL DIMENSION IN DIASTOLE: 4.59 CM (ref 3.8–5.2)
LEFT VENTRICULAR INTERNAL DIMENSION IN SYSTOLE: 2.84 CM (ref 2.2–3.5)
LEFT VENTRICULAR MASS: 113.8 G
LEFT VENTRICULAR OUTFLOW TRACT MEAN GRADIENT: 2 MMHG
LEFT VENTRICULAR OUTFLOW TRACT MEAN VELOCITY: 68.6 CM/S
LEFT VENTRICULAR OUTFLOW TRACT PEAK GRADIENT: 4 MMHG
LEFT VENTRICULAR POSTERIOR WALL IN END DIASTOLE: 0.87 CM (ref 0.6–0.9)
LV STROKE VOLUME INDEX: 50.1 ML/M2
MITRAL VALVE DECELERATION SLOPE: 2330 MM/S2
MITRAL VALVE E/A RATIO: 0.6
MITRAL VALVE PRESSURE HALF-TIME: 76 MS
MV AVERAGE E/E' RATIO: 11.9 CM/S
MV DECELERATION TIME: 261 MS
MV E'TISSUE VEL-LAT: 5.51 CM/S
MV E'TISSUE VEL-MED: 4.64 CM/S
MV LATERAL E/E' RATIO: 11
MV MEDIAL E/E' RATIO: 13.1
MV PEAK A VELOCITY: 95.1 CM/S
MV PEAK E VELOCITY: 60.6 CM/S
MV VALVE AREA PRESSURE 1/2 METHOD: 2.9 CM2
NUC REST DIASTOLIC VOLUME INDEX: 2704 LBS
NUC REST SYSTOLIC VOLUME INDEX: 66 IN
PV ACCELERATION TIME: 155 MS
TRICUSPID REGURGITATION PEAK PRESSURE GRADIENT: 21.2 MMHG
TRICUSPID VALVE ANULAR PLANE SYSTOLIC EXCURSION: 2.9 CM
TRICUSPID VALVE PEAK REGURGITANT VELOCITY: 230 CM/S

## 2021-06-29 ASSESSMENT — MIFFLIN-ST. JEOR: SCORE: 1273.33

## 2021-07-03 NOTE — ADDENDUM NOTE
Addendum Note by Delfina Boyle MD at 7/24/2020  6:04 PM     Author: Delfina Boyle MD Service: -- Author Type: Physician    Filed: 7/24/2020  6:04 PM Encounter Date: 7/24/2020 Status: Signed    : Delfina Boyle MD (Physician)    Addended by: DELFINA BOYLE on: 7/24/2020 06:04 PM        Modules accepted: Orders

## 2021-07-06 VITALS — WEIGHT: 169 LBS | BODY MASS INDEX: 27.16 KG/M2 | HEIGHT: 66 IN

## 2021-07-07 NOTE — TELEPHONE ENCOUNTER
Refill Approved    Rx renewed per Medication Renewal Policy. Medication was last renewed on 2020.  Last office visit was 06/10/2021 with PCP.    Crystal Bryant, [facility] Triage/Med Refill 2021     Requested Prescriptions   Pending Prescriptions Disp Refills     alendronate (FOSAMAX) 70 MG tablet [Pharmacy Med Name: ALENDRONATE SODIUM 70 MG TAB] 12 tablet 2     Si TAB ON EMPTY STOMACH ONCE WEEKLY WITH AT LEAST 8 OZ WATER, STAY UPRIGHT 30-60 MIN AFTER TAKING       Biphosphonates Refill Protocol Passed - 2021  9:51 AM        Passed - PCP or prescribing provider visit in last 12 months     Last office visit with prescriber/PCP: 6/10/2021 Delfina Boyle MD OR same dept: 6/10/2021 Delfina Boyle MD OR same specialty: 6/10/2021 Delfina Boyle MD  Last physical: 3/6/2020 Last MTM visit: Visit date not found   Next visit within 3 mo: Visit date not found  Next physical within 3 mo: Visit date not found  Prescriber OR PCP: Delfina Boyle MD  Last diagnosis associated with med order: 1. Age-related osteoporosis without current pathological fracture  - alendronate (FOSAMAX) 70 MG tablet [Pharmacy Med Name: ALENDRONATE SODIUM 70 MG TAB]; 1 TAB ON EMPTY STOMACH ONCE WEEKLY WITH AT LEAST 8 OZ WATER, STAY UPRIGHT 30-60 MIN AFTER TAKING  Dispense: 12 tablet; Refill: 2    If protocol passes may refill for 12 months if within 3 months of last provider visit (or a total of 15 months).             Passed - Serum creatinine in last 12 months     Creatinine   Date Value Ref Range Status   2021 0.89 0.60 - 1.10 mg/dL Final

## 2021-07-14 PROBLEM — I20.0 UNSTABLE ANGINA (H): Status: RESOLVED | Noted: 2020-11-02 | Resolved: 2020-11-26

## 2021-08-02 ENCOUNTER — PATIENT OUTREACH (OUTPATIENT)
Dept: ONCOLOGY | Facility: CLINIC | Age: 76
End: 2021-08-02

## 2021-08-02 NOTE — PROGRESS NOTES
Sadia is calling regarding denial of recent coverage for Bone Density scan completed on 6/22/21.    Sadia discussed that she contacted her insurance company who discussed that Bone Density scans are covered every 2 years or more frequently if medically necessary.  Sadia discussed that her insurance mentioned that our office could assist with appeal process.    Sadia last saw Dr. Harper on 4/14/21. Bone Density scan from 1/2/2020 showed mild bone thinning. Dr. Harper ordered repeat Bone Density scan, which was completed on 6/22/21.  -Sadia is currently on Arimidex.    Writer reviewed with Zakiya Chong RNCC. Zakiya will contact patient.    Writer updated patient regarding plan.  Sadia is requesting that Zakiya contact her at [phone]. Okay to leave message.    Suzy Tristan RN, BSN, OCN on 8/2/2021 at 10:57 AM

## 2021-08-03 NOTE — PROGRESS NOTES
Late entry for 8/2/2021:  Writer talked with pt on 8/2/2021.  Advised pt that writer left a message for Cristy in financial clearance. This is the dept who handles all of the authorizations for imaging tests.  Writer asked Cristy to look into pt's questions regarding the denial, and get back to pt.  Writer advised pt that if she does not hear back from financial clearance dept in the next 2-3 days, she can contact them directly. Phone number for Cristy given to pt.  Patient verbalized understanding and agreement with plan.  Pt was instructed to call the clinic with any questions, concerns, or worsening symptoms.  Zakiya Chong, RN, BSN, OCN, CBCN

## 2021-08-05 ENCOUNTER — TELEPHONE (OUTPATIENT)
Dept: ONCOLOGY | Facility: CLINIC | Age: 76
End: 2021-08-05

## 2021-08-05 NOTE — LETTER
August 25, 2021    RE: Sadia Rider  [address]  [place] [address]    Member ID: [id number]    To Appeals and [id number] Department:    I am writing this letter of medical necessity to obtain coverage for Sadia's recent bone density scan that was done on 6/22/2021.    Her prior bone density scan that was done on 1/2/2020 showed osteopenia.  Sadia is currently taking arimidex, which has the possible side effect of further decreasing the bone density.  Because of this possible side effect, it is very important that we closely monitor her bone density to make sure that it is safe for her to continue taking arimidex.  This is the reason why another bone density scan was done in June 2021.    The bone density scan that was done on 6/22/2021 again shows osteopenia, and recommends a follow-up bone density scan in 1 year.    Sadia will continue to need frequent bone density scans to monitor her osteopenia, especially while she is on active treatment with arimidex.    Please reconsider coverage for the bone density scan that was done in June 2021. Please contact the clinic with any questions or concerns.    Sincerely,            Ezra Harper MD

## 2021-08-05 NOTE — TELEPHONE ENCOUNTER
Writer talked with pt.  Pt states that she did talk with financial clearance dept.  They told her that they do not do approvals for dexa scans that are not done in the hospital.  Pt's dexa was done at a clinic.  Writer advised pt that we will continue to look into this, and we will let her know as soon as we receive an answer.  Zakiya Chong, RN, BSN, OCN, CBCN

## 2021-08-05 NOTE — TELEPHONE ENCOUNTER
Writer attempted to reach financial clearance dept today.  Left a voicemail message for them to return call to the clinic.  Need to find out who to contact regarding who does the pre-approvals for imaging tests that are done at an outpatient clinic.  Will await return call.  Zakiya Chong RN, BSN, OCN, CBCN

## 2021-08-05 NOTE — TELEPHONE ENCOUNTER
Patient calling and would like a call back to discuss dexa scan that was denied.    Ghada Lee CMA on 8/5/2021 at 12:43 PM

## 2021-08-10 NOTE — TELEPHONE ENCOUNTER
Writer attempted to reach Nathalie Sampson, financial securing for outpatient [facility].  Left a message for Nathalie to contact the clinic regarding the bone density scan.  Nathalie's contact information is below.        Nathalie Sampson  Revenue Cycle Supervisor    [facility]  [email or web address] [facility]    Office: [phone]  Fax [phone]      Zakiya Chong RN, BSN, OCN, CBCN

## 2021-08-11 NOTE — TELEPHONE ENCOUNTER
Writer talked with Nathalie Sampson.  Nathalie states that they only do financial clearance for inpatient settings.    Writer then talked with dexa department at [facility].  They state that they do not do financial clearance for dexa scans done in the outpatient settings.    The ordering provider will need to write a letter of medical necessity in order to try to obtain coverage for the dexa scan.    Writer talked with pt and notified pt with this information.  Advised pt that Dr. Harper is on vacation until 8/25, so the letter will have to wait until Dr Harper is back from vacation.    Pt states that she will contact her insurance company to find out where to fax or mail the letter when it has been completed.    Will await information from pt.  Will complete letter as requested when Dr. Harper is back from vacation.  Will also need to notify pt when letter has been completed.    Pt states that she appreciates all of the help.  Patient verbalized understanding and agreement with plan.  Pt was instructed to call the clinic with any questions, concerns, or worsening symptoms.  Zakiya Chong, RN, BSN, OCN, CBCN

## 2021-08-13 NOTE — TELEPHONE ENCOUNTER
Pt called the clinic.  States that she has talked with her insurance company.  They will need an appeal letter faxed to [phone].  The letter should be directed to Appeals and Grievance dept.  Please also include pt's insurance ID number on the letter.    Advised pt that letter will be completed when Dr. Harper is back in the clinic.  Pt verbalized understanding, and states that this is fine.  Pt appreciates all of the help.  Zakiya Chong, RN, BSN, OCN, CBCN

## 2021-08-25 NOTE — TELEPHONE ENCOUNTER
Letter has been completed and signed by Dr. Harper.  Letter was faxed to Appeals and Grievance Dept at [phone].  Also left a general message for pt to let her know that letter has been completed and faxed.  Zakiya Chong RN, BSN, OCN, CBCN

## 2021-09-02 ENCOUNTER — OFFICE VISIT (OUTPATIENT)
Dept: OPHTHALMOLOGY | Facility: CLINIC | Age: 76
End: 2021-09-02
Attending: OPHTHALMOLOGY
Payer: COMMERCIAL

## 2021-09-02 DIAGNOSIS — Q15.0 JUVENILE GLAUCOMA: Primary | ICD-10-CM

## 2021-09-02 DIAGNOSIS — Z96.1 PSEUDOPHAKIA OF BOTH EYES: ICD-10-CM

## 2021-09-02 DIAGNOSIS — H44.40 HYPOTONY OF EYE: ICD-10-CM

## 2021-09-02 PROCEDURE — G0463 HOSPITAL OUTPT CLINIC VISIT: HCPCS

## 2021-09-02 PROCEDURE — 99212 OFFICE O/P EST SF 10 MIN: CPT | Mod: GC | Performed by: OPHTHALMOLOGY

## 2021-09-02 ASSESSMENT — REFRACTION_WEARINGRX
OS_AXIS: 115
OD_ADD: +2.75
OD_CYLINDER: +1.25
OD_AXIS: 112
OS_SPHERE: -1.00
OD_SPHERE: -5.00
OS_CYLINDER: +0.50
OS_ADD: +2.75

## 2021-09-02 ASSESSMENT — VISUAL ACUITY
OD_PH_CC+: -2
METHOD: SNELLEN - LINEAR
OD_PH_CC: 20/50
CORRECTION_TYPE: GLASSES
OS_CC: 20/150ECC
OD_CC: 20/70

## 2021-09-02 ASSESSMENT — CONF VISUAL FIELD
OD_INFERIOR_NASAL_RESTRICTION: 3
OS_SUPERIOR_TEMPORAL_RESTRICTION: 2
OS_SUPERIOR_NASAL_RESTRICTION: 2
OS_INFERIOR_NASAL_RESTRICTION: 1
OS_INFERIOR_TEMPORAL_RESTRICTION: 2
OD_INFERIOR_TEMPORAL_RESTRICTION: 3
METHOD: COUNTING FINGERS

## 2021-09-02 ASSESSMENT — CUP TO DISC RATIO
OS_RATIO: 0.6
OD_RATIO: 0.6

## 2021-09-02 ASSESSMENT — TONOMETRY
OS_IOP_MMHG: 2
OD_IOP_MMHG: 4
IOP_METHOD: APPLANATION
OS_IOP_MMHG: 2
OD_IOP_MMHG: 5
IOP_METHOD: APPLANATION

## 2021-09-02 NOTE — PROGRESS NOTES
Chief Complaint/Presenting Concern: Here for glaucoma follow up    History of Present Illness:   Sadia Rider is a 75 year old patient who presents for glaucoma follow up. Reports doing well, no recent change in vision, no eye pain. Reports that at times her vision fluctuates in quality. No using any eye drops currently.     Relevant Past Medical/Family/Social History: None relevant     Relevant Review of Systems: None relevant       Diagnosis: Juvenile Open Angle Glaucoma each eye   Previous glaucoma surgery/laser:  s/p ALT OU in 2009  s/p Trab OS in 2004 by Dr. Mathis  s/p Trab OD in 1990 by Dr. Muñoz  s/p Trab OU at 29 yo  Currently Meds: None   Family history: positive daughter, mother, brother, great-grandma -- all on gtts, surgery and mother lost vision   Meds AEs/intolerance: AGN - contact dermatitis     PMHx: No history of Asthma and respiratory problems/Cardiac/Renal/Kidney stones/Sulfa Allergy  Previous testing:  Visual field 11/23/2020:  Right eye - inferior nasal step and paracentral defect, slightly more depressed at point but also less depressed at other points, likely fluctuations, reliable  Left eye - inferior nasal/arcuate defect, central defect  Fluctuations, reliable  Visual field February 25, 2021:  Right eye - inferior nasal step and paracentral defect, stable, reliable  OCT Optic Nerve RNFL Spectralis 11/23/2020  right eye: superior and inferior RNFL thinning, stable   left eye: superior and inferior RNFL thinning, possible worsening of IN thinning likely fluctuating     Today's testing:  IOP 4 / 3 mmHg  AC formed, no gross corneal edema  VA stable, no choroidals  Blebs elevated, no leak     Additional Ocular History:    2. 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    Doing well, stable with no evidence of hypotony signs of exam.     Careful precautions  given to patient to watch out for any signs and symptoms of bleb leak/infection. Advised to avoid eye rubbing and lake water.     Obtain OCT Mac w/ dilated examination if concern for hypotony arises with any vision changes     RTC 3 months VA, IOP, Dilate possible OCT macula     Franklin Sun MD - PGY3  Department of Ophthalmology  Pager: [phone]    Physician Attestation     Attending Physician Attestation:  Complete documentation of historical and exam elements from today's encounter can be found in the full encounter summary report (not reduplicated in this progress note). I personally obtained the chief complaint(s) and history of present illness. I confirmed and edited as necessary the review of systems, past medical/surgical history, family history, social history, and examination findings as documented by others; and I examined the patient myself. I personally reviewed the relevant tests, images, and reports as documented above. I formulated and edited as necessary the assessment and plan and discussed the findings and management plan with the patient and any family members present at the time of the visit.  Ramon Morin M.D., Glaucoma, 09/02/21

## 2021-09-02 NOTE — NURSING NOTE
Chief Complaints and History of Present Illnesses   Patient presents with     Follow Up     Juvenile glaucoma - Both Eyes      Chief Complaint(s) and History of Present Illness(es)     Follow Up     Laterality: both eyes    Onset: gradual    Onset: years ago    Course: gradually worsening    Associated symptoms: flashes and floaters.  Negative for eye pain, dryness, tearing, photophobia, glare and haloes    Treatments tried: artificial tears    Pain scale: 0/10    Comments: Juvenile glaucoma - Both Eyes               Comments     Pt states DVA is a little worse and NVA is about the same.  No change to flashes/floaters.    Rare use of AT's, no other ocular medications.    ASHLEY Cr September 2, 2021 9:52 AM

## 2021-09-11 ENCOUNTER — HEALTH MAINTENANCE LETTER (OUTPATIENT)
Age: 76
End: 2021-09-11

## 2021-10-04 DIAGNOSIS — M81.0 OSTEOPOROSIS, UNSPECIFIED OSTEOPOROSIS TYPE, UNSPECIFIED PATHOLOGICAL FRACTURE PRESENCE: ICD-10-CM

## 2021-10-04 DIAGNOSIS — E78.2 MIXED HYPERLIPIDEMIA: Primary | ICD-10-CM

## 2021-10-04 NOTE — PROGRESS NOTES
Patient has a lab only appointment tomorrow prior to visit with you. Please order fasting labs if appropriate. Thank you

## 2021-10-05 ENCOUNTER — LAB (OUTPATIENT)
Dept: LAB | Facility: CLINIC | Age: 76
End: 2021-10-05
Payer: COMMERCIAL

## 2021-10-05 ENCOUNTER — OFFICE VISIT (OUTPATIENT)
Dept: FAMILY MEDICINE | Facility: CLINIC | Age: 76
End: 2021-10-05
Payer: COMMERCIAL

## 2021-10-05 VITALS
OXYGEN SATURATION: 99 % | WEIGHT: 163.5 LBS | SYSTOLIC BLOOD PRESSURE: 130 MMHG | DIASTOLIC BLOOD PRESSURE: 82 MMHG | HEART RATE: 61 BPM | BODY MASS INDEX: 26.39 KG/M2

## 2021-10-05 DIAGNOSIS — E78.2 MIXED HYPERLIPIDEMIA: ICD-10-CM

## 2021-10-05 DIAGNOSIS — C50.411 MALIGNANT NEOPLASM OF UPPER-OUTER QUADRANT OF RIGHT BREAST IN FEMALE, ESTROGEN RECEPTOR POSITIVE (H): ICD-10-CM

## 2021-10-05 DIAGNOSIS — Z17.0 MALIGNANT NEOPLASM OF UPPER-OUTER QUADRANT OF RIGHT BREAST IN FEMALE, ESTROGEN RECEPTOR POSITIVE (H): ICD-10-CM

## 2021-10-05 DIAGNOSIS — M81.0 OSTEOPOROSIS, UNSPECIFIED OSTEOPOROSIS TYPE, UNSPECIFIED PATHOLOGICAL FRACTURE PRESENCE: ICD-10-CM

## 2021-10-05 DIAGNOSIS — Z11.59 NEED FOR HEPATITIS C SCREENING TEST: ICD-10-CM

## 2021-10-05 DIAGNOSIS — I10 ESSENTIAL HYPERTENSION: Primary | ICD-10-CM

## 2021-10-05 DIAGNOSIS — F43.22 ADJUSTMENT DISORDER WITH ANXIOUS MOOD: ICD-10-CM

## 2021-10-05 LAB
ALBUMIN SERPL-MCNC: 4 G/DL (ref 3.5–5)
ALP SERPL-CCNC: 52 U/L (ref 45–120)
ALT SERPL W P-5'-P-CCNC: 27 U/L (ref 0–45)
ANION GAP SERPL CALCULATED.3IONS-SCNC: 11 MMOL/L (ref 5–18)
AST SERPL W P-5'-P-CCNC: 23 U/L (ref 0–40)
BILIRUB SERPL-MCNC: 1.1 MG/DL (ref 0–1)
BUN SERPL-MCNC: 20 MG/DL (ref 8–28)
CALCIUM SERPL-MCNC: 9.9 MG/DL (ref 8.5–10.5)
CHLORIDE BLD-SCNC: 106 MMOL/L (ref 98–107)
CHOLEST SERPL-MCNC: 134 MG/DL
CO2 SERPL-SCNC: 24 MMOL/L (ref 22–31)
CREAT SERPL-MCNC: 0.91 MG/DL (ref 0.6–1.1)
FASTING STATUS PATIENT QL REPORTED: YES
GFR SERPL CREATININE-BSD FRML MDRD: 61 ML/MIN/1.73M2
GLUCOSE BLD-MCNC: 95 MG/DL (ref 70–125)
HDLC SERPL-MCNC: 39 MG/DL
LDLC SERPL CALC-MCNC: 69 MG/DL
POTASSIUM BLD-SCNC: 4.6 MMOL/L (ref 3.5–5)
PROT SERPL-MCNC: 6.8 G/DL (ref 6–8)
SODIUM SERPL-SCNC: 141 MMOL/L (ref 136–145)
TRIGL SERPL-MCNC: 132 MG/DL

## 2021-10-05 PROCEDURE — 80061 LIPID PANEL: CPT

## 2021-10-05 PROCEDURE — 82306 VITAMIN D 25 HYDROXY: CPT

## 2021-10-05 PROCEDURE — 36415 COLL VENOUS BLD VENIPUNCTURE: CPT

## 2021-10-05 PROCEDURE — 80053 COMPREHEN METABOLIC PANEL: CPT

## 2021-10-05 PROCEDURE — 99214 OFFICE O/P EST MOD 30 MIN: CPT | Performed by: FAMILY MEDICINE

## 2021-10-05 PROCEDURE — 86803 HEPATITIS C AB TEST: CPT

## 2021-10-05 RX ORDER — LISINOPRIL 5 MG/1
5 TABLET ORAL DAILY
Qty: 90 TABLET | Refills: 3 | Status: SHIPPED | OUTPATIENT
Start: 2021-10-05 | End: 2022-11-25

## 2021-10-05 NOTE — PROGRESS NOTES
Assessment & Plan:       ICD-10-CM    1. Essential hypertension  I10 lisinopril (ZESTRIL) 5 MG tablet   2. Malignant neoplasm of upper-outer quadrant of right breast in female, estrogen receptor positive (H)  C50.411     Z17.0    3. Adjustment disorder with anxious mood  F43.22    4. Need for hepatitis C screening test  Z11.59 Hepatitis C Screen Reflex to HCV RNA Quant and Genotype        Fasting labs were drawn. Blood pressure is under adequate control. We reviewed her current medications. Medication: continue current medication regimen unchanged pending additional results. We reviewed dietary recommendations, including low salt and high fiber diet, and recommendations for regular exercise/activity. We discussed her mood/anxiety symptoms and her current stressors. She declines any treatment at this time, and we discussed indications for treatment. She will call with any additional problems or concerns, and will plan to follow up in 4-6 mos for repeat fasting labs and med check, sooner if any difficulties.        Subjective:     Fasting today? Yes  Hypertension & Hyperlipidemia      Sadia Rider is a 76 year old female here for follow-up of elevated blood pressure ad hyperlipidemia. A repeat fasting lipid profile was done. Compliance with treatment has been good. Patient denies muscle pain associated with her medications. She is currently taking simvastatin 10 mg, lisinopril 5 mg. Current side effects include: none. Associated signs and symptoms: none. Denies chest pain, chest pressure/discomfort, dyspnea, palpitations, near-syncope. The patient reports sporadic irregular exercise. Weight trend: down a bit.              Previous history of cardiac disease includes: none.        She reports a fair amount of stress with her 's recent diagnosis of advanced biliary cancer. He had surgery and has had a long hospital stay, so she has been back and forth. She is feeling a bit overwhelmed and down, but overall  feels like she is managing well.       The following portions of the patient's history were reviewed and updated as appropriate: allergies, current medications, past family history, past medical history, past social history, past surgical history and problem list.    Review of Systems  12 point ROS negative except as noted above        Objective:      Vitals:    10/05/21 0957   BP: 130/82   Patient Position: Sitting   Cuff Size: Adult Regular   Pulse: 61   SpO2: 99%   Weight: 74.2 kg (163 lb 8 oz)     GEN: Alert and oriented, NAD, well nourished  SKIN:  Normal skin turgor, no lesions/rashes   HEENT: NC/AT, moist mucous membranes, no rhinorrhea.    NECK: Normal.  No adenopathy or thyromegaly.  CV: Regular rate and rhythm, no murmurs.   LUNGS: Clear to auscultation bilaterally.    ABDOMEN: Soft, non-tender, non-distended, no masses   BACK: Normal  EXTREMITY: No edema, cyanosis  NEURO: Grossly normal.         Labs:  Results for orders placed or performed in visit on 10/05/21   Lipid panel reflex to direct LDL Fasting     Status: Abnormal   Result Value Ref Range    Cholesterol 134 <=199 mg/dL    Triglycerides 132 <=149 mg/dL    Direct Measure HDL 39 (L) >=50 mg/dL    LDL Cholesterol Calculated 69 <=129 mg/dL    Patient Fasting > 8hrs? Yes    Comprehensive metabolic panel (BMP + Alb, Alk Phos, ALT, AST, Total. Bili, TP)     Status: Abnormal   Result Value Ref Range    Sodium 141 136 - 145 mmol/L    Potassium 4.6 3.5 - 5.0 mmol/L    Chloride 106 98 - 107 mmol/L    Carbon Dioxide (CO2) 24 22 - 31 mmol/L    Anion Gap 11 5 - 18 mmol/L    Urea Nitrogen 20 8 - 28 mg/dL    Creatinine 0.91 0.60 - 1.10 mg/dL    Calcium 9.9 8.5 - 10.5 mg/dL    Glucose 95 70 - 125 mg/dL    Alkaline Phosphatase 52 45 - 120 U/L    AST 23 0 - 40 U/L    ALT 27 0 - 45 U/L    Protein Total 6.8 6.0 - 8.0 g/dL    Albumin 4.0 3.5 - 5.0 g/dL    Bilirubin Total 1.1 (H) 0.0 - 1.0 mg/dL    GFR Estimate 61 >60 mL/min/1.73m2   Vitamin D Deficiency     Status:  Normal   Result Value Ref Range    Vitamin D, Total (25-Hydroxy) 49 30 - 80 ug/L    Narrative    Deficiency <10.0 ug/L  Insufficiency 10.0-29.9 ug/L  Sufficiency 30.0-80.0 ug/L  Toxicity (possible) >100.0 ug/L    Hepatitis C Screen Reflex to HCV RNA Quant and Genotype     Status: Normal   Result Value Ref Range    Hepatitis C Antibody Nonreactive Nonreactive    Narrative    Assay performance characteristics have not been established for newborns, infants, and children.

## 2021-10-06 LAB — DEPRECATED CALCIDIOL+CALCIFEROL SERPL-MC: 49 UG/L (ref 30–80)

## 2021-10-07 ENCOUNTER — TELEPHONE (OUTPATIENT)
Dept: FAMILY MEDICINE | Facility: CLINIC | Age: 76
End: 2021-10-07

## 2021-10-07 DIAGNOSIS — F43.23 ADJUSTMENT DISORDER WITH MIXED ANXIETY AND DEPRESSED MOOD: ICD-10-CM

## 2021-10-07 DIAGNOSIS — F94.1 REACTIVE ATTACHMENT DISORDER: Primary | ICD-10-CM

## 2021-10-07 NOTE — TELEPHONE ENCOUNTER
Reason for Call:  Medication or medication refill:    Do you use a [facility]?  Name of the pharmacy and phone number for the current request[facility]    Name of the medication requested: pt would like to have a rx of what was discussed at there last ofice visit to help thru these times with her  and his dx.r    Other request:     Can we leave a detailed message on this number? YES    Phone number patient can be reached at: Cell number on file:    Telephone Information:   Mobile [phone]       Best Time:     Call taken on 10/7/2021 at 3:08 PM by Aysha Funk

## 2021-10-08 LAB — HCV AB SERPL QL IA: NONREACTIVE

## 2021-10-13 NOTE — TELEPHONE ENCOUNTER
Pt called, she has called and has changed her mind and does want to to have something to help her with her husbands new dx.

## 2021-10-14 RX ORDER — ESCITALOPRAM OXALATE 5 MG/1
5 TABLET ORAL DAILY
Qty: 30 TABLET | Refills: 3 | Status: SHIPPED | OUTPATIENT
Start: 2021-10-14 | End: 2022-03-04

## 2021-10-14 NOTE — TELEPHONE ENCOUNTER
Sorry for the delay. I have sent a new Rx for lexapro 5 mg. Should start 1/2 tab for 4-6 days, then increase to a full tab. We could take it up further after a few weeks if she feels like she needs it. The most common side effects are GI upset (indigestion or nausea) and dry mouth, but those symptoms usually resolve after a couple weeks. She may feel woozy for the first couple days as well, but it should not be severe. We should touch base in another 4-6 wks or sooner if not tolerating it. Senthil msg would be fine.

## 2021-10-28 ENCOUNTER — TELEPHONE (OUTPATIENT)
Dept: FAMILY MEDICINE | Facility: CLINIC | Age: 76
End: 2021-10-28

## 2021-10-28 NOTE — TELEPHONE ENCOUNTER
has cancer diagnosis. Pt doesn't want to use Lexparo but is interested in a daily medication like, ativan.

## 2021-10-28 NOTE — TELEPHONE ENCOUNTER
Reason for Call:  Medication or medication refill:    Do you use a [facility]?  Name of the pharmacy and phone number for the current request[facility]    Name of the medication requested: ativan- as a new rx    Other request: she has not started the previous rx of the one that dr Boyle ordered as she did not want to take all of the time.   So would like a new rx and try ativan and only use it as needed  Can we leave a detailed message on this number? YES    Phone number patient can be reached at: Home number on file [phone] (home)    Best Time:     Call taken on 10/28/2021 at 1:03 PM by Aysha Funk

## 2021-10-28 NOTE — TELEPHONE ENCOUNTER
Not appropriate especially due to age. Strongly recommended NOT to use benzos.  This is a stressor which she will have for a while--should treat with an selective serotonin reuptake inhibitor such as citalopram, lexapro or fluoxetine in the elderly. Will need to wait for Boyle.

## 2021-11-01 NOTE — TELEPHONE ENCOUNTER
I would be comfortable doing the lorazepam/ativan on an intermittent basis. I think the daily lexapro would give better coverage over time. The dose is very low. Is she having any side effects from it at this time?

## 2021-11-01 NOTE — TELEPHONE ENCOUNTER
Pt called back, she will not take the ativan if  Says not to.  Is wondering if she can just stop taking the escitalapram ?

## 2021-11-02 NOTE — TELEPHONE ENCOUNTER
Pt called back, states she just started taking the Lexapro 4 days ago and no side effects yet, so she will continue to take and see how it works.    States she only asked about the Ativan as she heard them offer it to her  for his anxiety, and thougt it sounded better than what she had read about the Lexapro.    She is now taking it and will see how it goes.

## 2021-11-04 ENCOUNTER — OFFICE VISIT (OUTPATIENT)
Dept: OPHTHALMOLOGY | Facility: CLINIC | Age: 76
End: 2021-11-04
Attending: OPHTHALMOLOGY
Payer: COMMERCIAL

## 2021-11-04 DIAGNOSIS — Q15.0 JUVENILE GLAUCOMA: ICD-10-CM

## 2021-11-04 DIAGNOSIS — H44.40 HYPOTONY OF EYE: Primary | ICD-10-CM

## 2021-11-04 DIAGNOSIS — H44.40 HYPOTONY OF EYE: ICD-10-CM

## 2021-11-04 DIAGNOSIS — Z96.1 PSEUDOPHAKIA OF BOTH EYES: ICD-10-CM

## 2021-11-04 PROCEDURE — 92134 CPTRZ OPH DX IMG PST SGM RTA: CPT | Performed by: OPHTHALMOLOGY

## 2021-11-04 PROCEDURE — 92012 INTRM OPH EXAM EST PATIENT: CPT | Mod: GC | Performed by: OPHTHALMOLOGY

## 2021-11-04 PROCEDURE — G0463 HOSPITAL OUTPT CLINIC VISIT: HCPCS

## 2021-11-04 ASSESSMENT — VISUAL ACUITY
CORRECTION_TYPE: GLASSES
OD_CC: 20/60
OS_CC: 20/150
METHOD: SNELLEN - LINEAR
OD_CC+: -2

## 2021-11-04 ASSESSMENT — TONOMETRY
OS_IOP_MMHG: 2
IOP_METHOD: APPLANATION
OD_IOP_MMHG: 3
OD_IOP_MMHG: 05
IOP_METHOD: APPLANATION
OS_IOP_MMHG: 02

## 2021-11-04 ASSESSMENT — CONF VISUAL FIELD
OS_SUPERIOR_TEMPORAL_RESTRICTION: 2
OS_INFERIOR_TEMPORAL_RESTRICTION: 2
OS_INFERIOR_NASAL_RESTRICTION: 2
OD_INFERIOR_NASAL_RESTRICTION: 3
OS_SUPERIOR_NASAL_RESTRICTION: 2
OD_SUPERIOR_NASAL_RESTRICTION: 3

## 2021-11-04 ASSESSMENT — REFRACTION_WEARINGRX
OS_AXIS: 115
OS_CYLINDER: +0.50
OD_ADD: +2.75
OD_AXIS: 112
OS_ADD: +2.75
OS_SPHERE: -1.00
OD_SPHERE: -5.00
OD_CYLINDER: +1.25

## 2021-11-04 ASSESSMENT — EXTERNAL EXAM - LEFT EYE: OS_EXAM: NORMAL

## 2021-11-04 ASSESSMENT — EXTERNAL EXAM - RIGHT EYE: OD_EXAM: NORMAL

## 2021-11-04 ASSESSMENT — CUP TO DISC RATIO
OS_RATIO: 0.6
OD_RATIO: 0.6

## 2021-11-04 NOTE — PROGRESS NOTES
Chief Complaint/Presenting Concern: Here for glaucoma follow up    History of Present Illness:   Sadia Rider is a 75 year old patient who presents for glaucoma follow up. Reports doing well, no recent change in vision, no eye pain. No new flashes, floaters. No using any eye drops currently.     Relevant Past Medical/Family/Social History: None relevant     Relevant Review of Systems: None relevant       Diagnosis: Juvenile Open Angle Glaucoma each eye   Previous glaucoma surgery/laser:  s/p ALT OU in 2009  s/p Trab OS in 2004 by Dr. Mathis  s/p Trab OD in 1990 by Dr. Muñoz  s/p Trab OU at 27 yo  Currently Meds: None   Family history: positive daughter, mother, brother, great-grandma -- all on gtts, surgery and mother lost vision   Meds AEs/intolerance: AGN - contact dermatitis     PMHx: No history of Asthma and respiratory problems/Cardiac/Renal/Kidney stones/Sulfa Allergy  Previous testing:  Visual field 11/23/2020:  Right eye - inferior nasal step and paracentral defect, slightly more depressed at point but also less depressed at other points, likely fluctuations, reliable  Left eye - inferior nasal/arcuate defect, central defect  Fluctuations, reliable  Visual field February 25, 2021:  Right eye - inferior nasal step and paracentral defect, stable, reliable  OCT Optic Nerve RNFL Spectralis 11/23/2020  right eye: superior and inferior RNFL thinning, stable   left eye: superior and inferior RNFL thinning, possible worsening of IN thinning likely fluctuating     Today's testing:  IOP 3/2 mmHg  AC formed, no gross corneal edema  VA stable, no choroidals  Blebs elevated, no leak   OCT mac (November 4, 2021): preserved foveal contour, no evidence of hypotony maculopathy    Additional Ocular History:    2. 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    Doing well, stable with no  evidence of hypotony signs of exam.     Careful precautions given to patient to watch out for any signs and symptoms of bleb leak/infection. Advised to avoid eye rubbing and lake water.     RTC: DMV field each eye (per patient request)  RTC: VA, IOP with Dr. Patience Shaver MD  Ophthalmology Resident, PGY-3      Physician Attestation     Attending Physician Attestation:  Complete documentation of historical and exam elements from today's encounter can be found in the full encounter summary report (not reduplicated in this progress note). I personally obtained the chief complaint(s) and history of present illness. I confirmed and edited as necessary the review of systems, past medical/surgical history, family history, social history, and examination findings as documented by others; and I examined the patient myself. I personally reviewed the relevant tests, images, and reports as documented above. I formulated and edited as necessary the assessment and plan and discussed the findings and management plan with the patient and any family members present at the time of the visit.  Ramon Morin M.D., Glaucoma, November 4, 2021

## 2021-11-04 NOTE — NURSING NOTE
Chief Complaints and History of Present Illnesses   Patient presents with     Glaucoma Follow-Up     Chief Complaint(s) and History of Present Illness(es)     Glaucoma Follow-Up     Laterality: both eyes              Comments     Pt. States that VA seems to have worsened slightly BE. No pain BE. Occasional flashes and floaters BE.   Candelaria RAMIREZ 12:27 PM November 4, 2021

## 2021-11-06 DIAGNOSIS — F43.23 ADJUSTMENT DISORDER WITH MIXED ANXIETY AND DEPRESSED MOOD: ICD-10-CM

## 2021-11-08 RX ORDER — ESCITALOPRAM OXALATE 5 MG/1
TABLET ORAL
Qty: 30 TABLET | Refills: 3 | OUTPATIENT
Start: 2021-11-08

## 2021-11-09 NOTE — TELEPHONE ENCOUNTER
Refused refill request as medications should have refills available.   Last written 10/14/2021 dispense 30 refill 3.   Kamila Bustillos RN   11/08/21 11:48 PM  [facility] Nurse Advisor

## 2021-11-10 ENCOUNTER — ONCOLOGY VISIT (OUTPATIENT)
Dept: ONCOLOGY | Facility: CLINIC | Age: 76
End: 2021-11-10
Attending: INTERNAL MEDICINE
Payer: COMMERCIAL

## 2021-11-10 VITALS
HEIGHT: 65 IN | BODY MASS INDEX: 26.49 KG/M2 | SYSTOLIC BLOOD PRESSURE: 128 MMHG | WEIGHT: 159 LBS | TEMPERATURE: 97.9 F | DIASTOLIC BLOOD PRESSURE: 65 MMHG | HEART RATE: 64 BPM | OXYGEN SATURATION: 96 % | RESPIRATION RATE: 16 BRPM

## 2021-11-10 DIAGNOSIS — Z12.31 ENCOUNTER FOR SCREENING MAMMOGRAM FOR MALIGNANT NEOPLASM OF BREAST: ICD-10-CM

## 2021-11-10 DIAGNOSIS — C50.411 MALIGNANT NEOPLASM OF UPPER-OUTER QUADRANT OF RIGHT BREAST IN FEMALE, ESTROGEN RECEPTOR POSITIVE (H): Primary | ICD-10-CM

## 2021-11-10 DIAGNOSIS — Z17.0 MALIGNANT NEOPLASM OF UPPER-OUTER QUADRANT OF RIGHT BREAST IN FEMALE, ESTROGEN RECEPTOR POSITIVE (H): Primary | ICD-10-CM

## 2021-11-10 PROCEDURE — 99213 OFFICE O/P EST LOW 20 MIN: CPT | Performed by: INTERNAL MEDICINE

## 2021-11-10 ASSESSMENT — MIFFLIN-ST. JEOR: SCORE: 1205.27

## 2021-11-10 ASSESSMENT — PAIN SCALES - GENERAL: PAINLEVEL: NO PAIN (0)

## 2021-11-10 NOTE — LETTER
11/10/2021         RE: Sadia Rider  [address]  [facility] [facility] [address]        Dear Colleague,    Thank you for referring your patient, Sadia Rider, to the [facility] [place]. Please see a copy of my visit note below.    Visit Date: 11/10/2021    Sadia Rider is a 76-year-old patient here today for interim followup.    CHIEF COMPLAINT:    1.  Right-sided infiltrating ductal carcinoma diagnosed in 02/2020, ER/DC positive, HER2 receptor negative.     2.  Oncotype in the low risk of recurrence range, with a score of 13.    HISTORY OF PRESENT ILLNESS:  Sadia is a 76-year-old patient who was recently diagnosed with an infiltrating ductal carcinoma in the upper outer quadrant of the right breast.  Tumor was ER/DC strongly positive, HER2 receptor negative by FISH.  She went on to have a right sided lumpectomy done in March and tumor was 0.9 cm.  All the margins were negative.  Final stage of disease was a T1b N0 M0 infiltrating ductal carcinoma.  She had an Oncotype, which came back with a score of 13 and so she started on adjuvant Arimidex.  Overall, she feels like she is tolerating the Arimidex well.  She has got really no problems with it.  Specifically, she has got no problems with hot flashes or any myalgias or arthralgias.    PAIN ASSESSMENT:  She denies pain today.    COMPREHENSIVE REVIEW OF SYSTEMS:  A 12-point comprehensive review of systems has been done with her today and is otherwise unremarkable.  She did have some problems with hypertension, but seems to have gotten her blood pressure now under control.    REVIEW OF SYSTEMS:  Her 12-point comprehensive review of systems is unremarkable.    PAIN ASSESSMENT:  She is in no pain today.    MEDICATIONS AND ALLERGIES:  Outlined in the nursing records.    SOCIAL HISTORY:  The patient is a nonsmoker.  She is able to do all her activities of daily living.    PHYSICAL EXAMINATION:    GENERAL:  She is well-appearing lady in no  acute distress.  VITAL SIGNS:  Stable.  NECK:  Has no masses or goiter.  CHEST:  Clear to auscultation and percussion bilaterally.  HEART:  Sounds 1, 2, normal, no added sounds, no murmurs.  BREASTS:  Right breast, no palpable masses.  She has got a lot of right sided lumpectomy.  Scar looks good.  No further palpable masses on the right side.  Left breast normal, no palpable masses.  Left axilla negative.  GASTROINTESTINAL:  Abdomen is soft and nontender.  No hepatosplenomegaly.  EXTREMITIES:  Legs without tenderness or edema.  NEUROLOGIC:  The patient is alert and oriented x3.    DATA REVIEW:  The patient had a screening mammogram done in 2021 and that was normal.  She also had a DEXA scan done in the summer of .  She has some mild osteopenia in hips, so we will monitor that as we go along.    IMPRESSION:  This is a 76-year-old patient with a history of sided breast cancer as outlined above, ER/NC positive, HER2 receptor negative with an Oncotype score of 13.  She is continuing on adjuvant Arimidex.  She is tolerating it well.  I will see her back in 6 months.  I have written her up for another mammogram in 2022.    Ezra Harper MD        D: 2021   T: 2021   MT: [facility]    Name:     CHANEL GASTON  MRN:      [id number]        Account:    [id number]   :      1945           Visit Date: 11/10/2021     Document: [id number]      Again, thank you for allowing me to participate in the care of your patient.        Sincerely,        Ezra Harper MD

## 2021-11-10 NOTE — NURSING NOTE
"Oncology Rooming Note    November 10, 2021 2:54 PM   Sadia Rider is a 76 year old female who presents for:    Chief Complaint   Patient presents with     Oncology Clinic Visit     Malignant neoplasm of upper-outer quadrant of right breast in female     Initial Vitals: /65   Pulse 64   Temp 97.9  F (36.6  C) (Tympanic)   Resp 16   Ht 1.64 m (5' 4.57\")   Wt 72.1 kg (159 lb)   SpO2 96%   BMI 26.81 kg/m   Estimated body mass index is 26.81 kg/m  as calculated from the following:    Height as of this encounter: 1.64 m (5' 4.57\").    Weight as of this encounter: 72.1 kg (159 lb). Body surface area is 1.81 meters squared.  No Pain (0) Comment: Data Unavailable   No LMP recorded. Patient is postmenopausal.  Allergies reviewed: Yes  Medications reviewed: Yes    Medications: Medication refills not needed today.  Pharmacy name entered into [employer]: [id number] [place] [id number]    Clinical concerns: follow up       Zahra Oliva CMA              "

## 2021-11-11 NOTE — PROGRESS NOTES
Visit Date: 11/10/2021    Sadia Rider is a 76-year-old patient here today for interim followup.    CHIEF COMPLAINT:    1.  Right-sided infiltrating ductal carcinoma diagnosed in 02/2020, ER/MO positive, HER2 receptor negative.     2.  Oncotype in the low risk of recurrence range, with a score of 13.    HISTORY OF PRESENT ILLNESS:  Sadia is a 76-year-old patient who was recently diagnosed with an infiltrating ductal carcinoma in the upper outer quadrant of the right breast.  Tumor was ER/MO strongly positive, HER2 receptor negative by FISH.  She went on to have a right sided lumpectomy done in March and tumor was 0.9 cm.  All the margins were negative.  Final stage of disease was a T1b N0 M0 infiltrating ductal carcinoma.  She had an Oncotype, which came back with a score of 13 and so she started on adjuvant Arimidex.  Overall, she feels like she is tolerating the Arimidex well.  She has got really no problems with it.  Specifically, she has got no problems with hot flashes or any myalgias or arthralgias.    PAIN ASSESSMENT:  She denies pain today.    COMPREHENSIVE REVIEW OF SYSTEMS:  A 12-point comprehensive review of systems has been done with her today and is otherwise unremarkable.  She did have some problems with hypertension, but seems to have gotten her blood pressure now under control.    REVIEW OF SYSTEMS:  Her 12-point comprehensive review of systems is unremarkable.    PAIN ASSESSMENT:  She is in no pain today.    MEDICATIONS AND ALLERGIES:  Outlined in the nursing records.    SOCIAL HISTORY:  The patient is a nonsmoker.  She is able to do all her activities of daily living.    PHYSICAL EXAMINATION:    GENERAL:  She is well-appearing lady in no acute distress.  VITAL SIGNS:  Stable.  NECK:  Has no masses or goiter.  CHEST:  Clear to auscultation and percussion bilaterally.  HEART:  Sounds 1, 2, normal, no added sounds, no murmurs.  BREASTS:  Right breast, no palpable masses.  She has got a lot of right  sided lumpectomy.  Scar looks good.  No further palpable masses on the right side.  Left breast normal, no palpable masses.  Left axilla negative.  GASTROINTESTINAL:  Abdomen is soft and nontender.  No hepatosplenomegaly.  EXTREMITIES:  Legs without tenderness or edema.  NEUROLOGIC:  The patient is alert and oriented x3.    DATA REVIEW:  The patient had a screening mammogram done in 2021 and that was normal.  She also had a DEXA scan done in the summer of .  She has some mild osteopenia in hips, so we will monitor that as we go along.    IMPRESSION:  This is a 76-year-old patient with a history of sided breast cancer as outlined above, ER/MT positive, HER2 receptor negative with an Oncotype score of 13.  She is continuing on adjuvant Arimidex.  She is tolerating it well.  I will see her back in 6 months.  I have written her up for another mammogram in 2022.    Ezra Harper MD        D: 2021   T: 2021   MT: KEL    Name:     CHANEL GASTONEsther  MRN:      [id number]        Account:    [id number]   :      1945           Visit Date: 11/10/2021     Document: [id number]

## 2021-12-14 NOTE — PATIENT INSTRUCTIONS
RTC MD 6 months- scheduled 6/15/22 with Dr. Leroy An 2/22 - scheduled 3/7/22    Bharati Puente, RN, BSN    RN Care Coordinator  [facility]  [phone]

## 2021-12-22 DIAGNOSIS — E78.2 MIXED HYPERLIPIDEMIA: ICD-10-CM

## 2021-12-24 RX ORDER — SIMVASTATIN 20 MG
TABLET ORAL
Qty: 45 TABLET | Refills: 3 | Status: SHIPPED | OUTPATIENT
Start: 2021-12-24 | End: 2022-03-07

## 2021-12-24 NOTE — TELEPHONE ENCOUNTER
"Outpatient Medication Detail     Disp Refills Start End LATA   simvastatin (ZOCOR) 20 MG tablet 45 tablet 3 10/5/2020  No   Sig: TAKE 1/2 TABLET BY MOUTH DAILY AT BEDTIME   Sent to pharmacy as: simvastatin 20 mg tablet (ZOCOR)   E-Prescribing Status: Receipt confirmed by pharmacy (10/5/2020 10:39 AM CDT)       Last office visit provider:  10/5/21     Requested Prescriptions   Pending Prescriptions Disp Refills     simvastatin (ZOCOR) 20 MG tablet [Pharmacy Med Name: SIMVASTATIN 20 MG TABLET] 45 tablet 3     Sig: TAKE 1/2 TABLET BY MOUTH DAILY AT BEDTIME       Statins Protocol Passed - 12/22/2021  9:05 AM        Passed - LDL on file in past 12 months     Recent Labs   Lab Test 10/05/21  0927   LDL 69             Passed - No abnormal creatine kinase in past 12 months     No lab results found.             Passed - Recent (12 mo) or future (30 days) visit within the authorizing provider's specialty     Patient has had an office visit with the authorizing provider or a provider within the authorizing providers department within the previous 12 mos or has a future within next 30 days. See \"Patient Info\" tab in inbasket, or \"Choose Columns\" in Meds & Orders section of the refill encounter.              Passed - Medication is active on med list        Passed - Patient is age 18 or older        Passed - No active pregnancy on record        Passed - No positive pregnancy test in past 12 months             Yuniel Najera RN 12/24/21 12:29 PM  "

## 2022-02-15 ENCOUNTER — TELEPHONE (OUTPATIENT)
Dept: OPHTHALMOLOGY | Facility: CLINIC | Age: 77
End: 2022-02-15
Payer: COMMERCIAL

## 2022-02-22 ENCOUNTER — TELEPHONE (OUTPATIENT)
Dept: OPHTHALMOLOGY | Facility: CLINIC | Age: 77
End: 2022-02-22
Payer: COMMERCIAL

## 2022-02-22 NOTE — TELEPHONE ENCOUNTER
Miles Ridera YUNIER [phone]      Patients time of appt was changed last week.    Per my review template change and  provided time change--pt states did not receive notification.    Apologized for patient not receiving communication from clinic regarding time change.    Reviewed no other time slots on march 7th and appt could last 2-3 hours with DVM form/visual field.    Pt states will keep glaucoma appt as scheduled at this time and provided pt with direct triage number if needs any further assistance.    Yuniel Bradshaw RN 9:43 AM 02/22/22            [facility] Call Center    Phone Message    May a detailed message be left on voicemail: yes     Reason for Call: Other: Pt called in questioning the time of her appt. She has 2 toher appts that day. One is at 1:00 and the other at 2:00. Pt has to change buildings. She is concerned about the timing of these appts. Please call to discuss. Thank you     Action Taken: Message routed to:  [facility]    Travel Screening: Not Applicable

## 2022-03-01 DIAGNOSIS — F43.23 ADJUSTMENT DISORDER WITH MIXED ANXIETY AND DEPRESSED MOOD: ICD-10-CM

## 2022-03-02 NOTE — TELEPHONE ENCOUNTER
"Routing refill request to provider for review/approval because:  Needs PHQ-9    Last Written Prescription Date:    escitalopram (LEXAPRO) 5 MG tablet 30 tablet 3 10/14/2021  --   Sig - Route: Take 1 tablet (5 mg) by mouth daily Start 1/2 tab PO daily for 4-6 days then increase to a full tab as tolerated. - Oral   Sent to pharmacy as: Escitalopram Oxalate 5 MG Oral Tablet (LEXAPRO)   Class: E-Prescribe   Order: [id number]       Last office visit provider:  10/5/21     Requested Prescriptions   Pending Prescriptions Disp Refills     escitalopram (LEXAPRO) 5 MG tablet [Pharmacy Med Name: ESCITALOPRAM 5 MG TABLET] 90 tablet 1     Sig: TAKE 1/2 TABLET DAILY FOR 4-6 DAYS THEN INCREASE TO 1 FULL TABLET AS TOLERATED.       SSRIs Protocol Failed - 3/1/2022 12:32 AM        Failed - PHQ-9 score less than 5 in past 6 months     Please review last PHQ-9 score.           Passed - Medication is active on med list        Passed - Patient is age 18 or older        Passed - No active pregnancy on record        Passed - No positive pregnancy test in last 12 months        Passed - Recent (6 mo) or future (30 days) visit within the authorizing provider's specialty     Patient had office visit in the last 6 months or has a visit in the next 30 days with authorizing provider or within the authorizing provider's specialty.  See \"Patient Info\" tab in inbasket, or \"Choose Columns\" in Meds & Orders section of the refill encounter.                 Erika Chaudhary RN 03/02/22 3:15 PM  "

## 2022-03-04 RX ORDER — ESCITALOPRAM OXALATE 5 MG/1
5 TABLET ORAL DAILY
Qty: 90 TABLET | Refills: 1 | Status: SHIPPED | OUTPATIENT
Start: 2022-03-04 | End: 2022-03-07

## 2022-03-05 DIAGNOSIS — C50.411 MALIGNANT NEOPLASM OF UPPER-OUTER QUADRANT OF RIGHT BREAST IN FEMALE, ESTROGEN RECEPTOR POSITIVE (H): ICD-10-CM

## 2022-03-05 DIAGNOSIS — Z17.0 MALIGNANT NEOPLASM OF UPPER-OUTER QUADRANT OF RIGHT BREAST IN FEMALE, ESTROGEN RECEPTOR POSITIVE (H): ICD-10-CM

## 2022-03-06 RX ORDER — ANASTROZOLE 1 MG/1
TABLET ORAL
Qty: 90 TABLET | Refills: 3 | Status: SHIPPED | OUTPATIENT
Start: 2022-03-06 | End: 2022-12-27

## 2022-03-06 NOTE — CONFIDENTIAL NOTE
Pending Prescriptions:                       Disp   Refills    anastrozole (ARIMIDEX) 1 MG tablet [Pharm*90 tab*3            Sig: TAKE 1 TABLET BY MOUTH EVERY DAY          Last Written Prescription Date:  3/22/21  Last Fill Quantity: 90,   # refills: 3  Last Office Visit: 11/10/21  Future Office visit:  Dr. Harper on 6/22/22    Next 5 appointments (look out 90 days)    Mar 07, 2022  9:45 AM  (Arrive by 9:30 AM)  Return Visit with Jessica Yoon PA-C  [facility] [place] ([facility] ) [address] [place] [id number] [phone]           Routing refill request to provider for review/approval.    Crystal Welsh, RN, BSN, OCN  Nurse Care Coordinator  [facility] [place]  P: [phone]     F: [phone]

## 2022-03-07 ENCOUNTER — OFFICE VISIT (OUTPATIENT)
Dept: OPHTHALMOLOGY | Facility: CLINIC | Age: 77
End: 2022-03-07
Attending: OPHTHALMOLOGY
Payer: COMMERCIAL

## 2022-03-07 ENCOUNTER — ANCILLARY PROCEDURE (OUTPATIENT)
Dept: MAMMOGRAPHY | Facility: CLINIC | Age: 77
End: 2022-03-07
Attending: INTERNAL MEDICINE
Payer: COMMERCIAL

## 2022-03-07 ENCOUNTER — OFFICE VISIT (OUTPATIENT)
Dept: DERMATOLOGY | Facility: CLINIC | Age: 77
End: 2022-03-07
Payer: COMMERCIAL

## 2022-03-07 DIAGNOSIS — L82.1 SEBORRHEIC KERATOSIS: ICD-10-CM

## 2022-03-07 DIAGNOSIS — Z96.1 PSEUDOPHAKIA OF BOTH EYES: ICD-10-CM

## 2022-03-07 DIAGNOSIS — D18.01 CHERRY ANGIOMA: ICD-10-CM

## 2022-03-07 DIAGNOSIS — L81.4 SOLAR LENTIGO: ICD-10-CM

## 2022-03-07 DIAGNOSIS — D22.9 MULTIPLE BENIGN NEVI: ICD-10-CM

## 2022-03-07 DIAGNOSIS — R23.8 PAPULE OF SKIN: Primary | ICD-10-CM

## 2022-03-07 DIAGNOSIS — Z17.0 MALIGNANT NEOPLASM OF UPPER-OUTER QUADRANT OF RIGHT BREAST IN FEMALE, ESTROGEN RECEPTOR POSITIVE (H): ICD-10-CM

## 2022-03-07 DIAGNOSIS — Q15.0 JUVENILE GLAUCOMA: Primary | ICD-10-CM

## 2022-03-07 DIAGNOSIS — C50.411 MALIGNANT NEOPLASM OF UPPER-OUTER QUADRANT OF RIGHT BREAST IN FEMALE, ESTROGEN RECEPTOR POSITIVE (H): ICD-10-CM

## 2022-03-07 DIAGNOSIS — Z12.31 ENCOUNTER FOR SCREENING MAMMOGRAM FOR MALIGNANT NEOPLASM OF BREAST: ICD-10-CM

## 2022-03-07 PROCEDURE — 92081 LIMITED VISUAL FIELD XM: CPT | Performed by: OPHTHALMOLOGY

## 2022-03-07 PROCEDURE — G0463 HOSPITAL OUTPT CLINIC VISIT: HCPCS | Mod: 25

## 2022-03-07 PROCEDURE — 99212 OFFICE O/P EST SF 10 MIN: CPT | Performed by: OPHTHALMOLOGY

## 2022-03-07 PROCEDURE — 77063 BREAST TOMOSYNTHESIS BI: CPT | Mod: GC | Performed by: RADIOLOGY

## 2022-03-07 PROCEDURE — 92015 DETERMINE REFRACTIVE STATE: CPT

## 2022-03-07 PROCEDURE — 77067 SCR MAMMO BI INCL CAD: CPT | Mod: GC | Performed by: RADIOLOGY

## 2022-03-07 PROCEDURE — 99213 OFFICE O/P EST LOW 20 MIN: CPT | Performed by: PHYSICIAN ASSISTANT

## 2022-03-07 ASSESSMENT — TONOMETRY
OS_IOP_MMHG: 4
OD_IOP_MMHG: 5
OS_IOP_MMHG: 04
IOP_METHOD: APPLANATION
IOP_METHOD: TONOPEN
OD_IOP_MMHG: 06

## 2022-03-07 ASSESSMENT — VISUAL ACUITY
OD_CC+: -2
METHOD: SNELLEN - LINEAR
OD_CC: 20/60
OS_CC: 20/150
CORRECTION_TYPE: GLASSES

## 2022-03-07 ASSESSMENT — PAIN SCALES - GENERAL: PAINLEVEL: NO PAIN (0)

## 2022-03-07 ASSESSMENT — EXTERNAL EXAM - RIGHT EYE: OD_EXAM: NORMAL

## 2022-03-07 ASSESSMENT — CUP TO DISC RATIO
OD_RATIO: 0.6
OS_RATIO: 0.6

## 2022-03-07 ASSESSMENT — CONF VISUAL FIELD
OS_SUPERIOR_TEMPORAL_RESTRICTION: 2
OD_SUPERIOR_NASAL_RESTRICTION: 3
OS_INFERIOR_TEMPORAL_RESTRICTION: 2
OS_SUPERIOR_NASAL_RESTRICTION: 2
OS_INFERIOR_NASAL_RESTRICTION: 2
OD_INFERIOR_NASAL_RESTRICTION: 3

## 2022-03-07 ASSESSMENT — EXTERNAL EXAM - LEFT EYE: OS_EXAM: NORMAL

## 2022-03-07 ASSESSMENT — REFRACTION_WEARINGRX
OD_ADD: +2.75
OD_CYLINDER: +1.25
OS_AXIS: 115
OS_SPHERE: -1.00
OD_SPHERE: -5.00
OS_CYLINDER: +0.50
OS_ADD: +2.75
OD_AXIS: 112

## 2022-03-07 ASSESSMENT — REFRACTION_MANIFEST
OS_CYLINDER: SPHERE
OS_ADD: +2.75
OD_AXIS: 120
OD_SPHERE: -4.50
OD_ADD: +2.75
OS_SPHERE: -1.00
OD_CYLINDER: +1.25

## 2022-03-07 NOTE — PROGRESS NOTES
[facility] Dermatology Note  Encounter Date: Mar 7, 2022  Office Visit     Dermatology Problem List:  1. History of  NMSC  - BCC - right lower cheek, s/p Mohs 8/30/17  - BCC - R nose s/p excision 2012  2. History of intertrigo - OTC anti-fungal  3. Benign bx  - fibrous papule, right medial mid cheek, s/p bx 2/22/2021  - intradermal nevus, right ear lobe, s/p bx 2/22/2021  4.. Skin cancer screening, 3/7/2022      Social: .   ____________________________________________    Assessment & Plan:    # History of nonmelanoma skin cancer, no clincial evidence of recurrence.   - Continue with annual skin exams     # Skin papule/nodule, right dorsal forearm, ddx lipoma  - Recheck at 6 month follow-up.  -Benign appearing today but will recheck. Discussed returning sooner if symptoms change.     # Cherry angiomas  # Seborrheic keratoses  Discussed the natural history and benign nature of this lesion. Reassurance provided that no additional treatment is necessary.      # Multiple benign nevi  # Solar lentigines  - No concerning lesions today  - Monitor for ABCDEs of melanoma   - Continue sun protection - recommend SPF 30 or higher with frequent application   - Return sooner if noticing changing or symptomatic lesions     Procedures Performed:   None    Follow-up: 6 month(s) in-person to recheck area on right forearm    Staff and Scribe:     Scribe Disclosure:  I, Yfn Hong, am serving as a scribe to document services personally performed by Jessica Yoon PA-C based on data collection and the provider's statements to me.     Provider Disclosure:   The documentation recorded by the scribe accurately reflects the services I personally performed and the decisions made by me.    All risks, benefits and alternatives were discussed with patient.  Patient is in agreement and understands the assessment and plan.  All questions were answered.  Sun Screen Education was given.   Return to Clinic  in 6 months or sooner as needed.   Jessica Yoon PA-C   [facility]   ____________________________________________    CC: Skin Check (pt states she is here for full body skin check. )    HPI:  Ms. Sadia Rider is a(n) 76 year old female who presents today as a return patient for FBSE. Last seen in dermatology by Bonny Andrew PA-C, on 2/22/2021, at which time patient underwent shave biopsy x2 on the right medial cheek which returned as fibrous papule and on the right ear lobe which returned as intradermal melanocytic nevus.    Today, patient reports a bump under the skin on her right forearm for one month. It is not bothersome. She denies any associated symptoms. She wonders what it is.     Patient is otherwise feeling well, without additional skin concerns.    Labs Reviewed:  N/A    Physical Exam:  Vitals: There were no vitals taken for this visit.  SKIN: Total skin excluding the undergarment areas was performed. The exam included the head/face, neck, both arms, chest, back, abdomen, both legs, digits and/or nails.   - Ill defined, soft subcutaneous papule on the right dorsal forearm.  - There are dome shaped bright red papules on the trunk and extremities.   - Multiple regular brown pigmented macules and papules are identified on the trunk and extremities.   - Scattered brown macules on sun exposed areas.  - There are waxy stuck on tan to brown papules on the trunk and extremities.  - No other lesions of concern on areas examined.     Medications:  Current Outpatient Medications   Medication     Alendronate Sodium 70 MG TBEF     anastrozole (ARIMIDEX) 1 MG tablet     aspirin 81 MG tablet     calcium carb-cholecalciferol 600-500 MG-UNIT CAPS     Cholecalciferol (VITAMIN D3 PO)     lisinopril (ZESTRIL) 5 MG tablet     Multiple Vitamins-Minerals (CENTRUM ULTRA WOMENS PO)     simvastatin (ZOCOR) 10 MG tablet     escitalopram (LEXAPRO) 5 MG tablet     simvastatin (ZOCOR) 20 MG  tablet     Current Facility-Administered Medications   Medication     lidocaine 1% with EPINEPHrine 1:100,000 injection 3 mL      Past Medical History:   Patient Active Problem List   Diagnosis     Juvenile glaucoma     Myopia of both eyes     Stress fracture of metatarsal bone, right, with routine healing, subsequent encounter     Osteoporosis     Malignant neoplasm of upper-outer quadrant of right breast in female, estrogen receptor positive (H)     Conjugated hyperbilirubinemia     ERRONEOUS ENCOUNTER--DISREGARD     Past Medical History:   Diagnosis Date     Acute cholecystitis 4/16/2016     Basal cell carcinoma      Breast cancer (H)      Glaucoma      POAG ADV     High cholesterol      History of vertebral compression fracture      Hypertension      Osteoporosis      Other chronic pain      PONV (postoperative nausea and vomiting)     many years ago.     Rheumatoid osteoperiostitis (H)      Skin cancer, basal cell      Tear film insufficiency, unspecified         CC Referred Self, MD  No address on file on close of this encounter.

## 2022-03-07 NOTE — PROGRESS NOTES
Chief Complaint/Presenting Concern: Here for glaucoma follow up    History of Present Illness:   Sadia Rider is a 75 year old patient who presents for glaucoma follow up. Reports doing well, no recent change in vision, no eye pain. No new flashes, floaters. No using any eye drops currently.     Relevant Past Medical/Family/Social History: None relevant     Relevant Review of Systems: None relevant       Diagnosis: Juvenile Open Angle Glaucoma each eye   Previous glaucoma surgery/laser:  s/p ALT OU in 2009  s/p Trab OS in 2004 by Dr. Mathis  s/p Trab OD in 1990 by Dr. Muñoz  s/p Trab OU at 29 yo  Currently Meds: None   Family history: positive daughter, mother, brother, great-grandma -- all on gtts, surgery and mother lost vision   Meds AEs/intolerance: AGN - contact dermatitis     PMHx: No history of Asthma and respiratory problems/Cardiac/Renal/Kidney stones/Sulfa Allergy  Previous testing:  Visual field 11/23/2020:  Right eye - inferior nasal step and paracentral defect, slightly more depressed at point but also less depressed at other points, likely fluctuations, reliable  Left eye - inferior nasal/arcuate defect, central defect  Fluctuations, reliable  Visual field February 25, 2021:  Right eye - inferior nasal step and paracentral defect, stable, reliable  OCT Optic Nerve RNFL Spectralis 11/23/2020  right eye: superior and inferior RNFL thinning, stable   left eye: superior and inferior RNFL thinning, possible worsening of IN thinning likely fluctuating     Today's testing:  IOP 5/4 mmHg  AC formed, no gross corneal edema  VA stable, no choroidals  Blebs elevated, no leak   OCT mac (November 4, 2021): preserved foveal contour, no evidence of hypotony maculopathy    Additional Ocular History:    2. 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    Doing well, stable with no  evidence of hypotony signs of exam.     Careful precautions given to patient to watch out for any signs and symptoms of bleb leak/infection. Advised to avoid eye rubbing and lake water.     DMV form filled and given to patient     RTC in 4 months VA, IOP, OCT mac        Physician Attestation     Attending Physician Attestation:  Complete documentation of historical and exam elements from today's encounter can be found in the full encounter summary report (not reduplicated in this progress note). I personally obtained the chief complaint(s) and history of present illness. I confirmed and edited as necessary the review of systems, past medical/surgical history, family history, social history, and examination findings as documented by others; and I examined the patient myself. I personally reviewed the relevant tests, images, and reports as documented above. I formulated and edited as necessary the assessment and plan and discussed the findings and management plan with the patient and any family members present at the time of the visit.  Ramon Morin M.D., Glaucoma, March 7, 2022

## 2022-03-07 NOTE — LETTER
3/7/2022       RE: Sadia Rider  [address] [facility] [address]     Dear Colleague,    Thank you for referring your patient, Sadia Rider, to the [facility] [place] [facility]. Please see a copy of my visit note below.    [facility] Dermatology Note  Encounter Date: Mar 7, 2022  Office Visit     Dermatology Problem List:  1. History of  NMSC  - BCC - right lower cheek, s/p Mohs 8/30/17  - BCC - R nose s/p excision 2012  2. History of intertrigo - OTC anti-fungal  3. Benign bx  - fibrous papule, right medial mid cheek, s/p bx 2/22/2021  - intradermal nevus, right ear lobe, s/p bx 2/22/2021  4.. Skin cancer screening, 3/7/2022      Social: .   ____________________________________________    Assessment & Plan:    # History of nonmelanoma skin cancer, no clincial evidence of recurrence.   - Continue with annual skin exams     # Skin papule/nodule, right dorsal forearm, ddx lipoma  - Recheck at 6 month follow-up.  -Benign appearing today but will recheck. Discussed returning sooner if symptoms change.     # Cherry angiomas  # Seborrheic keratoses  Discussed the natural history and benign nature of this lesion. Reassurance provided that no additional treatment is necessary.      # Multiple benign nevi  # Solar lentigines  - No concerning lesions today  - Monitor for ABCDEs of melanoma   - Continue sun protection - recommend SPF 30 or higher with frequent application   - Return sooner if noticing changing or symptomatic lesions     Procedures Performed:   None    Follow-up: 6 month(s) in-person to recheck area on right forearm    Staff and Scribe:     Scribe Disclosure:  I, Yfn Hong, am serving as a scribe to document services personally performed by Jessica Yoon PA-C based on data collection and the provider's statements to me.     Provider Disclosure:   The documentation  recorded by the scribe accurately reflects the services I personally performed and the decisions made by me.    All risks, benefits and alternatives were discussed with patient.  Patient is in agreement and understands the assessment and plan.  All questions were answered.  Sun Screen Education was given.   Return to Clinic in 6 months or sooner as needed.   Jessica Yoon PA-C   [facility]   ____________________________________________    CC: Skin Check (pt states she is here for full body skin check. )    HPI:  Ms. Sadia Rider is a(n) 76 year old female who presents today as a return patient for FBSE. Last seen in dermatology by Bonny Andrew PA-C, on 2/22/2021, at which time patient underwent shave biopsy x2 on the right medial cheek which returned as fibrous papule and on the right ear lobe which returned as intradermal melanocytic nevus.    Today, patient reports a bump under the skin on her right forearm for one month. It is not bothersome. She denies any associated symptoms. She wonders what it is.     Patient is otherwise feeling well, without additional skin concerns.    Labs Reviewed:  N/A    Physical Exam:  Vitals: There were no vitals taken for this visit.  SKIN: Total skin excluding the undergarment areas was performed. The exam included the head/face, neck, both arms, chest, back, abdomen, both legs, digits and/or nails.   - Ill defined, soft subcutaneous papule on the right dorsal forearm.  - There are dome shaped bright red papules on the trunk and extremities.   - Multiple regular brown pigmented macules and papules are identified on the trunk and extremities.   - Scattered brown macules on sun exposed areas.  - There are waxy stuck on tan to brown papules on the trunk and extremities.  - No other lesions of concern on areas examined.     Medications:  Current Outpatient Medications   Medication     Alendronate Sodium 70 MG TBEF     anastrozole (ARIMIDEX) 1 MG  tablet     aspirin 81 MG tablet     calcium carb-cholecalciferol 600-500 MG-UNIT CAPS     Cholecalciferol (VITAMIN D3 PO)     lisinopril (ZESTRIL) 5 MG tablet     Multiple Vitamins-Minerals (CENTRUM ULTRA WOMENS PO)     simvastatin (ZOCOR) 10 MG tablet     escitalopram (LEXAPRO) 5 MG tablet     simvastatin (ZOCOR) 20 MG tablet     Current Facility-Administered Medications   Medication     lidocaine 1% with EPINEPHrine 1:100,000 injection 3 mL      Past Medical History:   Patient Active Problem List   Diagnosis     Juvenile glaucoma     Myopia of both eyes     Stress fracture of metatarsal bone, right, with routine healing, subsequent encounter     Osteoporosis     Malignant neoplasm of upper-outer quadrant of right breast in female, estrogen receptor positive (H)     Conjugated hyperbilirubinemia     ERRONEOUS ENCOUNTER--DISREGARD     Past Medical History:   Diagnosis Date     Acute cholecystitis 4/16/2016     Basal cell carcinoma      Breast cancer (H)      Glaucoma      POAG ADV     High cholesterol      History of vertebral compression fracture      Hypertension      Osteoporosis      Other chronic pain      PONV (postoperative nausea and vomiting)     many years ago.     Rheumatoid osteoperiostitis (H)      Skin cancer, basal cell      Tear film insufficiency, unspecified         CC Referred Self, MD  No address on file on close of this encounter.

## 2022-03-07 NOTE — NURSING NOTE
Chief Complaints and History of Present Illnesses   Patient presents with     Follow Up     Chief Complaint(s) and History of Present Illness(es)     Follow Up     Laterality: both eyes    Associated symptoms: Negative for eye pain, headache, floaters and flashes              Comments     Pt here for follow up on Hypotony each eye - Juvenile glaucoma each eye. Vision is stable since last exam. Pt has DMV form   Pt not using drops.  CRISTIANO URRUTIA 1:30 PM March 7, 2022

## 2022-03-07 NOTE — CONFIDENTIAL NOTE
Signed Prescriptions:                        Disp   Refills    anastrozole (ARIMIDEX) 1 MG tablet         90 tab*3        Sig: TAKE 1 TABLET BY MOUTH EVERY DAY  Authorizing Provider: SHEA ROSADO, RN, BSN, OCN  Nurse Care Coordinator  [facility] [place]  P: [phone]     F: [phone]

## 2022-03-07 NOTE — PATIENT INSTRUCTIONS
Patient Education     Checking for Skin Cancer  You can find cancer early by checking your skin each month. There are 3 kinds of skin cancer. They are melanoma, basal cell carcinoma, and squamous cell carcinoma. Doing monthly skin checks is the best way to find new marks or skin changes. Follow the instructions below for checking your skin.   The ABCDEs of checking moles for melanoma   Check your moles or growths for signs of melanoma using ABCDE:     Asymmetry: the sides of the mole or growth don t match    Border: the edges are ragged, notched, or blurred    Color: the color within the mole or growth varies    Diameter: the mole or growth is larger than 6 mm (size of a pencil eraser)    Evolving: the size, shape, or color of the mole or growth is changing (evolving is not shown in the images below)    Checking for other types of skin cancer  Basal cell carcinoma or squamous cell carcinoma have symptoms such as:       A spot or mole that looks different from all other marks on your skin    Changes in how an area feels, such as itching, tenderness, or pain    Changes in the skin's surface, such as oozing, bleeding, or scaliness    A sore that does not heal    New swelling or redness beyond the border of a mole    Who s at risk?  Anyone can get skin cancer. But you are at greater risk if you have:     Fair skin, light-colored hair, or light-colored eyes    Many moles or abnormal moles on your skin    A history of sunburns from sunlight or tanning beds    A family history of skin cancer    A history of exposure to radiation or chemicals    A weakened immune system  If you have had skin cancer in the past, you are at risk for recurring skin cancer.   How to check your skin  Do your monthly skin checkups in front of a full-length mirror. Check all parts of your body, including your:     Head (ears, face, neck, and scalp)    Torso (front, back, and sides)    Arms (tops, undersides, upper, and lower armpits)    Hands  (palms, backs, and fingers, including under the nails)    Buttocks and genitals    Legs (front, back, and sides)    Feet (tops, soles, toes, including under the nails, and between toes)  If you have a lot of moles, take digital photos of them each month. Make sure to take photos both up close and from a distance. These can help you see if any moles change over time.   Most skin changes are not cancer. But if you see any changes in your skin, call your doctor right away. Only he or she can diagnose a problem. If you have skin cancer, seeing your doctor can be the first step toward getting the treatment that could save your life.   [employer] last reviewed this educational content on 4/1/2019 2000-2020 [employer]. [address] [place] [address]. All rights reserved. This information is not intended as a substitute for professional medical care. Always follow your healthcare professional's instructions.       When should I call my doctor?    If you are worsening or not improving, please, contact us or seek urgent care as noted below.     Who should I call with questions (adults)?    [facility] (adult and pediatric): [phone]    [facility] (adult): [phone]    For urgent needs outside of business hours call the [facility] at [phone] and ask for the dermatology resident on call to be paged    If this is a medical emergency and you are unable to reach an ER, Call [phone]    Who should I call with questions (pediatric)?  [facility]- Pediatric Dermatology  Dr. Wandy Browning, Dr. Bettina Wadsworth, Dr. Renea Bustamante, SOPHIE Brown, Dr. Waleska Ruiz, Dr. Luda Tinoco & Dr. Hawk Arias  Non-urgent nurse triage line; [phone]- Niki and Daria ADAIR Care Coordinatorsmiley Barboza (/Complex ) [phone]    If you need a prescription refill, please contact your  pharmacy. Refills are approved or denied by our Physicians during normal business hours, Monday through Fridays  Per office policy, refills will not be granted if you have not been seen within the past year (or sooner depending on your child's condition)    Scheduling Information:  Pediatric Appointment Scheduling and Call Center [phone]  Radiology Scheduling- [phone]  Sedation Unit Scheduling- [phone]  [place] Scheduling- [facility] [phone]; Pediatric Dermatology [phone]  Main  Services: [phone]  Trinidadian: [phone]  Salvadorean: [phone]  Hmong/Italian/Avi: [phone]  Preadmission Nursing Department Fax Number: [phone] (Fax all pre-operative paperwork to this number)    For urgent matters arising during evenings, weekends, or holidays that cannot wait for normal business hours please call [phone] and ask for the dermatology resident on call to be paged.

## 2022-03-16 ENCOUNTER — TELEPHONE (OUTPATIENT)
Dept: OPHTHALMOLOGY | Facility: CLINIC | Age: 77
End: 2022-03-16
Payer: COMMERCIAL

## 2022-03-16 NOTE — TELEPHONE ENCOUNTER
[facility] Call [facility]    Phone Message    May a detailed message be left on voicemail: yes     Reason for Call: Other: Pt was in to see Dr. Morin on 3/7 and had the DMV form completed at that time. She states that the field of vision section was not filled out, however. Pt would like to discuss this. She is requesting a call back before 12:30 pm today or after 3:30 pm, as she will not be home between those hours. Thank you.     Action Taken: Message routed to:  [facility] E[facility]    Travel Screening: Not Applicable

## 2022-03-16 NOTE — TELEPHONE ENCOUNTER
Spoke with Sadia. I will have the resident fill out the new DMV form and we'll mail it directly to you to sign and add your drivers license number to. I will do my very best to have it in the mail tomorrow. Sadia will call us back if she doesn't have it by Monday or Tuesday.    SHLOMO ARNETT 3:54 PM March 16, 2022

## 2022-03-21 ENCOUNTER — TELEPHONE (OUTPATIENT)
Dept: OPHTHALMOLOGY | Facility: CLINIC | Age: 77
End: 2022-03-21
Payer: COMMERCIAL

## 2022-03-21 NOTE — TELEPHONE ENCOUNTER
[facility] Call [facility]    Phone Message    May a detailed message be left on voicemail: yes     Reason for Call: Other: Pt was in to see Dr. Morin on 3/7 and had the DMV form completed at that time. She states that the field of vision section was not filled out, however. Pt would like to discuss this. She said she hasn't received this and needs back ASAP  Thank you,      Action Taken: Message routed to:  [facility]    Travel Screening: Not Applicable

## 2022-03-22 NOTE — TELEPHONE ENCOUNTER
Spoke with patient earlier today ~530 PM, discussed new driving restrictions and will be mailing out DMV form tomorrow 3/22/22.

## 2022-04-07 ENCOUNTER — TELEPHONE (OUTPATIENT)
Dept: OPHTHALMOLOGY | Facility: CLINIC | Age: 77
End: 2022-04-07
Payer: COMMERCIAL

## 2022-04-23 ENCOUNTER — HEALTH MAINTENANCE LETTER (OUTPATIENT)
Age: 77
End: 2022-04-23

## 2022-05-20 ENCOUNTER — NURSE TRIAGE (OUTPATIENT)
Dept: NURSING | Facility: CLINIC | Age: 77
End: 2022-05-20
Payer: COMMERCIAL

## 2022-05-20 NOTE — TELEPHONE ENCOUNTER
Patient calling with a question about getting 4 Covid vaccine (2nd booster) after having Covid on April 26th.    Patient is fully recovered and no longer having symptoms. Patient's last booster was in October 2021. Advised patient that she should be able to get the booster at this time.     Patient verbalized understanding and had no further questions.    Shaylee Garcia RN  05/20/22 11:49 AM  [facility] Nurse Advisor    Additional Information    Information only question and nurse able to answer    Protocols used: INFORMATION ONLY CALL - NO TRIAGE-A-[place]

## 2022-06-07 DIAGNOSIS — M81.0 AGE-RELATED OSTEOPOROSIS WITHOUT CURRENT PATHOLOGICAL FRACTURE: ICD-10-CM

## 2022-06-07 RX ORDER — ALENDRONATE SODIUM 70 MG/1
TABLET ORAL
Qty: 12 TABLET | Refills: 3 | Status: SHIPPED | OUTPATIENT
Start: 2022-06-07 | End: 2022-12-27

## 2022-06-08 NOTE — TELEPHONE ENCOUNTER
"Routing refill request to provider for review/approval because:  Medication is reported/historical    Last Written Prescription Date:  18  Last Fill Quantity: ,  # refills:    Last office visit provider:  10/5/21     Requested Prescriptions   Pending Prescriptions Disp Refills     alendronate (FOSAMAX) 70 MG tablet [Pharmacy Med Name: ALENDRONATE SODIUM 70 MG TAB] 12 tablet 3     Si TAB ON EMPTY STOMACH ONCE WEEKLY WITH AT LEAST 8 OZ WATER, STAY UPRIGHT 30-60 MIN AFTER TAKING       Bisphosphonates Passed - 2022 12:33 AM        Passed - Recent (12 mo) or future (30 days) visit within the authorizing provider's specialty     Patient has had an office visit with the authorizing provider or a provider within the authorizing providers department within the previous 12 mos or has a future within next 30 days. See \"Patient Info\" tab in inbasket, or \"Choose Columns\" in Meds & Orders section of the refill encounter.              Passed - Dexa on file within past 2 years     Please review last Dexa result.           Passed - Medication is active on med list        Passed - Patient is age 18 or older        Passed - Normal serum creatinine on file within past 12 months     Recent Labs   Lab Test 10/05/21  0927   CR 0.91       Ok to refill medication if creatinine is low               Luann Pardo, RN 22 9:03 PM  "

## 2022-06-10 ENCOUNTER — TELEPHONE (OUTPATIENT)
Dept: DERMATOLOGY | Facility: CLINIC | Age: 77
End: 2022-06-10
Payer: COMMERCIAL

## 2022-06-10 NOTE — TELEPHONE ENCOUNTER
Attempted to reach patient to schedule their follow-up with Dermatology.   I left the patient a voicemail to call the clinic for scheduling.     Azul Mendoza, Virtual Visit Facilitator

## 2022-06-22 ENCOUNTER — ONCOLOGY VISIT (OUTPATIENT)
Dept: ONCOLOGY | Facility: CLINIC | Age: 77
End: 2022-06-22
Attending: INTERNAL MEDICINE
Payer: COMMERCIAL

## 2022-06-22 VITALS
DIASTOLIC BLOOD PRESSURE: 78 MMHG | BODY MASS INDEX: 26.06 KG/M2 | SYSTOLIC BLOOD PRESSURE: 157 MMHG | HEART RATE: 70 BPM | HEIGHT: 65 IN | OXYGEN SATURATION: 97 % | TEMPERATURE: 97.8 F | WEIGHT: 156.4 LBS | RESPIRATION RATE: 16 BRPM

## 2022-06-22 DIAGNOSIS — C50.411 MALIGNANT NEOPLASM OF UPPER-OUTER QUADRANT OF RIGHT BREAST IN FEMALE, ESTROGEN RECEPTOR POSITIVE (H): Primary | ICD-10-CM

## 2022-06-22 DIAGNOSIS — M81.0 AGE-RELATED OSTEOPOROSIS WITHOUT CURRENT PATHOLOGICAL FRACTURE: ICD-10-CM

## 2022-06-22 DIAGNOSIS — Z17.0 MALIGNANT NEOPLASM OF UPPER-OUTER QUADRANT OF RIGHT BREAST IN FEMALE, ESTROGEN RECEPTOR POSITIVE (H): Primary | ICD-10-CM

## 2022-06-22 PROCEDURE — 99214 OFFICE O/P EST MOD 30 MIN: CPT | Performed by: INTERNAL MEDICINE

## 2022-06-22 PROCEDURE — G0463 HOSPITAL OUTPT CLINIC VISIT: HCPCS

## 2022-06-22 ASSESSMENT — PAIN SCALES - GENERAL: PAINLEVEL: NO PAIN (0)

## 2022-06-22 NOTE — NURSING NOTE
"Oncology Rooming Note    June 22, 2022 3:57 PM   Sadia Rider is a 77 year old female who presents for:    Chief Complaint   Patient presents with     Oncology Clinic Visit     Malignant neoplasm of upper-outer quadrant of right breast in female, estrogen receptor positive      Initial Vitals: BP (!) 157/78   Pulse 70   Temp 97.8  F (36.6  C) (Tympanic)   Resp 16   Ht 1.64 m (5' 4.57\")   Wt 70.9 kg (156 lb 6.4 oz)   SpO2 97%   BMI 26.37 kg/m   Estimated body mass index is 26.37 kg/m  as calculated from the following:    Height as of this encounter: 1.64 m (5' 4.57\").    Weight as of this encounter: 70.9 kg (156 lb 6.4 oz). Body surface area is 1.8 meters squared.  No Pain (0) Comment: Data Unavailable   No LMP recorded. Patient is postmenopausal.  Allergies reviewed: Yes  Medications reviewed: Yes    Medications: Medication refills not needed today.  Pharmacy name entered into [employer]: [id number] [place] [facility] [address]    Clinical concerns: follow up       Regine Marshall, TOI              "

## 2022-06-22 NOTE — LETTER
6/22/2022         RE: Sadia Rider  [address]  [place] [address]        Dear Colleague,    Thank you for referring your patient, Sadia Rider, to the [facility]. Please see a copy of my visit note below.    Visit Date: 06/22/2022    Sadia Rider is a 77-year-old patient here today for interim followup.    CHIEF COMPLAINT:  1.  Right-sided infiltrating ductal carcinoma diagnosed in 02/2020, ER/MI positive, HER-2 receptor negative.  2.  Oncotype in the low risk of recurrence range with a score of 13.    HISTORY OF PRESENTING COMPLAINT:  Sadia is a 77-year-old patient with an infiltrating ductal carcinoma in the upper outer quadrant of the right breast, ER/MI positive, HER-2 receptor negative.  She had a right-sided lumpectomy done and then had Oncotype sent out, which came back with a recurrence score of 13.  She is currently on adjuvant Arimidex.  She is tolerating it well, without any major problems.  We will need to repeat her bone density again by the end of the year to make sure that she is not having any drop in her bone density.  She does have some baseline osteopenia.    PAIN ASSESSMENT:  She denies pain today.    SOCIAL HISTORY:  The patient is a nonsmoker.  Unfortunately, she lost her  to bowel cancer at the beginning of this year; so, she is emotional about that today, and I have given her some support.    REVIEW OF SYSTEMS:  A 12-point comprehensive review of systems is otherwise unremarkable.  She denies cough, shortness of breath, or bone pain.    MEDICATIONS AND ALLERGIES:  Outlined in the nursing records.    PHYSICAL EXAMINATION:  GENERAL:  She is a well-appearing lady in no acute distress.  VITAL SIGNS:  Stable.  NECK:  There are no masses or goiter.  CHEST:  Clear to auscultation and percussion bilaterally.  HEART:  Sounds 1 and 2, normal.  No added sounds. No murmurs.  BREASTS:  Right breast:  No palpable masses, status post right-sided  lumpectomy.  Scar looks good.  Left breast:  Normal.  No palpable masses.  Left axilla negative.  GASTROINTESTINAL:  Abdomen:  Soft and nontender.  No hepatosplenomegaly.  EXTREMITIES:  Legs without tenderness or edema.  NEUROLOGIC:  The patient is alert and oriented x 3.    DATA REVIEW:  She is getting lab work done at her primary care physician's office tomorrow; so, I have not ordered that today.  She had a mammogram done on 2022, which was normal.  Her last DEXA scan was in the summer of .  We will do that in the fall or winter of .    IMPRESSION:  This is a 77-year-old patient with a history of right-sided breast cancer as outlined above.  She is continuing on active treatment with adjuvant Arimidex.  She seems to be tolerating it well.  Unfortunately, she lost her  earlier this year; so, she is adapting to living on her own.  All of the above has been discussed with her, and I will see her back in my clinic in 6 months.    Ezra Harper MD        D: 2022   T: 2022   MT: lexy    Name:     CHANEL GASTON  MRN:      [id number]        Account:    [id number]   :      1945           Visit Date: 2022     Document: [id number]      Again, thank you for allowing me to participate in the care of your patient.        Sincerely,        Ezra Harper MD

## 2022-06-23 ENCOUNTER — OFFICE VISIT (OUTPATIENT)
Dept: FAMILY MEDICINE | Facility: CLINIC | Age: 77
End: 2022-06-23
Payer: COMMERCIAL

## 2022-06-23 VITALS
TEMPERATURE: 98.5 F | HEIGHT: 64 IN | HEART RATE: 65 BPM | DIASTOLIC BLOOD PRESSURE: 60 MMHG | WEIGHT: 155 LBS | SYSTOLIC BLOOD PRESSURE: 130 MMHG | OXYGEN SATURATION: 97 % | RESPIRATION RATE: 16 BRPM | BODY MASS INDEX: 26.46 KG/M2

## 2022-06-23 DIAGNOSIS — E78.2 MIXED HYPERLIPIDEMIA: ICD-10-CM

## 2022-06-23 DIAGNOSIS — I10 ESSENTIAL HYPERTENSION: Primary | ICD-10-CM

## 2022-06-23 LAB
ALBUMIN SERPL-MCNC: 4 G/DL (ref 3.5–5)
ALP SERPL-CCNC: 45 U/L (ref 45–120)
ALT SERPL W P-5'-P-CCNC: 15 U/L (ref 0–45)
ANION GAP SERPL CALCULATED.3IONS-SCNC: 8 MMOL/L (ref 5–18)
AST SERPL W P-5'-P-CCNC: 16 U/L (ref 0–40)
BILIRUB SERPL-MCNC: 1.2 MG/DL (ref 0–1)
BUN SERPL-MCNC: 23 MG/DL (ref 8–28)
CALCIUM SERPL-MCNC: 9.6 MG/DL (ref 8.5–10.5)
CHLORIDE BLD-SCNC: 106 MMOL/L (ref 98–107)
CHOLEST SERPL-MCNC: 148 MG/DL
CO2 SERPL-SCNC: 26 MMOL/L (ref 22–31)
CREAT SERPL-MCNC: 0.82 MG/DL (ref 0.6–1.1)
FASTING STATUS PATIENT QL REPORTED: ABNORMAL
GFR SERPL CREATININE-BSD FRML MDRD: 73 ML/MIN/1.73M2
GLUCOSE BLD-MCNC: 86 MG/DL (ref 70–125)
HDLC SERPL-MCNC: 42 MG/DL
LDLC SERPL CALC-MCNC: 80 MG/DL
POTASSIUM BLD-SCNC: 4.3 MMOL/L (ref 3.5–5)
PROT SERPL-MCNC: 7 G/DL (ref 6–8)
SODIUM SERPL-SCNC: 140 MMOL/L (ref 136–145)
TRIGL SERPL-MCNC: 128 MG/DL

## 2022-06-23 PROCEDURE — 99214 OFFICE O/P EST MOD 30 MIN: CPT | Performed by: NURSE PRACTITIONER

## 2022-06-23 PROCEDURE — 80053 COMPREHEN METABOLIC PANEL: CPT | Performed by: NURSE PRACTITIONER

## 2022-06-23 PROCEDURE — 36415 COLL VENOUS BLD VENIPUNCTURE: CPT | Performed by: NURSE PRACTITIONER

## 2022-06-23 PROCEDURE — 80061 LIPID PANEL: CPT | Performed by: NURSE PRACTITIONER

## 2022-06-23 ASSESSMENT — PAIN SCALES - GENERAL: PAINLEVEL: NO PAIN (0)

## 2022-06-23 NOTE — PROGRESS NOTES
"  Assessment & Plan     Essential hypertension    - Comprehensive metabolic panel (BMP + Alb, Alk Phos, ALT, AST, Total. Bili, TP)  She did bring a blood pressure log with her today, she on average is only taking her lisinopril medication every 2 to 3 days.  When this happens, her systolic blood pressure increases up to 150.  We did have a long discussion about medication compliance today and how the medication directly affects the heart muscle and controlling her blood pressure and heart rate.  I did give her specific parameters to watch for, if her systolic blood pressure is under 90, she can certainly hold her dose for 1 day and once it exceeds 100 systolic, she should resume the medication.  Per review of her blood pressure log, she is not having any issues with bradycardia.  She will continue to monitor her blood pressures at home, follow-up in clinic as needed otherwise I will see her again in 6 months    Mixed hyperlipidemia    - Lipid panel reflex to direct LDL Fasting  The 10-year ASCVD risk score (Lani RODRÍGUEZ Jr., et al., 2013) is: 25.5%    Values used to calculate the score:      Age: 77 years      Sex: Female      Is Non- : No      Diabetic: No      Tobacco smoker: No      Systolic Blood Pressure: 130 mmHg      Is BP treated: Yes      HDL Cholesterol: 39 mg/dL      Total Cholesterol: 134 mg/dL  Based on her cholesterol levels from October 2021, her current ASCVD risk score is 25.5%.  She is fasting today so a fasting lipid panel will be completed today and I will recalculate her risk score, she is aware that if it is above 7%, I do recommend a dose adjustment on her statin medication which I will call to discuss with her.  Currently, she is taking simvastatin 10 mg daily               BMI:   Estimated body mass index is 26.4 kg/m  as calculated from the following:    Height as of this encounter: 1.632 m (5' 4.25\").    Weight as of this encounter: 70.3 kg (155 lb).   Weight management " plan: Discussed healthy diet and exercise guidelines        Return in about 6 months (around 12/23/2022) for Follow up, with me, in person, med check, est care visit, Routine preventive.    Kallie Franks NP  [facility] PAXTON Mccullough is a 77 year old, presenting for the following health issues:  Recheck Medication (Fasting- regular follow up)      History of Present Illness       Reason for visit:  Medication follow up    She eats 2-3 servings of fruits and vegetables daily.She consumes 1 sweetened beverage(s) daily.She exercises with enough effort to increase her heart rate 30 to 60 minutes per day.  She exercises with enough effort to increase her heart rate 3 or less days per week.   She is taking medications regularly.       Hypertension Follow-up      Do you check your blood pressure regularly outside of the clinic? Yes     Are you following a low salt diet? No    Are your blood pressures ever more than 140 on the top number (systolic) OR more   than 90 on the bottom number (diastolic), for example 140/90? Yes does not take her Lisinopril daily, she has only taken the Rx 7 times out of the last 17 days  Rx: Lisinopril 5 mg daily  Smoker: never  No previous history of stroke or heart attack    Hyperlipidemia Follow-Up      Are you regularly taking any medication or supplement to lower your cholesterol?   Yes- taking Simvastatin 10 mg daily    Are you having muscle aches or other side effects that you think could be caused by your cholesterol lowering medication?  No   The 10-year ASCVD risk score (Lani RODRÍGUEZ Jr., et al., 2013) is: 25.5%    Values used to calculate the score:      Age: 77 years      Sex: Female      Is Non- : No      Diabetic: No      Tobacco smoker: No      Systolic Blood Pressure: 130 mmHg      Is BP treated: Yes      HDL Cholesterol: 39 mg/dL        Total Cholesterol: 134 mg/dL    She is currently taking a daily baby ASA 81  "mg      Review of Systems   CONSTITUTIONAL: NEGATIVE for fever, chills, change in weight  ENT/MOUTH: NEGATIVE for ear, mouth and throat problems  RESP: NEGATIVE for significant cough or SOB  CV: NEGATIVE for chest pain, palpitations or peripheral edema      Objective    /60 (BP Location: Right arm, Patient Position: Sitting, Cuff Size: Adult Regular)   Pulse 65   Temp 98.5  F (36.9  C) (Oral)   Resp 16   Ht 1.632 m (5' 4.25\")   Wt 70.3 kg (155 lb)   SpO2 97%   BMI 26.40 kg/m    Body mass index is 26.4 kg/m .  Physical Exam   GENERAL: healthy, alert and no distress  NECK: no adenopathy, no asymmetry, masses, or scars   RESP: lungs clear to auscultation - no rales, rhonchi or wheezes  CV: regular rate and rhythm, normal S1 S2, no S3 or S4, no murmur, click or rub, no peripheral edema and peripheral pulses strong  ABDOMEN: soft, nontender, no hepatosplenomegaly, no masses and bowel sounds normal                    .  ..  "

## 2022-06-23 NOTE — PROGRESS NOTES
Visit Date: 06/22/2022    Sadia Rider is a 77-year-old patient here today for interim followup.    CHIEF COMPLAINT:  1.  Right-sided infiltrating ductal carcinoma diagnosed in 02/2020, ER/UT positive, HER-2 receptor negative.  2.  Oncotype in the low risk of recurrence range with a score of 13.    HISTORY OF PRESENTING COMPLAINT:  Sadia is a 77-year-old patient with an infiltrating ductal carcinoma in the upper outer quadrant of the right breast, ER/UT positive, HER-2 receptor negative.  She had a right-sided lumpectomy done and then had Oncotype sent out, which came back with a recurrence score of 13.  She is currently on adjuvant Arimidex.  She is tolerating it well, without any major problems.  We will need to repeat her bone density again by the end of the year to make sure that she is not having any drop in her bone density.  She does have some baseline osteopenia.    PAIN ASSESSMENT:  She denies pain today.    SOCIAL HISTORY:  The patient is a nonsmoker.  Unfortunately, she lost her  to bowel cancer at the beginning of this year; so, she is emotional about that today, and I have given her some support.    REVIEW OF SYSTEMS:  A 12-point comprehensive review of systems is otherwise unremarkable.  She denies cough, shortness of breath, or bone pain.    MEDICATIONS AND ALLERGIES:  Outlined in the nursing records.    PHYSICAL EXAMINATION:  GENERAL:  She is a well-appearing lady in no acute distress.  VITAL SIGNS:  Stable.  NECK:  There are no masses or goiter.  CHEST:  Clear to auscultation and percussion bilaterally.  HEART:  Sounds 1 and 2, normal.  No added sounds. No murmurs.  BREASTS:  Right breast:  No palpable masses, status post right-sided lumpectomy.  Scar looks good.  Left breast:  Normal.  No palpable masses.  Left axilla negative.  GASTROINTESTINAL:  Abdomen:  Soft and nontender.  No hepatosplenomegaly.  EXTREMITIES:  Legs without tenderness or edema.  NEUROLOGIC:  The patient is alert and  oriented x 3.    DATA REVIEW:  She is getting lab work done at her primary care physician's office tomorrow; so, I have not ordered that today.  She had a mammogram done on 2022, which was normal.  Her last DEXA scan was in the summer of .  We will do that in the fall or winter of .    IMPRESSION:  This is a 77-year-old patient with a history of right-sided breast cancer as outlined above.  She is continuing on active treatment with adjuvant Arimidex.  She seems to be tolerating it well.  Unfortunately, she lost her  earlier this year; so, she is adapting to living on her own.  All of the above has been discussed with her, and I will see her back in my clinic in 6 months.    Ezra Harper MD        D: 2022   T: 2022   MT: lexy    Name:     CHANEL GASTONEsther  MRN:      [id number]        Account:    [id number]   :      1945           Visit Date: 2022     Document: [id number]

## 2022-06-24 DIAGNOSIS — E78.2 MIXED HYPERLIPIDEMIA: Primary | ICD-10-CM

## 2022-06-24 RX ORDER — SIMVASTATIN 20 MG
20 TABLET ORAL AT BEDTIME
Qty: 90 TABLET | Refills: 2 | Status: SHIPPED | OUTPATIENT
Start: 2022-06-24 | End: 2023-01-23

## 2022-06-28 NOTE — PATIENT INSTRUCTIONS
RTC MD 6 months -- 12/27/22 at 10:30am  Bone density November 2022 -- 12/1/22 at 4pm    Crystal Welsh, RN, BSN, OCN  Nurse Care Coordinator  [facility]  P: [phone]     F: [phone]

## 2022-07-14 DIAGNOSIS — H44.40 HYPOTONY OF EYE: Primary | ICD-10-CM

## 2022-07-26 ENCOUNTER — OFFICE VISIT (OUTPATIENT)
Dept: OPHTHALMOLOGY | Facility: CLINIC | Age: 77
End: 2022-07-26
Attending: OPHTHALMOLOGY
Payer: COMMERCIAL

## 2022-07-26 DIAGNOSIS — H44.40 HYPOTONY OF EYE: ICD-10-CM

## 2022-07-26 PROCEDURE — G0463 HOSPITAL OUTPT CLINIC VISIT: HCPCS | Mod: 25

## 2022-07-26 PROCEDURE — 92134 CPTRZ OPH DX IMG PST SGM RTA: CPT | Performed by: OPHTHALMOLOGY

## 2022-07-26 PROCEDURE — 99213 OFFICE O/P EST LOW 20 MIN: CPT | Performed by: OPHTHALMOLOGY

## 2022-07-26 ASSESSMENT — TONOMETRY
IOP_METHOD: APPLANATE (AN)
OS_IOP_MMHG: 2
OD_IOP_MMHG: 5
IOP_METHOD: TONOPEN
OS_IOP_MMHG: 8
OD_IOP_MMHG: 6

## 2022-07-26 ASSESSMENT — REFRACTION_WEARINGRX
SPECS_TYPE: PAL
OS_SPHERE: -1.00
OD_AXIS: 120
OD_CYLINDER: +1.25
OD_ADD: +2.75
OS_CYLINDER: SPHERE
OD_SPHERE: -4.50
OS_ADD: +2.75

## 2022-07-26 ASSESSMENT — EXTERNAL EXAM - RIGHT EYE: OD_EXAM: NORMAL

## 2022-07-26 ASSESSMENT — VISUAL ACUITY
OD_PH_CC: 20/40
OD_PH_CC+: -2
OD_CC+: -1
OD_CC: 20/60
OS_CC: 20/150
METHOD: SNELLEN - LINEAR
CORRECTION_TYPE: GLASSES

## 2022-07-26 ASSESSMENT — EXTERNAL EXAM - LEFT EYE: OS_EXAM: NORMAL

## 2022-07-26 ASSESSMENT — CUP TO DISC RATIO
OD_RATIO: 0.6
OS_RATIO: 0.7

## 2022-07-26 ASSESSMENT — CONF VISUAL FIELD
OS_SUPERIOR_TEMPORAL_RESTRICTION: 2
OD_SUPERIOR_NASAL_RESTRICTION: 3
OS_INFERIOR_TEMPORAL_RESTRICTION: 2
OD_INFERIOR_NASAL_RESTRICTION: 3
OS_SUPERIOR_NASAL_RESTRICTION: 2
OS_INFERIOR_NASAL_RESTRICTION: 2

## 2022-07-26 NOTE — PROGRESS NOTES
Chief Complaint/Presenting Concern: Here for glaucoma follow up    History of Present Illness:   Sadia Rider is a 75 year old patient who presents for glaucoma follow up. Reports doing well, no recent change in vision, no eye pain. No new flashes, floaters. No using any eye drops currently.    Interval 07/26/22 : No changes in vision since last visit. No pain or redness. No gush of fluid. No skin changes that she has noticed around the eyes.    Relevant Past Medical/Family/Social History: None relevant     Relevant Review of Systems: None relevant       Diagnosis: Juvenile Open Angle Glaucoma each eye   Previous glaucoma surgery/laser:  s/p ALT OU in 2009  s/p Trab OS in 2004 by Dr. Mathis  s/p Trab OD in 1990 by Dr. Muñoz  s/p Trab OU at 29 yo  Currently Meds: None   Family history: positive daughter, mother, brother, great-grandma -- all on gtts, surgery and mother lost vision   Meds AEs/intolerance: AGN - contact dermatitis     PMHx: No history of Asthma and respiratory problems/Cardiac/Renal/Kidney stones/Sulfa Allergy  Previous testing:  Visual field 11/23/2020:  Right eye - inferior nasal step and paracentral defect, slightly more depressed at point but also less depressed at other points, likely fluctuations, reliable  Left eye - inferior nasal/arcuate defect, central defect  Fluctuations, reliable  Visual field February 25, 2021:  Right eye - inferior nasal step and paracentral defect, stable, reliable  OCT Optic Nerve RNFL Spectralis 11/23/2020  right eye: superior and inferior RNFL thinning, stable   left eye: superior and inferior RNFL thinning, possible worsening of IN thinning likely fluctuating     Today's testing:   OCT Macula July 26, 2022    - right eye: normal foveal contour no intraretinal or subretinal fluid   - left eye: irregular appearing retina, stable, no subretinal or intraretinal fluid, appears stable from November.     IOP today: 6 / 2 mmHg    Additional Ocular History:    2.  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 Periorbital dermatitis, bilateral  - Could consider dermatology referral  - Patient has not noticed but may be 2/2 poor vision each eye     Plan/Recommendations:    Discussed findings with patient.    Doing well, stable with no evidence of hypotony signs of exam.     Careful precautions given to patient to watch out for any signs and symptoms of bleb leak/infection. Advised to avoid eye rubbing and lake water.     RTC in 4-6  months VA, IOP, OCT mac, DMV VF     Gwen Carmichael MD  Resident Physician, PGY-3  Department of Ophthalmology  07/26/22 3:33 PM      Physician Attestation     Attending Physician Attestation:  Complete documentation of historical and exam elements from today's encounter can be found in the full encounter summary report (not reduplicated in this progress note). I personally obtained the chief complaint(s) and history of present illness. I confirmed and edited as necessary the review of systems, past medical/surgical history, family history, social history, and examination findings as documented by others; and I examined the patient myself. I personally reviewed the relevant tests, images, and reports as documented above. I personally reviewed the ophthalmic test(s) associated with this encounter, agree with the interpretation(s) as documented by the resident/fellow and have edited the corresponding report(s) as necessary. I formulated and edited as necessary the assessment and plan and discussed the findings and management plan with the patient and any family members present at the time of the visit.  Ramon Morin M.D., Glaucoma, July 26, 2022

## 2022-07-26 NOTE — NURSING NOTE
Chief Complaints and History of Present Illnesses   Patient presents with     Glaucoma Follow-Up     4 month follow up.     Chief Complaint(s) and History of Present Illness(es)     Glaucoma Follow-Up     Comments: 4 month follow up.              Comments     Patient states that her vision is the same since she was here last. No changes in flashes of lights and floaters. No pain and irritation.     Ocular Meds:none     Song RAMIREZ, July 26, 2022 1:31 PM

## 2022-11-25 DIAGNOSIS — I10 ESSENTIAL HYPERTENSION: ICD-10-CM

## 2022-11-25 RX ORDER — LISINOPRIL 5 MG/1
5 TABLET ORAL DAILY
Qty: 90 TABLET | Refills: 3 | Status: SHIPPED | OUTPATIENT
Start: 2022-11-25 | End: 2023-11-14

## 2022-11-25 NOTE — TELEPHONE ENCOUNTER
Refill Request  Medication name: Pending Prescriptions:                       Disp   Refills    lisinopril (ZESTRIL) 5 MG tablet          90 tab*3            Sig: Take 1 tablet (5 mg) by mouth daily    [facility]

## 2022-11-25 NOTE — TELEPHONE ENCOUNTER
"Routing refill request to provider for review/approval because:  No PCP. Was a patient of Delfina Boyle   and Kallie Franks, BP  prescription over 1 year old.    Last Written Prescription Date:  10/5/21  Last Fill Quantity: 90,  # refills: 3   Last office visit provider:  6/23/22    Requested Prescriptions   Pending Prescriptions Disp Refills     lisinopril (ZESTRIL) 5 MG tablet 90 tablet 3     Sig: Take 1 tablet (5 mg) by mouth daily       ACE Inhibitors (Including Combos) Protocol Passed - 11/25/2022  1:40 PM        Passed - Blood pressure under 140/90 in past 12 months     BP Readings from Last 3 Encounters:   06/23/22 130/60   06/22/22 (!) 157/78   11/10/21 128/65                 Passed - Recent (12 mo) or future (30 days) visit within the authorizing provider's specialty     Patient has had an office visit with the authorizing provider or a provider within the authorizing providers department within the previous 12 mos or has a future within next 30 days. See \"Patient Info\" tab in inbasket, or \"Choose Columns\" in Meds & Orders section of the refill encounter.              Passed - Medication is active on med list        Passed - Patient is age 18 or older        Passed - No active pregnancy on record        Passed - Normal serum creatinine on file in past 12 months     Recent Labs   Lab Test 06/23/22  1418   CR 0.82       Ok to refill medication if creatinine is low          Passed - Normal serum potassium on file in past 12 months     Recent Labs   Lab Test 06/23/22  1418   POTASSIUM 4.3             Passed - No positive pregnancy test within past 12 months             Brianna White RN 11/25/22 2:16 PM  "

## 2022-12-01 ENCOUNTER — ANCILLARY PROCEDURE (OUTPATIENT)
Dept: BONE DENSITY | Facility: CLINIC | Age: 77
End: 2022-12-01
Attending: INTERNAL MEDICINE
Payer: COMMERCIAL

## 2022-12-01 DIAGNOSIS — Z78.0 POSTMENOPAUSAL STATUS: ICD-10-CM

## 2022-12-01 DIAGNOSIS — H44.40 HYPOTONY OF EYE: ICD-10-CM

## 2022-12-01 DIAGNOSIS — Q15.0 JUVENILE GLAUCOMA: Primary | ICD-10-CM

## 2022-12-01 DIAGNOSIS — H44.433: ICD-10-CM

## 2022-12-01 DIAGNOSIS — M81.0 AGE-RELATED OSTEOPOROSIS WITHOUT CURRENT PATHOLOGICAL FRACTURE: ICD-10-CM

## 2022-12-01 PROCEDURE — 77080 DXA BONE DENSITY AXIAL: CPT | Mod: TC | Performed by: RADIOLOGY

## 2022-12-01 PROCEDURE — 77081 DXA BONE DENSITY APPENDICULR: CPT | Mod: TC | Performed by: RADIOLOGY

## 2022-12-05 ENCOUNTER — OFFICE VISIT (OUTPATIENT)
Dept: OPHTHALMOLOGY | Facility: CLINIC | Age: 77
End: 2022-12-05
Attending: OPHTHALMOLOGY
Payer: COMMERCIAL

## 2022-12-05 DIAGNOSIS — H44.433: ICD-10-CM

## 2022-12-05 DIAGNOSIS — Q15.0 JUVENILE GLAUCOMA: ICD-10-CM

## 2022-12-05 PROCEDURE — 92081 LIMITED VISUAL FIELD XM: CPT | Performed by: OPHTHALMOLOGY

## 2022-12-05 PROCEDURE — 92012 INTRM OPH EXAM EST PATIENT: CPT | Performed by: OPHTHALMOLOGY

## 2022-12-05 PROCEDURE — G0463 HOSPITAL OUTPT CLINIC VISIT: HCPCS

## 2022-12-05 ASSESSMENT — VISUAL ACUITY
OS_SC: 20/250
METHOD: SNELLEN - LINEAR
CORRECTION_TYPE: GLASSES
OD_SC: 20/300
OD_CC: 20/60
OS_CC: 20/150
OD_CC+: -1

## 2022-12-05 ASSESSMENT — CONF VISUAL FIELD
OS_SUPERIOR_NASAL_RESTRICTION: 2
OS_SUPERIOR_TEMPORAL_RESTRICTION: 2
OD_INFERIOR_NASAL_RESTRICTION: 3
OD_SUPERIOR_NASAL_RESTRICTION: 3
OS_INFERIOR_TEMPORAL_RESTRICTION: 2
OS_INFERIOR_NASAL_RESTRICTION: 2

## 2022-12-05 ASSESSMENT — REFRACTION_WEARINGRX
OD_AXIS: 120
OD_SPHERE: -4.50
SPECS_TYPE: PAL
OS_SPHERE: -1.00
OD_ADD: +2.75
OD_CYLINDER: +1.25
OS_ADD: +2.75
OS_CYLINDER: SPHERE

## 2022-12-05 ASSESSMENT — TONOMETRY
OD_IOP_MMHG: 07
OS_IOP_MMHG: 06
IOP_METHOD: TONOPEN

## 2022-12-05 NOTE — NURSING NOTE
Chief Complaints and History of Present Illnesses   Patient presents with     Follow Up     Diagnosis: Juvenile Open Angle Glaucoma each eye      Chief Complaint(s) and History of Present Illness(es)     Follow Up            Comments: Diagnosis: Juvenile Open Angle Glaucoma each eye           Comments    Pt states no change in VA since last visit  Pt states floaters and flashes same as last visit BE  No eye pain or redness    Keyanna Brothers COT 3:10 PM December 5, 2022   .

## 2022-12-05 NOTE — PROGRESS NOTES
Chief Complaint/Presenting Concern: glaucoma follow up    History of Present Illness:   Sadia Rider is a 75 year old patient who presents for glaucoma follow up, history of trab both eyes.    Interval 12/5/22 :No changes in vision since last visit. No pain or redness. No gush of fluid. No skin changes that she has noticed around the eyes.    Relevant Past Medical/Family/Social History: None relevant     Relevant Review of Systems: None relevant       Diagnosis: Juvenile Open Angle Glaucoma each eye   Previous glaucoma surgery/laser:  s/p ALT OU in 2009  s/p Trab OS in 2004 by Dr. Mathis  s/p Trab OD in 1990 by Dr. Muñoz  s/p Trab OU at 27 yo  Currently Meds: None   Family history: positive daughter, mother, brother, great-grandma -- all on gtts, surgery and mother lost vision   Meds AEs/intolerance: AGN - contact dermatitis     PMHx: No history of Asthma and respiratory problems/Cardiac/Renal/Kidney stones/Sulfa Allergy  Previous testing:  Visual field 11/23/2020:  Right eye - inferior nasal step and paracentral defect, slightly more depressed at point but also less depressed at other points, likely fluctuations, reliable  Left eye - inferior nasal/arcuate defect, central defect  Fluctuations, reliable  Visual field February 25, 2021:  Right eye - inferior nasal step and paracentral defect, stable, reliable  OCT Optic Nerve RNFL Spectralis 11/23/2020  right eye: superior and inferior RNFL thinning, stable   left eye: superior and inferior RNFL thinning, possible worsening of IN thinning likely fluctuating   OCT Macula today 7/26/22  - right eye: normal foveal contour no intraretinal or subretinal fluid  - left eye: irregular appearing retina, stable, no subretinal or intraretinal fluid, appears stable    Today's testing:  IOP today: 7/6 mmHg     Additional Ocular History:    2. 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 Periorbital dermatitis, bilateral  - Could consider dermatology referral  - Patient has not noticed but may be 2/2 poor vision each eye     Plan/Recommendations:    Discussed findings with patient.    Doing well, stable with no evidence of hypotony signs of exam.     Careful precautions given to patient to watch out for any signs and symptoms of bleb leak/infection. Advised to avoid eye rubbing and lake water.     Patient would like her previous DMV visual fields reviewed and commented on for stability, informed her that we will look into them and have her called back with the review results.     RTC in 4 months [facility], IOP      Physician Attestation     Attending Physician Attestation:  Complete documentation of historical and exam elements from today's encounter can be found in the full encounter summary report (not reduplicated in this progress note). I personally obtained the chief complaint(s) and history of present illness. I confirmed and edited as necessary the review of systems, past medical/surgical history, family history, social history, and examination findings as documented by others; and I examined the patient myself. I personally reviewed the relevant tests, images, and reports as documented above. I personally reviewed the ophthalmic test(s) associated with this encounter. I formulated and edited as necessary the assessment and plan and discussed the findings and management plan with the patient and any family members present at the time of the visit.  Ramon Morin M.D., Glaucoma, December 21, 2022

## 2022-12-21 ASSESSMENT — CUP TO DISC RATIO
OD_RATIO: 0.6
OS_RATIO: 0.7

## 2022-12-21 ASSESSMENT — EXTERNAL EXAM - RIGHT EYE: OD_EXAM: NORMAL

## 2022-12-21 ASSESSMENT — EXTERNAL EXAM - LEFT EYE: OS_EXAM: NORMAL

## 2022-12-27 ENCOUNTER — ONCOLOGY VISIT (OUTPATIENT)
Dept: ONCOLOGY | Facility: CLINIC | Age: 77
End: 2022-12-27
Attending: INTERNAL MEDICINE
Payer: COMMERCIAL

## 2022-12-27 VITALS
SYSTOLIC BLOOD PRESSURE: 149 MMHG | WEIGHT: 158.7 LBS | OXYGEN SATURATION: 99 % | RESPIRATION RATE: 16 BRPM | DIASTOLIC BLOOD PRESSURE: 71 MMHG | HEART RATE: 67 BPM | HEIGHT: 64 IN | TEMPERATURE: 97.8 F | BODY MASS INDEX: 27.1 KG/M2

## 2022-12-27 DIAGNOSIS — Z12.31 ENCOUNTER FOR SCREENING MAMMOGRAM FOR MALIGNANT NEOPLASM OF BREAST: ICD-10-CM

## 2022-12-27 DIAGNOSIS — Z17.0 MALIGNANT NEOPLASM OF UPPER-OUTER QUADRANT OF RIGHT BREAST IN FEMALE, ESTROGEN RECEPTOR POSITIVE (H): Primary | ICD-10-CM

## 2022-12-27 DIAGNOSIS — C50.411 MALIGNANT NEOPLASM OF UPPER-OUTER QUADRANT OF RIGHT BREAST IN FEMALE, ESTROGEN RECEPTOR POSITIVE (H): Primary | ICD-10-CM

## 2022-12-27 PROCEDURE — G0463 HOSPITAL OUTPT CLINIC VISIT: HCPCS | Performed by: INTERNAL MEDICINE

## 2022-12-27 PROCEDURE — 99214 OFFICE O/P EST MOD 30 MIN: CPT | Performed by: INTERNAL MEDICINE

## 2022-12-27 RX ORDER — TAMOXIFEN CITRATE 20 MG/1
20 TABLET ORAL DAILY
Qty: 90 TABLET | Refills: 3 | Status: SHIPPED | OUTPATIENT
Start: 2022-12-27 | End: 2023-11-13

## 2022-12-27 ASSESSMENT — PAIN SCALES - GENERAL: PAINLEVEL: NO PAIN (0)

## 2022-12-27 NOTE — LETTER
12/27/2022         RE: Sadia Rider  [address]  [facility] [address]        Dear Colleague,    Thank you for referring your patient, Sadia Rider, to the [facility] [place]. Please see a copy of my visit note below.    Visit Date: 12/27/2022    Sadia Rider is a 77-year-old patient who comes in today for interim followup.    CHIEF COMPLAINT:    1.  Right-sided infiltrating ductal carcinoma diagnosed in 2020, now coming up on 3 years out from her diagnosis in 02/2023, ER/MO positive, HER-2 receptor negative.  2.  Oncotype in the low risk of recurrence range.  3.  Osteopenia.    HISTORY OF PRESENTING COMPLAINT:  Sadia is a 77-year-old patient with an infiltrating ductal carcinoma in the upper outer quadrant of the right breast, ER/MO positive, HER-2 receptor negative.  She ended up having a right-sided lumpectomy and then had Oncotype sent out, which came back with a recurrence score of 13.  She started on anastrozole, and since 2020 she has actually had a drop in her bone density.  She was on Fosamax for 5 years and seemed to have fairly good bone density and then, since her drop, she was put back on Fosamax.  I have discussed with her today that she has done 2 years of Arimidex and if we continue it for another 5 years, we will need to protect the bones more because she will continue to drop on the bone density most likely.  The other option is to switch her over to tamoxifen, which actually has an estrogen-like and favorable effect on the bone, that way she could stop the Arimidex and Fosamax.  She has elected to switch to tamoxifen for the last 3 years, which I think is a good idea.  She denies today, cough, shortness of breath, bone pain, any worrisome symptomatology from my standpoint.    REVIEW OF SYSTEMS:  A 12-point comprehensive review of systems is otherwise unremarkable.    PAIN ASSESSMENT:  She denies pain today.    MEDICATIONS AND ALLERGIES:  Outlined in the  nursing records.    SOCIAL HISTORY:  The patient is single.  She is a nonsmoker.    PHYSICAL EXAMINATION:    GENERAL:  She is well-appearing lady in no acute distress.  VITAL SIGNS:  Stable.  NECK:  Has no masses or goiter.  CHEST:  Clear to auscultation and percussion bilaterally.  HEART:  Sounds 1 and 2 normal, no added sounds, no murmurs.  BREASTS:  Right breast, no palpable masses.  Right axilla negative.  Left breast normal, no palpable masses.  Left axilla negative.  GASTROINTESTINAL:  Abdomen is soft and nontender.  No hepatosplenomegaly.  EXTREMITIES:  Legs without tenderness or edema.  NEUROLOGIC:  Peripheral neurological exam is grossly intact.    DATA REVIEW:  I have reviewed her DEXA scan and discussed it at length with her.  I have also reviewed her last mammogram, which was done in 2022, and I have written her up for another one in 2023.  She is due to get labs at her primary doctor's office in 2023, so I have not done those today.  Her last labs were 2022.    IMPRESSION:  A 77-year-old patient with a history of right-sided breast cancer.  Since she started active treatment with adjuvant anastrozole, she has had a drop in the bone density, despite the fact she was on Fosamax.  I am going to stop both the Fosamax and the Arimidex today, and I am going to start her on tamoxifen, which has a favorable effect on bone will be adequate for her breast cancer since it was low risk on her Oncotype.  She is an active lady.  We have discussed the risks, benefits, and side effects of tamoxifen.  Specifically, we have discussed the risk of uterus cancer and the risk of blood clot.  She understands and is willing to proceed.    Ezra Harper MD        D: 2022   T: 2022   MT: [facility]    Name:     CHANEL GASTON  MRN:      [id number]        Account:    [id number]   :      1945           Visit Date: 2022     Document: [id number]      Again, thank you for allowing me  to participate in the care of your patient.        Sincerely,        Ezra Harper MD

## 2022-12-27 NOTE — PROGRESS NOTES
Visit Date: 12/27/2022    Sadia Rider is a 77-year-old patient who comes in today for interim followup.    CHIEF COMPLAINT:    1.  Right-sided infiltrating ductal carcinoma diagnosed in 2020, now coming up on 3 years out from her diagnosis in 02/2023, ER/MT positive, HER-2 receptor negative.  2.  Oncotype in the low risk of recurrence range.  3.  Osteopenia.    HISTORY OF PRESENTING COMPLAINT:  Sadia is a 77-year-old patient with an infiltrating ductal carcinoma in the upper outer quadrant of the right breast, ER/MT positive, HER-2 receptor negative.  She ended up having a right-sided lumpectomy and then had Oncotype sent out, which came back with a recurrence score of 13.  She started on anastrozole, and since 2020 she has actually had a drop in her bone density.  She was on Fosamax for 5 years and seemed to have fairly good bone density and then, since her drop, she was put back on Fosamax.  I have discussed with her today that she has done 2 years of Arimidex and if we continue it for another 5 years, we will need to protect the bones more because she will continue to drop on the bone density most likely.  The other option is to switch her over to tamoxifen, which actually has an estrogen-like and favorable effect on the bone, that way she could stop the Arimidex and Fosamax.  She has elected to switch to tamoxifen for the last 3 years, which I think is a good idea.  She denies today, cough, shortness of breath, bone pain, any worrisome symptomatology from my standpoint.    REVIEW OF SYSTEMS:  A 12-point comprehensive review of systems is otherwise unremarkable.    PAIN ASSESSMENT:  She denies pain today.    MEDICATIONS AND ALLERGIES:  Outlined in the nursing records.    SOCIAL HISTORY:  The patient is single.  She is a nonsmoker.    PHYSICAL EXAMINATION:    GENERAL:  She is well-appearing lady in no acute distress.  VITAL SIGNS:  Stable.  NECK:  Has no masses or goiter.  CHEST:  Clear to auscultation and  percussion bilaterally.  HEART:  Sounds 1 and 2 normal, no added sounds, no murmurs.  BREASTS:  Right breast, no palpable masses.  Right axilla negative.  Left breast normal, no palpable masses.  Left axilla negative.  GASTROINTESTINAL:  Abdomen is soft and nontender.  No hepatosplenomegaly.  EXTREMITIES:  Legs without tenderness or edema.  NEUROLOGIC:  Peripheral neurological exam is grossly intact.    DATA REVIEW:  I have reviewed her DEXA scan and discussed it at length with her.  I have also reviewed her last mammogram, which was done in 2022, and I have written her up for another one in 2023.  She is due to get labs at her primary doctor's office in 2023, so I have not done those today.  Her last labs were 2022.    IMPRESSION:  A 77-year-old patient with a history of right-sided breast cancer.  Since she started active treatment with adjuvant anastrozole, she has had a drop in the bone density, despite the fact she was on Fosamax.  I am going to stop both the Fosamax and the Arimidex today, and I am going to start her on tamoxifen, which has a favorable effect on bone will be adequate for her breast cancer since it was low risk on her Oncotype.  She is an active lady.  We have discussed the risks, benefits, and side effects of tamoxifen.  Specifically, we have discussed the risk of uterus cancer and the risk of blood clot.  She understands and is willing to proceed.    Ezra Harper MD        D: 2022   T: 2022   MT: [facility]    Name:     CHANEL GASTON  MRN:      [id number]        Account:    [id number]   :      1945           Visit Date: 2022     Document: [id number]

## 2023-01-23 ENCOUNTER — OFFICE VISIT (OUTPATIENT)
Dept: FAMILY MEDICINE | Facility: CLINIC | Age: 78
End: 2023-01-23
Payer: COMMERCIAL

## 2023-01-23 VITALS
DIASTOLIC BLOOD PRESSURE: 70 MMHG | HEART RATE: 70 BPM | BODY MASS INDEX: 27.37 KG/M2 | WEIGHT: 160.3 LBS | SYSTOLIC BLOOD PRESSURE: 142 MMHG | OXYGEN SATURATION: 98 % | HEIGHT: 64 IN

## 2023-01-23 DIAGNOSIS — I10 PRIMARY HYPERTENSION: ICD-10-CM

## 2023-01-23 DIAGNOSIS — Z12.11 COLON CANCER SCREENING: ICD-10-CM

## 2023-01-23 DIAGNOSIS — Z00.00 ENCOUNTER FOR MEDICARE ANNUAL WELLNESS EXAM: Primary | ICD-10-CM

## 2023-01-23 DIAGNOSIS — C50.411 MALIGNANT NEOPLASM OF UPPER-OUTER QUADRANT OF RIGHT BREAST IN FEMALE, ESTROGEN RECEPTOR POSITIVE (H): ICD-10-CM

## 2023-01-23 DIAGNOSIS — Z17.0 MALIGNANT NEOPLASM OF UPPER-OUTER QUADRANT OF RIGHT BREAST IN FEMALE, ESTROGEN RECEPTOR POSITIVE (H): ICD-10-CM

## 2023-01-23 DIAGNOSIS — E78.2 MIXED HYPERLIPIDEMIA: ICD-10-CM

## 2023-01-23 PROBLEM — Z53.9 ERRONEOUS ENCOUNTER--DISREGARD: Status: RESOLVED | Noted: 2021-02-25 | Resolved: 2023-01-23

## 2023-01-23 PROCEDURE — 99214 OFFICE O/P EST MOD 30 MIN: CPT | Mod: 25 | Performed by: FAMILY MEDICINE

## 2023-01-23 PROCEDURE — G0439 PPPS, SUBSEQ VISIT: HCPCS | Performed by: FAMILY MEDICINE

## 2023-01-23 PROCEDURE — 84443 ASSAY THYROID STIM HORMONE: CPT | Performed by: FAMILY MEDICINE

## 2023-01-23 PROCEDURE — 80053 COMPREHEN METABOLIC PANEL: CPT | Performed by: FAMILY MEDICINE

## 2023-01-23 PROCEDURE — 80061 LIPID PANEL: CPT | Performed by: FAMILY MEDICINE

## 2023-01-23 PROCEDURE — 36415 COLL VENOUS BLD VENIPUNCTURE: CPT | Performed by: FAMILY MEDICINE

## 2023-01-23 RX ORDER — SIMVASTATIN 20 MG
20 TABLET ORAL AT BEDTIME
Qty: 90 TABLET | Refills: 3 | Status: SHIPPED | OUTPATIENT
Start: 2023-01-23 | End: 2024-02-12

## 2023-01-23 ASSESSMENT — ENCOUNTER SYMPTOMS
WEAKNESS: 0
CHILLS: 0
COUGH: 0
FREQUENCY: 0
SORE THROAT: 0
PARESTHESIAS: 0
MYALGIAS: 0
HEARTBURN: 0
FEVER: 0
BREAST MASS: 0
PALPITATIONS: 0
HEADACHES: 0
DIARRHEA: 0
NERVOUS/ANXIOUS: 0
HEMATURIA: 0
ABDOMINAL PAIN: 0
NAUSEA: 0
ARTHRALGIAS: 0
SHORTNESS OF BREATH: 0
HEMATOCHEZIA: 0
DYSURIA: 0
JOINT SWELLING: 0
DIZZINESS: 0
CONSTIPATION: 0
EYE PAIN: 0

## 2023-01-23 ASSESSMENT — ACTIVITIES OF DAILY LIVING (ADL): CURRENT_FUNCTION: TRANSPORTATION REQUIRES ASSISTANCE

## 2023-01-23 NOTE — PATIENT INSTRUCTIONS
Preventive Health Recommendations    See your health care provider every year to    Review health changes.     Discuss preventive care.      Review your medicines if your doctor has prescribed any.      You no longer need a yearly Pap test unless you've had an abnormal Pap test in the past 10 years. If you have vaginal symptoms, such as bleeding or discharge, be sure to talk with your provider about a Pap test.      Every 1 to 2 years, have a mammogram.  If you are over 69, talk with your health care provider about whether or not you want to continue having screening mammograms.      Every 10 years, have a colonoscopy. Or, have a yearly FIT test (stool test). These exams will check for colon cancer.       Have a cholesterol test every 5 years, or more often if your doctor advises it.       Have a diabetes test (fasting glucose) every three years. If you are at risk for diabetes, you should have this test more often.       At age 65, have a bone density scan (DEXA) to check for osteoporosis (brittle bone disease).    Shots:    Get a flu shot each year.    Get a tetanus shot every 10 years.    Talk to your doctor about your pneumonia vaccines. There are now two you should receive - Pneumovax (PPSV 23) and Prevnar (PCV 13).    Talk to your pharmacist about the shingles vaccine.    Talk to your doctor about the hepatitis B vaccine.    Nutrition:     Eat at least 5 servings of fruits and vegetables each day.      Eat whole-grain bread, whole-wheat pasta and brown rice instead of white grains and rice.      Get adequate about Calcium and Vitamin D.     Lifestyle    Exercise at least 150 minutes a week (30 minutes a day, 5 days a week). This will help you control your weight and prevent disease.      Limit alcohol to one drink per day.      No smoking.       Wear sunscreen to prevent skin cancer.       See your dentist twice a year for an exam and cleaning.      See your eye doctor every 1 to 2 years to screen for  conditions such as glaucoma, macular degeneration, cataracts, etc.    Personalized Prevention Plan  You are due for the preventive services outlined below.  Your care team is available to assist you in scheduling these services.  If you have already completed any of these items, please share that information with your care team to update in your medical record.    Health Maintenance Due   Topic Date Due     Annual Wellness Visit  07/09/2020     ANNUAL REVIEW OF HM ORDERS  10/05/2022     Patient Education   Personalized Prevention Plan  You are due for the preventive services outlined below.  Your care team is available to assist you in scheduling these services.  If you have already completed any of these items, please share that information with your care team to update in your medical record.  Health Maintenance Due   Topic Date Due     Annual Wellness Visit  07/09/2020     ANNUAL REVIEW OF HM ORDERS  10/05/2022       Exercise for a Healthier Heart  You may wonder how you can improve the health of your heart. If you re thinking about exercise, you re on the right track. You don t need to become an athlete. But you do need a certain amount of brisk exercise to help strengthen your heart. If you have been diagnosed with a heart condition, your healthcare provider may advise exercise to help stabilize your condition. To help make exercise a habit, choose safe, fun activities.      Exercise with a friend. When activity is fun, you're more likely to stick with it.   Before you start  Check with your healthcare provider before starting an exercise program. This is especially important if you have not been active for a while. It's also important if you have a long-term (chronic) health problem such as heart disease, diabetes, or obesity. Or if you are at high risk for having these problems.   Why exercise?  Exercising regularly offers many healthy rewards. It can help you do all of the following:     Improve your blood  cholesterol level to help prevent further heart trouble    Lower your blood pressure to help prevent a stroke or heart attack    Control diabetes, or reduce your risk of getting this disease    Improve your heart and lung function    Reach and stay at a healthy weight    Make your muscles stronger so you can stay active    Prevent falls and fractures by slowing the loss of bone mass (osteoporosis)    Manage stress better    Reduce your blood pressure    Improve your sense of self and your body image  Exercise tips      Ease into your routine. Set small goals. Then build on them. If you are not sure what your activity level should be, talk with your healthcare provider first before starting an exercise routine.    Exercise on most days. Aim for a total of 150 minutes (2 hours and 30 minutes) or more of moderate-intensity aerobic activity each week. Or 75 minutes (1 hour and 15 minutes) or more of vigorous-intensity aerobic activity each week. Or try for a combination of both. Moderate activity means that you breathe heavier and your heart rate increases but you can still talk. Think about doing 40 minutes of moderate exercise, 3 to 4 times a week. For best results, activity should last for about 40 minutes to lower blood pressure and cholesterol. It's OK to work up to the 40-minute period over time. Examples of moderate-intensity activity are walking 1 mile in 15 minutes. Or doing 30 to 45 minutes of yard work.    Step up your daily activity level.  Along with your exercise program, try being more active the whole day. Walk instead of drive. Or park further away so that you take more steps each day. Do more household tasks or yard work. You may not be able to meet the advised mount of physical activity. But doing some moderate- or vigorous-intensity aerobic activity can help reduce your risk for heart disease. Your healthcare provider can help you figure out what is best for you.    Choose 1 or more activities you  enjoy.  Walking is one of the easiest things you can do. You can also try swimming, riding a bike, dancing, or taking an exercise class.    When to call your healthcare provider  Call your healthcare provider if you have any of these:     Chest pain or feel dizzy or lightheaded    Burning, tightness, pressure, or heaviness in your chest, neck, shoulders, back, or arms    Abnormal shortness of breath    More joint or muscle pain    A very fast or irregular heartbeat (palpitations)  [employer] last reviewed this educational content on 7/1/2019 2000-2021 [employer]. All rights reserved. This information is not intended as a substitute for professional medical care. Always follow your healthcare professional's instructions.          Understanding USDA MyPlate  The USDA has guidelines to help you make healthy food choices. These are called MyPlate. MyPlate shows the food groups that make up healthy meals using the image of a place setting. Before you eat, think about the healthiest choices for what to put on your plate or in your cup or bowl. To learn more about building a healthy plate, visit www.choosemyplate.gov.    The food groups    Fruits. Any fruit or 100% fruit juice counts as part of the Fruit Group. Fruits may be fresh, canned, frozen, or dried, and may be whole, cut-up, or pureed. Make 1/2 of your plate fruits and vegetables.    Vegetables. Any vegetable or 100% vegetable juice counts as a member of the Vegetable Group. Vegetables may be fresh, frozen, canned, or dried. They can be served raw or cooked and may be whole, cut-up, or mashed. Make 1/2 of your plate fruits and vegetables.    Grains. All foods made from grains are part of the Grains Group. These include wheat, rice, oats, cornmeal, and barley. Grains are often used to make foods such as bread, pasta, oatmeal, cereal, tortillas, and grits. Grains should be no more than 1/4 of your plate. At least half of your grains should be whole  grains.    Protein. This group includes meat, poultry, seafood, beans and peas, eggs, processed soy products (such as tofu), nuts (including nut butters), and seeds. Make protein choices no more than 1/4 of your plate. Meat and poultry choices should be lean or low fat.    Dairy. The Dairy Group includes all fluid milk products and foods made from milk that contain calcium, such as yogurt and cheese. (Foods that have little calcium, such as cream, butter, and cream cheese, are not part of this group.) Most dairy choices should be low-fat or fat-free.    Oils. Oils aren't a food group, but they do contain essential nutrients. However it's important to watch your intake of oils. These are fats that are liquid at room temperature. They include canola, corn, olive, soybean, vegetable, and sunflower oil. Foods that are mainly oil include mayonnaise, certain salad dressings, and soft margarines. You likely already get your daily oil allowance from the foods you eat.  Things to limit  Eating healthy also means limiting these things in your diet:       Salt (sodium). Many processed foods have a lot of sodium. To keep sodium intake down, eat fresh vegetables, meats, poultry, and seafood when possible. Purchase low-sodium, reduced-sodium, or no-salt-added food products at the store. And don't add salt to your meals at home. Instead, season them with herbs and spices such as dill, oregano, cumin, and paprika. Or try adding flavor with lemon or lime zest and juice.    Saturated fat. Saturated fats are most often found in animal products such as beef, pork, and chicken. They are often solid at room temperature, such as butter. To reduce your saturated fat intake, choose leaner cuts of meat and poultry. And try healthier cooking methods such as grilling, broiling, roasting, or baking. For a simple lower-fat swap, use plain nonfat yogurt instead of mayonnaise when making potato salad or macaroni salad.    Added sugars. These are  sugars added to foods. They are in foods such as ice cream, candy, soda, fruit drinks, sports drinks, energy drinks, cookies, pastries, jams, and syrups. Cut down on added sugars by sharing sweet treats with a family member or friend. You can also choose fruit for dessert, and drink water or other unsweetened beverages.     StayWell last reviewed this educational content on 6/1/2020 2000-2021 [employer]. All rights reserved. This information is not intended as a substitute for professional medical care. Always follow your healthcare professional's instructions.        Activities of Daily Living    Your Health Risk Assessment indicates you have difficulties with activities of daily living such as housework, bathing, preparing meals, taking medication, etc. Please make a follow up appointment for us to address this issue in more detail.    Signs of Hearing Loss      Hearing much better with one ear can be a sign of hearing loss.   Hearing loss is a problem shared by many people. In fact, it is one of the most common health problems, particularly as people age. Most people age 65 and older have some hearing loss. By age 80, almost everyone does. Hearing loss often occurs slowly over the years. So you may not realize your hearing has gotten worse.  Have your hearing checked  Call your healthcare provider if you:    Have to strain to hear normal conversation    Have to watch other people s faces very carefully to follow what they re saying    Need to ask people to repeat what they ve said    Often misunderstand what people are saying    Turn the volume of the television or radio up so high that others complain    Feel that people are mumbling when they re talking to you    Find that the effort to hear leaves you feeling tired and irritated    Notice, when using the phone, that you hear better with one ear than the other  [employer]Well last reviewed this educational content on 1/1/2020 2000-2021 [employer]  [employer]. All rights reserved. This information is not intended as a substitute for professional medical care. Always follow your healthcare professional's instructions.

## 2023-01-23 NOTE — PROGRESS NOTES
"    She is at risk for lack of exercise and has been provided with information to increase physical activity for the benefit of her well-being.  The patient was counseled and encouraged to consider modifying their diet and eating habits. She was provided with information on recommended healthy diet options.  The patient reports that she has difficulty with activities of daily living. I have asked that the patient make a follow up appointment in 4 weeks where this issue will be further evaluated and addressed.  The patient was provided with written information regarding signs of hearing loss.  Answers for HPI/ROS submitted by the patient on 1/23/2023  In general, how would you rate your overall physical health?: good  Frequency of exercise:: 1 day/week  Do you usually eat at least 4 servings of fruit and vegetables a day, include whole grains & fiber, and avoid regularly eating high fat or \"junk\" foods? : No  Taking medications regularly:: Yes  Medication side effects:: None  Activities of Daily Living: transportation requires assistance  Home safety: throw rugs in the hallway, lack of grab bars in the bathroom  Hearing Impairment:: difficulty following a conversation in a noisy restaurant or crowded room, difficulty understanding soft or whispered speech  In the past 6 months, have you been bothered by leaking of urine?: No  abdominal pain: No  Blood in stool: No  Blood in urine: No  chest pain: No  chills: No  congestion: No  constipation: No  cough: No  diarrhea: No  dizziness: No  ear pain: No  eye pain: No  nervous/anxious: No  fever: No  frequency: No  genital sores: No  headaches: No  hearing loss: No  heartburn: No  arthralgias: No  joint swelling: No  peripheral edema: Yes  mood changes: No  myalgias: No  nausea: No  dysuria: No  palpitations: No  Skin sensation changes: No  sore throat: No  urgency: No  rash: No  shortness of breath: No  visual disturbance: No  weakness: No  pelvic pain: No  vaginal " bleeding: No  vaginal discharge: No  tenderness: No  breast mass: No  breast discharge: No  In general, how would you rate your overall mental or emotional health?: good  Additional concerns today:: Yes  Duration of exercise:: N/A

## 2023-01-23 NOTE — PROGRESS NOTES
"SUBJECTIVE:   Sadia is a 77 year old who presents for Preventive Visit.    Patient has been advised of split billing requirements and indicates understanding: Yes  Are you in the first 12 months of your Medicare coverage?  No    Healthy Habits:     In general, how would you rate your overall health?  Good    Frequency of exercise:  1 day/week    Duration of exercise:  N/A    Do you usually eat at least 4 servings of fruit and vegetables a day, include whole grains    & fiber and avoid regularly eating high fat or \"junk\" foods?  No    Taking medications regularly:  Yes    Medication side effects:  None    Ability to successfully perform activities of daily living:  Transportation requires assistance    Home Safety:  Throw rugs in the hallway and lack of grab bars in the bathroom    Hearing Impairment:  Difficulty following a conversation in a noisy restaurant or crowded room and difficulty understanding soft or whispered speech    In the past 6 months, have you been bothered by leaking of urine?  No    In general, how would you rate your overall mental or emotional health?  Good      PHQ-2 Total Score: 0    Additional concerns today:  Yes      Have you ever done Advance Care Planning? (For example, a Health Directive, POLST, or a discussion with a medical provider or your loved ones about your wishes):        Fall risk  Fallen 2 or more times in the past year?: No (Patient entered data at this time. Check the audit trail.)  Any fall with injury in the past year?: No (Patient entered data at this time. Check the audit trail.)    Cognitive Screening   1) Repeat 3 items (Leader, Season, Table)    2) Clock draw: NORMAL  3) 3 item recall: Recalls 3 objects  Results: 3 items recalled: COGNITIVE IMPAIRMENT LESS LIKELY    Mini-CogTM Copyright MAILE Rosario. Licensed by the author for use in [facility]; reprinted with permission (santi@.[email or web address]). All rights reserved.      Do you have sleep apnea, excessive snoring or " daytime drowsiness?: no    Reviewed and updated as needed this visit by clinical staff   Tobacco  Allergies  Meds  Problems  Med Hx  Surg Hx  Fam Hx          Reviewed and updated as needed this visit by Provider   Tobacco  Allergies  Meds  Problems  Med Hx  Surg Hx  Fam Hx         Social History     Tobacco Use     Smoking status: Never     Passive exposure: Never     Smokeless tobacco: Never   Substance Use Topics     Alcohol use: Yes     Comment: Alcoholic Drinks/day: Very rarely       Alcohol Use 1/23/2023   Prescreen: >3 drinks/day or >7 drinks/week? No       Current providers sharing in care for this patient include:   Patient Care Team:  Delfina Boyle MD as PCP - General  Evelyn Mathis MD as MD (Ophthalmology)  Yecenia Hutchison MD as MD (Dermatology)  Ezra Harper MD as Assigned Cancer Care Provider  Bonny Andrew PASHERRI as Physician Assistant (Dermatology)  Ramon Morin MD as Assigned Surgical Provider  Delfina Boyle MD as Assigned PCP  Lolly Herndon MD as MD (Dermatology)    The following health maintenance items are reviewed in Epic and correct as of today:  Health Maintenance   Topic Date Due     MEDICARE ANNUAL WELLNESS VISIT  07/09/2020     ANNUAL REVIEW OF HM ORDERS  10/05/2022     COLORECTAL CANCER SCREENING  09/14/2023     FALL RISK ASSESSMENT  01/23/2024     ADVANCE CARE PLANNING  07/09/2024     DTAP/TDAP/TD IMMUNIZATION (4 - Td or Tdap) 07/14/2025     LIPID  06/23/2027     DEXA  12/01/2037     HEPATITIS C SCREENING  Completed     PHQ-2 (once per calendar year)  Completed     INFLUENZA VACCINE  Completed     Pneumococcal Vaccine: 65+ Years  Completed     ZOSTER IMMUNIZATION  Completed     COVID-19 Vaccine  Completed     IPV IMMUNIZATION  Aged Out     MENINGITIS IMMUNIZATION  Aged Out     MAMMO SCREENING  Discontinued     Lab work is in process  Labs reviewed in EPIC      Mammogram Screening - Alternate mammogram schedule due to breast  "cancer history annually mammogram  Pertinent mammograms are reviewed under the imaging tab.    Review of Systems   Constitutional: Negative for chills and fever.   HENT: Negative for congestion, ear pain, hearing loss and sore throat.    Eyes: Negative for pain and visual disturbance.   Respiratory: Negative for cough and shortness of breath.    Cardiovascular: Positive for peripheral edema. Negative for chest pain and palpitations.   Gastrointestinal: Negative for abdominal pain, constipation, diarrhea, heartburn, hematochezia and nausea.   Breasts:  Negative for tenderness, breast mass and discharge.   Genitourinary: Negative for dysuria, frequency, genital sores, hematuria, pelvic pain, urgency, vaginal bleeding and vaginal discharge.   Musculoskeletal: Negative for arthralgias, joint swelling and myalgias.   Skin: Negative for rash.   Neurological: Negative for dizziness, weakness, headaches and paresthesias.   Psychiatric/Behavioral: Negative for mood changes. The patient is not nervous/anxious.          OBJECTIVE:   BP (!) 154/70 (BP Location: Right arm, Patient Position: Sitting, Cuff Size: Adult Regular)   Pulse 70   Ht 1.632 m (5' 4.25\")   Wt 72.7 kg (160 lb 4.8 oz)   SpO2 98%   BMI 27.30 kg/m   Estimated body mass index is 27.3 kg/m  as calculated from the following:    Height as of this encounter: 1.632 m (5' 4.25\").    Weight as of this encounter: 72.7 kg (160 lb 4.8 oz).  Physical Exam  GENERAL APPEARANCE: healthy, alert and no distress  EYES: Eyes grossly normal to inspection, PERRL and conjunctivae and sclerae normal  HENT: ear canals and TM's normal, nose and mouth without ulcers or lesions, oropharynx clear and oral mucous membranes moist  NECK: no adenopathy, no asymmetry, masses, or scars and thyroid normal to palpation  RESP: lungs clear to auscultation - no rales, rhonchi or wheezes  BREAST: normal without masses, tenderness or nipple discharge and no palpable axillary masses or " adenopathy  CV: regular rate and rhythm, normal S1 S2, no S3 or S4, no murmur, click or rub, no peripheral edema and peripheral pulses strong  ABDOMEN: soft, nontender, no hepatosplenomegaly, no masses and bowel sounds normal  MS: no musculoskeletal defects are noted and gait is age appropriate without ataxia  SKIN: no suspicious lesions or rashes  NEURO: Normal strength and tone, sensory exam grossly normal, mentation intact and speech normal  PSYCH: mentation appears normal and affect normal/bright    Diagnostic Test Results:  Labs reviewed in Epic    ASSESSMENT / PLAN:   (Z00.00) Encounter for Medicare annual wellness exam  (primary encounter diagnosis)  Comment: encourage annual wellness and vaccine up date.   Plan: PRIMARY CARE FOLLOW-UP SCHEDULING            (I10) Primary hypertension  Comment: bp elevated at office. Pt dose home bp check around 100-120/60-70 that is lower than office visit. She takes medication as instructed and no side effect. Pt state she is anxious at doctor office and bp always higher than at home. Reviewed the chart bp high at other office visit too.   Plan: Comprehensive metabolic panel (BMP + Alb, Alk         Phos, ALT, AST, Total. Bili, TP), TSH with free        T4 reflex        Continue current medication. Advise monitor bp at home. Follow-up in 3 days for nurse bp monitor and will adjust the medication accordingly.     (C50.411,  Z17.0) Malignant neoplasm of upper-outer quadrant of right breast in female, estrogen receptor positive (H)  Comment: pt follow-up with oncologist and take medication.   Plan:  Discussed the tamoxifen has risk of blood clots. she takes aspirin 81 mg daily that is blood thinner, hopefully will prevent blood clots.     (E78.2) Mixed hyperlipidemia  Comment: doing well with medication.   Plan: simvastatin (ZOCOR) 20 MG tablet, Lipid panel         reflex to direct LDL Fasting        Continue current plan and advise low fat diet.    (Z12.11) Colon cancer  "screening  Comment: she is due for colonoscope at 9/2023. She asks to order it now.   Plan: Colonoscopy Screening  Referral              Patient has been advised of split billing requirements and indicates understanding: Yes      COUNSELING:  Reviewed preventive health counseling, as reflected in patient instructions       Regular exercise       Healthy diet/nutrition       Vision screening       Dental care       Bladder control       Fall risk prevention       Osteoporosis prevention/bone health       Colon cancer screening      BMI:   Estimated body mass index is 27.3 kg/m  as calculated from the following:    Height as of this encounter: 1.632 m (5' 4.25\").    Weight as of this encounter: 72.7 kg (160 lb 4.8 oz).   Weight management plan: Discussed healthy diet and exercise guidelines      She reports that she has never smoked. She has never been exposed to tobacco smoke. She has never used smokeless tobacco.      Appropriate preventive services were discussed with this patient, including applicable screening as appropriate for cardiovascular disease, diabetes, osteopenia/osteoporosis, and glaucoma.  As appropriate for age/gender, discussed screening for colorectal cancer, prostate cancer, breast cancer, and cervical cancer. Checklist reviewing preventive services available has been given to the patient.    Reviewed patients plan of care and provided an AVS. The Basic Care Plan (routine screening as documented in Health Maintenance) for Sadia meets the Care Plan requirement. This Care Plan has been established and reviewed with the Patient.          Krys Myrick MD  [facility]    Identified Health Risks:  "

## 2023-01-24 ENCOUNTER — ALLIED HEALTH/NURSE VISIT (OUTPATIENT)
Dept: FAMILY MEDICINE | Facility: CLINIC | Age: 78
End: 2023-01-24
Payer: COMMERCIAL

## 2023-01-24 VITALS — SYSTOLIC BLOOD PRESSURE: 132 MMHG | HEART RATE: 71 BPM | DIASTOLIC BLOOD PRESSURE: 62 MMHG

## 2023-01-24 DIAGNOSIS — I10 PRIMARY HYPERTENSION: Primary | ICD-10-CM

## 2023-01-24 LAB
ALBUMIN SERPL BCG-MCNC: 4.3 G/DL (ref 3.5–5.2)
ALP SERPL-CCNC: 34 U/L (ref 35–104)
ALT SERPL W P-5'-P-CCNC: 19 U/L (ref 10–35)
ANION GAP SERPL CALCULATED.3IONS-SCNC: 13 MMOL/L (ref 7–15)
AST SERPL W P-5'-P-CCNC: 21 U/L (ref 10–35)
BILIRUB SERPL-MCNC: 0.8 MG/DL
BUN SERPL-MCNC: 21.3 MG/DL (ref 8–23)
CALCIUM SERPL-MCNC: 9.6 MG/DL (ref 8.8–10.2)
CHLORIDE SERPL-SCNC: 103 MMOL/L (ref 98–107)
CHOLEST SERPL-MCNC: 153 MG/DL
CREAT SERPL-MCNC: 0.87 MG/DL (ref 0.51–0.95)
DEPRECATED HCO3 PLAS-SCNC: 23 MMOL/L (ref 22–29)
GFR SERPL CREATININE-BSD FRML MDRD: 68 ML/MIN/1.73M2
GLUCOSE SERPL-MCNC: 90 MG/DL (ref 70–99)
HDLC SERPL-MCNC: 48 MG/DL
LDLC SERPL CALC-MCNC: 78 MG/DL
NONHDLC SERPL-MCNC: 105 MG/DL
POTASSIUM SERPL-SCNC: 4.3 MMOL/L (ref 3.4–5.3)
PROT SERPL-MCNC: 7 G/DL (ref 6.4–8.3)
SODIUM SERPL-SCNC: 139 MMOL/L (ref 136–145)
TRIGL SERPL-MCNC: 134 MG/DL
TSH SERPL DL<=0.005 MIU/L-ACNC: 3.99 UIU/ML (ref 0.3–4.2)

## 2023-01-24 PROCEDURE — 99207 PR NO CHARGE NURSE ONLY: CPT

## 2023-01-24 NOTE — PROGRESS NOTES
Sadia Rider is a 77 year old patient who comes in today for a Blood Pressure check.  Initial BP:  /62 (BP Location: Left arm, Patient Position: Sitting, Cuff Size: Adult Regular)   Pulse 71      71  Disposition: results routed to provider    Tamara Elam MA on 1/24/2023 at 4:12 PM

## 2023-02-06 ENCOUNTER — TELEPHONE (OUTPATIENT)
Dept: OPHTHALMOLOGY | Facility: CLINIC | Age: 78
End: 2023-02-06
Payer: COMMERCIAL

## 2023-02-06 NOTE — TELEPHONE ENCOUNTER
[facility] Call Center    Phone Message    May a detailed message be left on voicemail: yes     Reason for Call: Symptoms or Concerns     If patient has red-flag symptoms, warm transfer to triage line    Current symptom or concern: Pt reports for about a month having watery/irritated eyes, but more so in the Rt eye. She says they may be slightly red as well.    Symptoms have been present for:  1 month(s)    Has patient previously been seen for this? No    By : Dr. Morin    Date: 12/5/22    Are there any new or worsening symptoms? No      Action Taken: Message routed to:  [facility] EYE    Travel Screening: Not Applicable

## 2023-02-06 NOTE — TELEPHONE ENCOUNTER
I spoke to the patient who notes new symptoms of watery eyes and right eye irritation.  She has tried an artificial tear once daily.  She is concerned about her bleb.  I asked her to increase artificial tears and try warm compresses.  I scheduled in the acute clinic tomorrow.

## 2023-02-07 ENCOUNTER — OFFICE VISIT (OUTPATIENT)
Dept: OPHTHALMOLOGY | Facility: CLINIC | Age: 78
End: 2023-02-07
Attending: OPHTHALMOLOGY
Payer: COMMERCIAL

## 2023-02-07 DIAGNOSIS — H01.01A ULCERATIVE BLEPHARITIS OF UPPER AND LOWER EYELIDS OF BOTH EYES: ICD-10-CM

## 2023-02-07 DIAGNOSIS — H02.106 PUNCTAL ECTROPIONS OF BOTH EYES: ICD-10-CM

## 2023-02-07 DIAGNOSIS — H01.01B ULCERATIVE BLEPHARITIS OF UPPER AND LOWER EYELIDS OF BOTH EYES: ICD-10-CM

## 2023-02-07 DIAGNOSIS — H44.433: ICD-10-CM

## 2023-02-07 DIAGNOSIS — H02.103 PUNCTAL ECTROPIONS OF BOTH EYES: ICD-10-CM

## 2023-02-07 DIAGNOSIS — T15.10XA: Primary | ICD-10-CM

## 2023-02-07 DIAGNOSIS — H04.123 BILATERAL DRY EYES: ICD-10-CM

## 2023-02-07 PROCEDURE — G0463 HOSPITAL OUTPT CLINIC VISIT: HCPCS

## 2023-02-07 PROCEDURE — 65205 REMOVE FOREIGN BODY FROM EYE: CPT | Mod: RT | Performed by: OPHTHALMOLOGY

## 2023-02-07 PROCEDURE — 99214 OFFICE O/P EST MOD 30 MIN: CPT | Mod: 25 | Performed by: OPHTHALMOLOGY

## 2023-02-07 PROCEDURE — 65205 REMOVE FOREIGN BODY FROM EYE: CPT | Performed by: GENERAL ACUTE CARE HOSPITAL

## 2023-02-07 RX ORDER — MINERAL OIL, PETROLATUM 425; 573 MG/G; MG/G
0.5 OINTMENT OPHTHALMIC AT BEDTIME
Qty: 3.5 G | Refills: 11 | Status: SHIPPED | OUTPATIENT
Start: 2023-02-07 | End: 2023-08-17

## 2023-02-07 RX ORDER — NEOMYCIN POLYMYXIN B SULFATES AND DEXAMETHASONE 3.5; 10000; 1 MG/ML; [USP'U]/ML; MG/ML
1 SUSPENSION/ DROPS OPHTHALMIC 3 TIMES DAILY
Qty: 5 ML | Refills: 0 | Status: SHIPPED | OUTPATIENT
Start: 2023-02-07 | End: 2023-02-14

## 2023-02-07 RX ORDER — CARBOXYMETHYLCELLULOSE SODIUM 5 MG/ML
1 SOLUTION/ DROPS OPHTHALMIC 3 TIMES DAILY PRN
COMMUNITY
End: 2023-02-07

## 2023-02-07 RX ORDER — CARBOXYMETHYLCELLULOSE SODIUM 5 MG/ML
1 SOLUTION/ DROPS OPHTHALMIC 4 TIMES DAILY
Qty: 70 EACH | Refills: 11 | Status: SHIPPED | OUTPATIENT
Start: 2023-02-07 | End: 2024-04-03

## 2023-02-07 ASSESSMENT — CONF VISUAL FIELD
OD_INFERIOR_NASAL_RESTRICTION: 3
METHOD: COUNTING FINGERS
OS_SUPERIOR_TEMPORAL_RESTRICTION: 2
OS_INFERIOR_TEMPORAL_RESTRICTION: 2
OD_SUPERIOR_NASAL_RESTRICTION: 3
OS_SUPERIOR_NASAL_RESTRICTION: 2
OS_INFERIOR_NASAL_RESTRICTION: 2

## 2023-02-07 ASSESSMENT — REFRACTION_WEARINGRX
OD_ADD: +2.75
OD_CYLINDER: +1.25
OS_CYLINDER: SPHERE
OD_SPHERE: -4.50
SPECS_TYPE: PAL
OS_SPHERE: -1.00
OD_AXIS: 120
OD_SPHERE: -4.50
OD_CYLINDER: +1.25
OS_ADD: +2.75
OD_ADD: +2.75
OD_AXIS: 120
OS_ADD: +2.75
OS_SPHERE: -1.00
OS_CYLINDER: SPHERE

## 2023-02-07 ASSESSMENT — VISUAL ACUITY
OD_PH_CC+: +2
OD_CC: 20/50
OS_CC: 20/150
OD_PH_CC: 20/50
METHOD: SNELLEN - LINEAR
CORRECTION_TYPE: GLASSES

## 2023-02-07 ASSESSMENT — TONOMETRY
IOP_METHOD: TONOPEN
OD_IOP_MMHG: 08
OS_IOP_MMHG: 06

## 2023-02-07 ASSESSMENT — EXTERNAL EXAM - RIGHT EYE: OD_EXAM: NORMAL

## 2023-02-07 ASSESSMENT — EXTERNAL EXAM - LEFT EYE: OS_EXAM: NORMAL

## 2023-02-07 NOTE — NURSING NOTE
The patient started a new medication Tamoxifen.  Chief Complaints and History of Present Illnesses   Patient presents with     New Patient     Chief Complaint(s) and History of Present Illness(es)     New Patient            Laterality: both eyes    Associated symptoms: tearing.  Negative for flashes and floaters    Treatments tried: artificial tears    Pain scale: 2/10          Comments    New patient presents with watery eyes (right to left).  The patient reports she has never had watery eyes before.  The teary eyes started about a month ago.  She has irritation in the right eye corner of the eye started about a week ago.  She notes right eye pain yesterday.  She started using artificial tears two to three times a day.  She started using warm compresses yesterday.  She started a new medication Tamoxifin which she read has eye side effects.  Darlene Leung, COA, COA 8:29 AM 02/07/2023      The patient has blebs and she is concerned

## 2023-02-07 NOTE — PROGRESS NOTES
Ophthalmology Acute Clinic     Encounter Diagnosis   Name Primary?     Dry eye Yes         HPI:   Sadia Rider is a 77 year old female who presents for watery eyes, right worse than left.  History of juvenile open-angle glaucoma, status post multiple TRAB surgeries.  Last seen in glaucoma clinic on 12/5/2022.  Stable exam, IOP 7 and 6 pressure in the single digits since 2014.  No new changes.  Told to follow-up in 4 months.    Today states, one month ago she has developed watery eyes. Then 8 days ago she developed pain in the right eye. She has been using refresh artificial tears with minimal improvement, but only using 2-3x/day. She has tried warm compresses. Last night she had severe pain (worse than post-op surgery). Pain resolved with closing her eyes.     Past medical history  infiltrating ductal carcinoma in the upper outer quadrant of the right breast, ER/NH positive, HER-2 receptor negative. Hx lumpectomy.   She started tamoxifen 12/28/23       Past Ocular history:   Previous glaucoma surgery/laser:  s/p ALT OU in 2009  s/p Trab OS in 2004 by Dr. Mathis  s/p Trab OD in 1990 by Dr. Muñoz  s/p Trab OU at 27 yo  Currently Meds: None   Family history: positive daughter, mother, brother, great-grandma -- all on gtts, surgery and mother lost vision     Review of systems for the eyes was negative other than the pertinent positives/negatives listed in the HPI.        Assessment & Plan      Sadia Rider is a 77 year old female with the following diagnoses:   1. Acute foreign body of conjunctiva, initial encounter    2. Ulcerative blepharitis of upper and lower eyelids of both eyes    3. Punctal ectropions of both eyes    4. Bilateral dry eyes    5. 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 Focal dryness and quick tear break up time adjacent to overriding conj.  -Exposed suture noted superotemporally. Likely contributing to irritation.  No evidence of infection Recommend removal and patient agreed. See procedure note below.   -Of note, patient has recently started tamoxifen (12/22). No punctal stenosis on exam today. No true epiphora. Pt very concerned about tamoxifen causing dry eye symptoms.     PLAN  -Start PF artificial tears QID, both eyes  -Start lubricating eye ointment at night, both eyes  -Warm compresses TID  -Maxitrol, 1 drop, TID, right eye for 7 days and then stop   -Consider doxycycline trial in the future +/- referral to dermatology  -RTC in 7-10 days for follow-up, and then 3/14/23 for glaucoma follow-up, and review symptoms at that time.     Patient seen with Dr. Brothers    Thank you for entrusting us with your care  Gio Gerard MD, PGY2  Ophthalmology Resident  [facility]  Pager: [phone]  02/06/23 8:07 PM    Attending Physician Attestation:  Complete documentation of historical and exam elements from today's encounter can be found in the full encounter summary report (not reduplicated in this progress note).  I personally obtained the chief complaint(s) and history of present illness.  I confirmed and edited as necessary the review of systems, past medical/surgical history, family history, social history, and examination findings as documented by others; and I examined the patient myself.  I personally reviewed the relevant tests, images, and reports as documented above.  I formulated and edited as necessary the assessment and plan and discussed the findings and management plan with the patient and family. Attending Physician Procedure Attestation: I was present for the entire procedure     . - Ravinder Brothers MD

## 2023-02-07 NOTE — PATIENT INSTRUCTIONS
Use artificial tears 4 times a day in both eyes  Lubricating ointment at night in both eyes  Maxitrol eye drop 3 times a day in the right eye for 7 days    Come back in 7-10 days and we'll see how you are doing

## 2023-02-15 NOTE — PROGRESS NOTES
Ophthalmology Acute Clinic     Encounter Diagnoses   Name Primary?     Bilateral dry eyes Yes     Acute foreign body of conjunctiva, initial encounter          HPI:   Sadia Rider is a 77 year old female who presents for dry eye/rosacea follow-up.    History of juvenile open-angle glaucoma, status post multiple TRAB surgeries.  Last seen one week ago in acute clinic for watery eyes, irritation, and ocular pain.    Today states, she is using tears 4-5 times a day, ointment at night, and completed the 1 week course of maxitrol. States that symptoms are improved, but not all the way back to baseline. Patient prefers to not use ointment.    Past medical history  infiltrating ductal carcinoma in the upper outer quadrant of the right breast, ER/DC positive, HER-2 receptor negative. Hx lumpectomy.   She started tamoxifen 12/28/23       Past Ocular history:   Previous glaucoma surgery/laser:  s/p ALT OU in 2009  s/p Trab OS in 2004 by Dr. Mathis  s/p Trab OD in 1990 by Dr. Muñoz  s/p Trab OU at 27 yo  Currently Meds: None   Family history: positive daughter, mother, brother, great-grandma -- all on gtts, surgery and mother lost vision     Review of systems for the eyes was negative other than the pertinent positives/negatives listed in the HPI.        Assessment & Plan      Sadia Rider is a 77 year old female with the following diagnoses:   1. Bilateral dry eyes    2. Acute foreign body of conjunctiva, initial encounter           -Symptomatic: tearing, eye discomfort nasally, sensation of dryness  -Has debris in tear film, rare PEE, MGD, quick tear break up time  -Focal supronasal bleb with tracking nasally with some overriding conj of the limbus. Focal dryness and quick tear break up time adjacent to overriding conj.  -Of note, patient has recently started tamoxifen (12/22). No punctal stenosis on exam today. No true epiphora. Pt very concerned about tamoxifen causing dry eye symptoms.   -Today the PEE is improved.  "Still has focal dryness surrounding bleb and cycstic conj from poor surface contact when blinking. Took photo's of patients' eye and showed them to her and explained the \"dry patch\" causing symptoms.     PLAN  -Increase PF artificial tears to 6x/day, both eyes  -Recommend lubricating eye ointment at night, both eyes  -Warm compresses TID   -Discussed starting 1g/day fish oil supplement  -Consider doxycycline trial in the future +/- referral to dermatology  -RTC on 3/14/23 for glaucoma follow-up, and review symptoms at that time.    - May consider bleb revision if symptoms unable to be controlled medically    Patient seen with Dr. Brothers    Thank you for entrusting us with your care  Gio Gerard MD, PGY2  Ophthalmology Resident  [facility]  Pager: [phone]                Attending Physician Attestation:  Complete documentation of historical and exam elements from today's encounter can be found in the full encounter summary report (not reduplicated in this progress note).  I personally obtained the chief complaint(s) and history of present illness.  I confirmed and edited as necessary the review of systems, past medical/surgical history, family history, social history, and examination findings as documented by others; and I examined the patient myself.  I personally reviewed the relevant tests, images, and reports as documented above.  I formulated and edited as necessary the assessment and plan and discussed the findings and management plan with the patient and family. . - Ravinder Brothers MD     "

## 2023-02-16 ENCOUNTER — OFFICE VISIT (OUTPATIENT)
Dept: OPHTHALMOLOGY | Facility: CLINIC | Age: 78
End: 2023-02-16
Attending: OPHTHALMOLOGY
Payer: COMMERCIAL

## 2023-02-16 DIAGNOSIS — T15.10XA: ICD-10-CM

## 2023-02-16 DIAGNOSIS — H04.123 BILATERAL DRY EYES: Primary | ICD-10-CM

## 2023-02-16 PROCEDURE — G0463 HOSPITAL OUTPT CLINIC VISIT: HCPCS

## 2023-02-16 PROCEDURE — 99213 OFFICE O/P EST LOW 20 MIN: CPT | Mod: GC | Performed by: OPHTHALMOLOGY

## 2023-02-16 ASSESSMENT — REFRACTION_WEARINGRX
OS_CYLINDER: SPHERE
OS_ADD: +2.75
OD_AXIS: 120
OD_CYLINDER: +1.25
OS_CYLINDER: SPHERE
SPECS_TYPE: PAL
OS_SPHERE: -1.00
OS_ADD: +2.75
OS_SPHERE: -1.00
OD_AXIS: 120
OD_SPHERE: -4.50
OD_CYLINDER: +1.25
OD_ADD: +2.75
OD_ADD: +2.75
OD_SPHERE: -4.50

## 2023-02-16 ASSESSMENT — VISUAL ACUITY
METHOD: SNELLEN - LINEAR
CORRECTION_TYPE: GLASSES
OS_CC: 20/300
OD_CC+: -2
OD_CC: 20/60

## 2023-02-16 ASSESSMENT — CONF VISUAL FIELD
OS_INFERIOR_NASAL_RESTRICTION: 2
OS_SUPERIOR_NASAL_RESTRICTION: 2
OS_INFERIOR_TEMPORAL_RESTRICTION: 2
OD_SUPERIOR_NASAL_RESTRICTION: 3
OD_INFERIOR_NASAL_RESTRICTION: 3
OS_SUPERIOR_TEMPORAL_RESTRICTION: 2

## 2023-02-16 ASSESSMENT — TONOMETRY
IOP_METHOD: ICARE
OD_IOP_MMHG: 07
OS_IOP_MMHG: 04

## 2023-02-16 ASSESSMENT — EXTERNAL EXAM - LEFT EYE: OS_EXAM: NORMAL

## 2023-02-16 ASSESSMENT — EXTERNAL EXAM - RIGHT EYE: OD_EXAM: NORMAL

## 2023-02-16 NOTE — NURSING NOTE
Chief Complaints and History of Present Illnesses   Patient presents with     Foreign Body Right Eye     Chief Complaint(s) and History of Present Illness(es)     Foreign Body Right Eye            Laterality: right eye    Duration: 1 week          Comments    Pt. States that she is doing well. Comfort has improved BE. Tearing has slowed down. RE does still have some irritation.  Candelaria Hernandez COT 7:20 AM February 16, 2023

## 2023-03-14 ENCOUNTER — ANCILLARY PROCEDURE (OUTPATIENT)
Dept: MAMMOGRAPHY | Facility: CLINIC | Age: 78
End: 2023-03-14
Attending: INTERNAL MEDICINE
Payer: COMMERCIAL

## 2023-03-14 ENCOUNTER — OFFICE VISIT (OUTPATIENT)
Dept: OPHTHALMOLOGY | Facility: CLINIC | Age: 78
End: 2023-03-14
Attending: OPHTHALMOLOGY
Payer: COMMERCIAL

## 2023-03-14 DIAGNOSIS — Q15.0 JUVENILE GLAUCOMA: Primary | ICD-10-CM

## 2023-03-14 DIAGNOSIS — Z12.31 VISIT FOR SCREENING MAMMOGRAM: ICD-10-CM

## 2023-03-14 DIAGNOSIS — Z96.1 PSEUDOPHAKIA OF BOTH EYES: ICD-10-CM

## 2023-03-14 PROCEDURE — 92012 INTRM OPH EXAM EST PATIENT: CPT | Mod: GC | Performed by: OPHTHALMOLOGY

## 2023-03-14 PROCEDURE — G0463 HOSPITAL OUTPT CLINIC VISIT: HCPCS | Performed by: OPHTHALMOLOGY

## 2023-03-14 PROCEDURE — 77063 BREAST TOMOSYNTHESIS BI: CPT | Performed by: RADIOLOGY

## 2023-03-14 PROCEDURE — 77067 SCR MAMMO BI INCL CAD: CPT | Performed by: RADIOLOGY

## 2023-03-14 ASSESSMENT — REFRACTION_WEARINGRX
SPECS_TYPE: PAL
OS_SPHERE: -1.00
OD_AXIS: 120
OD_CYLINDER: +1.25
OD_SPHERE: -4.50
OD_AXIS: 120
OS_CYLINDER: SPHERE
OS_ADD: +2.75
OD_SPHERE: -4.50
OS_ADD: +2.75
OD_ADD: +2.75
OD_CYLINDER: +1.25
OD_ADD: +2.75
OS_CYLINDER: SPHERE
OS_SPHERE: -1.00

## 2023-03-14 ASSESSMENT — VISUAL ACUITY
OS_CC: 20/300
OD_CC+: +2
CORRECTION_TYPE: GLASSES
OS_PH_CC+: 0
OS_PH_CC: 20/250
OD_CC: 20/60
METHOD: SNELLEN - LINEAR

## 2023-03-14 ASSESSMENT — CONF VISUAL FIELD
OS_SUPERIOR_NASAL_RESTRICTION: 2
OS_INFERIOR_TEMPORAL_RESTRICTION: 2
OS_INFERIOR_NASAL_RESTRICTION: 2
OD_INFERIOR_NASAL_RESTRICTION: 3
OS_SUPERIOR_TEMPORAL_RESTRICTION: 2

## 2023-03-14 ASSESSMENT — TONOMETRY
OS_IOP_MMHG: 1
OD_IOP_MMHG: 5
IOP_METHOD: APPLANATION
OD_IOP_MMHG: 04
OS_IOP_MMHG: 03
IOP_METHOD: APPLANATION

## 2023-03-14 ASSESSMENT — EXTERNAL EXAM - RIGHT EYE: OD_EXAM: NORMAL

## 2023-03-14 ASSESSMENT — EXTERNAL EXAM - LEFT EYE: OS_EXAM: NORMAL

## 2023-03-14 NOTE — NURSING NOTE
Chief Complaints and History of Present Illnesses   Patient presents with     Follow Up     3 month follow up Juvenile Open Angle Glaucoma each eye      Chief Complaint(s) and History of Present Illness(es)     Follow Up            Comments: 3 month follow up Juvenile Open Angle Glaucoma each eye           Comments    Pt states vision is about the same as last visit. No eye pain today. No new flashes or floaters.  No redness. Dryness in BE is much better since visit last month.     JARAD Carmichael March 14, 2023 1:46 PM

## 2023-03-14 NOTE — PROGRESS NOTES
Chief Complaint/Presenting Concern: glaucoma follow up    History of Present Illness:   Sadia Rider is a 75 year old patient who presents for glaucoma follow up, history of trab both eyes.    3/14/23: last visit 12/5/22 here for 4 month glaucoma follow up with IOP check.    Relevant Past Medical/Family/Social History: None relevant     Relevant Review of Systems: None relevant       Diagnosis: Juvenile Open Angle Glaucoma each eye   Previous glaucoma surgery/laser:  s/p ALT OU in 2009  s/p Trab OS in 2004 by Dr. Mathis  s/p Trab OD in 1990 by Dr. Muñoz  s/p Trab OU at 27 yo  Currently Meds: None   Family history: positive daughter, mother, brother, great-grandma -- all on gtts, surgery and mother lost vision   Meds AEs/intolerance: AGN - contact dermatitis     PMHx: No history of Asthma and respiratory problems/Cardiac/Renal/Kidney stones/Sulfa Allergy  Previous testing:  Visual field 11/23/2020:  Right eye - inferior nasal step and paracentral defect, slightly more depressed at point but also less depressed at other points, likely fluctuations, reliable  Left eye - inferior nasal/arcuate defect, central defect  Fluctuations, reliable  Visual field February 25, 2021:  Right eye - inferior nasal step and paracentral defect, stable, reliable  OCT Optic Nerve RNFL Spectralis 11/23/2020  right eye: superior and inferior RNFL thinning, stable   left eye: superior and inferior RNFL thinning, possible worsening of IN thinning likely fluctuating   OCT Macula today 7/26/22  - right eye: normal foveal contour no intraretinal or subretinal fluid  - left eye: irregular appearing retina, stable, no subretinal or intraretinal fluid, appears stable    Today's testing:  IOP today: 5/1 mmHg     Additional Ocular History:    2. 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 Periorbital dermatitis,  bilateral  - Could consider dermatology referral  - Patient has not noticed but may be 2/2 poor vision each eye     Plan/Recommendations:    Discussed findings with patient.    Doing well, stable with no evidence of hypotony signs of exam.     Careful precautions given to patient to watch out for any signs and symptoms of bleb leak/infection. Advised to avoid eye rubbing and lake water.       RTC in 4 months [facility], IOP    Evelyn Suero MD  Ophthalmology Resident, PGY-3  [facility]        Physician Attestation     Attending Physician Attestation:  Complete documentation of historical and exam elements from today's encounter can be found in the full encounter summary report (not reduplicated in this progress note). I personally obtained the chief complaint(s) and history of present illness. I confirmed and edited as necessary the review of systems, past medical/surgical history, family history, social history, and examination findings as documented by others; and I examined the patient myself. I personally reviewed the relevant tests, images, and reports as documented above. I formulated and edited as necessary the assessment and plan and discussed the findings and management plan with the patient and any family members present at the time of the visit.  Ramon Morin M.D., Glaucoma, March 14, 2023

## 2023-06-07 ENCOUNTER — LAB (OUTPATIENT)
Dept: ONCOLOGY | Facility: CLINIC | Age: 78
End: 2023-06-07
Attending: INTERNAL MEDICINE
Payer: COMMERCIAL

## 2023-06-07 VITALS
TEMPERATURE: 98.2 F | OXYGEN SATURATION: 97 % | RESPIRATION RATE: 18 BRPM | SYSTOLIC BLOOD PRESSURE: 145 MMHG | HEART RATE: 64 BPM | BODY MASS INDEX: 27.28 KG/M2 | WEIGHT: 160.2 LBS | DIASTOLIC BLOOD PRESSURE: 75 MMHG

## 2023-06-07 DIAGNOSIS — Z17.0 MALIGNANT NEOPLASM OF UPPER-OUTER QUADRANT OF RIGHT BREAST IN FEMALE, ESTROGEN RECEPTOR POSITIVE (H): Primary | ICD-10-CM

## 2023-06-07 DIAGNOSIS — C50.411 MALIGNANT NEOPLASM OF UPPER-OUTER QUADRANT OF RIGHT BREAST IN FEMALE, ESTROGEN RECEPTOR POSITIVE (H): Primary | ICD-10-CM

## 2023-06-07 LAB
ALBUMIN SERPL BCG-MCNC: 4 G/DL (ref 3.5–5.2)
ALP SERPL-CCNC: 31 U/L (ref 35–104)
ALT SERPL W P-5'-P-CCNC: 17 U/L (ref 10–35)
ANION GAP SERPL CALCULATED.3IONS-SCNC: 8 MMOL/L (ref 7–15)
AST SERPL W P-5'-P-CCNC: 19 U/L (ref 10–35)
BILIRUB SERPL-MCNC: 0.3 MG/DL
BUN SERPL-MCNC: 28.2 MG/DL (ref 8–23)
CALCIUM SERPL-MCNC: 9.5 MG/DL (ref 8.8–10.2)
CHLORIDE SERPL-SCNC: 103 MMOL/L (ref 98–107)
CREAT SERPL-MCNC: 1.12 MG/DL (ref 0.51–0.95)
DEPRECATED HCO3 PLAS-SCNC: 27 MMOL/L (ref 22–29)
ERYTHROCYTE [DISTWIDTH] IN BLOOD BY AUTOMATED COUNT: 12.3 % (ref 10–15)
GFR SERPL CREATININE-BSD FRML MDRD: 50 ML/MIN/1.73M2
GLUCOSE SERPL-MCNC: 105 MG/DL (ref 70–99)
HCT VFR BLD AUTO: 42.7 % (ref 35–47)
HGB BLD-MCNC: 14.2 G/DL (ref 11.7–15.7)
MCH RBC QN AUTO: 30.6 PG (ref 26.5–33)
MCHC RBC AUTO-ENTMCNC: 33.3 G/DL (ref 31.5–36.5)
MCV RBC AUTO: 92 FL (ref 78–100)
PLATELET # BLD AUTO: 216 10E3/UL (ref 150–450)
POTASSIUM SERPL-SCNC: 4.3 MMOL/L (ref 3.4–5.3)
PROT SERPL-MCNC: 6.5 G/DL (ref 6.4–8.3)
RBC # BLD AUTO: 4.64 10E6/UL (ref 3.8–5.2)
SODIUM SERPL-SCNC: 138 MMOL/L (ref 136–145)
WBC # BLD AUTO: 6.5 10E3/UL (ref 4–11)

## 2023-06-07 PROCEDURE — 80053 COMPREHEN METABOLIC PANEL: CPT | Performed by: INTERNAL MEDICINE

## 2023-06-07 PROCEDURE — 36415 COLL VENOUS BLD VENIPUNCTURE: CPT

## 2023-06-07 PROCEDURE — 85027 COMPLETE CBC AUTOMATED: CPT | Performed by: INTERNAL MEDICINE

## 2023-06-07 PROCEDURE — 99213 OFFICE O/P EST LOW 20 MIN: CPT | Performed by: INTERNAL MEDICINE

## 2023-06-07 PROCEDURE — G0463 HOSPITAL OUTPT CLINIC VISIT: HCPCS | Performed by: INTERNAL MEDICINE

## 2023-06-07 ASSESSMENT — PAIN SCALES - GENERAL: PAINLEVEL: NO PAIN (0)

## 2023-06-07 NOTE — PROGRESS NOTES
Sadia Rider is a 78-year-old patient who comes in today for interim followup.     CHIEF COMPLAINT:    1.  Right-sided infiltrating ductal carcinoma diagnosed in 2020, now coming up on 3 years out from her diagnosis in 02/2023, ER/WV positive, HER-2 receptor negative.  2.  Oncotype in the low risk of recurrence range.  3.  Osteopenia.    HISTORY OF PRESENTING COMPLAINT:  Sadia is a 77-year-old patient with an infiltrating ductal carcinoma in the upper outer quadrant of the right breast, ER/WV positive, HER-2 receptor negative.  She ended up having a right-sided lumpectomy and then had Oncotype sent out, which came back with a recurrence score of 13.  She started on anastrozole, and since 2020 she has actually had a drop in her bone density. We ended up stopping the anastrozole and switching to Tamoxifen   She is tolerating the tamoxifen well except for some brittle nails and anxiety intermittently.    REVIEW OF SYSTEMS:  A 12-point comprehensive review of systems is otherwise unremarkable.     PAIN ASSESSMENT:  She denies pain today.     MEDICATIONS AND ALLERGIES:  Outlined in the nursing records.     SOCIAL HISTORY:  The patient is single.  She is a nonsmoker.     PHYSICAL EXAMINATION:    GENERAL:  She is well-appearing lady in no acute distress.  VITAL SIGNS:  Stable.  NECK:  Has no masses or goiter.  CHEST:  Clear to auscultation and percussion bilaterally.  HEART:  Sounds 1 and 2 normal, no added sounds, no murmurs.  BREASTS:  Right breast, no palpable masses.  Right axilla negative.  Left breast normal, no palpable masses.  Left axilla negative.  GASTROINTESTINAL:  Abdomen is soft and nontender.  No hepatosplenomegaly.  EXTREMITIES:  Legs without tenderness or edema.  NEUROLOGIC:  Peripheral neurological exam is grossly intact.    Data review   Labs pending at time of dictation     IMPRESSION:  A 77-year-old patient with a history of right-sided breast cancer.  Since she started active treatment with adjuvant  anastrozole, she has had a drop in the bone density, despite the fact she was on Fosamax. She is now on tamoxifen an seems to be tolerating it fine except for a few symptoms   I will assess again in 6 months     Patient in agreement     Ezra Harper MD

## 2023-06-07 NOTE — LETTER
6/7/2023         RE: Sadia Rider  [address]  [facility] [address]        Dear Colleague,    Thank you for referring your patient, Sadia Rider, to the [facility] [place]. Please see a copy of my visit note below.    Sadia Rider is a 78-year-old patient who comes in today for interim followup.     CHIEF COMPLAINT:    1.  Right-sided infiltrating ductal carcinoma diagnosed in 2020, now coming up on 3 years out from her diagnosis in 02/2023, ER/GA positive, HER-2 receptor negative.  2.  Oncotype in the low risk of recurrence range.  3.  Osteopenia.    HISTORY OF PRESENTING COMPLAINT:  Sadia is a 77-year-old patient with an infiltrating ductal carcinoma in the upper outer quadrant of the right breast, ER/GA positive, HER-2 receptor negative.  She ended up having a right-sided lumpectomy and then had Oncotype sent out, which came back with a recurrence score of 13.  She started on anastrozole, and since 2020 she has actually had a drop in her bone density. We ended up stopping the anastrozole and switching to Tamoxifen   She is tolerating the tamoxifen well except for some brittle nails and anxiety intermittently.    REVIEW OF SYSTEMS:  A 12-point comprehensive review of systems is otherwise unremarkable.     PAIN ASSESSMENT:  She denies pain today.     MEDICATIONS AND ALLERGIES:  Outlined in the nursing records.     SOCIAL HISTORY:  The patient is single.  She is a nonsmoker.     PHYSICAL EXAMINATION:    GENERAL:  She is well-appearing lady in no acute distress.  VITAL SIGNS:  Stable.  NECK:  Has no masses or goiter.  CHEST:  Clear to auscultation and percussion bilaterally.  HEART:  Sounds 1 and 2 normal, no added sounds, no murmurs.  BREASTS:  Right breast, no palpable masses.  Right axilla negative.  Left breast normal, no palpable masses.  Left axilla negative.  GASTROINTESTINAL:  Abdomen is soft and nontender.  No hepatosplenomegaly.  EXTREMITIES:  Legs without  tenderness or edema.  NEUROLOGIC:  Peripheral neurological exam is grossly intact.    Data review   Labs pending at time of dictation     IMPRESSION:  A 77-year-old patient with a history of right-sided breast cancer.  Since she started active treatment with adjuvant anastrozole, she has had a drop in the bone density, despite the fact she was on Fosamax. She is now on tamoxifen an seems to be tolerating it fine except for a few symptoms   I will assess again in 6 months     Patient in agreement     Ezra Harper MD            Again, thank you for allowing me to participate in the care of your patient.        Sincerely,        Ezra Harper MD

## 2023-06-07 NOTE — NURSING NOTE
"Oncology Rooming Note    June 7, 2023 3:56 PM   Sadia Rider is a 78 year old female who presents for:    Chief Complaint   Patient presents with     Oncology Clinic Visit     Initial Vitals: BP (!) 145/75   Pulse 64   Temp 98.2  F (36.8  C) (Tympanic)   Resp 18   Wt 72.7 kg (160 lb 3.2 oz)   SpO2 97%   BMI 27.28 kg/m   Estimated body mass index is 27.28 kg/m  as calculated from the following:    Height as of 1/23/23: 1.632 m (5' 4.25\").    Weight as of this encounter: 72.7 kg (160 lb 3.2 oz). Body surface area is 1.82 meters squared.  No Pain (0) Comment: Data Unavailable   No LMP recorded. Patient is postmenopausal.  Allergies reviewed: Yes  Medications reviewed: Yes    Medications: Medication refills not needed today.  Pharmacy name entered into [employer]: [id number] [place] [id number] [address]    Clinical concerns: f/u       Chichi Berry CMA              "

## 2023-06-07 NOTE — PROGRESS NOTES
Medical Assistant Note:  Sadia Rider presents today for blood draw.    Patient seen by provider today: Yes: Dr Harper.   present during visit today: Not Applicable.    Concerns: No Concerns.    Procedure:  Lab draw site: rt antecub, Needle type: butterfly, Gauge: 23.    Post Assessment:  Labs drawn without difficulty: Yes.    Discharge Plan:  Departure Mode: Ambulatory.    Face to Face Time: 10.    Cathy Fox CMA

## 2023-07-11 ENCOUNTER — OFFICE VISIT (OUTPATIENT)
Dept: OPHTHALMOLOGY | Facility: CLINIC | Age: 78
End: 2023-07-11
Attending: OPHTHALMOLOGY
Payer: COMMERCIAL

## 2023-07-11 ENCOUNTER — OFFICE VISIT (OUTPATIENT)
Dept: DERMATOLOGY | Facility: CLINIC | Age: 78
End: 2023-07-11
Payer: COMMERCIAL

## 2023-07-11 DIAGNOSIS — Z85.828 HISTORY OF SKIN CANCER: ICD-10-CM

## 2023-07-11 DIAGNOSIS — L82.1 SEBORRHEIC KERATOSES: ICD-10-CM

## 2023-07-11 DIAGNOSIS — Q15.0 JUVENILE GLAUCOMA: Primary | ICD-10-CM

## 2023-07-11 DIAGNOSIS — L57.0 AK (ACTINIC KERATOSIS): ICD-10-CM

## 2023-07-11 DIAGNOSIS — D22.9 MULTIPLE BENIGN NEVI: ICD-10-CM

## 2023-07-11 DIAGNOSIS — D18.01 CHERRY ANGIOMA: ICD-10-CM

## 2023-07-11 DIAGNOSIS — L81.4 SOLAR LENTIGO: ICD-10-CM

## 2023-07-11 DIAGNOSIS — L71.9 ROSACEA: Primary | ICD-10-CM

## 2023-07-11 PROCEDURE — 92012 INTRM OPH EXAM EST PATIENT: CPT | Performed by: OPHTHALMOLOGY

## 2023-07-11 PROCEDURE — G0463 HOSPITAL OUTPT CLINIC VISIT: HCPCS | Performed by: OPHTHALMOLOGY

## 2023-07-11 PROCEDURE — 99214 OFFICE O/P EST MOD 30 MIN: CPT | Mod: GC | Performed by: DERMATOLOGY

## 2023-07-11 ASSESSMENT — TONOMETRY
IOP_METHOD: APPLANATION
OS_IOP_MMHG: 2
OS_IOP_MMHG: 03
OD_IOP_MMHG: 05
IOP_METHOD: APPLANATION
OD_IOP_MMHG: 6

## 2023-07-11 ASSESSMENT — CONF VISUAL FIELD
METHOD: COUNTING FINGERS
OD_SUPERIOR_NASAL_RESTRICTION: 0
OS_INFERIOR_TEMPORAL_RESTRICTION: 2
OD_INFERIOR_TEMPORAL_RESTRICTION: 0
OD_INFERIOR_NASAL_RESTRICTION: 0
OS_SUPERIOR_TEMPORAL_RESTRICTION: 2
OD_SUPERIOR_TEMPORAL_RESTRICTION: 0
OD_NORMAL: 1
OS_SUPERIOR_NASAL_RESTRICTION: 2
OS_INFERIOR_NASAL_RESTRICTION: 2

## 2023-07-11 ASSESSMENT — REFRACTION_WEARINGRX
OD_SPHERE: -4.50
OS_CYLINDER: SPHERE
OS_ADD: +2.75
OD_AXIS: 120
OD_CYLINDER: +1.25
OD_CYLINDER: +1.25
SPECS_TYPE: PAL
OD_ADD: +2.75
OS_SPHERE: -1.00
OS_ADD: +2.75
OS_SPHERE: -1.00
OD_AXIS: 120
OS_CYLINDER: SPHERE
OD_SPHERE: -4.50
OD_ADD: +2.75

## 2023-07-11 ASSESSMENT — VISUAL ACUITY
OS_CC: 20/150
OD_CC+: +1
OD_CC: 20/70
METHOD: SNELLEN - LINEAR
CORRECTION_TYPE: GLASSES

## 2023-07-11 ASSESSMENT — EXTERNAL EXAM - RIGHT EYE: OD_EXAM: NORMAL

## 2023-07-11 ASSESSMENT — EXTERNAL EXAM - LEFT EYE: OS_EXAM: NORMAL

## 2023-07-11 ASSESSMENT — PAIN SCALES - GENERAL: PAINLEVEL: NO PAIN (0)

## 2023-07-11 NOTE — NURSING NOTE
Chief Complaints and History of Present Illnesses   Patient presents with     Follow Up     Juvenile glaucoma     Chief Complaint(s) and History of Present Illness(es)     Follow Up            Laterality: both eyes    Course: stable    Associated symptoms: Negative for dryness (not needing to use artificial tears at this point, asymptomatic), eye pain, redness and headache    Treatments tried: no treatments    Pain scale: 0/10    Comments: Juvenile glaucoma          Comments    She states that her vision has seemed stable in both eyes since her last eye exam.  Patient denies having any eye discomfort.    ASHLEY Mason 10:45 AM  July 11, 2023

## 2023-07-11 NOTE — PATIENT INSTRUCTIONS
Metrocream twice a day      Patient Education     Checking for Skin Cancer  You can find cancer early by checking your skin each month. There are 3 kinds of skin cancer. They are melanoma, basal cell carcinoma, and squamous cell carcinoma. Doing monthly skin checks is the best way to find new marks or skin changes. Follow the instructions below for checking your skin.   The ABCDEs of checking moles for melanoma   Check your moles or growths for signs of melanoma using ABCDE:   Asymmetry: the sides of the mole or growth don t match  Border: the edges are ragged, notched, or blurred  Color: the color within the mole or growth varies  Diameter: the mole or growth is larger than 6 mm (size of a pencil eraser)  Evolving: the size, shape, or color of the mole or growth is changing (evolving is not shown in the images below)    Checking for other types of skin cancer  Basal cell carcinoma or squamous cell carcinoma have symptoms such as:     A spot or mole that looks different from all other marks on your skin  Changes in how an area feels, such as itching, tenderness, or pain  Changes in the skin's surface, such as oozing, bleeding, or scaliness  A sore that does not heal  New swelling or redness beyond the border of a mole    Who s at risk?  Anyone can get skin cancer. But you are at greater risk if you have:   Fair skin, light-colored hair, or light-colored eyes  Many moles or abnormal moles on your skin  A history of sunburns from sunlight or tanning beds  A family history of skin cancer  A history of exposure to radiation or chemicals  A weakened immune system  If you have had skin cancer in the past, you are at risk for recurring skin cancer.   How to check your skin  Do your monthly skin checkups in front of a full-length mirror. Check all parts of your body, including your:   Head (ears, face, neck, and scalp)  Torso (front, back, and sides)  Arms (tops, undersides, upper, and lower armpits)  Hands (palms, backs,  and fingers, including under the nails)  Buttocks and genitals  Legs (front, back, and sides)  Feet (tops, soles, toes, including under the nails, and between toes)  If you have a lot of moles, take digital photos of them each month. Make sure to take photos both up close and from a distance. These can help you see if any moles change over time.   Most skin changes are not cancer. But if you see any changes in your skin, call your doctor right away. Only he or she can diagnose a problem. If you have skin cancer, seeing your doctor can be the first step toward getting the treatment that could save your life.   "[employer] last reviewed this educational content on 4/1/2019 2000-2020 [employer]. [address] [place] [address]. All rights reserved. This information is not intended as a substitute for professional medical care. Always follow your healthcare professional's instructions.       When should I call my doctor?  If you are worsening or not improving, please, contact us or seek urgent care as noted below.     Who should I call with questions (adults)?  [facility] (adult and pediatric): [phone]  [facility] (adult): [phone]  For urgent needs outside of business hours call the [facility] at [phone] and ask for the dermatology resident on call to be paged  If this is a medical emergency and you are unable to reach an ER, Call [phone]    Who should I call with questions (pediatric)?  [facility]- Pediatric Dermatology  Dr. Wandy Browning, Dr. Bettina Wadsworth, Dr. Renea Bustamante, Jessica Yoon, PA, Dr. Waleska Ruiz, Dr. Luda Tinoco & Dr. Hawk Arias  Non-urgent nurse triage line; [phone]- Niki and Daria ADAIR Care Coordinatorsmiley Barboza (/Complex ) [phone]    If you need a prescription refill, please contact your pharmacy. Refills are approved  or denied by our Physicians during normal business hours, Monday through Fridays  Per office policy, refills will not be granted if you have not been seen within the past year (or sooner depending on your child's condition)    Scheduling Information:  Pediatric Appointment Scheduling and Call Center [phone]  Radiology Scheduling- [phone]  Sedation Unit Scheduling- [phone]  [place] Scheduling- [facility] [phone]; Pediatric Dermatology [phone]  Main  Services: [phone]  Romanian: [phone]  Nepalese: [phone]  Hmong/Kinyarwanda/Avi: [phone]  Preadmission Nursing Department Fax Number: [phone] (Fax all pre-operative paperwork to this number)    For urgent matters arising during evenings, weekends, or holidays that cannot wait for normal business hours please call [phone] and ask for the dermatology resident on call to be paged.

## 2023-07-11 NOTE — PROGRESS NOTES
[facility] Dermatology Note  Encounter Date: Jul 11, 2023  Office Visit     Dermatology Problem List:  TBSE 07/11/23   1. History of NMSC  - BCC - right lower cheek, s/p Mohs 8/30/17  - BCC - R nose s/p excision 2012  2. History of intertrigo - OTC anti-fungal  3. Benign bx  - fibrous papule, right medial mid cheek, s/p bx 2/22/2021  - intradermal nevus, right ear lobe, s/p bx 2/22/2021  4. Papulopustular/ET rosacea  - Metrocream  ____________________________________________    Assessment & Plan:     # Papulopustular/ET rosacea - chronic, active.  Physical exam as below. Discussed topical treatments aimed at reducing the inflammatory papules, advised creams not as effective for helping redness.  - Start Metrocream    # Benign skin findings  - Physical exam demonstrating benign skin findings as below.  - Seborrheic keratoses  - Cherry hemangiomas  - Solar lentingos   - Benign nevi  - Reassurance provided.  - ABCDEs of melanoma handout provided.  - Advised patient to return to clinic for any new or concerning lesions.    # History of NMSC. No evidence of recurrent disease.  - Continue photoprotection - recommend SPF 30 or higher with frequent reapplication  - Continue yearly skin exams  - Advised to monitor for changing, non-healing, bleeding, painful, changing, or otherwise symptomatic lesions      Procedures Performed:   None    Follow-up: 1 year, prn for new or changing lesions    Staff and Resident:     Ken Peck MD  PGY-4 Dermatology  Pager: [phone]    Staff Physician Comments:   I saw and evaluated the patient with the resident and I agree with the assessment and plan.  I was present for the examination.    Lolly Herndon MD    Department of Dermatology  [facility]: Phone: [phone], Fax: [phone]  7/18/2023     ____________________________________________    CC: Skin Check (Here for a  skin check. No concerns)    HPI:  Ms. Sadia Rider is a(n) 78 year old female who presents today as a return patient for skin check.    - Notes facial redness worsened when feeling nervous, not using anything    Personal hx of skin cancer: as per HPI  Family history of melanoma: none that she is aware  Wears sunscreen consistently: yes  History of immunosuppression: no  No lesions that are bleeding, growing, or tender.     - Patient is otherwise feeling well, without additional skin concerns.    Labs Reviewed:  N/A    Physical Exam:  Vitals: There were no vitals taken for this visit.  SKIN: Total skin excluding the undergarment areas was performed. The exam included the head/face, neck, both arms, chest, back, abdomen, both legs, digits and/or nails.   - right lateral neck with flesh colored papule with + wobble sign  - Waxy stuck on tan to brown papules on the trunk and extremities.   - Dome shaped bright red papules on the trunk and extremities.   - There are fine lines and dyspigmentation on sun exposed areas of the face and chest.  - Scattered brown macules on sun exposed areas.  - Light to dark brown symmetric macules and papules with normal pigment networks on dermoscopy on trunk/extremities  - Prior areas of skin cancer examined and no evidence of recurrence based on inspection or palpation  - No other lesions of concern on areas examined.     Medications:  Current Outpatient Medications   Medication     aspirin 81 MG tablet     calcium carb-cholecalciferol 600-500 MG-UNIT CAPS     carboxymethylcellulose PF (REFRESH PLUS) 0.5 % ophthalmic solution     Cholecalciferol (VITAMIN D3 PO)     lisinopril (ZESTRIL) 5 MG tablet     Multiple Vitamins-Minerals (CENTRUM ULTRA WOMENS PO)     simvastatin (ZOCOR) 20 MG tablet     tamoxifen (NOLVADEX) 20 MG tablet     UNABLE TO FIND     UNABLE TO FIND     White Petrolatum-Mineral Oil (REFRESH P.M.) OINT     Current Facility-Administered Medications   Medication      lidocaine 1% with EPINEPHrine 1:100,000 injection 3 mL      Past Medical History:   Patient Active Problem List   Diagnosis     Juvenile glaucoma     Myopia of both eyes     Stress fracture of metatarsal bone, right, with routine healing, subsequent encounter     Osteoporosis     Malignant neoplasm of upper-outer quadrant of right breast in female, estrogen receptor positive (H)     Conjugated hyperbilirubinemia     Primary hypertension     Past Medical History:   Diagnosis Date     Acute cholecystitis 4/16/2016     Basal cell carcinoma      Breast cancer (H)      Glaucoma      POAG ADV     High cholesterol      History of vertebral compression fracture      Hypertension      Osteoporosis      Other chronic pain      PONV (postoperative nausea and vomiting)     many years ago.     Rheumatoid osteoperiostitis (H)      Skin cancer, basal cell      Tear film insufficiency, unspecified        CC Referred Self, MD  No address on file on close of this encounter.

## 2023-07-11 NOTE — NURSING NOTE
Dermatology Rooming Note    Sadia Rider's goals for this visit include:   Chief Complaint   Patient presents with     Skin Check     Here for a skin check. No concerns     Desirae Zepeda RN

## 2023-07-11 NOTE — PROGRESS NOTES
Chief Complaint/Presenting Concern: glaucoma follow up    History of Present Illness:   Sadia Rider is a 75 year old patient who presents for glaucoma follow up, history of trab both eyes.  here for 4 month glaucoma follow up with IOP check.    Relevant Past Medical/Family/Social History: None relevant     Relevant Review of Systems: None relevant       Diagnosis: Juvenile Open Angle Glaucoma each eye   Previous glaucoma surgery/laser:  s/p ALT OU in 2009  s/p Trab OS in 2004 by Dr. Mathis  s/p Trab OD in 1990 by Dr. Mñuoz  s/p Trab OU at 29 yo  Currently Meds: None   Family history: positive daughter, mother, brother, great-grandma -- all on gtts, surgery and mother lost vision   Meds AEs/intolerance: AGN - contact dermatitis     PMHx: No history of Asthma and respiratory problems/Cardiac/Renal/Kidney stones/Sulfa Allergy  Previous testing:  Visual field 11/23/2020:  Right eye - inferior nasal step and paracentral defect, slightly more depressed at point but also less depressed at other points, likely fluctuations, reliable  Left eye - inferior nasal/arcuate defect, central defect  Fluctuations, reliable  Visual field February 25, 2021:  Right eye - inferior nasal step and paracentral defect, stable, reliable  OCT Optic Nerve RNFL Spectralis 11/23/2020  right eye: superior and inferior RNFL thinning, stable   left eye: superior and inferior RNFL thinning, possible worsening of IN thinning likely fluctuating   OCT Macula today 7/26/22  - right eye: normal foveal contour no intraretinal or subretinal fluid  - left eye: irregular appearing retina, stable, no subretinal or intraretinal fluid, appears stable    Today's testing:  IOP today: 6/2 mmHg     Additional Ocular History:    2. 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 Periorbital dermatitis, bilateral  - Could consider  dermatology referral  - Patient has not noticed but may be 2/2 poor vision each eye     Plan/Recommendations:    Discussed findings with patient.    Doing well, stable with no evidence of hypotony signs of exam.     Careful precautions given to patient to watch out for any signs and symptoms of bleb leak/infection. Advised to avoid eye rubbing and lake water.       RTC in 4 months [facility], IOP, MRX (with Luann, Lizzette, or BARTOLO if possible) followed by [facility]       Physician Attestation     Attending Physician Attestation:  Complete documentation of historical and exam elements from today's encounter can be found in the full encounter summary report (not reduplicated in this progress note). I personally obtained the chief complaint(s) and history of present illness. I confirmed and edited as necessary the review of systems, past medical/surgical history, family history, social history, and examination findings as documented by others; and I examined the patient myself. I personally reviewed the relevant tests, images, and reports as documented above. I formulated and edited as necessary the assessment and plan and discussed the findings and management plan with the patient and any family members present at the time of the visit.  Ramon Morin M.D., Glaucoma, July 11, 2023

## 2023-07-11 NOTE — LETTER
7/11/2023       RE: Sadia Rider  [address] [facility] [address]     Dear Colleague,    Thank you for referring your patient, Sadia Rider, to the [facility] DERMATOLOGY CLINIC [place] [facility]. Please see a copy of my visit note below.    [facility] Dermatology Note  Encounter Date: Jul 11, 2023  Office Visit     Dermatology Problem List:  TBSE 07/11/23   1. History of NMSC  - BCC - right lower cheek, s/p Mohs 8/30/17  - BCC - R nose s/p excision 2012  2. History of intertrigo - OTC anti-fungal  3. Benign bx  - fibrous papule, right medial mid cheek, s/p bx 2/22/2021  - intradermal nevus, right ear lobe, s/p bx 2/22/2021  4. Papulopustular/ET rosacea  - Metrocream  ____________________________________________    Assessment & Plan:     # Papulopustular/ET rosacea - chronic, active.  Physical exam as below. Discussed topical treatments aimed at reducing the inflammatory papules, advised creams not as effective for helping redness.  - Start Metrocream    # Benign skin findings  - Physical exam demonstrating benign skin findings as below.  - Seborrheic keratoses  - Cherry hemangiomas  - Solar lentingos   - Benign nevi  - Reassurance provided.  - ABCDEs of melanoma handout provided.  - Advised patient to return to clinic for any new or concerning lesions.    # History of NMSC. No evidence of recurrent disease.  - Continue photoprotection - recommend SPF 30 or higher with frequent reapplication  - Continue yearly skin exams  - Advised to monitor for changing, non-healing, bleeding, painful, changing, or otherwise symptomatic lesions      Procedures Performed:   None    Follow-up: 1 year, prn for new or changing lesions    Staff and Resident:     Ken Peck MD  PGY-4 Dermatology  Pager: [phone]    Staff Physician Comments:   I saw and evaluated the patient with the resident and I agree with the assessment and plan.  I was  present for the examination.    Lolly Herndon MD    Department of Dermatology  [facility]: Phone: [phone], Fax: [phone]  7/18/2023     ____________________________________________    CC: Skin Check (Here for a skin check. No concerns)    HPI:  Ms. Sadia Rider is a(n) 78 year old female who presents today as a return patient for skin check.    - Notes facial redness worsened when feeling nervous, not using anything    Personal hx of skin cancer: as per HPI  Family history of melanoma: none that she is aware  Wears sunscreen consistently: yes  History of immunosuppression: no  No lesions that are bleeding, growing, or tender.     - Patient is otherwise feeling well, without additional skin concerns.    Labs Reviewed:  N/A    Physical Exam:  Vitals: There were no vitals taken for this visit.  SKIN: Total skin excluding the undergarment areas was performed. The exam included the head/face, neck, both arms, chest, back, abdomen, both legs, digits and/or nails.   - right lateral neck with flesh colored papule with + wobble sign  - Waxy stuck on tan to brown papules on the trunk and extremities.   - Dome shaped bright red papules on the trunk and extremities.   - There are fine lines and dyspigmentation on sun exposed areas of the face and chest.  - Scattered brown macules on sun exposed areas.  - Light to dark brown symmetric macules and papules with normal pigment networks on dermoscopy on trunk/extremities  - Prior areas of skin cancer examined and no evidence of recurrence based on inspection or palpation  - No other lesions of concern on areas examined.     Medications:  Current Outpatient Medications   Medication    aspirin 81 MG tablet    calcium carb-cholecalciferol 600-500 MG-UNIT CAPS    carboxymethylcellulose PF (REFRESH PLUS) 0.5 % ophthalmic solution    Cholecalciferol (VITAMIN D3 PO)    lisinopril  (ZESTRIL) 5 MG tablet    Multiple Vitamins-Minerals (CENTRUM ULTRA WOMENS PO)    simvastatin (ZOCOR) 20 MG tablet    tamoxifen (NOLVADEX) 20 MG tablet    UNABLE TO FIND    UNABLE TO FIND    White Petrolatum-Mineral Oil (REFRESH P.M.) OINT     Current Facility-Administered Medications   Medication    lidocaine 1% with EPINEPHrine 1:100,000 injection 3 mL      Past Medical History:   Patient Active Problem List   Diagnosis    Juvenile glaucoma    Myopia of both eyes    Stress fracture of metatarsal bone, right, with routine healing, subsequent encounter    Osteoporosis    Malignant neoplasm of upper-outer quadrant of right breast in female, estrogen receptor positive (H)    Conjugated hyperbilirubinemia    Primary hypertension     Past Medical History:   Diagnosis Date    Acute cholecystitis 4/16/2016    Basal cell carcinoma     Breast cancer (H)     Glaucoma      POAG ADV    High cholesterol     History of vertebral compression fracture     Hypertension     Osteoporosis     Other chronic pain     PONV (postoperative nausea and vomiting)     many years ago.    Rheumatoid osteoperiostitis (H)     Skin cancer, basal cell     Tear film insufficiency, unspecified        CC Referred Self, MD  No address on file on close of this encounter.

## 2023-08-17 ENCOUNTER — OFFICE VISIT (OUTPATIENT)
Dept: FAMILY MEDICINE | Facility: CLINIC | Age: 78
End: 2023-08-17
Payer: COMMERCIAL

## 2023-08-17 VITALS
OXYGEN SATURATION: 98 % | WEIGHT: 158 LBS | BODY MASS INDEX: 26.98 KG/M2 | HEIGHT: 64 IN | HEART RATE: 61 BPM | DIASTOLIC BLOOD PRESSURE: 74 MMHG | RESPIRATION RATE: 18 BRPM | SYSTOLIC BLOOD PRESSURE: 138 MMHG | TEMPERATURE: 97.9 F

## 2023-08-17 DIAGNOSIS — I10 PRIMARY HYPERTENSION: Primary | ICD-10-CM

## 2023-08-17 DIAGNOSIS — R42 DIZZINESS: ICD-10-CM

## 2023-08-17 DIAGNOSIS — Z82.49 FH: CAD (CORONARY ARTERY DISEASE): ICD-10-CM

## 2023-08-17 PROBLEM — C44.90 MALIGNANT NEOPLASM OF SKIN: Status: ACTIVE | Noted: 2023-08-17

## 2023-08-17 PROBLEM — Z85.3 HISTORY OF BREAST CANCER: Status: ACTIVE | Noted: 2023-08-17

## 2023-08-17 PROBLEM — E78.2 MIXED HYPERLIPIDEMIA: Status: ACTIVE | Noted: 2023-08-17

## 2023-08-17 PROBLEM — M84.374D: Status: RESOLVED | Noted: 2017-02-10 | Resolved: 2023-08-17

## 2023-08-17 PROBLEM — E55.9 VITAMIN D DEFICIENCY: Status: ACTIVE | Noted: 2023-08-17

## 2023-08-17 PROBLEM — J31.0 CHRONIC RHINITIS: Status: ACTIVE | Noted: 2023-08-17

## 2023-08-17 PROBLEM — Z90.49 S/P CHOLECYSTECTOMY: Status: ACTIVE | Noted: 2023-08-17

## 2023-08-17 PROCEDURE — 99213 OFFICE O/P EST LOW 20 MIN: CPT | Performed by: NURSE PRACTITIONER

## 2023-08-17 ASSESSMENT — PAIN SCALES - GENERAL: PAINLEVEL: NO PAIN (0)

## 2023-08-17 NOTE — PROGRESS NOTES
"  Yoel Mccullough is a 78 year old, presenting for the following health issues:  Establish Care and Recheck Medication        8/17/2023     4:16 PM   Additional Questions   Roomed by Suzy GR CMA       History of Present Illness       Hyperlipidemia:  She presents for follow up of hyperlipidemia.   She is taking medication to lower cholesterol. She is not having myalgia or other side effects to statin medications.    Hypertension: She presents for follow up of hypertension.  She does check blood pressure  regularly outside of the clinic. Outpatient blood pressures have not been over 140/90. She does not follow a low salt diet.     Hypothyroidism:     Since last visit, patient describes the following symptoms::  Dry skin and Hair loss    She eats 2-3 servings of fruits and vegetables daily.She consumes 0 sweetened beverage(s) daily.She exercises with enough effort to increase her heart rate 9 or less minutes per day.  She exercises with enough effort to increase her heart rate 3 or less days per week.   She is taking medications regularly.       Review of Systems   Constitutional, HEENT, cardiovascular, pulmonary, gi and gu systems are negative, except as otherwise noted.  In March had an episode of dizziness and shortness of breath while shoveling snow.  No further episodes but has been avoiding strenuous activity since then.  Strong FH CAD.       Objective    /74 (BP Location: Left arm, Patient Position: Sitting, Cuff Size: Adult Regular)   Pulse 61   Temp 97.9  F (36.6  C) (Oral)   Resp 18   Ht 1.632 m (5' 4.25\")   Wt 71.7 kg (158 lb)   LMP 08/17/1997 (Exact Date)   SpO2 98%   Breastfeeding No   BMI 26.91 kg/m    Body mass index is 26.91 kg/m .  Physical Exam   GENERAL: healthy, alert and no distress  RESP: lungs clear to auscultation - no rales, rhonchi or wheezes  CV: regular rate and rhythm, normal S1 S2, no S3 or S4, no murmur, click or rub, no peripheral edema and peripheral pulses strong  MS: " no gross musculoskeletal defects noted, no edema  PSYCH: mentation appears normal, affect normal/bright  Stress test and echo from 2021 reviewed  .Last Comprehensive Metabolic Panel: 6/7/23:  Lab Results   Component Value Date     06/07/2023    POTASSIUM 4.3 06/07/2023    CHLORIDE 103 06/07/2023    CO2 27 06/07/2023    ANIONGAP 8 06/07/2023     (H) 06/07/2023    BUN 28.2 (H) 06/07/2023    CR 1.12 (H) 06/07/2023    GFRESTIMATED 50 (L) 06/07/2023    NEIL 9.5 06/07/2023        Lab Results   Component Value Date    WBC 6.5 06/07/2023     Lab Results   Component Value Date    RBC 4.64 06/07/2023     Lab Results   Component Value Date    HGB 14.2 06/07/2023    HGB 16.7 03/26/2020     Lab Results   Component Value Date    HCT 42.7 06/07/2023     No components found for: MCT  Lab Results   Component Value Date    MCV 92 06/07/2023     Lab Results   Component Value Date    MCH 30.6 06/07/2023     Lab Results   Component Value Date    MCHC 33.3 06/07/2023     Lab Results   Component Value Date    RDW 12.3 06/07/2023     Lab Results   Component Value Date     06/07/2023       A/P:  (I10) Primary hypertension  (primary encounter diagnosis)  Comment: Stable  Plan: Continue lisinopril    (R42) Dizziness  Comment:  Plan: Adult Cardiology Eval  Referral          (Z82.49) FH: CAD (coronary artery disease)  Comment:   Plan: Adult Cardiology Eval  Referral

## 2023-09-07 ENCOUNTER — TELEPHONE (OUTPATIENT)
Dept: FAMILY MEDICINE | Facility: CLINIC | Age: 78
End: 2023-09-07

## 2023-09-07 NOTE — TELEPHONE ENCOUNTER
Forms Request  Name of form/paperwork: esdras varma   Have you been seen for this request:   Do we have the form: on Sadia's desk   When is form needed by: when done  How would you like the form returned: mail call patient when mailing out ok to leave message.  Patient Notified form requests are processed in 3-5 business days: yes    Okay to leave a detailed message?

## 2023-09-08 NOTE — TELEPHONE ENCOUNTER
Paperwork mailed and copy sent to be scanned.    Copies made for onsite clinic records? YES  (If Yes, place in provider gray hanging bin)    Copy needed for patient? NO  Patient notified this has been done. Dedra Parada CMA on 9/8/2023 at 10:02 AM

## 2023-11-07 ENCOUNTER — OFFICE VISIT (OUTPATIENT)
Dept: OPHTHALMOLOGY | Facility: CLINIC | Age: 78
End: 2023-11-07
Attending: OPHTHALMOLOGY
Payer: COMMERCIAL

## 2023-11-07 ENCOUNTER — OFFICE VISIT (OUTPATIENT)
Dept: CARDIOLOGY | Facility: CLINIC | Age: 78
End: 2023-11-07
Attending: NURSE PRACTITIONER
Payer: COMMERCIAL

## 2023-11-07 VITALS
HEART RATE: 68 BPM | RESPIRATION RATE: 16 BRPM | DIASTOLIC BLOOD PRESSURE: 70 MMHG | OXYGEN SATURATION: 98 % | SYSTOLIC BLOOD PRESSURE: 150 MMHG | BODY MASS INDEX: 26.67 KG/M2 | WEIGHT: 160.1 LBS | HEIGHT: 65 IN

## 2023-11-07 DIAGNOSIS — I10 PRIMARY HYPERTENSION: ICD-10-CM

## 2023-11-07 DIAGNOSIS — E78.2 MIXED HYPERLIPIDEMIA: ICD-10-CM

## 2023-11-07 DIAGNOSIS — Q15.0 JUVENILE GLAUCOMA: Primary | ICD-10-CM

## 2023-11-07 DIAGNOSIS — Z17.0 MALIGNANT NEOPLASM OF UPPER-OUTER QUADRANT OF RIGHT BREAST IN FEMALE, ESTROGEN RECEPTOR POSITIVE (H): ICD-10-CM

## 2023-11-07 DIAGNOSIS — Z82.49 FH: CAD (CORONARY ARTERY DISEASE): ICD-10-CM

## 2023-11-07 DIAGNOSIS — R42 DIZZINESS: Primary | ICD-10-CM

## 2023-11-07 DIAGNOSIS — C50.411 MALIGNANT NEOPLASM OF UPPER-OUTER QUADRANT OF RIGHT BREAST IN FEMALE, ESTROGEN RECEPTOR POSITIVE (H): ICD-10-CM

## 2023-11-07 PROCEDURE — 92012 INTRM OPH EXAM EST PATIENT: CPT | Performed by: OPHTHALMOLOGY

## 2023-11-07 PROCEDURE — 92015 DETERMINE REFRACTIVE STATE: CPT

## 2023-11-07 PROCEDURE — 99204 OFFICE O/P NEW MOD 45 MIN: CPT | Performed by: INTERNAL MEDICINE

## 2023-11-07 PROCEDURE — G0463 HOSPITAL OUTPT CLINIC VISIT: HCPCS | Performed by: OPHTHALMOLOGY

## 2023-11-07 PROCEDURE — 92083 EXTENDED VISUAL FIELD XM: CPT | Performed by: OPHTHALMOLOGY

## 2023-11-07 ASSESSMENT — VISUAL ACUITY
OD_CC: 20/60
METHOD: SNELLEN - LINEAR
OS_CC: 20/300
CORRECTION_TYPE: GLASSES

## 2023-11-07 ASSESSMENT — REFRACTION_MANIFEST
OS_SPHERE: -2.00
OD_AXIS: 120
OD_CYLINDER: +1.25
OS_CYLINDER: +1.00
OS_AXIS: 070
OD_SPHERE: -4.00

## 2023-11-07 ASSESSMENT — REFRACTION_WEARINGRX
OS_CYLINDER: SPHERE
OD_AXIS: 120
OD_ADD: +2.75
OS_ADD: +2.75
OD_SPHERE: -4.50
OD_CYLINDER: +1.25
OS_SPHERE: -1.00

## 2023-11-07 ASSESSMENT — TONOMETRY
IOP_METHOD: TONOPEN
OS_IOP_MMHG: 03
OD_IOP_MMHG: 05
IOP_METHOD: ICARE

## 2023-11-07 ASSESSMENT — EXTERNAL EXAM - LEFT EYE: OS_EXAM: NORMAL

## 2023-11-07 ASSESSMENT — EXTERNAL EXAM - RIGHT EYE: OD_EXAM: NORMAL

## 2023-11-07 NOTE — PATIENT INSTRUCTIONS
Ms. Sadia Rider,  It certainly was nice to meet you today.  Per our conversation you had some fatigue after shoveling.  This could represent heart issues and the reason I am checking the stress test of the heart and the heart monitor.  We will call you the results of these tests.    In the interim try doubling the LISINOPRIL to 10 mg.  If issues call [phone].  George Beltran

## 2023-11-07 NOTE — PROGRESS NOTES
Chief Complaint/Presenting Concern: glaucoma follow up    History of Present Illness:   Sadia Rider is a 75 year old patient who presents for glaucoma follow up, history of trab both eyes.  here for 4 month glaucoma follow up with IOP check.    Relevant Past Medical/Family/Social History: None relevant     Relevant Review of Systems: None relevant       Diagnosis: Juvenile Open Angle Glaucoma each eye   Previous glaucoma surgery/laser:  s/p ALT OU in 2009  s/p Trab OS in 2004 by Dr. Mathis  s/p Trab OD in 1990 by Dr. Muñoz  s/p Trab OU at 29 yo  Currently Meds: None   Family history: positive daughter, mother, brother, great-grandma -- all on gtts, surgery and mother lost vision   Meds AEs/intolerance: AGN - contact dermatitis     PMHx: No history of Asthma and respiratory problems/Cardiac/Renal/Kidney stones/Sulfa Allergy  Previous testing:  Visual field 11/23/2020:  Right eye - inferior nasal step and paracentral defect, slightly more depressed at point but also less depressed at other points, likely fluctuations, reliable  Left eye - inferior nasal/arcuate defect, central defect  Fluctuations, reliable  Visual field February 25, 2021:  Right eye - inferior nasal step and paracentral defect, stable, reliable  OCT Optic Nerve RNFL Spectralis 11/23/2020  right eye: superior and inferior RNFL thinning, stable   left eye: superior and inferior RNFL thinning, possible worsening of IN thinning likely fluctuating   OCT Macula today 7/26/22  - right eye: normal foveal contour no intraretinal or subretinal fluid  - left eye: irregular appearing retina, stable, no subretinal or intraretinal fluid, appears stable    Today's testing:  IOP today: 5/2 mmHg     Additional Ocular History:    2. 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 Periorbital dermatitis, bilateral  - Could consider  dermatology referral  - Patient has not noticed but may be 2/2 poor vision each eye     Plan/Recommendations:  Discussed findings with patient.  Doing well, stable with no evidence of hypotony signs of exam.   Careful precautions given to patient to watch out for any signs and symptoms of bleb leak/infection. Advised to avoid eye rubbing and lake water.     RTC in 5 months [facility], IOP    Physician Attestation     Attending Physician Attestation:  Complete documentation of historical and exam elements from today's encounter can be found in the full encounter summary report (not reduplicated in this progress note). I personally obtained the chief complaint(s) and history of present illness. I confirmed and edited as necessary the review of systems, past medical/surgical history, family history, social history, and examination findings as documented by others; and I examined the patient myself. I personally reviewed the relevant tests, images, and reports as documented above. I personally reviewed the ophthalmic test(s) associated with this encounter. I formulated and edited as necessary the assessment and plan and discussed the findings and management plan with the patient and any family members present at the time of the visit.  Ramon Morin M.D., Glaucoma, November 7, 2023

## 2023-11-07 NOTE — LETTER
11/7/2023    Sadia Myriamasia, CNP  [address]  [place] [address]    RE: Sadia Rider       Dear Colleague,     I had the pleasure of seeing Sadia Rider in the [facility].    [facility] Heart Care Office Consult     Assessment:   (R42) Dizziness  (primary encounter diagnosis)  Comment: Based on history sounds orthostatic, worse sitting up, better laying down.  There is no chest pain or shortness of breath, in addition has a normal coronary CTA in 2013 as well as normal stress test in 2011 as well as 2020 and normal echo in 2021.  However, she is somewhat concerned especially with a questionable family history and will arrange a exercise treadmill nuclear.  If this does suggest significant abnormalities with the need invasive angiography.  Lastly, does not sound arrhythmogenic but will obtain 7-day event monitor make sure she does not have any sort of arrhythmias.    (Z82.49) FH: CAD (coronary artery disease)  Comment: Apparently father and mother had issues, however there is no autopsy is done.  We will assume that there was significant coronary arteries in both parents and need to be very aggressive with risk factors.    (E78.2) Mixed hyperlipidemia  Comment: On simvastatin with total cholesterol 153 and LDL of 78 which is acceptable.    (I10) Primary hypertension  Comment: Blood pressure elevated, lisinopril 5 mg is relatively low-dose, will increase to 10 mg, warned her of lightheadedness.    (C50.411,  Z17.0) Malignant neoplasm of upper-outer quadrant of right breast in female, estrogen receptor positive (H)  Comment: So noted and on tamoxifen.     Plan:   1.  7-day event monitor to rule out arrhythmias.  2.  Repeat exercise treadmill and address if abnormal.  3.  Follow-up only as needed based on above.  4.  Increase lisinopril up to 10 mg.  If very light headed or dizzy may need to go back down.    History of Present Illness:   Thank you for asking the [facility]  Heart Care team to see Sadia Rider a 78 year old  female  in consultation  to evaluate lightheadedness.   Patient has had extensive cardiac evaluation in the past, she cannot really remember having any chest pains or shortness of breath but apparently had a normal stress nuclear around 2011, normal CTA 2013, normal stress test 2020 with a normal echo in 2021.  She tells was the 2021 echo was due to the fact that she was worried about calcification and hardening of her arteries.  She was in her usual state of health living independently alone shoveling snow in March or 2023 when she got done came inside and also became extremely fatigued and lightheaded.  She states she had abdominal discomfort and lie down and it made it somewhat better but every time she got up it made it worse.  She states she was able to fall asleep in the next day was somewhat still fatigued but able to go to take care of her taxes.  She then after that came home and lie down again and things got better but when she sat up it eventually got worse.  She was able to function thereafter doing yard work and working out without any issues and mentioned this to her primary physician in August and she is sent to the rapid access clinic.  She is physically active without any effort related syncope, presyncope, angina or shortness of breath.    Past Medical History:     Past Medical History:   Diagnosis Date    Acute cholecystitis 4/16/2016    Breast cancer (H)     Glaucoma      POAG ADV    High cholesterol     History of vertebral compression fracture     Hypertension     Osteoporosis     Other chronic pain     PONV (postoperative nausea and vomiting)     many years ago.    Rheumatoid osteoperiostitis (H)     Skin cancer, basal cell     Tear film insufficiency, unspecified      Past history is negative for tuberculosis, diabetes mellitus, myocardial infarction,  rheumatic fever, cerebrovascular accident, chronic kidney disease, peptic ulcer disease,  chronic obstructive pulmonary disease, or thyroid disorder.    Past Surgical History:     Past Surgical History:   Procedure Laterality Date    ARGON LASER TRABECULOPLASTY (ALT) OD (RIGHT EYE)  5/1989    RE    BASAL CELL CARCINOMA EXCISION      x2, 2015 and 2017    CATARACT EXTRACTION, BILATERAL      CATARACT IOL, RT/LT  1998/2002    BE    CHOLECYSTECTOMY      metal staple    EYE SURGERY      x4 for glaucoma    GLAUCOMA SURGERY  1990s    Trab x 2 RE    LAPAROSCOPIC CHOLECYSTECTOMY N/A 4/17/2016    Procedure: CHOLECYSTECTOMY LAPAROSCOPIC;  Surgeon: Sumit Espino MD;  Location: [facility] OR;  Service:     MASTECTOMY SIMPLE, SENTINEL NODE, COMBINED Right 3/26/2020    Procedure: RIGHT Wire-Localized Segmental Mastectomy, RIGHT Axillary [place] Lymph Node Biopsy;  Surgeon: Grace Madison MD;  Location: [facility] OR    MOHS MICROGRAPHIC PROCEDURE      TRABECULECTOMY, MITOMYCIN FILTER, COMBINED  3/9/2014    LE    YAG CAPSULOTOMY OD (RIGHT EYE)  8/4/2008    RE    ZZC STOMACH SURGERY PROCEDURE UNLISTED       Family History:   Family history positive for some subtle heart problem in her father causing his death in the hospital from a blood clot, her mother had a heart attack while in a nursing home in her 90s.    Social History:   She is , lives alone independently alone, reports that she has never smoked. She has never been exposed to tobacco smoke. She has never used smokeless tobacco. She reports current alcohol use. She reports that she does not use drugs.     The primary care physician is Sadia Whitfield    Meds:   Scheduled Meds:  Current Outpatient Medications   Medication Sig Dispense Refill    aspirin 81 MG tablet Take 1 tablet by mouth daily.      calcium carb-cholecalciferol 600-500 MG-UNIT CAPS Take 1 capsule by mouth 3 times daily      carboxymethylcellulose PF (REFRESH PLUS) 0.5 % ophthalmic solution Place 1 drop into both eyes 4 times daily 70 each 11    Cholecalciferol (VITAMIN D3 PO) Take 2,000  "Units by mouth daily       lisinopril (ZESTRIL) 5 MG tablet Take 1 tablet (5 mg) by mouth daily 90 tablet 3    Multiple Vitamins-Minerals (CENTRUM ULTRA WOMENS PO) Take 1 tablet by mouth daily ( calcium: 300 mg and vitamin d: 1000 units)      simvastatin (ZOCOR) 20 MG tablet Take 1 tablet (20 mg) by mouth At Bedtime 90 tablet 3    tamoxifen (NOLVADEX) 20 MG tablet Take 1 tablet (20 mg) by mouth daily 90 tablet 3    UNABLE TO FIND daily MEDICATION NAME: Raw Brazil Nuts      UNABLE TO FIND daily MEDICATION NAME: Raw Sunflower Seeds         PRN Meds:.    Allergies:   Metoprolol, Alphagan [brimonidine tartrate], Brimonidine, and Brimonidine tartrate    Objective:      Physical Exam  72.6 kg (160 lb 1.6 oz)  1.651 m (5' 5\")  Body mass index is 26.64 kg/m .  BP (!) 150/70 (BP Location: Left arm, Patient Position: Sitting, Cuff Size: Adult Regular)   Pulse 68   Resp 16   Ht 1.651 m (5' 5\")   Wt 72.6 kg (160 lb 1.6 oz)   LMP 08/17/1997 (Exact Date)   SpO2 98%   BMI 26.64 kg/m      General Appearance:   Alert, cooperative and in no acute distress.   HEENT:  No scleral icterus; the mucous membranes were pink and moist.   Neck: JVP normal. No thyromegaly. No HJR   Chest: The spine was straight. The chest was symmetric.   Lungs:   Respirations unlabored; the lungs are clear to auscultation.   Cardiovascular:   S1 and S2 without murmur, clicks or rubs. Brachial, radial, carotid and posterior tibial pulses are intact and symetrical.  No carotid bruits noted   Abdomen:  No organomegaly, masses, bruits, or tenderness. Bowels sounds are present   Extremities: No cyanosis, clubbing, or edema.   Skin: No xanthelasma.   Neurologic: Mood and affect are appropriate.         Lab Reviewed Personally by myself  Lab Results   Component Value Date     06/07/2023    CO2 27 06/07/2023    CO2 26 06/23/2022    BUN 28.2 06/07/2023    BUN 23 06/23/2022     Lab Results   Component Value Date    WBC 6.5 06/07/2023    HGB 14.2 06/07/2023 " "   HGB 16.7 03/26/2020    HCT 42.7 06/07/2023    MCV 92 06/07/2023     06/07/2023     Lab Results   Component Value Date    CHOL 153 01/23/2023    CHOL 141 07/30/2020    TRIG 134 01/23/2023    TRIG 164 07/30/2020    HDL 48 01/23/2023    HDL 42 07/30/2020     No results found for: \"BNP\"    ECG personally reviewed by myself shows sinus bradycardia, low voltage, within normal limits.     Review of Systems:     Review of Systems:   Enc Vitals  BP: (!) 150/70  Pulse: 68  Resp: 16  SpO2: 98 %  Weight: 72.6 kg (160 lb 1.6 oz)  Height: 165.1 cm (5' 5\")                      Thank you for allowing me to participate in the care of your patient.      Sincerely,     TOMMIE HOUSTON MD     [facility]  cc:   Sadia Whitfield, CNP  [address] [place] [address]      "

## 2023-11-07 NOTE — NURSING NOTE
Chief Complaints and History of Present Illnesses   Patient presents with    Glaucoma Follow-Up     Chief Complaint(s) and History of Present Illness(es)       Glaucoma Follow-Up              Laterality: both eyes    Associated symptoms: Negative for flashes and floaters    Pain scale: 0/10              Comments    Glaucoma follow up.  The patient doesn't notice vision changes.  Darlene Leung, COA, COA 9:42 AM 11/07/2023

## 2023-11-07 NOTE — PROGRESS NOTES
[facility] Heart Care Office Consult     Assessment:   (R42) Dizziness  (primary encounter diagnosis)  Comment: Based on history sounds orthostatic, worse sitting up, better laying down.  There is no chest pain or shortness of breath, in addition has a normal coronary CTA in 2013 as well as normal stress test in 2011 as well as 2020 and normal echo in 2021.  However, she is somewhat concerned especially with a questionable family history and will arrange a exercise treadmill nuclear.  If this does suggest significant abnormalities with the need invasive angiography.  Lastly, does not sound arrhythmogenic but will obtain 7-day event monitor make sure she does not have any sort of arrhythmias.    (Z82.49) FH: CAD (coronary artery disease)  Comment: Apparently father and mother had issues, however there is no autopsy is done.  We will assume that there was significant coronary arteries in both parents and need to be very aggressive with risk factors.    (E78.2) Mixed hyperlipidemia  Comment: On simvastatin with total cholesterol 153 and LDL of 78 which is acceptable.    (I10) Primary hypertension  Comment: Blood pressure elevated, lisinopril 5 mg is relatively low-dose, will increase to 10 mg, warned her of lightheadedness.    (C50.411,  Z17.0) Malignant neoplasm of upper-outer quadrant of right breast in female, estrogen receptor positive (H)  Comment: So noted and on tamoxifen.     Plan:   1.  7-day event monitor to rule out arrhythmias.  2.  Repeat exercise treadmill and address if abnormal.  3.  Follow-up only as needed based on above.  4.  Increase lisinopril up to 10 mg.  If very light headed or dizzy may need to go back down.    History of Present Illness:   Thank you for asking the [facility] Heart Care team to see Sadia Rider a 78 year old  female  in consultation  to evaluate lightheadedness.   Patient has had extensive cardiac evaluation in the past, she cannot really remember having any chest  pains or shortness of breath but apparently had a normal stress nuclear around 2011, normal CTA 2013, normal stress test 2020 with a normal echo in 2021.  She tells was the 2021 echo was due to the fact that she was worried about calcification and hardening of her arteries.  She was in her usual state of health living independently alone shoveling snow in March or 2023 when she got done came inside and also became extremely fatigued and lightheaded.  She states she had abdominal discomfort and lie down and it made it somewhat better but every time she got up it made it worse.  She states she was able to fall asleep in the next day was somewhat still fatigued but able to go to take care of her taxes.  She then after that came home and lie down again and things got better but when she sat up it eventually got worse.  She was able to function thereafter doing yard work and working out without any issues and mentioned this to her primary physician in August and she is sent to the rapid access clinic.  She is physically active without any effort related syncope, presyncope, angina or shortness of breath.    Past Medical History:     Past Medical History:   Diagnosis Date    Acute cholecystitis 4/16/2016    Breast cancer (H)     Glaucoma      POAG ADV    High cholesterol     History of vertebral compression fracture     Hypertension     Osteoporosis     Other chronic pain     PONV (postoperative nausea and vomiting)     many years ago.    Rheumatoid osteoperiostitis (H)     Skin cancer, basal cell     Tear film insufficiency, unspecified      Past history is negative for tuberculosis, diabetes mellitus, myocardial infarction,  rheumatic fever, cerebrovascular accident, chronic kidney disease, peptic ulcer disease, chronic obstructive pulmonary disease, or thyroid disorder.    Past Surgical History:     Past Surgical History:   Procedure Laterality Date    ARGON LASER TRABECULOPLASTY (ALT) OD (RIGHT EYE)  5/1989    RE     BASAL CELL CARCINOMA EXCISION      x2, 2015 and 2017    CATARACT EXTRACTION, BILATERAL      CATARACT IOL, RT/LT  1998/2002    BE    CHOLECYSTECTOMY      metal staple    EYE SURGERY      x4 for glaucoma    GLAUCOMA SURGERY  1990s    Trab x 2 RE    LAPAROSCOPIC CHOLECYSTECTOMY N/A 4/17/2016    Procedure: CHOLECYSTECTOMY LAPAROSCOPIC;  Surgeon: Sumit Espino MD;  Location: [facility];  Service:     MASTECTOMY SIMPLE, SENTINEL NODE, COMBINED Right 3/26/2020    Procedure: RIGHT Wire-Localized Segmental Mastectomy, RIGHT Axillary Winkelman Lymph Node Biopsy;  Surgeon: Grace Madison MD;  Location: [facility] OR    MOHS MICROGRAPHIC PROCEDURE      TRABECULECTOMY, MITOMYCIN FILTER, COMBINED  3/9/2014    LE    YAG CAPSULOTOMY OD (RIGHT EYE)  8/4/2008    RE    ZZC STOMACH SURGERY PROCEDURE UNLISTED       Family History:   Family history positive for some subtle heart problem in her father causing his death in the hospital from a blood clot, her mother had a heart attack while in a nursing home in her 90s.    Social History:   She is , lives alone independently alone, reports that she has never smoked. She has never been exposed to tobacco smoke. She has never used smokeless tobacco. She reports current alcohol use. She reports that she does not use drugs.     The primary care physician is Sadia Whitfield    Meds:   Scheduled Meds:  Current Outpatient Medications   Medication Sig Dispense Refill    aspirin 81 MG tablet Take 1 tablet by mouth daily.      calcium carb-cholecalciferol 600-500 MG-UNIT CAPS Take 1 capsule by mouth 3 times daily      carboxymethylcellulose PF (REFRESH PLUS) 0.5 % ophthalmic solution Place 1 drop into both eyes 4 times daily 70 each 11    Cholecalciferol (VITAMIN D3 PO) Take 2,000 Units by mouth daily       lisinopril (ZESTRIL) 5 MG tablet Take 1 tablet (5 mg) by mouth daily 90 tablet 3    Multiple Vitamins-Minerals (CENTRUM ULTRA WOMENS PO) Take 1 tablet by mouth daily ( calcium: 300  "mg and vitamin d: 1000 units)      simvastatin (ZOCOR) 20 MG tablet Take 1 tablet (20 mg) by mouth At Bedtime 90 tablet 3    tamoxifen (NOLVADEX) 20 MG tablet Take 1 tablet (20 mg) by mouth daily 90 tablet 3    UNABLE TO FIND daily MEDICATION NAME: Raw Brazil Nuts      UNABLE TO FIND daily MEDICATION NAME: Raw Sunflower Seeds         PRN Meds:.    Allergies:   Metoprolol, Alphagan [brimonidine tartrate], Brimonidine, and Brimonidine tartrate    Objective:      Physical Exam  72.6 kg (160 lb 1.6 oz)  1.651 m (5' 5\")  Body mass index is 26.64 kg/m .  BP (!) 150/70 (BP Location: Left arm, Patient Position: Sitting, Cuff Size: Adult Regular)   Pulse 68   Resp 16   Ht 1.651 m (5' 5\")   Wt 72.6 kg (160 lb 1.6 oz)   LMP 08/17/1997 (Exact Date)   SpO2 98%   BMI 26.64 kg/m      General Appearance:   Alert, cooperative and in no acute distress.   HEENT:  No scleral icterus; the mucous membranes were pink and moist.   Neck: JVP normal. No thyromegaly. No HJR   Chest: The spine was straight. The chest was symmetric.   Lungs:   Respirations unlabored; the lungs are clear to auscultation.   Cardiovascular:   S1 and S2 without murmur, clicks or rubs. Brachial, radial, carotid and posterior tibial pulses are intact and symetrical.  No carotid bruits noted   Abdomen:  No organomegaly, masses, bruits, or tenderness. Bowels sounds are present   Extremities: No cyanosis, clubbing, or edema.   Skin: No xanthelasma.   Neurologic: Mood and affect are appropriate.         Lab Reviewed Personally by myself  Lab Results   Component Value Date     06/07/2023    CO2 27 06/07/2023    CO2 26 06/23/2022    BUN 28.2 06/07/2023    BUN 23 06/23/2022     Lab Results   Component Value Date    WBC 6.5 06/07/2023    HGB 14.2 06/07/2023    HGB 16.7 03/26/2020    HCT 42.7 06/07/2023    MCV 92 06/07/2023     06/07/2023     Lab Results   Component Value Date    CHOL 153 01/23/2023    CHOL 141 07/30/2020    TRIG 134 01/23/2023    TRIG 164 " "07/30/2020    HDL 48 01/23/2023    HDL 42 07/30/2020     No results found for: \"BNP\"    ECG personally reviewed by myself shows sinus bradycardia, low voltage, within normal limits.     Review of Systems:     Review of Systems:   Enc Vitals  BP: (!) 150/70  Pulse: 68  Resp: 16  SpO2: 98 %  Weight: 72.6 kg (160 lb 1.6 oz)  Height: 165.1 cm (5' 5\")                                          "

## 2023-11-13 DIAGNOSIS — Z17.0 MALIGNANT NEOPLASM OF UPPER-OUTER QUADRANT OF RIGHT BREAST IN FEMALE, ESTROGEN RECEPTOR POSITIVE (H): ICD-10-CM

## 2023-11-13 DIAGNOSIS — C50.411 MALIGNANT NEOPLASM OF UPPER-OUTER QUADRANT OF RIGHT BREAST IN FEMALE, ESTROGEN RECEPTOR POSITIVE (H): ICD-10-CM

## 2023-11-13 RX ORDER — TAMOXIFEN CITRATE 20 MG/1
20 TABLET ORAL DAILY
Qty: 90 TABLET | Refills: 3 | Status: SHIPPED | OUTPATIENT
Start: 2023-11-13

## 2023-11-13 NOTE — TELEPHONE ENCOUNTER
Pending Prescriptions:                       Disp   Refills    tamoxifen (NOLVADEX) 20 MG tablet         90 tab*3            Sig: Take 1 tablet (20 mg) by mouth daily          Last Written Prescription Date:  12/27/22  Last Fill Quantity: 90,   # refills: 3  Last Office Visit: 6/7/23  Future Office visit:   1/2/24    Routing refill request to provider for review/approval.    Crystal Welsh, RN, BSN, OCN, CBCN  Nurse Care Coordinator  [facility]  P: [phone]     F: [phone]

## 2023-11-13 NOTE — TELEPHONE ENCOUNTER
Signed Prescriptions:                        Disp   Refills    tamoxifen (NOLVADEX) 20 MG tablet          90 tab*3        Sig: Take 1 tablet (20 mg) by mouth daily  Authorizing Provider: CUONG DOSHI, RN, BSN, OCN, CBCN  Nurse Care Coordinator  [facility]  P: [phone]     F: [phone]

## 2023-11-14 ENCOUNTER — TELEPHONE (OUTPATIENT)
Dept: CARDIOLOGY | Facility: CLINIC | Age: 78
End: 2023-11-14

## 2023-11-14 DIAGNOSIS — I10 ESSENTIAL HYPERTENSION: ICD-10-CM

## 2023-11-14 RX ORDER — LISINOPRIL 5 MG/1
5 TABLET ORAL DAILY
Qty: 90 TABLET | Refills: 3 | OUTPATIENT
Start: 2023-11-14

## 2023-11-14 RX ORDER — LISINOPRIL 10 MG/1
10 TABLET ORAL DAILY
Qty: 90 TABLET | Refills: 3 | Status: SHIPPED | OUTPATIENT
Start: 2023-11-14

## 2023-11-14 NOTE — TELEPHONE ENCOUNTER
See telephone encounter dated 11/7/23:     Plan:   1.  7-day event monitor to rule out arrhythmias.  2.  Repeat exercise treadmill and address if abnormal.  3.  Follow-up only as needed based on above.  4.  Increase lisinopril up to 10 mg.  If very light headed or dizzy may need to go back down.           ==LVM that refill was granted. Direct line provided for call back should this be needed. -[facility]

## 2023-11-14 NOTE — TELEPHONE ENCOUNTER
Health Call Center    Phone Message    May a detailed message be left on voicemail: yes     Reason for Call: Medication Refill Request    Has the patient contacted the pharmacy for the refill? Yes   Name of medication being requested: lisinopril (ZESTRIL) 5 MG tablet   Provider who prescribed the medication: Dr. Gutierrez/ Devin increased dose  Pharmacy: [facility] [id number] [address]    Date medication is needed: 11/14/2023       Dr. Beltran increased her dose to 10MG. Please send a new prescription to pharmacy. Thank you    Action Taken: Message routed to:  Other: Cardiology    Travel Screening: Not Applicable      Thank you!  [facility]

## 2023-11-17 ENCOUNTER — HOSPITAL ENCOUNTER (OUTPATIENT)
Dept: NUCLEAR MEDICINE | Facility: CLINIC | Age: 78
Discharge: HOME | End: 2023-11-17
Attending: INTERNAL MEDICINE
Payer: COMMERCIAL

## 2023-11-17 ENCOUNTER — HOSPITAL ENCOUNTER (OUTPATIENT)
Dept: CARDIOLOGY | Facility: CLINIC | Age: 78
Discharge: HOME | End: 2023-11-17
Attending: INTERNAL MEDICINE
Payer: COMMERCIAL

## 2023-11-17 DIAGNOSIS — R42 DIZZINESS: ICD-10-CM

## 2023-11-17 DIAGNOSIS — Z82.49 FH: CAD (CORONARY ARTERY DISEASE): ICD-10-CM

## 2023-11-17 LAB
NUC STRESS EJECTION FRACTION: 71 %
STRESS ECHO POST ESTIMATED WORKLOAD: 7.9 METS
STRESS ECHO POST EXERCISE DUR MIN: 6 MIN
STRESS ECHO POST EXERCISE DUR SEC: 30 SEC
STRESS ECHO TARGET HR: 142

## 2023-11-17 PROCEDURE — 343N000001 HC RX 343: Performed by: INTERNAL MEDICINE

## 2023-11-17 PROCEDURE — 93018 CV STRESS TEST I&R ONLY: CPT | Performed by: INTERNAL MEDICINE

## 2023-11-17 PROCEDURE — A9500 TC99M SESTAMIBI: HCPCS | Performed by: INTERNAL MEDICINE

## 2023-11-17 PROCEDURE — 78452 HT MUSCLE IMAGE SPECT MULT: CPT | Mod: 26 | Performed by: INTERNAL MEDICINE

## 2023-11-17 PROCEDURE — 78452 HT MUSCLE IMAGE SPECT MULT: CPT

## 2023-11-17 PROCEDURE — 93270 REMOTE 30 DAY ECG REV/REPORT: CPT

## 2023-11-17 PROCEDURE — 93016 CV STRESS TEST SUPVJ ONLY: CPT | Performed by: INTERNAL MEDICINE

## 2023-11-17 PROCEDURE — 93017 CV STRESS TEST TRACING ONLY: CPT

## 2023-11-17 RX ADMIN — Medication 7.9 MILLICURIE: at 07:55

## 2023-11-17 RX ADMIN — Medication 33 MILLICURIE: at 09:10

## 2023-11-17 NOTE — PROGRESS NOTES
Onset in March denies chest pain, fatigue and malaise for over 24 hours.  Had positional dizziness.  All sx resolved and not occurred again since that time.  Denies DAVID or increase fatigue since the episode in March.  Dedra Arias RN

## 2023-11-27 PROCEDURE — 93272 ECG/REVIEW INTERPRET ONLY: CPT | Performed by: INTERNAL MEDICINE

## 2024-01-02 ENCOUNTER — LAB (OUTPATIENT)
Dept: ONCOLOGY | Facility: CLINIC | Age: 79
End: 2024-01-02
Attending: INTERNAL MEDICINE
Payer: COMMERCIAL

## 2024-01-02 VITALS
HEART RATE: 72 BPM | BODY MASS INDEX: 26.98 KG/M2 | SYSTOLIC BLOOD PRESSURE: 147 MMHG | DIASTOLIC BLOOD PRESSURE: 76 MMHG | HEIGHT: 64 IN | OXYGEN SATURATION: 99 % | WEIGHT: 158 LBS | TEMPERATURE: 97.7 F | RESPIRATION RATE: 16 BRPM

## 2024-01-02 DIAGNOSIS — C50.411 MALIGNANT NEOPLASM OF UPPER-OUTER QUADRANT OF RIGHT BREAST IN FEMALE, ESTROGEN RECEPTOR POSITIVE (H): ICD-10-CM

## 2024-01-02 DIAGNOSIS — C50.411 MALIGNANT NEOPLASM OF UPPER-OUTER QUADRANT OF RIGHT BREAST IN FEMALE, ESTROGEN RECEPTOR POSITIVE (H): Primary | ICD-10-CM

## 2024-01-02 DIAGNOSIS — Z17.0 MALIGNANT NEOPLASM OF UPPER-OUTER QUADRANT OF RIGHT BREAST IN FEMALE, ESTROGEN RECEPTOR POSITIVE (H): ICD-10-CM

## 2024-01-02 DIAGNOSIS — Z17.0 MALIGNANT NEOPLASM OF UPPER-OUTER QUADRANT OF RIGHT BREAST IN FEMALE, ESTROGEN RECEPTOR POSITIVE (H): Primary | ICD-10-CM

## 2024-01-02 LAB
ALBUMIN SERPL BCG-MCNC: 4.3 G/DL (ref 3.5–5.2)
ALP SERPL-CCNC: 34 U/L (ref 40–150)
ALT SERPL W P-5'-P-CCNC: 18 U/L (ref 0–50)
ANION GAP SERPL CALCULATED.3IONS-SCNC: 7 MMOL/L (ref 7–15)
AST SERPL W P-5'-P-CCNC: 20 U/L (ref 0–45)
BILIRUB SERPL-MCNC: 0.3 MG/DL
BUN SERPL-MCNC: 21.4 MG/DL (ref 8–23)
CALCIUM SERPL-MCNC: 9.2 MG/DL (ref 8.8–10.2)
CHLORIDE SERPL-SCNC: 105 MMOL/L (ref 98–107)
CREAT SERPL-MCNC: 0.93 MG/DL (ref 0.51–0.95)
DEPRECATED HCO3 PLAS-SCNC: 28 MMOL/L (ref 22–29)
EGFRCR SERPLBLD CKD-EPI 2021: 63 ML/MIN/1.73M2
ERYTHROCYTE [DISTWIDTH] IN BLOOD BY AUTOMATED COUNT: 12.6 % (ref 10–15)
GLUCOSE SERPL-MCNC: 103 MG/DL (ref 70–99)
HCT VFR BLD AUTO: 43.1 % (ref 35–47)
HGB BLD-MCNC: 14.4 G/DL (ref 11.7–15.7)
MCH RBC QN AUTO: 31.2 PG (ref 26.5–33)
MCHC RBC AUTO-ENTMCNC: 33.4 G/DL (ref 31.5–36.5)
MCV RBC AUTO: 93 FL (ref 78–100)
PLATELET # BLD AUTO: 207 10E3/UL (ref 150–450)
POTASSIUM SERPL-SCNC: 4.6 MMOL/L (ref 3.4–5.3)
PROT SERPL-MCNC: 6.7 G/DL (ref 6.4–8.3)
RBC # BLD AUTO: 4.62 10E6/UL (ref 3.8–5.2)
SODIUM SERPL-SCNC: 140 MMOL/L (ref 135–145)
WBC # BLD AUTO: 6.5 10E3/UL (ref 4–11)

## 2024-01-02 PROCEDURE — 85027 COMPLETE CBC AUTOMATED: CPT | Performed by: INTERNAL MEDICINE

## 2024-01-02 PROCEDURE — 36415 COLL VENOUS BLD VENIPUNCTURE: CPT

## 2024-01-02 PROCEDURE — 99213 OFFICE O/P EST LOW 20 MIN: CPT | Performed by: INTERNAL MEDICINE

## 2024-01-02 PROCEDURE — 80053 COMPREHEN METABOLIC PANEL: CPT | Performed by: INTERNAL MEDICINE

## 2024-01-02 PROCEDURE — G0463 HOSPITAL OUTPT CLINIC VISIT: HCPCS | Performed by: INTERNAL MEDICINE

## 2024-01-02 ASSESSMENT — PAIN SCALES - GENERAL: PAINLEVEL: NO PAIN (0)

## 2024-01-02 NOTE — NURSING NOTE
"Oncology Rooming Note    January 2, 2024 2:24 PM   Sadia Rider is a 78 year old female who presents for:    Chief Complaint   Patient presents with    Oncology Clinic Visit     Malignant neoplasm of upper-outer quadrant of right breast in female, estrogen receptor positive      Initial Vitals: BP (!) 147/76 (Cuff Size: Adult Regular)   Pulse 72   Temp 97.7  F (36.5  C) (Oral)   Resp 16   Ht 1.632 m (5' 4.25\")   Wt 71.7 kg (158 lb)   LMP 08/17/1997 (Exact Date)   SpO2 99%   BMI 26.91 kg/m   Estimated body mass index is 26.91 kg/m  as calculated from the following:    Height as of this encounter: 1.632 m (5' 4.25\").    Weight as of this encounter: 71.7 kg (158 lb). Body surface area is 1.8 meters squared.  No Pain (0) Comment: Data Unavailable   Patient's last menstrual period was 08/17/1997 (exact date).  Allergies reviewed: Yes  Medications reviewed: Yes    Medications: Medication refills not needed today.  Pharmacy name entered into [employer]: [id number] [place] [facility] [address]    Frailty Screening:   Is the patient here for a new oncology consult visit in cancer care? 2. No      Clinical concerns: f/u       Cathy Fox CMA              "

## 2024-01-02 NOTE — LETTER
1/2/2024         RE: Sadia Rider  [address]  [place] [address]        Dear Colleague,    Thank you for referring your patient, Sadia Rider, to the [facility]. Please see a copy of my visit note below.    Sadia Rider is a 78-year-old patient who comes in today for interim followup.     CHIEF COMPLAINT:    1.  Right-sided infiltrating ductal carcinoma diagnosed in 2020, now coming up on 3 years out from her diagnosis in 02/2023, ER/RI positive, HER-2 receptor negative.  2.  Oncotype in the low risk of recurrence range.  3.  Osteopenia.     HISTORY OF PRESENTING COMPLAINT:  Sadia is a 77-year-old patient with an infiltrating ductal carcinoma in the upper outer quadrant of the right breast, ER/RI positive, HER-2 receptor negative.  She ended up having a right-sided lumpectomy and then had Oncotype sent out, which came back with a recurrence score of 13.  She started on anastrozole, and since 2020 she has actually had a drop in her bone density. We ended up stopping the anastrozole and switching to Tamoxifen     Sadia has no major complaints today she is tolerating the tamoxifen well.  She does get some hot flashes from it but other than that nothing sinister.    12 point comprehensive review of systems today is otherwise unremarkable.    Medications and allergies are outlined in the [facility] nursing records.      Social history:  Patient is single and is a non-smoker.    On physical exam:  She's a well appearing lady no acute distress vital signs are stable  Neck is no masses or lymph nodes  Chest clear to auscultation percussion bilaterally  Heart S1-2 normal no added sounds no murmurs  Breast exam: Right breast normal no palpable masses right axilla negative.  Left breast no palpable masses left axilla negative.  Gastrointestinal exam normal  Legs without tenderness or edema  Psychiatric exam normal      Data review:    I have reviewed her last mammogram that was  done in March 2023 and she has got another one scheduled for March 2024.  I have ordered routine blood work on her today to include a CMP and a CBC.  In the summer she had slightly elevated creatinine and BUN which look like a pattern of mild dehydration.  We will recheck those today.      Impression and plan:    78-year-old patient with history of right-sided breast cancer with an Oncotype score of 13.  To recap on her tumor it was an invasive ductal carcinoma overall grade 1 and measured 9 x 5 mm.  She had associated grade 2 DCIS and 2 lymph nodes were negative.  Stage of disease was a T1b N0, ER/MN strongly positive HER2 receptor negative.    She will continue on tamoxifen for a full 5 years.  She will be coming up on 4 years out from her diagnosis this spring.    I will be in touch with her with the results of the lab work.      Ezra Harper MD               Again, thank you for allowing me to participate in the care of your patient.        Sincerely,        Ezra Harper MD

## 2024-01-02 NOTE — PROGRESS NOTES
Medical Assistant Note:  Sadia Rider presents today for blood draw.    Patient seen by provider today: Yes: .   present during visit today: Not Applicable.    Concerns: No Concerns.    Procedure:  Lab draw site: right antecub, Needle type: butterfly, Gauge: 23.    Post Assessment:  Labs drawn without difficulty: Yes.    Discharge Plan:  Departure Mode: Ambulatory.    Face to Face Time: 10.    Chichi Berry, CMA

## 2024-01-02 NOTE — PROGRESS NOTES
Sadia Rider is a 78-year-old patient who comes in today for interim followup.     CHIEF COMPLAINT:    1.  Right-sided infiltrating ductal carcinoma diagnosed in 2020, now coming up on 3 years out from her diagnosis in 02/2023, ER/GA positive, HER-2 receptor negative.  2.  Oncotype in the low risk of recurrence range.  3.  Osteopenia.     HISTORY OF PRESENTING COMPLAINT:  Sadia is a 77-year-old patient with an infiltrating ductal carcinoma in the upper outer quadrant of the right breast, ER/GA positive, HER-2 receptor negative.  She ended up having a right-sided lumpectomy and then had Oncotype sent out, which came back with a recurrence score of 13.  She started on anastrozole, and since 2020 she has actually had a drop in her bone density. We ended up stopping the anastrozole and switching to Tamoxifen     Sadia has no major complaints today she is tolerating the tamoxifen well.  She does get some hot flashes from it but other than that nothing sinister.    12 point comprehensive review of systems today is otherwise unremarkable.    Medications and allergies are outlined in the [facility] nursing records.      Social history:  Patient is single and is a non-smoker.    On physical exam:  She's a well appearing lady no acute distress vital signs are stable  Neck is no masses or lymph nodes  Chest clear to auscultation percussion bilaterally  Heart S1-2 normal no added sounds no murmurs  Breast exam: Right breast normal no palpable masses right axilla negative.  Left breast no palpable masses left axilla negative.  Gastrointestinal exam normal  Legs without tenderness or edema  Psychiatric exam normal      Data review:    I have reviewed her last mammogram that was done in March 2023 and she has got another one scheduled for March 2024.  I have ordered routine blood work on her today to include a CMP and a CBC.  In the summer she had slightly elevated creatinine and BUN which look like a pattern of mild dehydration.  We  will recheck those today.      Impression and plan:    78-year-old patient with history of right-sided breast cancer with an Oncotype score of 13.  To recap on her tumor it was an invasive ductal carcinoma overall grade 1 and measured 9 x 5 mm.  She had associated grade 2 DCIS and 2 lymph nodes were negative.  Stage of disease was a T1b N0, ER/TX strongly positive HER2 receptor negative.    She will continue on tamoxifen for a full 5 years.  She will be coming up on 4 years out from her diagnosis this spring.    I will be in touch with her with the results of the lab work.      Ezra Harper MD

## 2024-01-18 ENCOUNTER — PATIENT OUTREACH (OUTPATIENT)
Dept: CARE COORDINATION | Facility: CLINIC | Age: 79
End: 2024-01-18
Payer: COMMERCIAL

## 2024-02-01 ENCOUNTER — PATIENT OUTREACH (OUTPATIENT)
Dept: CARE COORDINATION | Facility: CLINIC | Age: 79
End: 2024-02-01
Payer: COMMERCIAL

## 2024-02-21 ENCOUNTER — TELEPHONE (OUTPATIENT)
Dept: FAMILY MEDICINE | Facility: CLINIC | Age: 79
End: 2024-02-21
Payer: COMMERCIAL

## 2024-02-21 DIAGNOSIS — Z17.0 MALIGNANT NEOPLASM OF UPPER-OUTER QUADRANT OF RIGHT BREAST IN FEMALE, ESTROGEN RECEPTOR POSITIVE (H): Primary | ICD-10-CM

## 2024-02-21 DIAGNOSIS — R73.09 ELEVATED GLUCOSE: ICD-10-CM

## 2024-02-21 DIAGNOSIS — C50.411 MALIGNANT NEOPLASM OF UPPER-OUTER QUADRANT OF RIGHT BREAST IN FEMALE, ESTROGEN RECEPTOR POSITIVE (H): Primary | ICD-10-CM

## 2024-02-21 DIAGNOSIS — R53.83 OTHER FATIGUE: ICD-10-CM

## 2024-02-21 NOTE — TELEPHONE ENCOUNTER
Order/Referral Request    Who is requesting: PT    Orders being requested: lab test - creatinin, glucose, A1C, thyroid tests on 2-28    Reason service is needed/diagnosis: cancer    When are orders needed by: asap     Has this been discussed with Provider: No    Does patient have a preference on a Group/Provider/Facility? [facility]    Does patient have an appointment scheduled?: Yes: 2-28    Where to send orders: Place orders within Epic    [facility] or would you prefer to receive a phone call?:   No preference   Okay to leave a detailed message?: Yes at Cell number on file:    Telephone Information:   Mobile [phone]

## 2024-03-01 ENCOUNTER — LAB (OUTPATIENT)
Dept: LAB | Facility: CLINIC | Age: 79
End: 2024-03-01
Payer: COMMERCIAL

## 2024-03-01 DIAGNOSIS — E78.2 MIXED HYPERLIPIDEMIA: ICD-10-CM

## 2024-03-01 DIAGNOSIS — R73.09 ELEVATED GLUCOSE: ICD-10-CM

## 2024-03-01 DIAGNOSIS — Z17.0 MALIGNANT NEOPLASM OF UPPER-OUTER QUADRANT OF RIGHT BREAST IN FEMALE, ESTROGEN RECEPTOR POSITIVE (H): ICD-10-CM

## 2024-03-01 DIAGNOSIS — C50.411 MALIGNANT NEOPLASM OF UPPER-OUTER QUADRANT OF RIGHT BREAST IN FEMALE, ESTROGEN RECEPTOR POSITIVE (H): ICD-10-CM

## 2024-03-01 DIAGNOSIS — R53.83 OTHER FATIGUE: ICD-10-CM

## 2024-03-01 LAB — HBA1C MFR BLD: 5.3 % (ref 0–5.6)

## 2024-03-01 PROCEDURE — 83036 HEMOGLOBIN GLYCOSYLATED A1C: CPT

## 2024-03-01 PROCEDURE — 80061 LIPID PANEL: CPT

## 2024-03-01 PROCEDURE — 80048 BASIC METABOLIC PNL TOTAL CA: CPT

## 2024-03-01 PROCEDURE — 36415 COLL VENOUS BLD VENIPUNCTURE: CPT

## 2024-03-01 PROCEDURE — 84443 ASSAY THYROID STIM HORMONE: CPT

## 2024-03-03 LAB
ANION GAP SERPL CALCULATED.3IONS-SCNC: 10 MMOL/L (ref 7–15)
BUN SERPL-MCNC: 19.2 MG/DL (ref 8–23)
CALCIUM SERPL-MCNC: 9.6 MG/DL (ref 8.8–10.2)
CHLORIDE SERPL-SCNC: 104 MMOL/L (ref 98–107)
CHOLEST SERPL-MCNC: 134 MG/DL
CREAT SERPL-MCNC: 0.94 MG/DL (ref 0.51–0.95)
DEPRECATED HCO3 PLAS-SCNC: 26 MMOL/L (ref 22–29)
EGFRCR SERPLBLD CKD-EPI 2021: 62 ML/MIN/1.73M2
FASTING STATUS PATIENT QL REPORTED: YES
GLUCOSE SERPL-MCNC: 95 MG/DL (ref 70–99)
HDLC SERPL-MCNC: 46 MG/DL
LDLC SERPL CALC-MCNC: 55 MG/DL
NONHDLC SERPL-MCNC: 88 MG/DL
POTASSIUM SERPL-SCNC: 4.7 MMOL/L (ref 3.4–5.3)
SODIUM SERPL-SCNC: 140 MMOL/L (ref 135–145)
TRIGL SERPL-MCNC: 167 MG/DL
TSH SERPL DL<=0.005 MIU/L-ACNC: 4.04 UIU/ML (ref 0.3–4.2)

## 2024-03-19 ENCOUNTER — ANCILLARY PROCEDURE (OUTPATIENT)
Dept: MAMMOGRAPHY | Facility: CLINIC | Age: 79
End: 2024-03-19
Attending: NURSE PRACTITIONER
Payer: COMMERCIAL

## 2024-03-19 ENCOUNTER — OFFICE VISIT (OUTPATIENT)
Dept: OPHTHALMOLOGY | Facility: CLINIC | Age: 79
End: 2024-03-19
Attending: OPHTHALMOLOGY
Payer: COMMERCIAL

## 2024-03-19 DIAGNOSIS — Z12.31 VISIT FOR SCREENING MAMMOGRAM: ICD-10-CM

## 2024-03-19 DIAGNOSIS — Q15.0 JUVENILE GLAUCOMA: Primary | ICD-10-CM

## 2024-03-19 PROCEDURE — 92012 INTRM OPH EXAM EST PATIENT: CPT | Performed by: OPHTHALMOLOGY

## 2024-03-19 PROCEDURE — G0463 HOSPITAL OUTPT CLINIC VISIT: HCPCS | Performed by: OPHTHALMOLOGY

## 2024-03-19 PROCEDURE — 77063 BREAST TOMOSYNTHESIS BI: CPT | Mod: GC | Performed by: STUDENT IN AN ORGANIZED HEALTH CARE EDUCATION/TRAINING PROGRAM

## 2024-03-19 PROCEDURE — 77067 SCR MAMMO BI INCL CAD: CPT | Mod: GC | Performed by: STUDENT IN AN ORGANIZED HEALTH CARE EDUCATION/TRAINING PROGRAM

## 2024-03-19 ASSESSMENT — VISUAL ACUITY
OS_CC: 20/250ECC
METHOD: SNELLEN - LINEAR
CORRECTION_TYPE: GLASSES
OD_CC+: -2
OD_CC: 20/60

## 2024-03-19 ASSESSMENT — CONF VISUAL FIELD
OS_SUPERIOR_NASAL_RESTRICTION: 2
OD_INFERIOR_TEMPORAL_RESTRICTION: 3
OS_INFERIOR_NASAL_RESTRICTION: 2
OS_INFERIOR_TEMPORAL_RESTRICTION: 2
METHOD: COUNTING FINGERS
OS_SUPERIOR_TEMPORAL_RESTRICTION: 2
OD_SUPERIOR_TEMPORAL_RESTRICTION: 3

## 2024-03-19 ASSESSMENT — TONOMETRY
OD_IOP_MMHG: 12
OS_IOP_MMHG: 4
IOP_METHOD: APPLANATION
OS_IOP_MMHG: 9
IOP_METHOD: TONOPEN
OD_IOP_MMHG: 7

## 2024-03-19 ASSESSMENT — EXTERNAL EXAM - LEFT EYE: OS_EXAM: NORMAL

## 2024-03-19 ASSESSMENT — REFRACTION_WEARINGRX
OD_ADD: +2.75
OD_AXIS: 120
OS_CYLINDER: SPHERE
OD_CYLINDER: +1.25
OS_ADD: +2.75
OD_SPHERE: -4.50
OS_SPHERE: -1.00

## 2024-03-19 ASSESSMENT — EXTERNAL EXAM - RIGHT EYE: OD_EXAM: NORMAL

## 2024-03-19 NOTE — NURSING NOTE
Chief Complaints and History of Present Illnesses   Patient presents with    Glaucoma Follow-Up     Juvenile glaucoma     Chief Complaint(s) and History of Present Illness(es)       Glaucoma Follow-Up              Associated symptoms: dryness, tearing, flashes and floaters.  Negative for glare, haloes and eye pain    Pain scale: 0/10    Comments: Juvenile glaucoma              Comments    Pt states vision is the same.  No pain.  No change to flashes/floaters.  AT's rarely.    ASHLEY Cr March 19, 2024 10:55 AM

## 2024-03-19 NOTE — PROGRESS NOTES
Chief Complaint/Presenting Concern: glaucoma follow up    History of Present Illness:   Sadia Rider is a 75 year old patient who presents for glaucoma follow up, history of trab both eyes.  here for 4 month glaucoma follow up with IOP check.    Relevant Past Medical/Family/Social History: None relevant     Relevant Review of Systems: None relevant       Diagnosis: Juvenile Open Angle Glaucoma each eye   Previous glaucoma surgery/laser:  s/p ALT OU in 2009  s/p Trab OS in 2004 by Dr. Mathis  s/p Trab OD in 1990 by Dr. Muñoz  s/p Trab OU at 27 yo  Currently Meds: None   Family history: positive daughter, mother, brother, great-grandma -- all on gtts, surgery and mother lost vision   Meds AEs/intolerance: AGN - contact dermatitis     PMHx: No history of Asthma and respiratory problems/Cardiac/Renal/Kidney stones/Sulfa Allergy  Previous testing:  Visual field 11/23/2020:  Right eye - inferior nasal step and paracentral defect, slightly more depressed at point but also less depressed at other points, likely fluctuations, reliable  Left eye - inferior nasal/arcuate defect, central defect  Fluctuations, reliable  Visual field February 25, 2021:  Right eye - inferior nasal step and paracentral defect, stable, reliable  OCT Optic Nerve RNFL Spectralis 11/23/2020  right eye: superior and inferior RNFL thinning, stable   left eye: superior and inferior RNFL thinning, possible worsening of IN thinning likely fluctuating   OCT Macula today 7/26/22  - right eye: normal foveal contour no intraretinal or subretinal fluid  - left eye: irregular appearing retina, stable, no subretinal or intraretinal fluid, appears stable    Today's testing:  IOP today: 7/4 mmHg     Additional Ocular History:    2. 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 Periorbital dermatitis, bilateral  - Could consider  dermatology referral  - Patient has not noticed but may be 2/2 poor vision each eye     Plan/Recommendations:  Discussed findings with patient.  Doing well, stable with no evidence of hypotony signs of exam.   Careful precautions given to patient to watch out for any signs and symptoms of bleb leak/infection. Advised to avoid eye rubbing and lake water.     RTC in 5 months [facility], IOP      Physician Attestation     Attending Physician Attestation:  Complete documentation of historical and exam elements from today's encounter can be found in the full encounter summary report (not reduplicated in this progress note). I personally obtained the chief complaint(s) and history of present illness. I confirmed and edited as necessary the review of systems, past medical/surgical history, family history, social history, and examination findings as documented by others; and I examined the patient myself. I personally reviewed the relevant tests, images, and reports as documented above. I formulated and edited as necessary the assessment and plan and discussed the findings and management plan with the patient and any family members present at the time of the visit.  Ramon Morin M.D., Glaucoma, March 19, 2024

## 2024-03-31 ENCOUNTER — HEALTH MAINTENANCE LETTER (OUTPATIENT)
Age: 79
End: 2024-03-31

## 2024-04-03 ENCOUNTER — OFFICE VISIT (OUTPATIENT)
Dept: FAMILY MEDICINE | Facility: CLINIC | Age: 79
End: 2024-04-03
Payer: COMMERCIAL

## 2024-04-03 VITALS
SYSTOLIC BLOOD PRESSURE: 139 MMHG | HEIGHT: 64 IN | RESPIRATION RATE: 16 BRPM | TEMPERATURE: 97.6 F | WEIGHT: 157 LBS | OXYGEN SATURATION: 98 % | DIASTOLIC BLOOD PRESSURE: 75 MMHG | HEART RATE: 73 BPM | BODY MASS INDEX: 26.8 KG/M2

## 2024-04-03 DIAGNOSIS — E78.2 MIXED HYPERLIPIDEMIA: ICD-10-CM

## 2024-04-03 DIAGNOSIS — I10 PRIMARY HYPERTENSION: Primary | ICD-10-CM

## 2024-04-03 PROCEDURE — 99214 OFFICE O/P EST MOD 30 MIN: CPT | Performed by: NURSE PRACTITIONER

## 2024-04-03 RX ORDER — RESPIRATORY SYNCYTIAL VIRUS VACCINE 120MCG/0.5
0.5 KIT INTRAMUSCULAR ONCE
Qty: 1 EACH | Refills: 0 | Status: CANCELLED | OUTPATIENT
Start: 2024-04-03 | End: 2024-04-03

## 2024-04-03 ASSESSMENT — PAIN SCALES - GENERAL: PAINLEVEL: NO PAIN (0)

## 2024-04-03 NOTE — PROGRESS NOTES
"  Yoel Mccullough is a 78 year old, presenting for the following health issues:  RECHECK (Follow up discuss results, )      4/3/2024     4:10 PM   Additional Questions   Roomed by priya lara cma   Accompanied by daughter     History of Present Illness       Reason for visit:  An office visit was recommended    She eats 2-3 servings of fruits and vegetables daily.She consumes 0 sweetened beverage(s) daily.She exercises with enough effort to increase her heart rate 30 to 60 minutes per day.  She exercises with enough effort to increase her heart rate 3 or less days per week.   She is taking medications regularly.       Hypertension Follow-up    Do you check your blood pressure regularly outside of the clinic? Yes home readings are in normal range  Are you following a low salt diet? Yes  Are your blood pressures ever more than 140 on the top number (systolic) OR more   than 90 on the bottom number (diastolic), for example 140/90? No      Review of Systems  Constitutional, neuro, ENT, endocrine, pulmonary, cardiac, gastrointestinal, genitourinary, musculoskeletal, integument and psychiatric systems are negative, except as otherwise noted.      Objective    /75   Pulse 73   Temp 97.6  F (36.4  C)   Resp 16   Ht 1.632 m (5' 4.25\")   Wt 71.2 kg (157 lb)   LMP 08/17/1997 (Exact Date)   SpO2 98%   BMI 26.74 kg/m    Body mass index is 26.74 kg/m .  Physical Exam   GENERAL: alert and no distress  RESP: lungs clear to auscultation - no rales, rhonchi or wheezes  CV: regular rate and rhythm, normal S1 S2, no S3 or S4, no murmur, click or rub, no peripheral edema   MS: no gross musculoskeletal defects noted, no edema  NEURO: Normal strength and tone, mentation intact and speech normal  PSYCH: mentation appears normal, affect normal/bright    Lab on 03/01/2024   Component Date Value Ref Range Status    Cholesterol 03/01/2024 134  <200 mg/dL Final    Triglycerides 03/01/2024 167 (H)  <150 mg/dL Final    Direct Measure " HDL 03/01/2024 46 (L)  >=50 mg/dL Final    LDL Cholesterol Calculated 03/01/2024 55  <=100 mg/dL Final    Non HDL Cholesterol 03/01/2024 88  <130 mg/dL Final    Patient Fasting > 8hrs? 03/01/2024 Yes   Final    Hemoglobin A1C 03/01/2024 5.3  0.0 - 5.6 % Final    Normal <5.7%   Prediabetes 5.7-6.4%    Diabetes 6.5% or higher     Note: Adopted from ADA consensus guidelines.    Sodium 03/01/2024 140  135 - 145 mmol/L Final    Reference intervals for this test were updated on 09/26/2023 to more accurately reflect our healthy population. There may be differences in the flagging of prior results with similar values performed with this method. Interpretation of those prior results can be made in the context of the updated reference intervals.     Potassium 03/01/2024 4.7  3.4 - 5.3 mmol/L Final    Chloride 03/01/2024 104  98 - 107 mmol/L Final    Carbon Dioxide (CO2) 03/01/2024 26  22 - 29 mmol/L Final    Anion Gap 03/01/2024 10  7 - 15 mmol/L Final    Urea Nitrogen 03/01/2024 19.2  8.0 - 23.0 mg/dL Final    Creatinine 03/01/2024 0.94  0.51 - 0.95 mg/dL Final    GFR Estimate 03/01/2024 62  >60 mL/min/1.73m2 Final    Calcium 03/01/2024 9.6  8.8 - 10.2 mg/dL Final    Glucose 03/01/2024 95  70 - 99 mg/dL Final    TSH 03/01/2024 4.04  0.30 - 4.20 uIU/mL Final       A/P:  1. Primary hypertension  Under good control    2. Mixed hyperlipidemia  Stable        Signed Electronically by: Sadia Whitfield, HARISH

## 2024-07-09 ENCOUNTER — ONCOLOGY VISIT (OUTPATIENT)
Dept: ONCOLOGY | Facility: CLINIC | Age: 79
End: 2024-07-09
Attending: INTERNAL MEDICINE
Payer: COMMERCIAL

## 2024-07-09 VITALS
HEART RATE: 60 BPM | BODY MASS INDEX: 26.98 KG/M2 | DIASTOLIC BLOOD PRESSURE: 70 MMHG | HEIGHT: 64 IN | RESPIRATION RATE: 16 BRPM | OXYGEN SATURATION: 98 % | WEIGHT: 158 LBS | TEMPERATURE: 97.4 F | SYSTOLIC BLOOD PRESSURE: 136 MMHG

## 2024-07-09 DIAGNOSIS — C50.411 MALIGNANT NEOPLASM OF UPPER-OUTER QUADRANT OF RIGHT BREAST IN FEMALE, ESTROGEN RECEPTOR POSITIVE (H): Primary | ICD-10-CM

## 2024-07-09 DIAGNOSIS — M81.0 AGE-RELATED OSTEOPOROSIS WITHOUT CURRENT PATHOLOGICAL FRACTURE: ICD-10-CM

## 2024-07-09 DIAGNOSIS — Z12.31 ENCOUNTER FOR SCREENING MAMMOGRAM FOR MALIGNANT NEOPLASM OF BREAST: ICD-10-CM

## 2024-07-09 DIAGNOSIS — Z17.0 MALIGNANT NEOPLASM OF UPPER-OUTER QUADRANT OF RIGHT BREAST IN FEMALE, ESTROGEN RECEPTOR POSITIVE (H): Primary | ICD-10-CM

## 2024-07-09 PROCEDURE — G0463 HOSPITAL OUTPT CLINIC VISIT: HCPCS | Performed by: INTERNAL MEDICINE

## 2024-07-09 PROCEDURE — 99214 OFFICE O/P EST MOD 30 MIN: CPT | Performed by: INTERNAL MEDICINE

## 2024-07-09 ASSESSMENT — PAIN SCALES - GENERAL: PAINLEVEL: NO PAIN (0)

## 2024-07-09 NOTE — NURSING NOTE
"Oncology Rooming Note    July 9, 2024 3:03 PM   Sadia Rider is a 79 year old female who presents for:    Chief Complaint   Patient presents with    Oncology Clinic Visit     Initial Vitals: /70 (Cuff Size: Adult Large)   Pulse 60   Temp 97.4  F (36.3  C) (Temporal)   Resp 16   Ht 1.632 m (5' 4.25\")   Wt 71.7 kg (158 lb)   LMP 08/17/1997 (Exact Date)   SpO2 98%   BMI 26.91 kg/m   Estimated body mass index is 26.91 kg/m  as calculated from the following:    Height as of this encounter: 1.632 m (5' 4.25\").    Weight as of this encounter: 71.7 kg (158 lb). Body surface area is 1.8 meters squared.  No Pain (0) Comment: Data Unavailable   Patient's last menstrual period was 08/17/1997 (exact date).  Allergies reviewed: Yes  Medications reviewed: Yes    Medications: Medication refills not needed today.  Pharmacy name entered into [facility]: [id number] [place] [id number] [address]    Frailty Screening:   Is the patient here for a new oncology consult visit in cancer care? 2. No      Clinical concerns: f/u       Cathy Fox CMA              "

## 2024-07-09 NOTE — LETTER
Patient:   JUAN HERNANDEZ            MRN: [id number]            FIN: [id number]               Age:   7 days     Sex:  MALE     :  18   Associated Diagnoses:   None   Author:   SD MERLOS         :  2018 02:46  AGE:  7 Days  Duration of Current Hospitalization:  7 Days   Gestational Age at Birth:  35 0/7 (Late prenatal care, so dates may not be accurate)  Corrected Gestational Age:  35 5/7         Discharge Measurements:   Head circumference: 32 cm  Length: 47 cm       Dosing Weight:   2.7 kg    2018 03:15  Most Recent Clinical Weight:   2.655  kg    2018 18:00    Previous Clinical Weight:   2.580  kg 2018 15:00  Changed:    up 68gm   Gained weight overnight    BIRTH HISTORY:     REASON FOR ADMISSION: prematurity    Birth weight: 2670 grams  Length at birth: 47 cm  Head circumference at birth: 30 cm    MATERNAL HISTORY     Maternal age: 24     5 Para 1    Ethnicity: Black, non-    CURRENT PREGNANCY:   Prenatal Care (Yes / No): Yes, late   Steroids (Yes / No): Yes, BMTZ  If yes, dates given: BMTZ x 1, approximately 11 hours prior to delivery     Magnesium Sulfate (Yes / No): No  Chorioamnionitis(Yes / No): No    Maternal HTN, Chronic or Pregnancy Induced (Yes / No): No  Maternal Diabetes (Yes / No): No  Congenital Infection - TORCH / Zika / Parvovirus (Yes / No): No  Mutiple Gestation (Yes / No): No       PRENATAL LABS:   Blood type 0 positive, antibody negative  HIV negative,   RPR Nonreactive  Rubella immune  Hep B negative  GBS  unknown, treated with ampicillin x 1 approximately 6 hours prior to delivery.    MEDICATIONS DURING PREGNANCY: PNV    MEDICATIONS DURING DELIVERY: Nifedipine     HISTORY   Membranes: SROM at 0240 on    Fluid: clear    Maternal history of depression and anxiety, not on any medications at this time.  Per SW work  mother was provioded prescription ofr prozac 5 weeks ago, unsure if she was taking during  7/9/2024      Sadia Rider  [address] [address]      Dear Colleague,    Thank you for referring your patient, Sadia Rider, to the [facility]. Please see a copy of my visit note below.    No notes on file    Again, thank you for allowing me to participate in the care of your patient.        Sincerely,        Ezra Harper MD   pregnancy per documentation however she has resumed taking prescription as early as  after seeing OB.       INFORMATION   MODE OF DELIVERY: Vaginal     RESUSCITATION:   NICU MD called to room STAT for precipitous delivery without OB in attendance. Mother was about to have an epidural placed when delivery progressed quickly. Infant cried at perineum. Cord clamping delayed approximately 30 seconds. Infant dried and stimulated under radiant warmer. Good tone, HR > 100 at all times, and good respiratory effort throughout evaluation. SpO2 normal for age. Infant transitioned well. Transferred to the NICU in room air.    DELIVERY ROOM RESUSCITATION:   Oxygen (Yes / No): No  Face Mask Vent (PPV) (Yes / No): No  Endotracheal Tube (Yes / No): No   CPAP (Yes / No): No    APGAR: 9 / 9      PHYSICAL EXAM:   General: No acute distress, well appearing, responds to stimuli appropriately  Eye: Normal conjunctiva  HENT: Normocephalic, Anterior fontanelle open/soft/flat  Neck: Supple  Respiratory: Lungs are clear to auscultation, respirations are non-labored, Breath sounds are equal, symmetrical chest wall expansion  Cardiovascular: Normal rate, Regular rhythm, No murmur, Normal peripheral perfusion  Gastrointestinal: Soft, Non-tender, Non-distended, Anus patent  Genitourinary: Normal genitalia for age and sex, No lesions  Musculoskeletal: No swelling. No deformity  Skin: Moist, No pallor, No rash  Neurologic: No focal deficits, appropriate tone    INITIAL COURSE:  Infant brought to NICU in room air. Sent CBC and blood gas. Started on enteral feeds advanced without incident.      Birth Measurements:   Weight: 2670 grams  Length: 47 cm  Head circumference: 30 cm      PHYSICAL EXAMINATION ON DISCHARGE    .  General: No acute distress, well appearing, responds to stimuli appropriately  Eye: Normal conjunctiva, positive red reflex  HENT: Normocephalic, Anterior fontanelle open/soft/flat  Neck: Supple  Respiratory: Lungs are  clear to auscultation, Respirations are non-labored, Breath sounds are equal, Symmetrical chest wall expansion  Cardiovascular: Normal rate, Regular rhythm, No murmur, Normal peripheral perfusion  Gastrointestinal: Soft, Non-tender, Non-distended, Anus patent  Genitourinary: Normal genitalia for age and sex, No lesions, circumcision healing well.  Musculoskeletal: No swelling. No deformity, hips without clicks or clunks.    Skin: Moist, No pallor, No rash  Neurologic: No focal deficits, appropriate tone    PROBLEM LIST:     infant of 35 completed weeks of gestation  hyperbilirubinemia      BRIEF HOSPITAL COURSE:   Mother with late prenatal care.  received BMZ at 11hr of age when arrived in PTL.  Not sure if dates are good given late prenatal care. Precipitious delivery without OB in attendance.   Infant stable in RA from DR to NICU.  started on feedings without incident.  needed phototherapy 1 day. now on Ad sonia feedings.       HOSPITAL COURSE BY SYSTEM:    Fluids, electrolytes and nutrition: tolerating ad sonia feedings NS22 ad sonia on demand.          Respiratory:  stable in room air since birth         Apnea / Bradycardia:  no events of apnea or bradycardias        Cardiovascular: stable        ID: initial CBC with reassuring results.         Hyperbilirubinemia: no set up.  infant under phototherapy for 1 day on DOL 2.         Neuro: stable; tone normal for gestational age        Optho:does not qualify.         Lines: none       Toxicology:   Maternal UDS positive for cannabinoids  Infant Urine toxicology negative, and mec tox screen positive for cannabinoids.  [facility]  Mom has talked to  before discharge and she is in good mood and excited to take baby home.  She is preparing things at home in anticipation of discharge and had good support at home.      Social:  Parents not  but live together with 2yo child from another relationship.  Mother and  Aunt state mom has very good support at home.  FOB involved and visited yesterday. Maternal Aunt expressed several times that she was very concerned about mom having PPD; per SW note , mother has resumed taking prozac and has good involvement and support from family including extended family wiith good support from Maternal Aunt.      PATIENT'S MEDICAL CONDITION AT DISCHARGE       stable    INVASIVE PROCEDURES PERFORMED DURING HOSPITALIZATION      none    SURGICAL PROCEDURES      none    SUMMARY OF RESPIRATORY CARE:   none                  SCREENING RESULTS    Orders:     SCREEN [NEOS] ( In Process ) 2018 08:34    elevated TSH 59.9 and repeats sent on  and      SCREEN REPEAT [NEOSR] ( In Process ) 2018 08:47      Results:      Hearing Screen:   Pass Left, Pass Right      Date and Time of Screenin18 13:13   CCHD Preductal SpO2:   97 %     CCHD Preductal SpO2 Site:   Right Wrist     CCHD Postductal SpO2:   97 %     CCHD Postductal SpO2 Site:   Right foot     Cardiac Screening Results:   Initial     Cardiac Screening Intervention:        Reason Cardiac Screening not Applicable:        Date and Time of Documentation:   18 01:00   Car Seat Screen:   Yes 18 14:00      Car Seat Type:   Car Seat      Screening Duration:   90    Vaccine Name: hepatitis B pediatric VACCINE ( Completed ) Ordered On 2018 03:34  -    Administered: 2018 15:09           DISCHARGE DISPOSITION  TO HOME with parents.      DISCHARGE/TRANSFER MEDICATIONS  DVS 1ml daily.      PRIMARY CARE PROVIDER        [facility]; appointment scheduled tomorrow.       DISCHARGE ORDERS AND INSTRUCTIONS          Anticipatory guidance including fevers, rear facing car seat, water temperature @ 120 degrees, feedings including avoiding water/juice until 6 months of age. Back to sleep and tummy time while awake.  Mother provided with [facility] form for NS22.      PARENT EDUCATION  DISCUSSED WITH PARENTS:   - Information on signs of significant cardiopulmonary events: color change, pauses in breathing, limpness in tone  - Opportunity to complete infant CPR  - Instruction on emergency procedures and when and how to call EMS  - Confirmation that there is family access to a reliable telephone  - Opportunity / encouragement to room-in and participate in feeding of the infant      Discussed with Dr Navarrete  [facility] Brandie Mcfadden PhD, APN, [facility]  [id number]                      Electronically Signed On 2018 08:23  __________________________________________________   BRANDIE MERLOS      Electronically Signed On 2018 10:49  __________________________________________________   BRANDIE MERLOS      Electronically Signed On 2018 13:19  __________________________________________________   ARTURO HERNANDEZ

## 2024-07-16 ENCOUNTER — OFFICE VISIT (OUTPATIENT)
Dept: DERMATOLOGY | Facility: CLINIC | Age: 79
End: 2024-07-16
Payer: COMMERCIAL

## 2024-07-16 ENCOUNTER — OFFICE VISIT (OUTPATIENT)
Dept: OPHTHALMOLOGY | Facility: CLINIC | Age: 79
End: 2024-07-16
Attending: OPHTHALMOLOGY
Payer: COMMERCIAL

## 2024-07-16 DIAGNOSIS — Z85.828 HISTORY OF NONMELANOMA SKIN CANCER: ICD-10-CM

## 2024-07-16 DIAGNOSIS — D18.01 CHERRY ANGIOMA: ICD-10-CM

## 2024-07-16 DIAGNOSIS — L81.4 LENTIGINES: ICD-10-CM

## 2024-07-16 DIAGNOSIS — Z12.83 ENCOUNTER FOR SCREENING FOR MALIGNANT NEOPLASM OF SKIN: ICD-10-CM

## 2024-07-16 DIAGNOSIS — L82.1 SEBORRHEIC KERATOSES: ICD-10-CM

## 2024-07-16 DIAGNOSIS — Q15.0 JUVENILE GLAUCOMA: Primary | ICD-10-CM

## 2024-07-16 DIAGNOSIS — D22.9 MULTIPLE NEVI: Primary | ICD-10-CM

## 2024-07-16 PROCEDURE — 92012 INTRM OPH EXAM EST PATIENT: CPT | Performed by: OPHTHALMOLOGY

## 2024-07-16 PROCEDURE — 99213 OFFICE O/P EST LOW 20 MIN: CPT | Performed by: PHYSICIAN ASSISTANT

## 2024-07-16 PROCEDURE — G0463 HOSPITAL OUTPT CLINIC VISIT: HCPCS | Performed by: OPHTHALMOLOGY

## 2024-07-16 ASSESSMENT — VISUAL ACUITY
CORRECTION_TYPE: GLASSES
OD_CC: 20/70
METHOD: SNELLEN - LINEAR
OS_CC: 20/150ECC

## 2024-07-16 ASSESSMENT — REFRACTION_WEARINGRX
OS_ADD: +2.75
OS_CYLINDER: SPHERE
OS_SPHERE: -1.00
OD_SPHERE: -4.50
OD_ADD: +2.75
OD_CYLINDER: +1.25
OD_AXIS: 120

## 2024-07-16 ASSESSMENT — TONOMETRY
OS_IOP_MMHG: 7
OS_IOP_MMHG: 4
IOP_METHOD: TONOPEN
OD_IOP_MMHG: 6
OD_IOP_MMHG: 8
IOP_METHOD: APPLANATION

## 2024-07-16 ASSESSMENT — EXTERNAL EXAM - LEFT EYE: OS_EXAM: NORMAL

## 2024-07-16 ASSESSMENT — PAIN SCALES - GENERAL: PAINLEVEL: NO PAIN (0)

## 2024-07-16 ASSESSMENT — EXTERNAL EXAM - RIGHT EYE: OD_EXAM: NORMAL

## 2024-07-16 NOTE — PROGRESS NOTES
Chief Complaint/Presenting Concern: glaucoma follow up    History of Present Illness:   Sadia Rider is a 75 year old patient who presents for glaucoma follow up, history of trab both eyes.  here for 4 month glaucoma follow up with IOP check.    Relevant Past Medical/Family/Social History: None relevant     Relevant Review of Systems: None relevant       Diagnosis: Juvenile Open Angle Glaucoma each eye   Previous glaucoma surgery/laser:  s/p ALT OU in 2009  s/p Trab OS in 2004 by Dr. Mathis  s/p Trab OD in 1990 by Dr. Muñoz  s/p Trab OU at 27 yo  Currently Meds: None   Family history: positive daughter, mother, brother, great-grandma -- all on gtts, surgery and mother lost vision   Meds AEs/intolerance: AGN - contact dermatitis     PMHx: No history of Asthma and respiratory problems/Cardiac/Renal/Kidney stones/Sulfa Allergy  Previous testing:  Visual field 11/23/2020:  Right eye - inferior nasal step and paracentral defect, slightly more depressed at point but also less depressed at other points, likely fluctuations, reliable  Left eye - inferior nasal/arcuate defect, central defect  Fluctuations, reliable  Visual field February 25, 2021:  Right eye - inferior nasal step and paracentral defect, stable, reliable  OCT Optic Nerve RNFL Spectralis 11/23/2020  right eye: superior and inferior RNFL thinning, stable   left eye: superior and inferior RNFL thinning, possible worsening of IN thinning likely fluctuating   OCT Macula today 7/26/22  - right eye: normal foveal contour no intraretinal or subretinal fluid  - left eye: irregular appearing retina, stable, no subretinal or intraretinal fluid, appears stable    Today's testing:  IOP today: 6/4 mmHg     Additional Ocular History:    2. 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 Periorbital dermatitis, bilateral  - Could consider  dermatology referral  - Patient has not noticed but may be 2/2 poor vision each eye     Plan/Recommendations:  Discussed findings with patient.  Doing well, stable with no evidence of hypotony signs of exam.   Careful precautions given to patient to watch out for any signs and symptoms of bleb leak/infection. Advised to avoid eye rubbing and lake water.     RTC in 5 months [facility], IOP      Physician Attestation     Attending Physician Attestation:  Complete documentation of historical and exam elements from today's encounter can be found in the full encounter summary report (not reduplicated in this progress note). I personally obtained the chief complaint(s) and history of present illness. I confirmed and edited as necessary the review of systems, past medical/surgical history, family history, social history, and examination findings as documented by others; and I examined the patient myself. I personally reviewed the relevant tests, images, and reports as documented above.. I formulated and edited as necessary the assessment and plan and discussed the findings and management plan with the patient and any family members present at the time of the visit.  Ramon Morin M.D., Glaucoma, July 16, 2024

## 2024-07-16 NOTE — PROGRESS NOTES
Dermatology Rooming Note    Sadia Rider's goals for this visit include:   Chief Complaint   Patient presents with    Derm Problem     FBSE no areas of concern     Carmine Elsa, EMT  Clinic Support  [facility]    [phone]    Employed by [facility] [employer]

## 2024-07-16 NOTE — LETTER
7/16/2024       RE: Sadia Rider  [address] [address]     Dear Colleague,    Thank you for referring your patient, Sadia Rider, to the [facility] [place] [facility]. Please see a copy of my visit note below.    [facility] Dermatology Note  Encounter Date: Jul 16, 2024  Office Visit     Reviewed patients past medical history and pertinent chart review prior to patients visit today.     Dermatology Problem List:  1. History of NMSC  - BCC - right lower cheek, s/p Mohs 8/30/17  - BCC - R nose s/p excision 2012  2. History of intertrigo - OTC anti-fungal  3. Benign bx  - fibrous papule, right medial mid cheek, s/p bx 2/22/2021  - intradermal nevus, right ear lobe, s/p bx 2/22/2021  4. Papulopustular/ET rosacea  - Past: Metrocream  ____________________________________________    Assessment & Plan:     # Onychomycosis, toenails  - Onychomycosis present on the bilateral feet. We discussed the difficulty associated with treating fungal infections of the nails. We reviewed the option to treat with Penlac. The patient wishes to think about this and will message in should she wish to start Penlac in the future.     # Onychorrhexis, fingernails  - Last TSH WNL. The patient reports her brittle nails began after starting tamoxifen. No evidence of other cutaneous etiology. She reports she will be stopping tamoxifen in 10 months, if brittle nails persist further workup could be considered.   - I recommended application of daily non-scented moisturizers to the nails and skin around the nails, a clear nail lacquer to reinforce the nails, and avoidence of prolonged exposure to moisture.        # Multiple nevi, trunk and extremities  # Solar lentigines  # History of NMSC  - No concerning features on dermoscopy. We discussed the importance of self exams at home. ABCDE criteria and importance of  photoprotection reviewed.     # Cherry angiomas  # Seborrheic keratoses  - We discussed the benign nature of the skin lesions. No treatment required. Continued observation recommended. Follow up with any concerns.      Follow-up:  Annual for follow up full body skin exam, as needed for new or changing lesions or new concerns    All risks, benefits and alternatives were discussed with patient.  Patient is in agreement and understands the assessment and plan.  All questions were answered.  Kelsey Rousseau PA-C  [facility] Dermatology    ____________________________________________    CC: No chief complaint on file.    HPI:  Ms. Sadia Rider is a(n) 79 year old female who presents today as a return patient for a full body skin cancer screening. The patient reports brittle nails since starting tamoxifen. She denies any history of psoriasis, lichen planus, or vitiligo. She is not currently using anything for her rosacea. No other specific cutaneous concerns today. The patient reports trying to be diligent with photoprotection.      Physical Exam:  Vitals: LMP 08/17/1997 (Exact Date)   SKIN: Total skin excluding the genitalia areas was performed. The exam included the scalp, face, neck, bilateral arms, chest, back, abdomen, bilateral legs, digits, mons pubis, buttocks, and nails.   - Kellogg II.  - Mild splitting of few distal fingernails.   - Yellowing with subungual debris involving the toenail(s).   - Multiple tan/brown macules and papules scattered throughout exam, consistent with benign nevi. No concerning features on dermoscopy.   - Scattered tan, homogenous macules scattered on sun exposed skin, consistent with solar lentigines.   - Scattered waxy, stuck on appearing papules and patches, consistent with seborrheic keratoses.    - Several 1-2 mm red dome shaped symmetric papules, consistent with cherry angiomas.     Medications:  Current Outpatient Medications   Medication Sig Dispense Refill      aspirin 81 MG tablet Take 1 tablet by mouth daily.       calcium carb-cholecalciferol 600-500 MG-UNIT CAPS Take 1 capsule by mouth 3 times daily       Cholecalciferol (VITAMIN D3 PO) Take 2,000 Units by mouth daily        lisinopril (ZESTRIL) 10 MG tablet Take 1 tablet (10 mg) by mouth daily 90 tablet 3     Multiple Vitamins-Minerals (CENTRUM ULTRA WOMENS PO) Take 1 tablet by mouth daily ( calcium: 300 mg and vitamin d: 1000 units)       simvastatin (ZOCOR) 20 MG tablet TAKE 1 TABLET BY MOUTH AT BEDTIME 90 tablet 3     tamoxifen (NOLVADEX) 20 MG tablet Take 1 tablet (20 mg) by mouth daily 90 tablet 3     UNABLE TO FIND daily MEDICATION NAME: Raw Brazil Nuts       UNABLE TO FIND daily MEDICATION NAME: Raw Sunflower Seeds       No current facility-administered medications for this visit.      Past Medical History:   Patient Active Problem List   Diagnosis     Juvenile glaucoma     Myopia of both eyes     Osteoporosis     Malignant neoplasm of upper-outer quadrant of right breast in female, estrogen receptor positive (H)     Conjugated hyperbilirubinemia     Primary hypertension     Vitamin D deficiency     Mixed hyperlipidemia     Malignant neoplasm of skin     History of hypothyroidism     Chronic rhinitis     History of breast cancer     Past Medical History:   Diagnosis Date     Acute cholecystitis 4/16/2016     Basal cell carcinoma      Breast cancer (H)      Glaucoma      POAG ADV     High cholesterol      History of vertebral compression fracture      Hypertension      Osteoporosis      Other chronic pain      PONV (postoperative nausea and vomiting)     many years ago.     Rheumatoid osteoperiostitis (H)      Skin cancer, basal cell      Tear film insufficiency, unspecified        CC Referred Self, MD  No address on file on close of this encounter.    Dermatology Rooming Note    Sadia Rider's goals for this visit include:   Chief Complaint   Patient presents with     Derm Problem     FBSE no areas of concern      Carmine Hunter, EMT  Clinic Support  [facility]    [phone]    Employed by [facility] [employer]      Again, thank you for allowing me to participate in the care of your patient.      Sincerely,    Kelsey Rousseau PA-C

## 2024-07-16 NOTE — PROGRESS NOTES
Chief Complaint/Presenting Concern: glaucoma follow up    History of Present Illness:   Sadia Rider is a 75 year old patient who presents for glaucoma follow up, history of trab both eyes.  here for 4-5 month glaucoma follow up with IOP check.    Relevant Past Medical/Family/Social History: None relevant     Relevant Review of Systems: None relevant       Diagnosis: Juvenile Open Angle Glaucoma each eye   Previous glaucoma surgery/laser:  s/p ALT OU in 2009  s/p Trab OS in 2004 by Dr. Mathis  s/p Trab OD in 1990 by Dr. Muñoz  s/p Trab OU at 27 yo  Currently Meds: None   Family history: positive daughter, mother, brother, great-grandma -- all on gtts, surgery and mother lost vision   Meds AEs/intolerance: AGN - contact dermatitis     PMHx: No history of Asthma and respiratory problems/Cardiac/Renal/Kidney stones/Sulfa Allergy  Previous testing:  Visual field 11/23/2020:  Right eye - inferior nasal step and paracentral defect, slightly more depressed at point but also less depressed at other points, likely fluctuations, reliable  Left eye - inferior nasal/arcuate defect, central defect  Fluctuations, reliable  Visual field February 25, 2021:  Right eye - inferior nasal step and paracentral defect, stable, reliable  OCT Optic Nerve RNFL Spectralis 11/23/2020  right eye: superior and inferior RNFL thinning, stable   left eye: superior and inferior RNFL thinning, possible worsening of IN thinning likely fluctuating   OCT Macula today 7/26/22  - right eye: normal foveal contour no intraretinal or subretinal fluid  - left eye: irregular appearing retina, stable, no subretinal or intraretinal fluid, appears stable    Today's testing:  IOP today: 8/7 mmHg     Additional Ocular History:    2. 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 Periorbital dermatitis, bilateral  - Could consider  dermatology referral  - Patient has not noticed but may be 2/2 poor vision each eye     Plan/Recommendations:  Discussed findings with patient.  Doing well, stable with no evidence of hypotony signs of exam.   Careful precautions given to patient to watch out for any signs and symptoms of bleb leak/infection. Advised to avoid eye rubbing and lake water.     RTC in 5 months [facility], IOP      Physician Attestation     Attending Physician Attestation:  Complete documentation of historical and exam elements from today's encounter can be found in the full encounter summary report (not reduplicated in this progress note). I personally obtained the chief complaint(s) and history of present illness. I confirmed and edited as necessary the review of systems, past medical/surgical history, family history, social history, and examination findings as documented by others; and I examined the patient myself. I personally reviewed the relevant tests, images, and reports as documented above. I formulated and edited as necessary the assessment and plan and discussed the findings and management plan with the patient and any family members present at the time of the visit.  Ramon Morin M.D., Glaucoma, March 19, 2024

## 2024-07-16 NOTE — NURSING NOTE
Chief Complaints and History of Present Illnesses   Patient presents with    Glaucoma Follow Up     Juvenile glaucoma.     Chief Complaint(s) and History of Present Illness(es)       Glaucoma Follow Up              Laterality: both eyes    Associated symptoms: dryness, tearing, flashes and floaters.  Negative for eye pain    Pain scale: 0/10    Comments: Juvenile glaucoma.              Comments    Pt unsure of any vision changes.  No pain.  No change to flashes/floaters.  No change to dryness/tearing.  No ocular meds.  Pt would like to see low vision for future Mrx.    ASHLEY Cr July 16, 2024 10:26 AM

## 2024-07-16 NOTE — PROGRESS NOTES
[facility] Note  Encounter Date: Jul 16, 2024  Office Visit     Reviewed patients past medical history and pertinent chart review prior to patients visit today.     Dermatology Problem List:  1. History of NMSC  - BCC - right lower cheek, s/p Mohs 8/30/17  - BCC - R nose s/p excision 2012  2. History of intertrigo - OTC anti-fungal  3. Benign bx  - fibrous papule, right medial mid cheek, s/p bx 2/22/2021  - intradermal nevus, right ear lobe, s/p bx 2/22/2021  4. Papulopustular/ET rosacea  - Past: Metrocream  ____________________________________________    Assessment & Plan:     # Onychomycosis, toenails  - Onychomycosis present on the bilateral feet. We discussed the difficulty associated with treating fungal infections of the nails. We reviewed the option to treat with Penlac. The patient wishes to think about this and will message in should she wish to start Penlac in the future.     # Onychorrhexis, fingernails  - Last TSH WNL. The patient reports her brittle nails began after starting tamoxifen. No evidence of other cutaneous etiology. She reports she will be stopping tamoxifen in 10 months, if brittle nails persist further workup could be considered.   - I recommended application of daily non-scented moisturizers to the nails and skin around the nails, a clear nail lacquer to reinforce the nails, and avoidence of prolonged exposure to moisture.        # Multiple nevi, trunk and extremities  # Solar lentigines  # History of NMSC  - No concerning features on dermoscopy. We discussed the importance of self exams at home. ABCDE criteria and importance of photoprotection reviewed.     # Cherry angiomas  # Seborrheic keratoses  - We discussed the benign nature of the skin lesions. No treatment required. Continued observation recommended. Follow up with any concerns.      Follow-up:  Annual for follow up full body skin exam, as needed for new or changing lesions or new concerns    All  risks, benefits and alternatives were discussed with patient.  Patient is in agreement and understands the assessment and plan.  All questions were answered.  ALMA DELIA Sethi [facility]    ____________________________________________    CC: No chief complaint on file.    HPI:  Ms. Sadia Rider is a(n) 79 year old female who presents today as a return patient for a full body skin cancer screening. The patient reports brittle nails since starting tamoxifen. She denies any history of psoriasis, lichen planus, or vitiligo. She is not currently using anything for her rosacea. No other specific cutaneous concerns today. The patient reports trying to be diligent with photoprotection.      Physical Exam:  Vitals: LMP 08/17/1997 (Exact Date)   SKIN: Total skin excluding the genitalia areas was performed. The exam included the scalp, face, neck, bilateral arms, chest, back, abdomen, bilateral legs, digits, mons pubis, buttocks, and nails.   - Kellogg II.  - Mild splitting of few distal fingernails.   - Yellowing with subungual debris involving the toenail(s).   - Multiple tan/brown macules and papules scattered throughout exam, consistent with benign nevi. No concerning features on dermoscopy.   - Scattered tan, homogenous macules scattered on sun exposed skin, consistent with solar lentigines.   - Scattered waxy, stuck on appearing papules and patches, consistent with seborrheic keratoses.    - Several 1-2 mm red dome shaped symmetric papules, consistent with cherry angiomas.     Medications:  Current Outpatient Medications   Medication Sig Dispense Refill    aspirin 81 MG tablet Take 1 tablet by mouth daily.      calcium carb-cholecalciferol 600-500 MG-UNIT CAPS Take 1 capsule by mouth 3 times daily      Cholecalciferol (VITAMIN D3 PO) Take 2,000 Units by mouth daily       lisinopril (ZESTRIL) 10 MG tablet Take 1 tablet (10 mg) by mouth daily 90 tablet 3    Multiple Vitamins-Minerals (CENTRUM  ULTRA WOMENS PO) Take 1 tablet by mouth daily ( calcium: 300 mg and vitamin d: 1000 units)      simvastatin (ZOCOR) 20 MG tablet TAKE 1 TABLET BY MOUTH AT BEDTIME 90 tablet 3    tamoxifen (NOLVADEX) 20 MG tablet Take 1 tablet (20 mg) by mouth daily 90 tablet 3    UNABLE TO FIND daily MEDICATION NAME: Raw Brazil Nuts      UNABLE TO FIND daily MEDICATION NAME: Raw Sunflower Seeds       No current facility-administered medications for this visit.      Past Medical History:   Patient Active Problem List   Diagnosis    Juvenile glaucoma    Myopia of both eyes    Osteoporosis    Malignant neoplasm of upper-outer quadrant of right breast in female, estrogen receptor positive (H)    Conjugated hyperbilirubinemia    Primary hypertension    Vitamin D deficiency    Mixed hyperlipidemia    Malignant neoplasm of skin    History of hypothyroidism    Chronic rhinitis    History of breast cancer     Past Medical History:   Diagnosis Date    Acute cholecystitis 4/16/2016    Basal cell carcinoma     Breast cancer (H)     Glaucoma      POAG ADV    High cholesterol     History of vertebral compression fracture     Hypertension     Osteoporosis     Other chronic pain     PONV (postoperative nausea and vomiting)     many years ago.    Rheumatoid osteoperiostitis (H)     Skin cancer, basal cell     Tear film insufficiency, unspecified        CC Referred Self, MD  No address on file on close of this encounter.

## 2024-07-21 ENCOUNTER — PATIENT OUTREACH (OUTPATIENT)
Dept: CARE COORDINATION | Facility: CLINIC | Age: 79
End: 2024-07-21
Payer: COMMERCIAL

## 2024-08-06 NOTE — PROGRESS NOTES
Sadia Rider is a 79-year-old patient who comes in today for interim followup.     CHIEF COMPLAINT:    1.  Right-sided infiltrating ductal carcinoma diagnosed in 2020, now coming up on 3 years out from her diagnosis in 02/2023, ER/VT positive, HER-2 receptor negative.  2.  Oncotype in the low risk of recurrence range.  3.  Osteopenia.     HISTORY OF PRESENTING COMPLAINT:      Sadia is a 79-year-old patient with an infiltrating ductal carcinoma in the upper outer quadrant of the right breast, ER/VT positive, HER-2 receptor negative.  She ended up having a right-sided lumpectomy and then had Oncotype sent out, which came back with a recurrence score of 13.  She started on anastrozole, and since 2020 she has actually had a drop in her bone density. We ended up stopping the anastrozole and switching to Tamoxifen      Sadia has no major complaints today she is tolerating the tamoxifen well.  She does get some hot flashes from it but other than that nothing sinister.     12 point comprehensive review of systems today is otherwise unremarkable.     Medications and allergies are outlined in the [facility] nursing records.        Social history:  Patient is single and is a non-smoker.     On physical exam:  She's a well appearing lady no acute distress vital signs are stable  Neck is no masses or lymph nodes  Chest clear to auscultation percussion bilaterally  Heart S1-2 normal no added sounds no murmurs  Breast exam: Right breast normal no palpable masses right axilla negative.  Left breast no palpable masses left axilla negative.  Gastrointestinal exam normal  Legs without tenderness or edema  Psychiatric exam normal        Data review:    Mammogram done on 3/19/2024 was normal.      Labs reviewed from 3/1/2024 all within normal limits.      Patient will be due a bone density and mammogram in March 2025        Impression and plan:      This is 79-year-old patient with a history of right-sided breast cancer and an Oncotype score  of 13.  Her stage of disease was a T1b N0 ER/SC positive HER2 receptor negative tumor.    She is now 4 years out from her diagnosis.  She will continue tamoxifen for full 5 years we will keep an eye on her mammograms and will also keep an eye on her bone densities.    All of the above has been discussed with her and she is in agreement with the above plan.        Total time spent today 30 minutes      Ezra Harper MD

## 2024-10-18 ENCOUNTER — OFFICE VISIT (OUTPATIENT)
Dept: FAMILY MEDICINE | Facility: CLINIC | Age: 79
End: 2024-10-18
Payer: COMMERCIAL

## 2024-10-18 ENCOUNTER — TELEPHONE (OUTPATIENT)
Dept: FAMILY MEDICINE | Facility: CLINIC | Age: 79
End: 2024-10-18

## 2024-10-18 VITALS
BODY MASS INDEX: 25.58 KG/M2 | RESPIRATION RATE: 12 BRPM | OXYGEN SATURATION: 100 % | DIASTOLIC BLOOD PRESSURE: 76 MMHG | SYSTOLIC BLOOD PRESSURE: 134 MMHG | WEIGHT: 153.5 LBS | HEART RATE: 59 BPM | TEMPERATURE: 97.9 F | HEIGHT: 65 IN

## 2024-10-18 DIAGNOSIS — I10 ESSENTIAL HYPERTENSION: Primary | ICD-10-CM

## 2024-10-18 DIAGNOSIS — E78.2 MIXED HYPERLIPIDEMIA: ICD-10-CM

## 2024-10-18 DIAGNOSIS — E55.9 VITAMIN D DEFICIENCY: ICD-10-CM

## 2024-10-18 DIAGNOSIS — C50.411 MALIGNANT NEOPLASM OF UPPER-OUTER QUADRANT OF RIGHT BREAST IN FEMALE, ESTROGEN RECEPTOR POSITIVE (H): ICD-10-CM

## 2024-10-18 DIAGNOSIS — Z17.0 MALIGNANT NEOPLASM OF UPPER-OUTER QUADRANT OF RIGHT BREAST IN FEMALE, ESTROGEN RECEPTOR POSITIVE (H): ICD-10-CM

## 2024-10-18 DIAGNOSIS — M81.0 OSTEOPOROSIS WITHOUT CURRENT PATHOLOGICAL FRACTURE, UNSPECIFIED OSTEOPOROSIS TYPE: ICD-10-CM

## 2024-10-18 LAB
ALBUMIN SERPL BCG-MCNC: 4.3 G/DL (ref 3.5–5.2)
ALP SERPL-CCNC: 30 U/L (ref 40–150)
ALT SERPL W P-5'-P-CCNC: 19 U/L (ref 0–50)
ANION GAP SERPL CALCULATED.3IONS-SCNC: 11 MMOL/L (ref 7–15)
AST SERPL W P-5'-P-CCNC: 25 U/L (ref 0–45)
BILIRUB SERPL-MCNC: 0.6 MG/DL
BUN SERPL-MCNC: 25.4 MG/DL (ref 8–23)
CALCIUM SERPL-MCNC: 10.2 MG/DL (ref 8.8–10.4)
CHLORIDE SERPL-SCNC: 106 MMOL/L (ref 98–107)
CHOLEST SERPL-MCNC: 134 MG/DL
CREAT SERPL-MCNC: 1.02 MG/DL (ref 0.51–0.95)
EGFRCR SERPLBLD CKD-EPI 2021: 56 ML/MIN/1.73M2
FASTING STATUS PATIENT QL REPORTED: YES
FASTING STATUS PATIENT QL REPORTED: YES
GLUCOSE SERPL-MCNC: 96 MG/DL (ref 70–99)
HCO3 SERPL-SCNC: 24 MMOL/L (ref 22–29)
HDLC SERPL-MCNC: 49 MG/DL
LDLC SERPL CALC-MCNC: 55 MG/DL
NONHDLC SERPL-MCNC: 85 MG/DL
POTASSIUM SERPL-SCNC: 5 MMOL/L (ref 3.4–5.3)
PROT SERPL-MCNC: 7 G/DL (ref 6.4–8.3)
SODIUM SERPL-SCNC: 141 MMOL/L (ref 135–145)
TRIGL SERPL-MCNC: 150 MG/DL
VIT D+METAB SERPL-MCNC: 68 NG/ML (ref 20–50)

## 2024-10-18 PROCEDURE — 99214 OFFICE O/P EST MOD 30 MIN: CPT | Performed by: NURSE PRACTITIONER

## 2024-10-18 PROCEDURE — 80061 LIPID PANEL: CPT | Performed by: NURSE PRACTITIONER

## 2024-10-18 PROCEDURE — 82306 VITAMIN D 25 HYDROXY: CPT | Performed by: NURSE PRACTITIONER

## 2024-10-18 PROCEDURE — 80053 COMPREHEN METABOLIC PANEL: CPT | Performed by: NURSE PRACTITIONER

## 2024-10-18 PROCEDURE — 36415 COLL VENOUS BLD VENIPUNCTURE: CPT | Performed by: NURSE PRACTITIONER

## 2024-10-18 RX ORDER — TAMOXIFEN CITRATE 20 MG/1
20 TABLET ORAL DAILY
Qty: 90 TABLET | Refills: 3 | Status: CANCELLED | OUTPATIENT
Start: 2024-10-18

## 2024-10-18 RX ORDER — SIMVASTATIN 20 MG
20 TABLET ORAL AT BEDTIME
Qty: 90 TABLET | Refills: 3 | Status: SHIPPED | OUTPATIENT
Start: 2024-10-18

## 2024-10-18 RX ORDER — SIMVASTATIN 20 MG
20 TABLET ORAL AT BEDTIME
Qty: 90 TABLET | Refills: 3 | Status: SHIPPED | OUTPATIENT
Start: 2024-10-18 | End: 2024-10-18

## 2024-10-18 RX ORDER — LISINOPRIL 10 MG/1
10 TABLET ORAL DAILY
Qty: 90 TABLET | Refills: 3 | Status: SHIPPED | OUTPATIENT
Start: 2024-10-18 | End: 2024-10-18

## 2024-10-18 RX ORDER — LISINOPRIL 10 MG/1
10 TABLET ORAL DAILY
Qty: 90 TABLET | Refills: 3 | Status: SHIPPED | OUTPATIENT
Start: 2024-10-18

## 2024-10-18 ASSESSMENT — PAIN SCALES - GENERAL: PAINLEVEL: NO PAIN (0)

## 2024-10-18 NOTE — PROGRESS NOTES
"  Yoel Mccullough is a 79 year old, presenting for the following health issues:  Forms (Pt has a [employer] form that she would like signed. Pt would like to fill out a health care directive. ) and RECHECK (Pt is fasting. Pt would like to do lab work even though she is not due. Would like to check Vitamin D and glucose. Pt is wondering if there is a blood test for alzheimer's. )        10/18/2024    12:37 PM   Additional Questions   Roomed by Joyce HERRERA CMA         10/18/2024   Forms   Any forms needing to be completed Yes        History of Present Illness       Reason for visit:  Labs   She is taking medications regularly.     Hyperlipidemia Follow-Up    Are you regularly taking any medication or supplement to lower your cholesterol?   Yes- Zocor  Are you having muscle aches or other side effects that you think could be caused by your cholesterol lowering medication?  No    Hypertension Follow-up    Do you check your blood pressure regularly outside of the clinic? No   Are you following a low salt diet? Yes  Are your blood pressures ever more than 140 on the top number (systolic) OR more   than 90 on the bottom number (diastolic), for example 140/90? No      Review of Systems  Constitutional, neuro, ENT, endocrine, pulmonary, cardiac, gastrointestinal, genitourinary, musculoskeletal, integument and psychiatric systems are negative, except as otherwise noted.      Objective    /76 (BP Location: Left arm, Patient Position: Sitting, Cuff Size: Adult Regular)   Pulse 59   Temp 97.9  F (36.6  C) (Oral)   Resp 12   Ht 1.651 m (5' 5\")   Wt 69.6 kg (153 lb 8 oz)   LMP 08/17/1997 (Exact Date)   SpO2 100%   Breastfeeding No   BMI 25.54 kg/m    Body mass index is 25.54 kg/m .  Physical Exam   GENERAL: alert and no distress  RESP: lungs clear to auscultation - no rales, rhonchi or wheezes  CV: regular rate and rhythm, normal S1 S2, no S3 or S4, no murmur, click or rub, no peripheral edema   MS: no gross " musculoskeletal defects noted, no edema  NEURO: Normal strength and tone, mentation intact and speech normal  PSYCH: mentation appears normal, affect normal/bright    Lab on 03/01/2024   Component Date Value Ref Range Status    Cholesterol 03/01/2024 134  <200 mg/dL Final    Triglycerides 03/01/2024 167 (H)  <150 mg/dL Final    Direct Measure HDL 03/01/2024 46 (L)  >=50 mg/dL Final    LDL Cholesterol Calculated 03/01/2024 55  <=100 mg/dL Final    Non HDL Cholesterol 03/01/2024 88  <130 mg/dL Final    Patient Fasting > 8hrs? 03/01/2024 Yes   Final    Hemoglobin A1C 03/01/2024 5.3  0.0 - 5.6 % Final    Normal <5.7%   Prediabetes 5.7-6.4%    Diabetes 6.5% or higher     Note: Adopted from ADA consensus guidelines.    Sodium 03/01/2024 140  135 - 145 mmol/L Final    Reference intervals for this test were updated on 09/26/2023 to more accurately reflect our healthy population. There may be differences in the flagging of prior results with similar values performed with this method. Interpretation of those prior results can be made in the context of the updated reference intervals.     Potassium 03/01/2024 4.7  3.4 - 5.3 mmol/L Final    Chloride 03/01/2024 104  98 - 107 mmol/L Final    Carbon Dioxide (CO2) 03/01/2024 26  22 - 29 mmol/L Final    Anion Gap 03/01/2024 10  7 - 15 mmol/L Final    Urea Nitrogen 03/01/2024 19.2  8.0 - 23.0 mg/dL Final    Creatinine 03/01/2024 0.94  0.51 - 0.95 mg/dL Final    GFR Estimate 03/01/2024 62  >60 mL/min/1.73m2 Final    Calcium 03/01/2024 9.6  8.8 - 10.2 mg/dL Final    Glucose 03/01/2024 95  70 - 99 mg/dL Final    TSH 03/01/2024 4.04  0.30 - 4.20 uIU/mL Final         A/P:  1. Essential hypertension  Stable  - Comprehensive metabolic panel (BMP + Alb, Alk Phos, ALT, AST, Total. Bili, TP); Future  - lisinopril (ZESTRIL) 10 MG tablet; Take 1 tablet (10 mg) by mouth daily.  Dispense: 90 tablet; Refill: 3  - Comprehensive metabolic panel (BMP + Alb, Alk Phos, ALT, AST, Total. Bili, TP)    2.  Mixed hyperlipidemia  Controlled  - Comprehensive metabolic panel (BMP + Alb, Alk Phos, ALT, AST, Total. Bili, TP); Future  - Lipid panel reflex to direct LDL Fasting; Future  - simvastatin (ZOCOR) 20 MG tablet; Take 1 tablet (20 mg) by mouth at bedtime.  Dispense: 90 tablet; Refill: 3  - Comprehensive metabolic panel (BMP + Alb, Alk Phos, ALT, AST, Total. Bili, TP)  - Lipid panel reflex to direct LDL Fasting    3. Vitamin D deficiency  - Vitamin D Deficiency; Future  - Vitamin D Deficiency    4. Malignant neoplasm of upper-outer quadrant of right breast in female, estrogen receptor positive (H)  Managed per oncology    5. Osteoporosis without current pathological fracture, unspecified osteoporosis type  - DX Bone Density; Future    Signed Electronically by: Sadia Robb CNP

## 2024-10-18 NOTE — TELEPHONE ENCOUNTER
Left message to call back for: Patient  Information to relay to patient: Patient does not need separate lab appointment in addition to her visit with Sadia Robb. Lab appt cancelled. PCP is also asking if pt is willing to do her Medicare AWV. Pt is overdue for check up. Please change appointment as appropriate.    Joyce Henry CMA.

## 2024-10-21 DIAGNOSIS — C50.411 MALIGNANT NEOPLASM OF UPPER-OUTER QUADRANT OF RIGHT BREAST IN FEMALE, ESTROGEN RECEPTOR POSITIVE (H): ICD-10-CM

## 2024-10-21 DIAGNOSIS — Z17.0 MALIGNANT NEOPLASM OF UPPER-OUTER QUADRANT OF RIGHT BREAST IN FEMALE, ESTROGEN RECEPTOR POSITIVE (H): ICD-10-CM

## 2024-10-21 RX ORDER — TAMOXIFEN CITRATE 20 MG/1
20 TABLET ORAL DAILY
Qty: 90 TABLET | Refills: 3 | Status: SHIPPED | OUTPATIENT
Start: 2024-10-21

## 2024-10-21 NOTE — TELEPHONE ENCOUNTER
Tamoxifen 20 mg tabs    Last Written Prescription Date:  11/13/23  Last Fill Quantity: 90,  # refills: 3   Last office visit: Visit date not found ; last virtual visit: Visit date not found with prescribing provider:  7/2024       Future Office Visit: not scheduled yet, pt is to follow up with ANTWAN    Cathy Fox CMA

## 2024-12-05 ENCOUNTER — OFFICE VISIT (OUTPATIENT)
Dept: OPHTHALMOLOGY | Facility: CLINIC | Age: 79
End: 2024-12-05
Attending: OPHTHALMOLOGY
Payer: COMMERCIAL

## 2024-12-05 ENCOUNTER — OFFICE VISIT (OUTPATIENT)
Dept: OPTOMETRY | Facility: CLINIC | Age: 79
End: 2024-12-05
Payer: COMMERCIAL

## 2024-12-05 DIAGNOSIS — Q15.0 JUVENILE GLAUCOMA: ICD-10-CM

## 2024-12-05 DIAGNOSIS — H52.4 PRESBYOPIA: ICD-10-CM

## 2024-12-05 DIAGNOSIS — Q15.0 JUVENILE GLAUCOMA: Primary | ICD-10-CM

## 2024-12-05 PROCEDURE — 92081 LIMITED VISUAL FIELD XM: CPT | Performed by: OPHTHALMOLOGY

## 2024-12-05 ASSESSMENT — VISUAL ACUITY
OS_CC: 20/150
METHOD: SNELLEN - LINEAR
CORRECTION_TYPE: GLASSES
OS_CC+: ECC
OD_CC: 20/60
CORRECTION_TYPE: GLASSES
METHOD: SNELLEN - SINGLE
OS_CC+: ECC
OS_CC: 20/150
OD_CC: 20/60

## 2024-12-05 ASSESSMENT — TONOMETRY
OD_IOP_MMHG: 06
IOP_METHOD: APPLANATION
OS_IOP_MMHG: 03
OS_IOP_MMHG: 03
OS_IOP_MMHG: 3
IOP_METHOD: TONOPEN
IOP_METHOD: TONOPEN
OD_IOP_MMHG: 06
OD_IOP_MMHG: 5

## 2024-12-05 ASSESSMENT — REFRACTION_WEARINGRX
OS_ADD: +2.75
OS_CYLINDER: SPHERE
OD_ADD: +2.75
OD_CYLINDER: +1.25
OD_SPHERE: -4.50
OS_SPHERE: -1.00
OD_AXIS: 120

## 2024-12-05 ASSESSMENT — CONF VISUAL FIELD
OD_SUPERIOR_TEMPORAL_RESTRICTION: 3
OS_INFERIOR_NASAL_RESTRICTION: 2
OS_SUPERIOR_NASAL_RESTRICTION: 2
OD_SUPERIOR_TEMPORAL_RESTRICTION: 3
OS_SUPERIOR_TEMPORAL_RESTRICTION: 2
OS_SUPERIOR_TEMPORAL_RESTRICTION: 2
OD_INFERIOR_TEMPORAL_RESTRICTION: 3
OD_INFERIOR_TEMPORAL_RESTRICTION: 3
OS_INFERIOR_TEMPORAL_RESTRICTION: 2
OS_INFERIOR_TEMPORAL_RESTRICTION: 2
OS_INFERIOR_NASAL_RESTRICTION: 2
OS_SUPERIOR_NASAL_RESTRICTION: 2

## 2024-12-05 ASSESSMENT — CUP TO DISC RATIO
OD_RATIO: 0.6
OS_RATIO: 0.7

## 2024-12-05 ASSESSMENT — REFRACTION_MANIFEST
OS_SPHERE: -1.50
OD_CYLINDER: +1.00
OD_SPHERE: -4.00
OD_AXIS: 120
OS_CYLINDER: SPHERE

## 2024-12-05 ASSESSMENT — EXTERNAL EXAM - RIGHT EYE: OD_EXAM: NORMAL

## 2024-12-05 ASSESSMENT — EXTERNAL EXAM - LEFT EYE: OS_EXAM: NORMAL

## 2024-12-05 NOTE — PROGRESS NOTES
Assessment/Plan  (Q15.0) Juvenile glaucoma  (primary encounter diagnosis)  Comment: Patient follows with Dr. Morin  Plan: Discussed findings with patient. Continue following recommendations of Dr. Morin. Advised patient that visual acuities remain essentially stable today, but that continuity of visual field may impact fluency of reading. Glasses Rx updated for patient without significant changes. Call or return to clinic with vision changes or new eye pain. Recommend patient meet with [facility] OT at some point to discuss distance spotting options (monocular telescope vs binoculars vs wearable tech).     (H52.4) Presbyopia  Plan: See above note. Discussed findings with patient. New spectacle prescription dispensed to patient. Patient is welcome to return to clinic with prolonged adaptation difficulties.           30 minutes were spent on the date of the encounter doing chart review, history and exam, documentation, and further activities as noted above.    Complete documentation of historical and exam elements from today's encounter can  be found in the full encounter summary report (not reduplicated in this progress  note). I personally obtained the chief complaint(s) and history of present illness. I  confirmed and edited as necessary the review of systems, past medical/surgical  history, family history, social history, and examination findings as documented by  others; and I examined the patient myself. I personally reviewed the relevant tests,  images, and reports as documented above. I formulated and edited as necessary the  assessment and plan and discussed the findings and management plan with the  patient and family.    Derrick Tucker OD

## 2024-12-05 NOTE — NURSING NOTE
Chief Complaints and History of Present Illnesses   Patient presents with    Low Vision     Pt here for a low vision evaluation.     Chief Complaint(s) and History of Present Illness(es)       Low Vision              Comments: Pt here for a low vision evaluation.              Comments    Pt with hx of Juvenile glaucoma each eye. Vision is largely unchanged since last visit with Dr Morin. Glasses were last updated over two years ago.     ASHLEY Mittal on 12/5/2024 at 9:46 AM

## 2024-12-05 NOTE — PROGRESS NOTES
Chief Complaint/Presenting Concern: glaucoma follow up    History of Present Illness:   Sadia Rider is a 79 year old patient who presents for glaucoma follow up, history of trab both eyes.      Today she notes no change in vision in either eye.     Relevant Past Medical/Family/Social History: None relevant     Relevant Review of Systems: None relevant       Diagnosis: Juvenile Open Angle Glaucoma each eye   Previous glaucoma surgery/laser:  s/p ALT OU in 2009  s/p Trab OS in 2004 by Dr. Mathis  s/p Trab OD in 1990 by Dr. Muñoz  s/p Trab OU at 29 yo  Currently Meds: None   Family history: positive daughter, mother, brother, great-grandma -- all on gtts, surgery and mother lost vision   Meds AEs/intolerance: AGN - contact dermatitis     PMHx: No history of Asthma and respiratory problems/Cardiac/Renal/Kidney stones/Sulfa Allergy  Previous testing:  Visual field 11/23/2020:  Right eye - inferior nasal step and paracentral defect, slightly more depressed at point but also less depressed at other points, likely fluctuations, reliable  Left eye - inferior nasal/arcuate defect, central defect  Fluctuations, reliable  Visual field February 25, 2021:  Right eye - inferior nasal step and paracentral defect, stable, reliable  OCT Optic Nerve RNFL Spectralis 11/23/2020  right eye: superior and inferior RNFL thinning, stable   left eye: superior and inferior RNFL thinning, possible worsening of IN thinning likely fluctuating   OCT Macula 7/26/22  - right eye: normal foveal contour no intraretinal or subretinal fluid  - left eye: irregular appearing retina, stable, no subretinal or intraretinal fluid, appears stable    Today's testing:  IOP today: 5/3 mmHg  DMV VF 12/05/2024   Right eye 100 degrees of horizontal visual field   Left eye 30 degrees of horizontal visual field      Additional Ocular History:    2. Pseudophakic each eye  - stable    3. Hypotony  - IOPs in single digits since 2014 -- irregular retina contour on exam  today but appears stable when compared to previous    4. Periorbital dermatitis, bilateral  - Could consider dermatology referral  - Patient has not noticed but may be 2/2 poor vision each eye     Plan/Recommendations:  Discussed findings with patient.  Doing well, stable with no evidence of hypotony on exam despite low IOP   Careful precautions given to patient to watch out for any signs and symptoms of bleb leak/infection. Advised to avoid eye rubbing and lake water.     RTC in 6-12 months [facility], IOP, RNFL, VF    Jose Liz MD  Resident Physician, PGY-3  Department of Ophthalmology     Physician Attestation     Attending Physician Attestation:  Complete documentation of historical and exam elements from today's encounter can be found in the full encounter summary report (not reduplicated in this progress note). I personally obtained the chief complaint(s) and history of present illness. I confirmed and edited as necessary the review of systems, past medical/surgical history, family history, social history, and examination findings as documented by others; and I examined the patient myself. I personally reviewed the relevant tests, images, and reports as documented above. I personally reviewed the ophthalmic test(s) associated with this encounter, agree with the interpretation(s) as documented by the resident/fellow and have edited the corresponding report(s) as necessary. I formulated and edited as necessary the assessment and plan and discussed the findings and management plan with the patient and any family members present at the time of the visit.  Ramon Morin M.D., Glaucoma, December 5, 2024

## 2024-12-05 NOTE — NURSING NOTE
Chief Complaints and History of Present Illnesses   Patient presents with    Glaucoma Follow Up     Pt here for 5 month follow up on Juvenile glaucoma each eye.     Chief Complaint(s) and History of Present Illness(es)       Glaucoma Follow Up              Laterality: both eyes    Comments: Pt here for 5 month follow up on Juvenile glaucoma each eye.              Comments    Pt notes vision is largely unchanged since last visit . Glasses were last updated over two years ago- being updated today with Dr Tucker. Brought DMV paperwork to be filled out.     ASHLEY Mittal on 12/5/2024 at 9:47 AM

## 2024-12-10 ENCOUNTER — TELEPHONE (OUTPATIENT)
Dept: FAMILY MEDICINE | Facility: CLINIC | Age: 79
End: 2024-12-10

## 2024-12-10 NOTE — TELEPHONE ENCOUNTER
Patient Quality Outreach    Patient is due for the following:   Physical Annual Wellness Visit  There are no preventive care reminders to display for this patient.    Action(s) Taken:   Schedule a Annual Wellness Visit    Type of outreach:    Sent [employer] message.    Questions for provider review:    None           Navya Haynes

## 2025-01-21 ENCOUNTER — OFFICE VISIT (OUTPATIENT)
Dept: FAMILY MEDICINE | Facility: CLINIC | Age: 80
End: 2025-01-21
Payer: COMMERCIAL

## 2025-01-21 ENCOUNTER — ANCILLARY PROCEDURE (OUTPATIENT)
Dept: GENERAL RADIOLOGY | Facility: CLINIC | Age: 80
End: 2025-01-21
Attending: NURSE PRACTITIONER
Payer: COMMERCIAL

## 2025-01-21 VITALS
HEART RATE: 74 BPM | WEIGHT: 158.9 LBS | BODY MASS INDEX: 26.44 KG/M2 | TEMPERATURE: 97.7 F | DIASTOLIC BLOOD PRESSURE: 70 MMHG | OXYGEN SATURATION: 98 % | RESPIRATION RATE: 16 BRPM | SYSTOLIC BLOOD PRESSURE: 120 MMHG

## 2025-01-21 DIAGNOSIS — C50.911 MALIGNANT NEOPLASM OF RIGHT FEMALE BREAST, UNSPECIFIED ESTROGEN RECEPTOR STATUS, UNSPECIFIED SITE OF BREAST (H): ICD-10-CM

## 2025-01-21 DIAGNOSIS — M89.8X9 BONY PROMINENCE: ICD-10-CM

## 2025-01-21 DIAGNOSIS — R79.89 ELEVATED SERUM CREATININE: ICD-10-CM

## 2025-01-21 DIAGNOSIS — N18.31 CHRONIC KIDNEY DISEASE, STAGE 3A (H): ICD-10-CM

## 2025-01-21 DIAGNOSIS — I10 ESSENTIAL HYPERTENSION: Primary | ICD-10-CM

## 2025-01-21 DIAGNOSIS — E55.9 VITAMIN D DEFICIENCY: ICD-10-CM

## 2025-01-21 PROCEDURE — 99214 OFFICE O/P EST MOD 30 MIN: CPT | Performed by: NURSE PRACTITIONER

## 2025-01-21 PROCEDURE — 71046 X-RAY EXAM CHEST 2 VIEWS: CPT | Mod: TC | Performed by: RADIOLOGY

## 2025-01-21 PROCEDURE — 80048 BASIC METABOLIC PNL TOTAL CA: CPT | Performed by: NURSE PRACTITIONER

## 2025-01-21 PROCEDURE — 82306 VITAMIN D 25 HYDROXY: CPT | Performed by: NURSE PRACTITIONER

## 2025-01-21 PROCEDURE — 36415 COLL VENOUS BLD VENIPUNCTURE: CPT | Performed by: NURSE PRACTITIONER

## 2025-01-21 ASSESSMENT — PAIN SCALES - GENERAL: PAINLEVEL_OUTOF10: NO PAIN (0)

## 2025-01-21 NOTE — PROGRESS NOTES
Yoel Mccullough is a 79 year old, presenting for the following health issues:  RECHECK (Vitamin D and Creatinine Follow Up/Bump on ribcage, felt it recently, chest xray on 2020. Refer to that Xray.)        1/21/2025     4:07 PM   Additional Questions   Roomed by LH LPN   Accompanied by Daughter Shaylee     History of Present Illness       Reason for visit:  Blood work    She eats 2-3 servings of fruits and vegetables daily.She consumes 1 sweetened beverage(s) daily. She exercises with enough effort to increase her heart rate 3 or less days per week.   She is taking medications regularly.       Hypertension Follow-up    Do you check your blood pressure regularly outside of the clinic? No   Are you following a low salt diet? Yes  Are your blood pressures ever more than 140 on the top number (systolic) OR more   than 90 on the bottom number (diastolic), for example 140/90? No      Review of Systems  Constitutional, HEENT, cardiovascular, pulmonary, GI, , musculoskeletal, neuro, skin, endocrine and psych systems are negative, except as otherwise noted.  Overall feeling well.  Noticed a bump on bottom of rib cage about 3 months ago- size stable- not painful.  Unsure if it's been there longer.       Objective    /70 (BP Location: Left arm, Patient Position: Sitting, Cuff Size: Adult Regular)   Pulse 74   Temp 97.7  F (36.5  C) (Oral)   Resp 16   Wt 72.1 kg (158 lb 14.4 oz)   LMP 08/17/1997 (Exact Date)   SpO2 98%   Breastfeeding No   BMI 26.44 kg/m    Body mass index is 26.44 kg/m .  Physical Exam   GENERAL: alert and no distress  RESP: lungs clear to auscultation - no rales, rhonchi or wheezes  CV: regular rate and rhythm, normal S1 S2, no S3 or S4, no murmur, click or rub, no peripheral edema   MS: no gross musculoskeletal defects noted, no edema.  Bony prominence right side of base of sternum  NEURO: Normal strength and tone, mentation intact and speech normal  PSYCH: mentation appears normal,  affect normal/bright    Xray - Reviewed and interpreted by me.        A/P:  1. Essential hypertension (Primary)  Under good control  - Basic metabolic panel; Future  - Basic metabolic panel    2. Elevated serum creatinine  - Basic metabolic panel; Future  - Basic metabolic panel    3. Vitamin D deficiency  - Vitamin D Deficiency; Future  - Vitamin D Deficiency    4. Bony prominence  - XR Chest 2 Views; Future    5. Chronic kidney disease, stage 3a (H)  -BMP    6. Malignant neoplasm of right female breast, unspecified estrogen receptor status, unspecified site of breast (H)  Right breast 2020- managed per oncologist     Signed Electronically by: Sadia Robb, CNP

## 2025-01-22 ENCOUNTER — TELEPHONE (OUTPATIENT)
Dept: FAMILY MEDICINE | Facility: CLINIC | Age: 80
End: 2025-01-22
Payer: COMMERCIAL

## 2025-01-22 NOTE — TELEPHONE ENCOUNTER
Call placed to patient and she was given results of chest xray, results were normal.  Patient voiced she understood.   Navya Haynes LPN on 1/22/2025 at 11:01 AM

## 2025-01-22 NOTE — TELEPHONE ENCOUNTER
----- Message from Sadia Robb sent at 1/22/2025  7:30 AM CST -----  Please inform patient of normal result.

## 2025-01-22 NOTE — TELEPHONE ENCOUNTER
Patient Quality Outreach    Patient is due for the following:   Physical Annual Wellness Visit    Action(s) Taken:   Schedule a Annual Wellness Visit    Type of outreach:    Patient was seen by provider on 11/21/2025. Plans on returning to clinic per Provider in 3 months for Medicare Wellness visit.    Questions for provider review:    None           Navya Haynes LPN

## 2025-01-23 LAB
ANION GAP SERPL CALCULATED.3IONS-SCNC: 9 MMOL/L (ref 7–15)
BUN SERPL-MCNC: 25.7 MG/DL (ref 8–23)
CALCIUM SERPL-MCNC: 9.6 MG/DL (ref 8.8–10.4)
CHLORIDE SERPL-SCNC: 106 MMOL/L (ref 98–107)
CREAT SERPL-MCNC: 1.24 MG/DL (ref 0.51–0.95)
EGFRCR SERPLBLD CKD-EPI 2021: 44 ML/MIN/1.73M2
GLUCOSE SERPL-MCNC: 124 MG/DL (ref 70–99)
HCO3 SERPL-SCNC: 26 MMOL/L (ref 22–29)
POTASSIUM SERPL-SCNC: 4.2 MMOL/L (ref 3.4–5.3)
SODIUM SERPL-SCNC: 141 MMOL/L (ref 135–145)
VIT D+METAB SERPL-MCNC: 44 NG/ML (ref 20–50)

## 2025-01-27 ENCOUNTER — TELEPHONE (OUTPATIENT)
Dept: FAMILY MEDICINE | Facility: CLINIC | Age: 80
End: 2025-01-27
Payer: COMMERCIAL

## 2025-01-27 DIAGNOSIS — N18.31 CHRONIC KIDNEY DISEASE, STAGE 3A (H): Primary | ICD-10-CM

## 2025-01-27 DIAGNOSIS — R79.89 ELEVATED SERUM CREATININE: ICD-10-CM

## 2025-01-27 NOTE — TELEPHONE ENCOUNTER
Spoke with patient to relay provider. She verbalized understanding.    Ricky James RN  [facility]

## 2025-01-27 NOTE — TELEPHONE ENCOUNTER
General Call    Contacts       Contact Date/Time Type Contact Phone/Fax    01/27/2025 11:30 AM CST Phone (Incoming) Sadia Rider (Self) [phone] (H)          Reason for Call:  pt states very concerned about creatin level and when she was in office in oct it was high, was told to drink more water for 3 months  and come back in 3 months she did do it, now it was higher yet as of last Tuesday got a call told her to hydrate again  and come back in 2 months but she is fearful it will get worse, wants to come in asap to see provider,    What are your questions or concerns:  creatin levels getting higher    Date of last appointment with provider: tuesday    Could we send this information to you in [facility] or would you prefer to receive a phone call?:   Patient would prefer a phone call  anytime today but will be gone from 3:50 4:50 pm Okay to leave a detailed message?: Yes at Home number on file [phone] (home)

## 2025-01-27 NOTE — TELEPHONE ENCOUNTER
Patient has chronic kidney disease and this isn't a dramatic increase.  No need to come in to see me at this point but would recommend a consult with nephrology for next follow-up.  Referral placed.

## 2025-01-31 ENCOUNTER — TRANSFERRED RECORDS (OUTPATIENT)
Dept: HEALTH INFORMATION MANAGEMENT | Facility: CLINIC | Age: 80
End: 2025-01-31
Payer: COMMERCIAL

## 2025-02-07 ENCOUNTER — HOSPITAL ENCOUNTER (OUTPATIENT)
Dept: ULTRASOUND IMAGING | Facility: CLINIC | Age: 80
Discharge: HOME | End: 2025-02-07
Payer: COMMERCIAL

## 2025-02-07 DIAGNOSIS — N18.30 CKD (CHRONIC KIDNEY DISEASE) STAGE 3, GFR 30-59 ML/MIN (H): ICD-10-CM

## 2025-02-07 PROCEDURE — 76770 US EXAM ABDO BACK WALL COMP: CPT

## 2025-03-12 NOTE — CONFIDENTIAL NOTE
DIAGNOSIS:    Elevated serum creatinine   Chronic kidney disease, stage 3a (H)      DATE RECEIVED: 06.04.2025    NOTES STATUS DETAILS   OFFICE NOTE from referring provider Internal 01.27.2025 Sadia Robb CNP    OFFICE NOTE from other specialist  Received 01.31.2025 [facility]   MEDICATION LIST Internal    IMAGING  (NEED IMAGES AND REPORTS)     KIDNEY ULTRASOUND Internal 02.07.2025 US Renal Complete   LABS     CBC Internal 01.02.2024   CMP Internal 01.02.2024   BMP Internal 01.21.2025   UA Internal 11.02.2020   URINE PROTEIN Internal 11.02.2020

## 2025-03-21 ENCOUNTER — TELEPHONE (OUTPATIENT)
Dept: FAMILY MEDICINE | Facility: CLINIC | Age: 80
End: 2025-03-21
Payer: COMMERCIAL

## 2025-03-21 NOTE — TELEPHONE ENCOUNTER
General Call    Contacts       Contact Date/Time Type Contact Phone/Fax    03/21/2025 08:31 AM CDT Phone (Incoming) Sadia Rider (Self) [phone] (H)          Reason for Call:  Patient did not know her appointment for 4pm was cancelled. She needs to get in before April 9th, for her cancer appointment. Can patient be fit in at the [facility]?     Could we send this information to you in [facility] or would you prefer to receive a phone call?:   Patient would prefer a phone call   Okay to leave a detailed message?: No at Home number on file [phone] (home)

## 2025-03-24 ENCOUNTER — ANCILLARY PROCEDURE (OUTPATIENT)
Dept: MAMMOGRAPHY | Facility: CLINIC | Age: 80
End: 2025-03-24
Attending: INTERNAL MEDICINE
Payer: COMMERCIAL

## 2025-03-24 DIAGNOSIS — Z17.0 MALIGNANT NEOPLASM OF UPPER-OUTER QUADRANT OF RIGHT BREAST IN FEMALE, ESTROGEN RECEPTOR POSITIVE (H): ICD-10-CM

## 2025-03-24 DIAGNOSIS — C50.411 MALIGNANT NEOPLASM OF UPPER-OUTER QUADRANT OF RIGHT BREAST IN FEMALE, ESTROGEN RECEPTOR POSITIVE (H): ICD-10-CM

## 2025-03-24 DIAGNOSIS — Z12.31 ENCOUNTER FOR SCREENING MAMMOGRAM FOR MALIGNANT NEOPLASM OF BREAST: ICD-10-CM

## 2025-03-24 PROCEDURE — 77063 BREAST TOMOSYNTHESIS BI: CPT | Mod: GC | Performed by: RADIOLOGY

## 2025-03-24 PROCEDURE — 77067 SCR MAMMO BI INCL CAD: CPT | Mod: GC | Performed by: RADIOLOGY

## 2025-03-25 ENCOUNTER — ANCILLARY ORDERS (OUTPATIENT)
Dept: MAMMOGRAPHY | Facility: CLINIC | Age: 80
End: 2025-03-25
Payer: COMMERCIAL

## 2025-03-25 DIAGNOSIS — R92.8 ABNORMAL MAMMOGRAM OF LEFT BREAST: Primary | ICD-10-CM

## 2025-03-26 ENCOUNTER — ANCILLARY PROCEDURE (OUTPATIENT)
Dept: BONE DENSITY | Facility: CLINIC | Age: 80
End: 2025-03-26
Attending: NURSE PRACTITIONER
Payer: COMMERCIAL

## 2025-03-26 DIAGNOSIS — M81.0 OSTEOPOROSIS WITHOUT CURRENT PATHOLOGICAL FRACTURE, UNSPECIFIED OSTEOPOROSIS TYPE: ICD-10-CM

## 2025-03-27 ENCOUNTER — ANCILLARY PROCEDURE (OUTPATIENT)
Dept: MAMMOGRAPHY | Facility: CLINIC | Age: 80
End: 2025-03-27
Attending: INTERNAL MEDICINE
Payer: COMMERCIAL

## 2025-03-27 DIAGNOSIS — R92.8 ABNORMAL MAMMOGRAM OF LEFT BREAST: ICD-10-CM

## 2025-03-27 PROCEDURE — 76642 ULTRASOUND BREAST LIMITED: CPT | Mod: LT

## 2025-03-27 PROCEDURE — 77065 DX MAMMO INCL CAD UNI: CPT | Mod: LT

## 2025-03-27 PROCEDURE — G0279 TOMOSYNTHESIS, MAMMO: HCPCS

## 2025-03-31 ENCOUNTER — TELEPHONE (OUTPATIENT)
Dept: FAMILY MEDICINE | Facility: CLINIC | Age: 80
End: 2025-03-31
Payer: COMMERCIAL

## 2025-03-31 NOTE — TELEPHONE ENCOUNTER
Order/Referral Request    Who is requesting: Pt    Orders being requested: Referral to Endocrinologist     Reason service is needed/diagnosis: Osteoporosis without current pathological fracture, unspecified osteoporosis type    When are orders needed by: ASAP    Has this been discussed with Provider: Yes    Does patient have a preference on a Group/Provider/Facility? Pt has an appt scheduled with [facility] but they're booked out further than pt would like, do not cancel that referral in case they end up going through with [facility]. Pt would like to request for an additional referral to see if they can get scheduled in earlier at [facility]     Please call pt back to inform them when referral has been placed.     Does patient have an appointment scheduled?: No    Where to send orders: Fax - [phone]     Could we send this information to you in [facility] or would you prefer to receive a phone call?:   Patient would prefer a phone call   Okay to leave a detailed message?: Yes at Home number on file [phone] (home)

## 2025-04-08 NOTE — PROGRESS NOTES
Oncology/Hematology Visit Note    Apr 9, 2025    Reason for visit: Follow up breast cancer    Oncology HPI: Sadia Rider is a 80 year old female with right-sided infiltrating ductal carcinoma, diagnosed in 2020, ER/[place] positive, HER2 negative.  She is s/p lumpectomy, Oncotype was 13, started on anastrozole, first dose May 2020.  Bone density started drop, therefore anastrozole was discontinued and switched to tamoxifen in January 2023.  Diagnostic mammogram of the left side including breast ultrasound were unremarkable.  Bone density in March 2025 with persistent osteopenia.  She continued on calcium and vitamin D.    She is here today for close follow-up.    Interval History: Sadia is here unaccompanied.  She continues to tolerate tamoxifen pretty well.  She is tolerating calcium and vitamin D and she is taking this 3 times a day.  Her primary referred her to endocrinology and she is seeing them next week.  No breast concerns today.    Review of Systems: See interval hx. Denies fevers, chills, HA, dizziness, changes in vision, cough, SOB, abdominal pain, N/V, diarrhea, changes in urination, bleeding, bruising.     PMHx and Social Hx reviewed per EPIC.      Medications:  Current Outpatient Medications   Medication Sig Dispense Refill    aspirin 81 MG tablet Take 1 tablet by mouth daily.      calcium carb-cholecalciferol 600-500 MG-UNIT CAPS Take 1 capsule by mouth 3 times daily      lisinopril (ZESTRIL) 5 MG tablet Take 1 tablet by mouth daily at 2 pm.      Multiple Vitamins-Minerals (CENTRUM ULTRA WOMENS PO) Take 1 tablet by mouth daily ( calcium: 300 mg and vitamin d: 1000 units)      simvastatin (ZOCOR) 20 MG tablet Take 1 tablet (20 mg) by mouth at bedtime. 90 tablet 3    tamoxifen (NOLVADEX) 20 MG tablet Take 1 tablet (20 mg) by mouth daily. 90 tablet 3       Allergies   Allergen Reactions    Metoprolol Other (See Comments)     Other reaction(s): Dizziness  Extreme fatigue/lethargy    Alphagan [Brimonidine  "Tartrate] Blisters    Brimonidine Itching     Other reaction(s): Blisters    Brimonidine Tartrate Other (See Comments)       EXAM:    /77   Pulse 67   Temp 98  F (36.7  C) (Temporal)   Resp 14   Ht 1.651 m (5' 5\")   Wt 70.9 kg (156 lb 4.8 oz)   LMP 08/17/1997 (Exact Date)   SpO2 97%   BMI 26.01 kg/m      GENERAL:  Female, in no acute distress.  Alert and oriented x3.   HEENT:  Normocephalic, atraumatic.    LYMPH NODES:  No palpable axillary lymphadenopathy appreciated.  CV:  RRR, No murmurs, gallops, or rubs.   LUNGS:  Clear to auscultation bilaterally.   BREAST: Bilateral breasts were examined.  Well-healed lumpectomy and axillary incisions.  No lumps, bumps, tenderness, nipple changes, puckering.  ABDOMEN:  Soft, nontender and nondistended.    EXTREMITIES:  No clubbing, cyanosis, or edema.   SKIN: No rash  PSYCH: Calm and cooperative       Labs: n/a    Imaging: n/a    Impression/Plan: Sadia Rider is a 80 year old female with ER/IA positive, HER2 negative right-sided breast cancer s/p lumpectomy previously on anastrozole, now on tamoxifen.    Breast cancer: Infiltrating ductal carcinoma, diagnosed in 2020, ER/IA positive, HER2 negative.  She is s/p lumpectomy, Oncotype 13, started anastrozole with first dose May 2020.  She would start on anastrozole, however her bone density was not tolerating it, therefore switched over to tamoxifen with no break in hormonal therapy.  She is tolerating this pretty well, but she is ready to be done here soon.  No clinical concerns for recurrence on exam today.  She had a diagnostic mammogram and breast ultrasound in March 2025 and this was unremarkable.    -Continue tamoxifen through May 2025, then she can discontinue  -No further active oncology follow-up needed  -We discussed referring to survivorship and she agreed with that, referral was placed today    Bone health: Ongoing osteopenia, currently on calcium and vitamin D.  Most recent DEXA scan in March 2025 " with persistent osteopenia.  Her primary physician has referred her to endocrinology, appointment is next week.      Chart documentation with Dragon Voice recognition Software. Although reviewed after completion, some words and grammatical errors may remain.    20 minutes spent on the date of the encounter doing chart review, review of test results, interpretation of tests, patient visit, and documentation     Christina Hendricks PA-C  Hematology/Oncology  [facility]

## 2025-04-09 ENCOUNTER — ONCOLOGY VISIT (OUTPATIENT)
Dept: ONCOLOGY | Facility: CLINIC | Age: 80
End: 2025-04-09
Attending: INTERNAL MEDICINE
Payer: COMMERCIAL

## 2025-04-09 VITALS
DIASTOLIC BLOOD PRESSURE: 77 MMHG | BODY MASS INDEX: 26.04 KG/M2 | RESPIRATION RATE: 14 BRPM | HEIGHT: 65 IN | OXYGEN SATURATION: 97 % | TEMPERATURE: 98 F | WEIGHT: 156.3 LBS | HEART RATE: 67 BPM | SYSTOLIC BLOOD PRESSURE: 137 MMHG

## 2025-04-09 DIAGNOSIS — C50.411 MALIGNANT NEOPLASM OF UPPER-OUTER QUADRANT OF RIGHT BREAST IN FEMALE, ESTROGEN RECEPTOR POSITIVE (H): Primary | ICD-10-CM

## 2025-04-09 DIAGNOSIS — M81.0 AGE-RELATED OSTEOPOROSIS WITHOUT CURRENT PATHOLOGICAL FRACTURE: ICD-10-CM

## 2025-04-09 DIAGNOSIS — Z17.0 MALIGNANT NEOPLASM OF UPPER-OUTER QUADRANT OF RIGHT BREAST IN FEMALE, ESTROGEN RECEPTOR POSITIVE (H): Primary | ICD-10-CM

## 2025-04-09 PROCEDURE — G0463 HOSPITAL OUTPT CLINIC VISIT: HCPCS | Performed by: PHYSICIAN ASSISTANT

## 2025-04-09 RX ORDER — LISINOPRIL 5 MG/1
1 TABLET ORAL
COMMUNITY
Start: 2025-03-31

## 2025-04-09 ASSESSMENT — PAIN SCALES - GENERAL: PAINLEVEL_OUTOF10: NO PAIN (0)

## 2025-04-09 NOTE — NURSING NOTE
"Oncology Rooming Note    April 9, 2025 3:31 PM   Sadia Rider is a 80 year old female who presents for:    Chief Complaint   Patient presents with    Oncology Clinic Visit     Malignant neoplasm of upper-outer quadrant of right breast in female, estrogen receptor positive       Initial Vitals: /77   Pulse 67   Temp 98  F (36.7  C) (Temporal)   Resp 14   Ht 1.651 m (5' 5\")   Wt 70.9 kg (156 lb 4.8 oz)   LMP 08/17/1997 (Exact Date)   SpO2 97%   BMI 26.01 kg/m   Estimated body mass index is 26.01 kg/m  as calculated from the following:    Height as of this encounter: 1.651 m (5' 5\").    Weight as of this encounter: 70.9 kg (156 lb 4.8 oz). Body surface area is 1.8 meters squared.  No Pain (0) Comment: Data Unavailable   Patient's last menstrual period was 08/17/1997 (exact date).  Allergies reviewed: Yes  Medications reviewed: Yes    Medications: Medication refills not needed today.  Pharmacy name entered into [employer]: [id number] [place] [address]    Frailty Screening:   Is the patient here for a new oncology consult visit in cancer care? 2. No    PHQ9:  Did this patient require a PHQ9?: No      Clinical concerns: Follow up     Wondering about her bone density and mammogram results and wondering if she is needing to go back on Fosamax for her bones. She does have an upcoming appointment with endocrinologist.       Lolita Banks MA              "

## 2025-04-09 NOTE — LETTER
4/9/2025      Sadia Rider  [address] [address]      Dear Colleague,    Thank you for referring your patient, Sadia Rider, to the [facility]. Please see a copy of my visit note below.    Oncology/Hematology Visit Note    Apr 9, 2025    Reason for visit: Follow up breast cancer    Oncology HPI: Sadia Rider is a 80 year old female with right-sided infiltrating ductal carcinoma, diagnosed in 2020, ER/HI positive, HER2 negative.  She is s/p lumpectomy, Oncotype was 13, started on anastrozole, first dose May 2020.  Bone density started drop, therefore anastrozole was discontinued and switched to tamoxifen in January 2023.  Diagnostic mammogram of the left side including breast ultrasound were unremarkable.  Bone density in March 2025 with persistent osteopenia.  She continued on calcium and vitamin D.    She is here today for close follow-up.    Interval History: Sadia is here unaccompanied.  She continues to tolerate tamoxifen pretty well.  She is tolerating calcium and vitamin D and she is taking this 3 times a day.  Her primary referred her to endocrinology and she is seeing them next week.  No breast concerns today.    Review of Systems: See interval hx. Denies fevers, chills, HA, dizziness, changes in vision, cough, SOB, abdominal pain, N/V, diarrhea, changes in urination, bleeding, bruising.     PMHx and Social Hx reviewed per EPIC.      Medications:  Current Outpatient Medications   Medication Sig Dispense Refill     aspirin 81 MG tablet Take 1 tablet by mouth daily.       calcium carb-cholecalciferol 600-500 MG-UNIT CAPS Take 1 capsule by mouth 3 times daily       lisinopril (ZESTRIL) 5 MG tablet Take 1 tablet by mouth daily at 2 pm.       Multiple Vitamins-Minerals (CENTRUM ULTRA WOMENS PO) Take 1 tablet by mouth daily ( calcium: 300 mg and vitamin d: 1000 units)       simvastatin (ZOCOR) 20 MG tablet Take 1 tablet (20 mg) by mouth at bedtime. 90  "tablet 3     tamoxifen (NOLVADEX) 20 MG tablet Take 1 tablet (20 mg) by mouth daily. 90 tablet 3       Allergies   Allergen Reactions     Metoprolol Other (See Comments)     Other reaction(s): Dizziness  Extreme fatigue/lethargy     Alphagan [Brimonidine Tartrate] Blisters     Brimonidine Itching     Other reaction(s): Blisters     Brimonidine Tartrate Other (See Comments)       EXAM:    /77   Pulse 67   Temp 98  F (36.7  C) (Temporal)   Resp 14   Ht 1.651 m (5' 5\")   Wt 70.9 kg (156 lb 4.8 oz)   LMP 08/17/1997 (Exact Date)   SpO2 97%   BMI 26.01 kg/m      GENERAL:  Female, in no acute distress.  Alert and oriented x3.   HEENT:  Normocephalic, atraumatic.    LYMPH NODES:  No palpable axillary lymphadenopathy appreciated.  CV:  RRR, No murmurs, gallops, or rubs.   LUNGS:  Clear to auscultation bilaterally.   BREAST: Bilateral breasts were examined.  Well-healed lumpectomy and axillary incisions.  No lumps, bumps, tenderness, nipple changes, puckering.  ABDOMEN:  Soft, nontender and nondistended.    EXTREMITIES:  No clubbing, cyanosis, or edema.   SKIN: No rash  PSYCH: Calm and cooperative       Labs: n/a    Imaging: n/a    Impression/Plan: Sadia Rider is a 80 year old female with ER/MI positive, HER2 negative right-sided breast cancer s/p lumpectomy previously on anastrozole, now on tamoxifen.    Breast cancer: Infiltrating ductal carcinoma, diagnosed in 2020, ER/MI positive, HER2 negative.  She is s/p lumpectomy, Oncotype 13, started anastrozole with first dose May 2020.  She would start on anastrozole, however her bone density was not tolerating it, therefore switched over to tamoxifen with no break in hormonal therapy.  She is tolerating this pretty well, but she is ready to be done here soon.  No clinical concerns for recurrence on exam today.  She had a diagnostic mammogram and breast ultrasound in March 2025 and this was unremarkable.    -Continue tamoxifen through May 2025, then she can " discontinue  -No further active oncology follow-up needed  -We discussed referring to survivorship and she agreed with that, referral was placed today    Bone health: Ongoing osteopenia, currently on calcium and vitamin D.  Most recent DEXA scan in March 2025 with persistent osteopenia.  Her primary physician has referred her to endocrinology, appointment is next week.      Chart documentation with Dragon Voice recognition Software. Although reviewed after completion, some words and grammatical errors may remain.    20 minutes spent on the date of the encounter doing chart review, review of test results, interpretation of tests, patient visit, and documentation     Christina Hendricks PA-C  Hematology/Oncology  [facility]                  Again, thank you for allowing me to participate in the care of your patient.        Sincerely,        Christina Hendricks PA-C    Electronically signed

## 2025-04-13 ENCOUNTER — HEALTH MAINTENANCE LETTER (OUTPATIENT)
Age: 80
End: 2025-04-13

## 2025-04-15 ENCOUNTER — PATIENT OUTREACH (OUTPATIENT)
Dept: ONCOLOGY | Facility: CLINIC | Age: 80
End: 2025-04-15
Payer: COMMERCIAL

## 2025-04-15 NOTE — PROGRESS NOTES
RN spoke with patient on the phone about referral to cancer survivorship clinic.  RN explained Nelia Vega's role and what she can expect at a visit to the survivorship clinic.  Patient asked many questions about options around staying with Christina in [place] vs. fully discharging from oncology to primary care vs. scheduling a visit with Nelia. Patient explained that because of her age and where she lives she's wanting to consolidate appointments.  RN indicated there is no urgency in scheduling this appointment and that we could attempt to schedule it with her next mammogram in March 2026.  Patient expressed desire to think further about the information we discussed and talk it over with her daughter.  She stated she will call RN back with her decision.      Dixie Garland RN  Cancer Survivorship Clinic

## 2025-05-23 ENCOUNTER — TRANSFERRED RECORDS (OUTPATIENT)
Dept: HEALTH INFORMATION MANAGEMENT | Facility: CLINIC | Age: 80
End: 2025-05-23
Payer: COMMERCIAL

## 2025-06-04 ENCOUNTER — PRE VISIT (OUTPATIENT)
Dept: NEPHROLOGY | Facility: CLINIC | Age: 80
End: 2025-06-04

## 2025-06-30 DIAGNOSIS — Q15.0 JUVENILE GLAUCOMA: Primary | ICD-10-CM

## 2025-07-01 ENCOUNTER — OFFICE VISIT (OUTPATIENT)
Dept: OPHTHALMOLOGY | Facility: CLINIC | Age: 80
End: 2025-07-01
Attending: OPHTHALMOLOGY
Payer: COMMERCIAL

## 2025-07-01 DIAGNOSIS — Q15.0 JUVENILE GLAUCOMA: ICD-10-CM

## 2025-07-01 DIAGNOSIS — H44.423: Primary | ICD-10-CM

## 2025-07-01 PROCEDURE — 99213 OFFICE O/P EST LOW 20 MIN: CPT | Performed by: OPHTHALMOLOGY

## 2025-07-01 PROCEDURE — 92133 CPTRZD OPH DX IMG PST SGM ON: CPT | Performed by: OPHTHALMOLOGY

## 2025-07-01 PROCEDURE — 92083 EXTENDED VISUAL FIELD XM: CPT | Performed by: OPHTHALMOLOGY

## 2025-07-01 PROCEDURE — G0463 HOSPITAL OUTPT CLINIC VISIT: HCPCS | Performed by: OPHTHALMOLOGY

## 2025-07-01 ASSESSMENT — REFRACTION_WEARINGRX
OS_SPHERE: -1.00
OD_ADD: +2.75
OS_ADD: +2.75
OS_CYLINDER: SPHERE
OD_SPHERE: -4.50
OD_AXIS: 120
OD_CYLINDER: +1.25

## 2025-07-01 ASSESSMENT — EXTERNAL EXAM - LEFT EYE: OS_EXAM: NORMAL

## 2025-07-01 ASSESSMENT — TONOMETRY
OD_IOP_MMHG: 6
OD_IOP_MMHG: 10
OS_IOP_MMHG: 3
IOP_METHOD: TONOPEN
OS_IOP_MMHG: 4
IOP_METHOD: APPLANATION

## 2025-07-01 ASSESSMENT — CUP TO DISC RATIO
OS_RATIO: 0.7
OD_RATIO: 0.6

## 2025-07-01 ASSESSMENT — VISUAL ACUITY
METHOD: SNELLEN - SINGLE
OS_SC: 20/200ECC
OD_SC: 20/80
CORRECTION_TYPE: GLASSES
OD_PH_SC: 20/70

## 2025-07-01 ASSESSMENT — EXTERNAL EXAM - RIGHT EYE: OD_EXAM: NORMAL

## 2025-07-01 NOTE — PROGRESS NOTES
Chief Complaint/Presenting Concern: glaucoma follow up    History of Present Illness:   Sadia Rider is a 80 year old patient who presents for glaucoma follow up, history of trab both eyes.    Today she notes no change in vision in either eye.     Relevant Past Medical/Family/Social History: None relevant     Relevant Review of Systems: None relevant       Diagnosis: Juvenile Open Angle Glaucoma each eye   Previous glaucoma surgery/laser:  s/p ALT OU in 2009  s/p Trab OS in 2004 by Dr. Mathsi  s/p Trab OD in 1990 by Dr. Muñoz  s/p Trab OU at 29 yo  Currently Meds: None   Family history: positive daughter, mother, brother, great-grandma -- all on gtts, surgery and mother lost vision   Meds AEs/intolerance: AGN - contact dermatitis     PMHx: No history of Asthma and respiratory problems/Cardiac/Renal/Kidney stones/Sulfa Allergy  Previous testing:  OCT Macula 7/26/22  - right eye: normal foveal contour no intraretinal or subretinal fluid  - left eye: irregular appearing retina, stable, no subretinal or intraretinal fluid, appears stable  DMV [facility]   Right eye 100 degrees of horizontal visual field   Left eye 30 degrees of horizontal visual field     Today's testing:  IOP today: 5/3 mmHg  Visual field 7/1/25  Right eye - few paracentral depressed points  Left eye - dense central depression   OCT Optic Nerve RNFL Spectralis 7/1/25  right eye: superior and inferior RNFL thinning, stable   left eye: superior and inferior RNFL thinning, stable      Additional Ocular History:    2. 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 Periorbital dermatitis, bilateral  - Could consider dermatology referral  - Patient has not noticed but may be 2/2 poor vision each eye     Plan/Recommendations:  Discussed findings with patient.  Doing well, stable with no evidence of hypotony on exam despite low IOP    Careful precautions given to patient to watch out for any signs and symptoms of bleb leak/infection. Advised to avoid eye rubbing and lake water.     RTC in 6 months VA, IOP, DMV     Physician Attestation     Attending Physician Attestation:  Complete documentation of historical and exam elements from today's encounter can be found in the full encounter summary report (not reduplicated in this progress note). I personally obtained the chief complaint(s) and history of present illness. I confirmed and edited as necessary the review of systems, past medical/surgical history, family history, social history, and examination findings as documented by others; and I examined the patient myself. I personally reviewed the relevant tests, images, and reports as documented above I formulated and edited as necessary the assessment and plan and discussed the findings and management plan with the patient and any family members present at the time of the visit.  Ramon Morin M.D., Glaucoma, July 1, 2025

## 2025-07-08 ENCOUNTER — OFFICE VISIT (OUTPATIENT)
Dept: FAMILY MEDICINE | Facility: CLINIC | Age: 80
End: 2025-07-08
Payer: COMMERCIAL

## 2025-07-08 VITALS
TEMPERATURE: 97.6 F | WEIGHT: 154 LBS | SYSTOLIC BLOOD PRESSURE: 126 MMHG | OXYGEN SATURATION: 98 % | BODY MASS INDEX: 25.66 KG/M2 | RESPIRATION RATE: 16 BRPM | DIASTOLIC BLOOD PRESSURE: 72 MMHG | HEIGHT: 65 IN | HEART RATE: 75 BPM

## 2025-07-08 DIAGNOSIS — R53.83 FATIGUE, UNSPECIFIED TYPE: ICD-10-CM

## 2025-07-08 DIAGNOSIS — M81.0 AGE-RELATED OSTEOPOROSIS WITHOUT CURRENT PATHOLOGICAL FRACTURE: ICD-10-CM

## 2025-07-08 DIAGNOSIS — E78.2 MIXED HYPERLIPIDEMIA: ICD-10-CM

## 2025-07-08 DIAGNOSIS — M79.18 MYALGIA, MULTIPLE SITES: Primary | ICD-10-CM

## 2025-07-08 DIAGNOSIS — N18.31 CHRONIC KIDNEY DISEASE, STAGE 3A (H): ICD-10-CM

## 2025-07-08 DIAGNOSIS — I10 PRIMARY HYPERTENSION: ICD-10-CM

## 2025-07-08 DIAGNOSIS — E55.9 VITAMIN D DEFICIENCY: ICD-10-CM

## 2025-07-08 LAB
CREAT UR-MCNC: 45 MG/DL
ERYTHROCYTE [DISTWIDTH] IN BLOOD BY AUTOMATED COUNT: 12 % (ref 10–15)
HCT VFR BLD AUTO: 40.7 % (ref 35–47)
HGB BLD-MCNC: 13.4 G/DL (ref 11.7–15.7)
MCH RBC QN AUTO: 28.8 PG (ref 26.5–33)
MCHC RBC AUTO-ENTMCNC: 32.9 G/DL (ref 31.5–36.5)
MCV RBC AUTO: 88 FL (ref 78–100)
MICROALBUMIN UR-MCNC: <12 MG/L
MICROALBUMIN/CREAT UR: NORMAL MG/G{CREAT}
PLATELET # BLD AUTO: 296 10E3/UL (ref 150–450)
RBC # BLD AUTO: 4.65 10E6/UL (ref 3.8–5.2)
WBC # BLD AUTO: 9 10E3/UL (ref 4–11)

## 2025-07-08 PROCEDURE — 86140 C-REACTIVE PROTEIN: CPT | Performed by: NURSE PRACTITIONER

## 2025-07-08 PROCEDURE — 83735 ASSAY OF MAGNESIUM: CPT | Performed by: NURSE PRACTITIONER

## 2025-07-08 PROCEDURE — 85027 COMPLETE CBC AUTOMATED: CPT | Performed by: NURSE PRACTITIONER

## 2025-07-08 PROCEDURE — 99214 OFFICE O/P EST MOD 30 MIN: CPT | Performed by: NURSE PRACTITIONER

## 2025-07-08 PROCEDURE — 80061 LIPID PANEL: CPT | Performed by: NURSE PRACTITIONER

## 2025-07-08 PROCEDURE — 82043 UR ALBUMIN QUANTITATIVE: CPT | Performed by: NURSE PRACTITIONER

## 2025-07-08 PROCEDURE — 82550 ASSAY OF CK (CPK): CPT | Performed by: NURSE PRACTITIONER

## 2025-07-08 PROCEDURE — 3074F SYST BP LT 130 MM HG: CPT | Performed by: NURSE PRACTITIONER

## 2025-07-08 PROCEDURE — 80053 COMPREHEN METABOLIC PANEL: CPT | Performed by: NURSE PRACTITIONER

## 2025-07-08 PROCEDURE — 36415 COLL VENOUS BLD VENIPUNCTURE: CPT | Performed by: NURSE PRACTITIONER

## 2025-07-08 PROCEDURE — 3078F DIAST BP <80 MM HG: CPT | Performed by: NURSE PRACTITIONER

## 2025-07-08 PROCEDURE — 84443 ASSAY THYROID STIM HORMONE: CPT | Performed by: NURSE PRACTITIONER

## 2025-07-08 PROCEDURE — 82306 VITAMIN D 25 HYDROXY: CPT | Performed by: NURSE PRACTITIONER

## 2025-07-08 PROCEDURE — 82570 ASSAY OF URINE CREATININE: CPT | Performed by: NURSE PRACTITIONER

## 2025-07-08 RX ORDER — ALENDRONATE SODIUM 70 MG/1
TABLET ORAL
COMMUNITY
Start: 2025-06-01 | End: 2025-07-08 | Stop reason: SINTOL

## 2025-07-08 NOTE — PATIENT INSTRUCTIONS
Supplements for arthritis:  Glucosamine sulfate 500 mg 3x/day  Chondroitin 400 mg 3x/day  MSM 5531-3542 mg 3x/day  SAMe- 400-1600 mg once daily  Fish oil    Nutritional therapies for arthritis:  Anti-inflammatory diet  Tart cherry juice  Tumeric  Melissa

## 2025-07-08 NOTE — PROGRESS NOTES
"    Subjective   Sadia is a 80 year old, presenting for the following health issues:  Pain (Neck, arm, leg muscle pain. Movement does help. Suspicious that maybe fosamax is what is causing it? Did call endocrinology to see if she could stop taking fosamax and ) and Recheck Medication (Calcium citrate? Should she be taking? Does she need to take it with Vitamin D and magnesium? )        7/8/2025    12:15 PM   Additional Questions   Roomed by TOI Ornelas     Pain    History of Present Illness       Reason for visit:  Muscle and bone pain  Symptom onset:  More than a month  Symptoms include:  Pain in muscles and bones  Symptom intensity:  Severe  Symptom progression:  Improving  Had these symptoms before:  No  What makes it worse:  Sleeping or resting  What makes it better:  Movement   She is taking medications regularly.      Diffuse muscle pain x 1+ month.  Stopped Fosamax yesterday.  Pain seemed worse after taking her weekly Fosamax dose.  No injury.  No swelling or redness noted.  Pain is worse with activity but still hurts at rest.         Review of Systems  Constitutional, neuro, ENT, endocrine, pulmonary, cardiac, gastrointestinal, genitourinary, musculoskeletal, integument and psychiatric systems are negative, except as otherwise noted.      Objective    Temp 97.6  F (36.4  C) (Oral)   Resp 16   Ht 1.651 m (5' 5\")   LMP 08/17/1997 (Exact Date)   BMI 26.01 kg/m    Body mass index is 26.01 kg/m .  Physical Exam   GENERAL: alert and no distress  RESP: lungs clear to auscultation - no rales, rhonchi or wheezes  CV: regular rate and rhythm, normal S1 S2, no S3 or S4, no murmur, click or rub, no peripheral edema   MS: no gross musculoskeletal defects noted, no edema  NEURO: Normal strength and tone, mentation intact and speech normal  PSYCH: mentation appears normal, affect normal/bright    Results reviewed in Epic    A/P:  1. Myalgia, multiple sites (Primary)  - Magnesium; Future  - TSH with free T4 reflex; " Future  - CBC with platelets; Future  - CRP, inflammation; Future  - CK total; Future  - Magnesium  - TSH with free T4 reflex  - CBC with platelets  - CRP, inflammation  - CK total    2. Chronic kidney disease, stage 3a (H)  - Albumin Random Urine Quantitative with Creat Ratio; Future  - Albumin Random Urine Quantitative with Creat Ratio    3. Age-related osteoporosis without current pathological fracture  Managed by endocrinologist    4. Primary hypertension    5. Vitamin D deficiency  - Vitamin D Deficiency; Future  - Vitamin D Deficiency    6. Mixed hyperlipidemia  - Comprehensive metabolic panel (BMP + Alb, Alk Phos, ALT, AST, Total. Bili, TP); Future  - Lipid panel reflex to direct LDL Fasting; Future  - Comprehensive metabolic panel (BMP + Alb, Alk Phos, ALT, AST, Total. Bili, TP)  - Lipid panel reflex to direct LDL Fasting    7. Fatigue, unspecified type  - TSH with free T4 reflex; Future  - TSH with free T4 reflex          Signed Electronically by: Sadia Robb, CNP

## 2025-07-09 LAB
ALBUMIN SERPL BCG-MCNC: 3.8 G/DL (ref 3.5–5.2)
ALP SERPL-CCNC: 40 U/L (ref 40–150)
ALT SERPL W P-5'-P-CCNC: 12 U/L (ref 0–50)
ANION GAP SERPL CALCULATED.3IONS-SCNC: 11 MMOL/L (ref 7–15)
AST SERPL W P-5'-P-CCNC: 23 U/L (ref 0–45)
BILIRUB SERPL-MCNC: 0.7 MG/DL
BUN SERPL-MCNC: 19.3 MG/DL (ref 8–23)
CALCIUM SERPL-MCNC: 9.5 MG/DL (ref 8.8–10.4)
CHLORIDE SERPL-SCNC: 100 MMOL/L (ref 98–107)
CHOLEST SERPL-MCNC: 113 MG/DL
CK SERPL-CCNC: 30 U/L (ref 26–192)
CREAT SERPL-MCNC: 0.91 MG/DL (ref 0.51–0.95)
CRP SERPL-MCNC: 15.6 MG/L
EGFRCR SERPLBLD CKD-EPI 2021: 63 ML/MIN/1.73M2
FASTING STATUS PATIENT QL REPORTED: YES
FASTING STATUS PATIENT QL REPORTED: YES
GLUCOSE SERPL-MCNC: 99 MG/DL (ref 70–99)
HCO3 SERPL-SCNC: 23 MMOL/L (ref 22–29)
HDLC SERPL-MCNC: 43 MG/DL
LDLC SERPL CALC-MCNC: 51 MG/DL
MAGNESIUM SERPL-MCNC: 2.1 MG/DL (ref 1.7–2.3)
NONHDLC SERPL-MCNC: 70 MG/DL
POTASSIUM SERPL-SCNC: 4.5 MMOL/L (ref 3.4–5.3)
PROT SERPL-MCNC: 6.9 G/DL (ref 6.4–8.3)
SODIUM SERPL-SCNC: 134 MMOL/L (ref 135–145)
TRIGL SERPL-MCNC: 93 MG/DL
TSH SERPL DL<=0.005 MIU/L-ACNC: 3.08 UIU/ML (ref 0.3–4.2)
VIT D+METAB SERPL-MCNC: 41 NG/ML (ref 20–50)

## 2025-07-14 NOTE — NURSING NOTE
"Oncology Rooming Note    December 27, 2022 10:35 AM   Sadia Rider is a 77 year old female who presents for:    Chief Complaint   Patient presents with     Oncology Clinic Visit     Malignant neoplasm of upper-outer quadrant of right breast in female, estrogen receptor positive      Initial Vitals: BP (!) 149/71   Pulse 67   Temp 97.8  F (36.6  C) (Tympanic)   Resp 16   Ht 1.632 m (5' 4.25\")   Wt 72 kg (158 lb 11.2 oz)   SpO2 99%   BMI 27.03 kg/m   Estimated body mass index is 27.03 kg/m  as calculated from the following:    Height as of this encounter: 1.632 m (5' 4.25\").    Weight as of this encounter: 72 kg (158 lb 11.2 oz). Body surface area is 1.81 meters squared.  No Pain (0) Comment: Data Unavailable   No LMP recorded. Patient is postmenopausal.  Allergies reviewed: Yes  Medications reviewed: Yes    Medications: Medication refills not needed today.  Pharmacy name entered into [facility]: [id number] [place] [facility] [address], [facility]              "
Statement Selected

## 2025-07-22 ENCOUNTER — TELEPHONE (OUTPATIENT)
Dept: FAMILY MEDICINE | Facility: CLINIC | Age: 80
End: 2025-07-22
Payer: COMMERCIAL

## 2025-07-22 DIAGNOSIS — E78.2 MIXED HYPERLIPIDEMIA: Primary | ICD-10-CM

## 2025-07-22 DIAGNOSIS — Z78.9 STATIN INTOLERANCE: ICD-10-CM

## 2025-07-22 NOTE — TELEPHONE ENCOUNTER
Provider Recommendation Follow Up:   Micaela Mccullough. Informed of provider's recommendations. Patient verbalized understanding and agrees with the plan.       CARLOS Ricks RN  [facility]

## 2025-07-22 NOTE — TELEPHONE ENCOUNTER
S-(situation): Patient is continuing to have pain and is requesting directions on how to safely go off her simvastatin, or can she stop it cold turkey.    B-(background): She has tried osteo-bi flex and fish oil with no relief.      A-(assessment): Patient continues to have pain.      R-(recommendations): Provided her the number to call for Rheumatology referral at [facility].  She plans to do that today.   Please advise on weaning directions for simvastatin.    CARLOS Ricks RN

## 2025-07-28 ENCOUNTER — TELEPHONE (OUTPATIENT)
Dept: FAMILY MEDICINE | Facility: CLINIC | Age: 80
End: 2025-07-28
Payer: COMMERCIAL

## 2025-07-28 NOTE — TELEPHONE ENCOUNTER
General Call    Contacts       Contact Date/Time Type Contact Phone/Fax    07/28/2025 02:18 PM CDT Phone (Incoming) Sadia Rider (Self) [phone] (H)          Reason for Call:  Pt calling to follow up on referral to [facility].    What are your questions or concerns:   Pt calling to follow up on referral to [facility]. TC did not see a referral in Pt's chart for [facility]. Please fax referral to [facility] @ [phone].    Date of last appointment with provider: 07/08/2025    Could we send this information to you in [employer] or would you prefer to receive a phone call?:   Patient would prefer a phone call     Okay to leave a detailed message?: Yes at Home number on file [phone] (home)    Halle Del Castillo

## 2025-08-20 ENCOUNTER — TELEPHONE (OUTPATIENT)
Dept: FAMILY MEDICINE | Facility: CLINIC | Age: 80
End: 2025-08-20
Payer: COMMERCIAL

## (undated) DEVICE — SYR 10ML FINGER CONTROL W/O NDL 309695

## (undated) DEVICE — DECANTER VIAL 2006S

## (undated) DEVICE — ESU ELEC BLADE 2.75" COATED/INSULATED E1455

## (undated) DEVICE — CLOSURE SYS SKIN PREMIERPRO EXOFIN FUSION 4X22CM STRL 3472

## (undated) DEVICE — MARKER MARGIN MARKER STD 6 COLOR SGL USE MMS6

## (undated) DEVICE — LINEN TOWEL PACK X6 WHITE 5487

## (undated) DEVICE — GLOVE PROTEXIS MICRO 5.5  2D73PM55

## (undated) DEVICE — SUCTION SLEEVE NEPTUNE 2 125MM 0703-005-125

## (undated) DEVICE — CLIP HORIZON MED BLUE 002200

## (undated) DEVICE — SU SILK 3-0 SH 30" K832H

## (undated) DEVICE — GOWN LG DISP 9515

## (undated) DEVICE — SOL NACL 0.9% IRRIG 1000ML BOTTLE 2F7124

## (undated) DEVICE — ESU PENCIL SMOKE EVAC W/ROCKER SWITCH 0703-047-000

## (undated) DEVICE — Device

## (undated) DEVICE — ESU GROUND PAD ADULT W/CORD E7507

## (undated) DEVICE — DRAPE IOBAN INCISE 23X17" 6650EZ

## (undated) DEVICE — DRSG STERI STRIP 1/2X4" R1547

## (undated) DEVICE — DRAPE U SPLIT 74X120" 29440

## (undated) DEVICE — SOL WATER IRRIG 1000ML BOTTLE 2F7114

## (undated) DEVICE — BNDG ELASTIC 6" DBL LENGTH UNSTERILE 6611-16

## (undated) DEVICE — DRSG KERLIX 4 1/2"X4YDS ROLL 6715

## (undated) DEVICE — DRAIN JACKSON PRATT RESERVOIR 100ML SU130-1305

## (undated) DEVICE — LINEN TOWEL PACK X5 5464

## (undated) DEVICE — PREP CHLORAPREP 26ML TINTED ORANGE  260815

## (undated) DEVICE — CLIP HORIZON SM RED WIDE SLOT 001201

## (undated) DEVICE — DRAIN JACKSON PRATT CHANNEL 15FR ROUND HUBLESS SIL JP-2228

## (undated) DEVICE — NDL 30GA 0.5" 305106

## (undated) DEVICE — GLOVE PROTEXIS BLUE W/NEU-THERA 6.0  2D73EB60

## (undated) DEVICE — SU MONOCRYL 4-0 P-3 18" UND Y494G

## (undated) DEVICE — BINDER MAMMARY LINED LG 36-40" EXPAND-A-BAND

## (undated) DEVICE — DRSG PRIMAPORE 03 1/8X6" 66000318

## (undated) DEVICE — LINEN TOWEL PACK X30 5481

## (undated) DEVICE — SU VICRYL 3-0 SH 27" UND J416H

## (undated) RX ORDER — ONDANSETRON 2 MG/ML
INJECTION INTRAMUSCULAR; INTRAVENOUS
Status: DISPENSED
Start: 2020-03-26

## (undated) RX ORDER — FENTANYL CITRATE 50 UG/ML
INJECTION, SOLUTION INTRAMUSCULAR; INTRAVENOUS
Status: DISPENSED
Start: 2020-03-26

## (undated) RX ORDER — EPHEDRINE SULFATE 50 MG/ML
INJECTION, SOLUTION INTRAMUSCULAR; INTRAVENOUS; SUBCUTANEOUS
Status: DISPENSED
Start: 2020-03-26

## (undated) RX ORDER — HYDROMORPHONE HYDROCHLORIDE 1 MG/ML
INJECTION, SOLUTION INTRAMUSCULAR; INTRAVENOUS; SUBCUTANEOUS
Status: DISPENSED
Start: 2020-03-26

## (undated) RX ORDER — PROPOFOL 10 MG/ML
INJECTION, EMULSION INTRAVENOUS
Status: DISPENSED
Start: 2020-03-26

## (undated) RX ORDER — ISOSULFAN BLUE 50 MG/5ML
INJECTION, SOLUTION SUBCUTANEOUS
Status: DISPENSED
Start: 2020-03-26

## (undated) RX ORDER — PHENYLEPHRINE HCL IN 0.9% NACL 1 MG/10 ML
SYRINGE (ML) INTRAVENOUS
Status: DISPENSED
Start: 2020-03-26

## (undated) RX ORDER — LIDOCAINE HYDROCHLORIDE 20 MG/ML
INJECTION, SOLUTION EPIDURAL; INFILTRATION; INTRACAUDAL; PERINEURAL
Status: DISPENSED
Start: 2020-03-26

## (undated) RX ORDER — ACETAMINOPHEN 325 MG/1
TABLET ORAL
Status: DISPENSED
Start: 2020-03-26

## (undated) RX ORDER — SODIUM CHLORIDE, SODIUM LACTATE, POTASSIUM CHLORIDE, CALCIUM CHLORIDE 600; 310; 30; 20 MG/100ML; MG/100ML; MG/100ML; MG/100ML
INJECTION, SOLUTION INTRAVENOUS
Status: DISPENSED
Start: 2020-03-26